# Patient Record
Sex: FEMALE | Race: ASIAN | NOT HISPANIC OR LATINO
[De-identification: names, ages, dates, MRNs, and addresses within clinical notes are randomized per-mention and may not be internally consistent; named-entity substitution may affect disease eponyms.]

---

## 2018-01-16 ENCOUNTER — APPOINTMENT (OUTPATIENT)
Dept: INFECTIOUS DISEASE | Facility: CLINIC | Age: 63
End: 2018-01-16
Payer: SELF-PAY

## 2018-01-16 PROCEDURE — 90471 IMMUNIZATION ADMIN: CPT | Mod: NC

## 2018-01-16 PROCEDURE — 90717 YELLOW FEVER VACCINE SUBQ: CPT

## 2018-01-16 PROCEDURE — 99401 PREV MED CNSL INDIV APPRX 15: CPT | Mod: 25

## 2021-12-11 ENCOUNTER — APPOINTMENT (OUTPATIENT)
Dept: NEUROSURGERY | Facility: HOSPITAL | Age: 66
End: 2021-12-11

## 2021-12-11 ENCOUNTER — INPATIENT (INPATIENT)
Facility: HOSPITAL | Age: 66
LOS: 24 days | Discharge: NURSING, REHAB OR LONG-TERM CARE | DRG: 20 | End: 2022-01-05
Attending: NEUROLOGICAL SURGERY | Admitting: NEUROLOGICAL SURGERY
Payer: MEDICARE

## 2021-12-11 ENCOUNTER — EMERGENCY (EMERGENCY)
Facility: HOSPITAL | Age: 66
LOS: 1 days | Discharge: ANOTHER HOSPITAL | End: 2021-12-11
Attending: EMERGENCY MEDICINE | Admitting: EMERGENCY MEDICINE
Payer: MEDICARE

## 2021-12-11 VITALS
DIASTOLIC BLOOD PRESSURE: 76 MMHG | OXYGEN SATURATION: 96 % | HEART RATE: 73 BPM | HEIGHT: 64 IN | RESPIRATION RATE: 16 BRPM | SYSTOLIC BLOOD PRESSURE: 129 MMHG | WEIGHT: 149.91 LBS

## 2021-12-11 VITALS — SYSTOLIC BLOOD PRESSURE: 128 MMHG | DIASTOLIC BLOOD PRESSURE: 72 MMHG | HEART RATE: 86 BPM

## 2021-12-11 VITALS
HEART RATE: 78 BPM | OXYGEN SATURATION: 100 % | DIASTOLIC BLOOD PRESSURE: 87 MMHG | TEMPERATURE: 98 F | SYSTOLIC BLOOD PRESSURE: 152 MMHG | RESPIRATION RATE: 20 BRPM

## 2021-12-11 DIAGNOSIS — Z86.79 PERSONAL HISTORY OF OTHER DISEASES OF THE CIRCULATORY SYSTEM: ICD-10-CM

## 2021-12-11 DIAGNOSIS — I60.9 NONTRAUMATIC SUBARACHNOID HEMORRHAGE, UNSPECIFIED: ICD-10-CM

## 2021-12-11 LAB
A1C WITH ESTIMATED AVERAGE GLUCOSE RESULT: 5.4 % — SIGNIFICANT CHANGE UP (ref 4–5.6)
ALBUMIN SERPL ELPH-MCNC: 4.3 G/DL — SIGNIFICANT CHANGE UP (ref 3.3–5)
ALBUMIN SERPL ELPH-MCNC: 4.3 G/DL — SIGNIFICANT CHANGE UP (ref 3.3–5)
ALP SERPL-CCNC: 77 U/L — SIGNIFICANT CHANGE UP (ref 40–120)
ALP SERPL-CCNC: 78 U/L — SIGNIFICANT CHANGE UP (ref 40–120)
ALT FLD-CCNC: 13 U/L — SIGNIFICANT CHANGE UP (ref 10–45)
ALT FLD-CCNC: 13 U/L — SIGNIFICANT CHANGE UP (ref 4–33)
ANION GAP SERPL CALC-SCNC: 11 MMOL/L — SIGNIFICANT CHANGE UP (ref 5–17)
ANION GAP SERPL CALC-SCNC: 11 MMOL/L — SIGNIFICANT CHANGE UP (ref 5–17)
ANION GAP SERPL CALC-SCNC: 13 MMOL/L — SIGNIFICANT CHANGE UP (ref 7–14)
APPEARANCE UR: CLEAR — SIGNIFICANT CHANGE UP
APTT BLD: 30.2 SEC — SIGNIFICANT CHANGE UP (ref 27.5–35.5)
APTT BLD: 32.6 SEC — SIGNIFICANT CHANGE UP (ref 27–36.3)
AST SERPL-CCNC: 15 U/L — SIGNIFICANT CHANGE UP (ref 10–40)
AST SERPL-CCNC: 16 U/L — SIGNIFICANT CHANGE UP (ref 4–32)
B PERT DNA SPEC QL NAA+PROBE: SIGNIFICANT CHANGE UP
B PERT+PARAPERT DNA PNL SPEC NAA+PROBE: SIGNIFICANT CHANGE UP
BACTERIA # UR AUTO: NEGATIVE — SIGNIFICANT CHANGE UP
BASOPHILS # BLD AUTO: 0.03 K/UL — SIGNIFICANT CHANGE UP (ref 0–0.2)
BASOPHILS # BLD AUTO: 0.03 K/UL — SIGNIFICANT CHANGE UP (ref 0–0.2)
BASOPHILS NFR BLD AUTO: 0.3 % — SIGNIFICANT CHANGE UP (ref 0–2)
BASOPHILS NFR BLD AUTO: 0.4 % — SIGNIFICANT CHANGE UP (ref 0–2)
BILIRUB SERPL-MCNC: 0.4 MG/DL — SIGNIFICANT CHANGE UP (ref 0.2–1.2)
BILIRUB SERPL-MCNC: 0.4 MG/DL — SIGNIFICANT CHANGE UP (ref 0.2–1.2)
BILIRUB UR-MCNC: NEGATIVE — SIGNIFICANT CHANGE UP
BLD GP AB SCN SERPL QL: NEGATIVE — SIGNIFICANT CHANGE UP
BLD GP AB SCN SERPL QL: NEGATIVE — SIGNIFICANT CHANGE UP
BORDETELLA PARAPERTUSSIS (RAPRVP): SIGNIFICANT CHANGE UP
BUN SERPL-MCNC: 11 MG/DL — SIGNIFICANT CHANGE UP (ref 7–23)
BUN SERPL-MCNC: 6 MG/DL — LOW (ref 7–23)
BUN SERPL-MCNC: 8 MG/DL — SIGNIFICANT CHANGE UP (ref 7–23)
C PNEUM DNA SPEC QL NAA+PROBE: SIGNIFICANT CHANGE UP
CALCIUM SERPL-MCNC: 8.4 MG/DL — SIGNIFICANT CHANGE UP (ref 8.4–10.5)
CALCIUM SERPL-MCNC: 8.7 MG/DL — SIGNIFICANT CHANGE UP (ref 8.4–10.5)
CALCIUM SERPL-MCNC: 9.5 MG/DL — SIGNIFICANT CHANGE UP (ref 8.4–10.5)
CHLORIDE SERPL-SCNC: 100 MMOL/L — SIGNIFICANT CHANGE UP (ref 98–107)
CHLORIDE SERPL-SCNC: 105 MMOL/L — SIGNIFICANT CHANGE UP (ref 96–108)
CHLORIDE SERPL-SCNC: 112 MMOL/L — HIGH (ref 96–108)
CHOLEST SERPL-MCNC: 241 MG/DL — HIGH
CO2 SERPL-SCNC: 21 MMOL/L — LOW (ref 22–31)
CO2 SERPL-SCNC: 22 MMOL/L — SIGNIFICANT CHANGE UP (ref 22–31)
CO2 SERPL-SCNC: 23 MMOL/L — SIGNIFICANT CHANGE UP (ref 22–31)
COLOR SPEC: COLORLESS — SIGNIFICANT CHANGE UP
CREAT SERPL-MCNC: 0.47 MG/DL — LOW (ref 0.5–1.3)
CREAT SERPL-MCNC: 0.53 MG/DL — SIGNIFICANT CHANGE UP (ref 0.5–1.3)
CREAT SERPL-MCNC: 0.55 MG/DL — SIGNIFICANT CHANGE UP (ref 0.5–1.3)
DIFF PNL FLD: ABNORMAL
EOSINOPHIL # BLD AUTO: 0 K/UL — SIGNIFICANT CHANGE UP (ref 0–0.5)
EOSINOPHIL # BLD AUTO: 0.14 K/UL — SIGNIFICANT CHANGE UP (ref 0–0.5)
EOSINOPHIL NFR BLD AUTO: 0 % — SIGNIFICANT CHANGE UP (ref 0–6)
EOSINOPHIL NFR BLD AUTO: 2 % — SIGNIFICANT CHANGE UP (ref 0–6)
EPI CELLS # UR: 0 /HPF — SIGNIFICANT CHANGE UP
ESTIMATED AVERAGE GLUCOSE: 108 MG/DL — SIGNIFICANT CHANGE UP (ref 68–114)
FLUAV SUBTYP SPEC NAA+PROBE: SIGNIFICANT CHANGE UP
FLUBV RNA SPEC QL NAA+PROBE: SIGNIFICANT CHANGE UP
GLUCOSE SERPL-MCNC: 100 MG/DL — HIGH (ref 70–99)
GLUCOSE SERPL-MCNC: 108 MG/DL — HIGH (ref 70–99)
GLUCOSE SERPL-MCNC: 126 MG/DL — HIGH (ref 70–99)
GLUCOSE UR QL: NEGATIVE — SIGNIFICANT CHANGE UP
HADV DNA SPEC QL NAA+PROBE: SIGNIFICANT CHANGE UP
HCOV 229E RNA SPEC QL NAA+PROBE: SIGNIFICANT CHANGE UP
HCOV HKU1 RNA SPEC QL NAA+PROBE: SIGNIFICANT CHANGE UP
HCOV NL63 RNA SPEC QL NAA+PROBE: SIGNIFICANT CHANGE UP
HCOV OC43 RNA SPEC QL NAA+PROBE: SIGNIFICANT CHANGE UP
HCT VFR BLD CALC: 37.7 % — SIGNIFICANT CHANGE UP (ref 34.5–45)
HCT VFR BLD CALC: 43.3 % — SIGNIFICANT CHANGE UP (ref 34.5–45)
HCT VFR BLD CALC: 43.9 % — SIGNIFICANT CHANGE UP (ref 34.5–45)
HDLC SERPL-MCNC: 71 MG/DL — SIGNIFICANT CHANGE UP
HEPARINASE TEG R TIME: 5.4 MIN — SIGNIFICANT CHANGE UP (ref 4.3–8.3)
HGB BLD-MCNC: 12.5 G/DL — SIGNIFICANT CHANGE UP (ref 11.5–15.5)
HGB BLD-MCNC: 14.1 G/DL — SIGNIFICANT CHANGE UP (ref 11.5–15.5)
HGB BLD-MCNC: 14.5 G/DL — SIGNIFICANT CHANGE UP (ref 11.5–15.5)
HMPV RNA SPEC QL NAA+PROBE: SIGNIFICANT CHANGE UP
HPIV1 RNA SPEC QL NAA+PROBE: SIGNIFICANT CHANGE UP
HPIV2 RNA SPEC QL NAA+PROBE: SIGNIFICANT CHANGE UP
HPIV3 RNA SPEC QL NAA+PROBE: SIGNIFICANT CHANGE UP
HPIV4 RNA SPEC QL NAA+PROBE: SIGNIFICANT CHANGE UP
IANC: 6.67 K/UL — SIGNIFICANT CHANGE UP (ref 1.5–8.5)
IMM GRANULOCYTES NFR BLD AUTO: 0.3 % — SIGNIFICANT CHANGE UP (ref 0–1.5)
IMM GRANULOCYTES NFR BLD AUTO: 0.4 % — SIGNIFICANT CHANGE UP (ref 0–1.5)
INR BLD: 0.97 RATIO — SIGNIFICANT CHANGE UP (ref 0.88–1.16)
INR BLD: 0.99 RATIO — SIGNIFICANT CHANGE UP (ref 0.88–1.16)
KETONES UR-MCNC: ABNORMAL
LEUKOCYTE ESTERASE UR-ACNC: NEGATIVE — SIGNIFICANT CHANGE UP
LIPID PNL WITH DIRECT LDL SERPL: 153 MG/DL — HIGH
LYMPHOCYTES # BLD AUTO: 1.69 K/UL — SIGNIFICANT CHANGE UP (ref 1–3.3)
LYMPHOCYTES # BLD AUTO: 1.77 K/UL — SIGNIFICANT CHANGE UP (ref 1–3.3)
LYMPHOCYTES # BLD AUTO: 19.6 % — SIGNIFICANT CHANGE UP (ref 13–44)
LYMPHOCYTES # BLD AUTO: 24 % — SIGNIFICANT CHANGE UP (ref 13–44)
M PNEUMO DNA SPEC QL NAA+PROBE: SIGNIFICANT CHANGE UP
MAGNESIUM SERPL-MCNC: 1.8 MG/DL — SIGNIFICANT CHANGE UP (ref 1.6–2.6)
MAGNESIUM SERPL-MCNC: 2.1 MG/DL — SIGNIFICANT CHANGE UP (ref 1.6–2.6)
MCHC RBC-ENTMCNC: 29.9 PG — SIGNIFICANT CHANGE UP (ref 27–34)
MCHC RBC-ENTMCNC: 30.2 PG — SIGNIFICANT CHANGE UP (ref 27–34)
MCHC RBC-ENTMCNC: 30.6 PG — SIGNIFICANT CHANGE UP (ref 27–34)
MCHC RBC-ENTMCNC: 32.6 GM/DL — SIGNIFICANT CHANGE UP (ref 32–36)
MCHC RBC-ENTMCNC: 33 GM/DL — SIGNIFICANT CHANGE UP (ref 32–36)
MCHC RBC-ENTMCNC: 33.2 GM/DL — SIGNIFICANT CHANGE UP (ref 32–36)
MCV RBC AUTO: 91.5 FL — SIGNIFICANT CHANGE UP (ref 80–100)
MCV RBC AUTO: 91.9 FL — SIGNIFICANT CHANGE UP (ref 80–100)
MCV RBC AUTO: 92.2 FL — SIGNIFICANT CHANGE UP (ref 80–100)
MONOCYTES # BLD AUTO: 0.37 K/UL — SIGNIFICANT CHANGE UP (ref 0–0.9)
MONOCYTES # BLD AUTO: 0.51 K/UL — SIGNIFICANT CHANGE UP (ref 0–0.9)
MONOCYTES NFR BLD AUTO: 5.2 % — SIGNIFICANT CHANGE UP (ref 2–14)
MONOCYTES NFR BLD AUTO: 5.7 % — SIGNIFICANT CHANGE UP (ref 2–14)
NEUTROPHILS # BLD AUTO: 4.79 K/UL — SIGNIFICANT CHANGE UP (ref 1.8–7.4)
NEUTROPHILS # BLD AUTO: 6.67 K/UL — SIGNIFICANT CHANGE UP (ref 1.8–7.4)
NEUTROPHILS NFR BLD AUTO: 68 % — SIGNIFICANT CHANGE UP (ref 43–77)
NEUTROPHILS NFR BLD AUTO: 74.1 % — SIGNIFICANT CHANGE UP (ref 43–77)
NITRITE UR-MCNC: NEGATIVE — SIGNIFICANT CHANGE UP
NON HDL CHOLESTEROL: 170 MG/DL — HIGH
NRBC # BLD: 0 /100 WBCS — SIGNIFICANT CHANGE UP
NRBC # BLD: 0 /100 WBCS — SIGNIFICANT CHANGE UP (ref 0–0)
NRBC # BLD: 0 /100 WBCS — SIGNIFICANT CHANGE UP (ref 0–0)
NRBC # FLD: 0 K/UL — SIGNIFICANT CHANGE UP
PA ADP PRP-ACNC: 195 PRU — SIGNIFICANT CHANGE UP (ref 194–417)
PH UR: 6.5 — SIGNIFICANT CHANGE UP (ref 5–8)
PHOSPHATE SERPL-MCNC: 2.2 MG/DL — LOW (ref 2.5–4.5)
PLATELET # BLD AUTO: 157 K/UL — SIGNIFICANT CHANGE UP (ref 150–400)
PLATELET # BLD AUTO: 185 K/UL — SIGNIFICANT CHANGE UP (ref 150–400)
PLATELET # BLD AUTO: 188 K/UL — SIGNIFICANT CHANGE UP (ref 150–400)
PLATELET RESPONSE ASPIRIN RESULT: 643 ARU — SIGNIFICANT CHANGE UP (ref 350–700)
POTASSIUM SERPL-MCNC: 3.1 MMOL/L — LOW (ref 3.5–5.3)
POTASSIUM SERPL-MCNC: 3.3 MMOL/L — LOW (ref 3.5–5.3)
POTASSIUM SERPL-MCNC: 3.6 MMOL/L — SIGNIFICANT CHANGE UP (ref 3.5–5.3)
POTASSIUM SERPL-SCNC: 3.1 MMOL/L — LOW (ref 3.5–5.3)
POTASSIUM SERPL-SCNC: 3.3 MMOL/L — LOW (ref 3.5–5.3)
POTASSIUM SERPL-SCNC: 3.6 MMOL/L — SIGNIFICANT CHANGE UP (ref 3.5–5.3)
PROT SERPL-MCNC: 6.4 G/DL — SIGNIFICANT CHANGE UP (ref 6–8.3)
PROT SERPL-MCNC: 7 G/DL — SIGNIFICANT CHANGE UP (ref 6–8.3)
PROT UR-MCNC: NEGATIVE — SIGNIFICANT CHANGE UP
PROTHROM AB SERPL-ACNC: 11.3 SEC — SIGNIFICANT CHANGE UP (ref 10.6–13.6)
PROTHROM AB SERPL-ACNC: 11.6 SEC — SIGNIFICANT CHANGE UP (ref 10.6–13.6)
RAPID RVP RESULT: SIGNIFICANT CHANGE UP
RAPIDTEG MAXIMUM AMPLITUDE: 60.8 MM — SIGNIFICANT CHANGE UP (ref 52–70)
RBC # BLD: 4.09 M/UL — SIGNIFICANT CHANGE UP (ref 3.8–5.2)
RBC # BLD: 4.71 M/UL — SIGNIFICANT CHANGE UP (ref 3.8–5.2)
RBC # BLD: 4.8 M/UL — SIGNIFICANT CHANGE UP (ref 3.8–5.2)
RBC # FLD: 12.6 % — SIGNIFICANT CHANGE UP (ref 10.3–14.5)
RBC # FLD: 12.6 % — SIGNIFICANT CHANGE UP (ref 10.3–14.5)
RBC # FLD: 13 % — SIGNIFICANT CHANGE UP (ref 10.3–14.5)
RBC CASTS # UR COMP ASSIST: 21 /HPF — HIGH (ref 0–4)
RH IG SCN BLD-IMP: POSITIVE — SIGNIFICANT CHANGE UP
RH IG SCN BLD-IMP: POSITIVE — SIGNIFICANT CHANGE UP
RSV RNA SPEC QL NAA+PROBE: SIGNIFICANT CHANGE UP
RV+EV RNA SPEC QL NAA+PROBE: SIGNIFICANT CHANGE UP
SARS-COV-2 RNA SPEC QL NAA+PROBE: SIGNIFICANT CHANGE UP
SARS-COV-2 RNA SPEC QL NAA+PROBE: SIGNIFICANT CHANGE UP
SODIUM SERPL-SCNC: 135 MMOL/L — SIGNIFICANT CHANGE UP (ref 135–145)
SODIUM SERPL-SCNC: 139 MMOL/L — SIGNIFICANT CHANGE UP (ref 135–145)
SODIUM SERPL-SCNC: 144 MMOL/L — SIGNIFICANT CHANGE UP (ref 135–145)
SP GR SPEC: 1.03 — HIGH (ref 1.01–1.02)
TEG FUNCTIONAL FIBRINOGEN: 20.9 MM — SIGNIFICANT CHANGE UP (ref 15–32)
TEG MAXIMUM AMPLITUDE: 60.2 MM — SIGNIFICANT CHANGE UP (ref 52–69)
TEG REACTION TIME: 6.2 MIN — SIGNIFICANT CHANGE UP (ref 4.6–9.1)
TRIGL SERPL-MCNC: 87 MG/DL — SIGNIFICANT CHANGE UP
TSH SERPL-MCNC: 0.46 UIU/ML — SIGNIFICANT CHANGE UP (ref 0.27–4.2)
UROBILINOGEN FLD QL: NEGATIVE — SIGNIFICANT CHANGE UP
WBC # BLD: 10.6 K/UL — HIGH (ref 3.8–10.5)
WBC # BLD: 7.05 K/UL — SIGNIFICANT CHANGE UP (ref 3.8–10.5)
WBC # BLD: 9.01 K/UL — SIGNIFICANT CHANGE UP (ref 3.8–10.5)
WBC # FLD AUTO: 10.6 K/UL — HIGH (ref 3.8–10.5)
WBC # FLD AUTO: 7.05 K/UL — SIGNIFICANT CHANGE UP (ref 3.8–10.5)
WBC # FLD AUTO: 9.01 K/UL — SIGNIFICANT CHANGE UP (ref 3.8–10.5)
WBC UR QL: 2 /HPF — SIGNIFICANT CHANGE UP (ref 0–5)

## 2021-12-11 PROCEDURE — 36224 PLACE CATH CAROTD ART: CPT | Mod: 59,RT

## 2021-12-11 PROCEDURE — 99291 CRITICAL CARE FIRST HOUR: CPT

## 2021-12-11 PROCEDURE — 36226 PLACE CATH VERTEBRAL ART: CPT | Mod: 50,59

## 2021-12-11 PROCEDURE — 70496 CT ANGIOGRAPHY HEAD: CPT | Mod: 26

## 2021-12-11 PROCEDURE — 99292 CRITICAL CARE ADDL 30 MIN: CPT

## 2021-12-11 PROCEDURE — 61645 PERQ ART M-THROMBECT &/NFS: CPT | Mod: LT

## 2021-12-11 PROCEDURE — 70450 CT HEAD/BRAIN W/O DYE: CPT | Mod: 26,77

## 2021-12-11 PROCEDURE — 93010 ELECTROCARDIOGRAM REPORT: CPT

## 2021-12-11 PROCEDURE — 75894 X-RAYS TRANSCATH THERAPY: CPT | Mod: 26,59

## 2021-12-11 PROCEDURE — 36227 PLACE CATH XTRNL CAROTID: CPT | Mod: 59,RT

## 2021-12-11 PROCEDURE — 99285 EMERGENCY DEPT VISIT HI MDM: CPT

## 2021-12-11 PROCEDURE — 70450 CT HEAD/BRAIN W/O DYE: CPT | Mod: 26,MA

## 2021-12-11 PROCEDURE — 70498 CT ANGIOGRAPHY NECK: CPT | Mod: 26

## 2021-12-11 PROCEDURE — 61624 TCAT PERM OCCLS/EMBOLJ CNS: CPT

## 2021-12-11 PROCEDURE — 75898 FOLLOW-UP ANGIOGRAPHY: CPT | Mod: 26,59

## 2021-12-11 PROCEDURE — 76377 3D RENDER W/INTRP POSTPROCES: CPT | Mod: 26

## 2021-12-11 RX ORDER — POTASSIUM CHLORIDE 20 MEQ
10 PACKET (EA) ORAL
Refills: 0 | Status: DISCONTINUED | OUTPATIENT
Start: 2021-12-11 | End: 2021-12-11

## 2021-12-11 RX ORDER — TRAMADOL HYDROCHLORIDE 50 MG/1
25 TABLET ORAL EVERY 4 HOURS
Refills: 0 | Status: DISCONTINUED | OUTPATIENT
Start: 2021-12-11 | End: 2021-12-12

## 2021-12-11 RX ORDER — METOCLOPRAMIDE HCL 10 MG
10 TABLET ORAL ONCE
Refills: 0 | Status: COMPLETED | OUTPATIENT
Start: 2021-12-11 | End: 2021-12-11

## 2021-12-11 RX ORDER — SODIUM CHLORIDE 9 MG/ML
1000 INJECTION INTRAMUSCULAR; INTRAVENOUS; SUBCUTANEOUS
Refills: 0 | Status: DISCONTINUED | OUTPATIENT
Start: 2021-12-11 | End: 2021-12-12

## 2021-12-11 RX ORDER — DIPHENHYDRAMINE HCL 50 MG
50 CAPSULE ORAL ONCE
Refills: 0 | Status: COMPLETED | OUTPATIENT
Start: 2021-12-11 | End: 2021-12-11

## 2021-12-11 RX ORDER — NICARDIPINE HYDROCHLORIDE 30 MG/1
5 CAPSULE, EXTENDED RELEASE ORAL
Qty: 40 | Refills: 0 | Status: DISCONTINUED | OUTPATIENT
Start: 2021-12-11 | End: 2021-12-12

## 2021-12-11 RX ORDER — NICARDIPINE HYDROCHLORIDE 30 MG/1
5 CAPSULE, EXTENDED RELEASE ORAL
Qty: 40 | Refills: 0 | Status: DISCONTINUED | OUTPATIENT
Start: 2021-12-11 | End: 2021-12-14

## 2021-12-11 RX ORDER — NIMODIPINE 60 MG/10ML
30 SOLUTION ORAL
Refills: 0 | Status: DISCONTINUED | OUTPATIENT
Start: 2021-12-11 | End: 2021-12-13

## 2021-12-11 RX ORDER — LEVETIRACETAM 250 MG/1
500 TABLET, FILM COATED ORAL EVERY 12 HOURS
Refills: 0 | Status: DISCONTINUED | OUTPATIENT
Start: 2021-12-11 | End: 2021-12-11

## 2021-12-11 RX ORDER — SODIUM CHLORIDE 9 MG/ML
500 INJECTION INTRAMUSCULAR; INTRAVENOUS; SUBCUTANEOUS ONCE
Refills: 0 | Status: COMPLETED | OUTPATIENT
Start: 2021-12-11 | End: 2021-12-11

## 2021-12-11 RX ORDER — ACETAMINOPHEN 500 MG
1000 TABLET ORAL ONCE
Refills: 0 | Status: COMPLETED | OUTPATIENT
Start: 2021-12-11 | End: 2021-12-11

## 2021-12-11 RX ORDER — ACETAMINOPHEN 500 MG
650 TABLET ORAL EVERY 6 HOURS
Refills: 0 | Status: DISCONTINUED | OUTPATIENT
Start: 2021-12-11 | End: 2022-01-05

## 2021-12-11 RX ORDER — CHLORHEXIDINE GLUCONATE 213 G/1000ML
1 SOLUTION TOPICAL
Refills: 0 | Status: DISCONTINUED | OUTPATIENT
Start: 2021-12-11 | End: 2021-12-26

## 2021-12-11 RX ORDER — POTASSIUM CHLORIDE 20 MEQ
40 PACKET (EA) ORAL ONCE
Refills: 0 | Status: COMPLETED | OUTPATIENT
Start: 2021-12-11 | End: 2021-12-11

## 2021-12-11 RX ORDER — POTASSIUM PHOSPHATE, MONOBASIC POTASSIUM PHOSPHATE, DIBASIC 236; 224 MG/ML; MG/ML
15 INJECTION, SOLUTION INTRAVENOUS ONCE
Refills: 0 | Status: COMPLETED | OUTPATIENT
Start: 2021-12-11 | End: 2021-12-11

## 2021-12-11 RX ORDER — LEVETIRACETAM 250 MG/1
1500 TABLET, FILM COATED ORAL ONCE
Refills: 0 | Status: COMPLETED | OUTPATIENT
Start: 2021-12-11 | End: 2021-12-11

## 2021-12-11 RX ORDER — SENNA PLUS 8.6 MG/1
2 TABLET ORAL AT BEDTIME
Refills: 0 | Status: DISCONTINUED | OUTPATIENT
Start: 2021-12-11 | End: 2021-12-13

## 2021-12-11 RX ORDER — SODIUM CHLORIDE 9 MG/ML
1000 INJECTION INTRAMUSCULAR; INTRAVENOUS; SUBCUTANEOUS ONCE
Refills: 0 | Status: COMPLETED | OUTPATIENT
Start: 2021-12-11 | End: 2021-12-11

## 2021-12-11 RX ORDER — NIMODIPINE 60 MG/10ML
60 SOLUTION ORAL ONCE
Refills: 0 | Status: DISCONTINUED | OUTPATIENT
Start: 2021-12-11 | End: 2021-12-14

## 2021-12-11 RX ORDER — POTASSIUM CHLORIDE 20 MEQ
40 PACKET (EA) ORAL EVERY 4 HOURS
Refills: 0 | Status: COMPLETED | OUTPATIENT
Start: 2021-12-11 | End: 2021-12-12

## 2021-12-11 RX ADMIN — SODIUM CHLORIDE 1000 MILLILITER(S): 9 INJECTION INTRAMUSCULAR; INTRAVENOUS; SUBCUTANEOUS at 02:58

## 2021-12-11 RX ADMIN — Medication 50 MILLIGRAM(S): at 02:59

## 2021-12-11 RX ADMIN — NICARDIPINE HYDROCHLORIDE 25 MG/HR: 30 CAPSULE, EXTENDED RELEASE ORAL at 21:26

## 2021-12-11 RX ADMIN — LEVETIRACETAM 400 MILLIGRAM(S): 250 TABLET, FILM COATED ORAL at 18:43

## 2021-12-11 RX ADMIN — POTASSIUM PHOSPHATE, MONOBASIC POTASSIUM PHOSPHATE, DIBASIC 62.5 MILLIMOLE(S): 236; 224 INJECTION, SOLUTION INTRAVENOUS at 23:32

## 2021-12-11 RX ADMIN — Medication 40 MILLIEQUIVALENT(S): at 04:33

## 2021-12-11 RX ADMIN — SODIUM CHLORIDE 75 MILLILITER(S): 9 INJECTION INTRAMUSCULAR; INTRAVENOUS; SUBCUTANEOUS at 21:26

## 2021-12-11 RX ADMIN — Medication 1000 MILLIGRAM(S): at 19:13

## 2021-12-11 RX ADMIN — Medication 10 MILLIGRAM(S): at 02:59

## 2021-12-11 RX ADMIN — CHLORHEXIDINE GLUCONATE 1 APPLICATION(S): 213 SOLUTION TOPICAL at 22:57

## 2021-12-11 RX ADMIN — Medication 400 MILLIGRAM(S): at 18:43

## 2021-12-11 RX ADMIN — NICARDIPINE HYDROCHLORIDE 25 MG/HR: 30 CAPSULE, EXTENDED RELEASE ORAL at 06:18

## 2021-12-11 RX ADMIN — LEVETIRACETAM 400 MILLIGRAM(S): 250 TABLET, FILM COATED ORAL at 06:19

## 2021-12-11 RX ADMIN — Medication 400 MILLIGRAM(S): at 06:18

## 2021-12-11 RX ADMIN — TRAMADOL HYDROCHLORIDE 25 MILLIGRAM(S): 50 TABLET ORAL at 23:29

## 2021-12-11 RX ADMIN — SODIUM CHLORIDE 500 MILLILITER(S): 9 INJECTION INTRAMUSCULAR; INTRAVENOUS; SUBCUTANEOUS at 22:57

## 2021-12-11 NOTE — ED ADULT NURSE NOTE - OBJECTIVE STATEMENT
67 y/o female presents to ED as transfer from [facility] ED for NSGY services, found to have atraumatic SAH overnight on CTs. Pt was reporting headache "all over" last night and  brought her to ED. No known PMH, no daily meds or blood thinners. A&Ox2 to person and place, arousable to voice, breathing unlabored, no edema, abd soft nontender, skin warm dry and intact. 2 patent peripheral IVs in place on arrival. NSGY resident at bedside.

## 2021-12-11 NOTE — ED PROVIDER NOTE - PROGRESS NOTE DETAILS
Mario RODNEY (PGY-2)   Received call from rads, found to have SAH on CT with hydrocephalus. Ordered CTA's, informed NSGY, administered keppra load, cardine gtt started to control sBP<140. Will likely need transfer to NS after imaging

## 2021-12-11 NOTE — CHART NOTE - NSCHARTNOTEFT_GEN_A_CORE
Interventional Neuro- Radiology   Procedure Note PA-C    Procedure: Selective Cerebral Angiography   Pre- Procedure Diagnosis:  Post- Procedure Diagnosis:    : Dr. Vladimir Lucas      RN:    Anesthesia: (MAC)   (general anesthesia)    Sheath:    I/Os:  Estimated blood loss less than 10cc  IV fluids:     cc     Urine output     cc        Contrast Omnipaque 240      cc         Antibiotics:    Vitals: BP         HR      Spo2    %      Spo2    %    Preliminary Report:    Using a 4 Fr short/long sheath to the right groin under MAC sedation via   left vertebral artery,  left common carotid artery, left external carotid artey, right vertebral arter,  right common carotid artery, right external carotid artery  a selective cerebral angiography was performed and  demonstrates ________. ( Official note to follow).  Patient tolerated procedure well, hemodynamically stable, no change in neurological status compared to baseline.  Results discussed with neurosurgery, patient and patient's  family.  Groin sheath was removed,  manual compression held to hemostasis  for  21 minutes, no active bleeding, no hematoma, avitene applied,  quick clot and safeguard balloon dressing applied at _____h.  STAT labs:  CBC BMP  ____h.  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 Vladimir Lucas  Fellow: Dr. Wilmar Rodriguez  Resident: Dr. Reji Packer  NP: Cynthia Eric  RN: Oneil    Anesthesia: Dr. Han (general anesthesia)    Sheath: 8 Yoruba BMX    I/Os:  Estimated blood loss: less than 10cc    IV fluids: 900cc     Urine output: 450     cc        Contrast Omnipaque 240: 97      cc         Antibiotics: ancef 2 grams  Intra arterial heparin: 4000 units  Intra arterial verapamil: 3mg     Vitals: BP  141/78        HR 68     Spo2 100   %      Preliminary Report:  Using a 8 Yoruba bmx sheath to the right groin under general anesthesia via left vertebral artery,  left internal carotid artery, left external carotid artery, right vertebral artery,  right internal carotid artery, right external carotid artery  a selective cerebral angiography was performed and  demonstrates bilobed left pcomm aneurysm.  Proceeded with endovascular embolization of the aneurysm with balloon assisted coiling (3coils). Partial left m2 thombus now s/p thrombectomy with tici 3 reperfusion.  Aneurysm well secured and thrombosed  ( Official note to follow).  Patient tolerated procedure well, hemodynamically stable.   Results discussed with neurosurgery  and patient's  family.  Groin sheath was removed, kelt device deployed, manual compression held to hemostasis  for  21 minutes, no active bleeding, no hematoma,  quick clot and safeguard balloon dressing applied at 1230h.  Patient transferred to NSCU  Keep -140 post procedure

## 2021-12-11 NOTE — ED PROVIDER NOTE - OBJECTIVE STATEMENT
66F smoker no significant PMH presenting for diffuse, stabbing headache that started gradually around 12 hrs ago, associated with nausea and vomiting x3. No fevers/chills, neck stiffness, vision changes, chest pain, SOB, abdominal pain, leg swelling, vision changes. Has never had headache like this before. No FHx of aneurysms of brain bleeds

## 2021-12-11 NOTE — PROGRESS NOTE ADULT - SUBJECTIVE AND OBJECTIVE BOX
HPI:  66F, no sig PMH, txfered from [facility] for HA since 12/10 morning. Had associated nausea/vomiting and posterior neck pain. CT: diffuse SAH, most prominent at Sylvian fissures. Exam: Somnolent, Ox2.5 (said November), Left pupil 6NR; Right pupil 5NR, EOMI, no facial, Right drift, LAWTON 5/5   (11 Dec 2021 11:22)      EVENTS:   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; partial left m2 thombus now s/p thrombectomy with tici 3 reperfusion      ICU Vital Signs Last 24 Hrs  T(C): 36.9 (11 Dec 2021 10:35), Max: 36.9 (11 Dec 2021 08:30)  T(F): 98.4 (11 Dec 2021 08:30), Max: 98.4 (11 Dec 2021 08:30)  HR: 62 (11 Dec 2021 10:35) (62 - 97)  BP: 139/84 (11 Dec 2021 10:35) (128/76 - 139/84)  BP(mean): --  ABP: --  ABP(mean): --  RR: 18 (11 Dec 2021 10:35) (16 - 20)  SpO2: 98% (11 Dec 2021 10:35) (96% - 99%)                            14.1   7.05  )-----------( 185      ( 11 Dec 2021 07:49 )             43.3    12-11    139  |  105  |  8   ----------------------------<  100<H>  3.6   |  23  |  0.53    Ca    8.7      11 Dec 2021 07:49  Mg     2.1     12-11    TPro  7.0  /  Alb  4.3  /  TBili  0.4  /  DBili  x   /  AST  15  /  ALT  13  /  AlkPhos  77  12-11            PHYSICAL EXAM:    General: No Acute Distress     Neurological: Awake, alert oriented to person, place and time, Following Commands, PERRL, EOMI, Face Symmetrical, Speech Fluent, Moving all extremities, Muscle Strength normal in all four extremities, No Drift, Sensation to Light Touch Intact    Pulmonary: Clear to Auscultation, No Rales, No Rhonchi, No Wheezes     Cardiovascular: S1, S2, Regular Rate and Rhythm     Gastrointestinal: Soft, Nontender, Nondistended     Extremities: No calf tenderness     Incision:       MEDICATIONS:  Antibiotics:      Neurological:   acetaminophen     Tablet .. 650 milliGRAM(s) Oral every 6 hours PRN  levETIRAcetam 500 milliGRAM(s) Oral every 12 hours    Cardiac:   niCARdipine Infusion 5 mG/Hr IV Continuous <Continuous>    Pulm:    Heme:     Other:   senna 2 Tablet(s) Oral at bedtime PRN Constipation  sodium chloride 0.9%. 1000 milliLiter(s) IV Continuous <Continuous>       DEVICES: [] Restraints [] MILES/HMV []LD [] ET tube [] Trach [] Chest Tube [] A-line [] Ty [] NGT [] Rectal Tube       A/P:  HPI:  66F, a SAH mFS3, HHS 2 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; partial left m2 thombus now s/p thrombectomy with tici 3 reperfusion    Neuro: neuro checks q 1 hr, CT head tomorrow morning, nimodipine,  d/c  keppra 500 mg BID for seizure prophylaxis  hold sedation   hydrocephalus, EVD at 10 cm water   Respiratory: intubated, acute respiratory failure , will get chest xray  stop propofol  PS trial   attempt extubation   CV: NSR, TTE, -160 mmhg  Endocrine: finger sticks q6hrs, ISS , keep sugar 120-180 mmhg   Heme/Onc: INR <1.5, Plt>100            DVT ppx: SCD, start lovenox tomorrow if am CT head stable   Renal: NS  75 ml/hr  ID: afebrile  GI: NPO, protonix, NG, colace   Social/Family: updated at bedside  Discharge planning: ICU    Code Status: [x] Full Code [] DNR [] DNI [] Goals of Care:   Disposition: [x] ICU [] Stroke Unit [] RCU []PCU []Floor [] Discharge Home     Patient at high risk for neurologic deterioration, aneurysm rerupture, seizures, ICP crisis, critical care time, excluding procedures: 40 minutes  Patient condition is critical, she has very poor exam.                       HPI:  66F, no sig PMH, txfered from [facility] for HA since 12/10 morning. Had associated nausea/vomiting and posterior neck pain. CT: diffuse SAH, most prominent at Sylvian fissures. Exam: Somnolent, Ox2.5 (said November), Left pupil 6NR; Right pupil 5NR, EOMI, no facial, Right drift, LAWTON 5/5   (11 Dec 2021 11:22)      EVENTS:   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; partial left m2 thombus now s/p thrombectomy with tici 3 reperfusion      ICU Vital Signs Last 24 Hrs  T(C): 36.9 (11 Dec 2021 10:35), Max: 36.9 (11 Dec 2021 08:30)  T(F): 98.4 (11 Dec 2021 08:30), Max: 98.4 (11 Dec 2021 08:30)  HR: 62 (11 Dec 2021 10:35) (62 - 97)  BP: 139/84 (11 Dec 2021 10:35) (128/76 - 139/84)  BP(mean): --  ABP: --  ABP(mean): --  RR: 18 (11 Dec 2021 10:35) (16 - 20)  SpO2: 98% (11 Dec 2021 10:35) (96% - 99%)                            14.1   7.05  )-----------( 185      ( 11 Dec 2021 07:49 )             43.3    12-11    139  |  105  |  8   ----------------------------<  100<H>  3.6   |  23  |  0.53    Ca    8.7      11 Dec 2021 07:49  Mg     2.1     12-11    TPro  7.0  /  Alb  4.3  /  TBili  0.4  /  DBili  x   /  AST  15  /  ALT  13  /  AlkPhos  77  12-11            PHYSICAL EXAM:    General: No Acute Distress     Neurological: off propofol- awake Ox0 intubated, ALLAN, not FC pupils R 4 NR L 5NR corneals, dolls, cough gag+, overbreathing, BUE posturing BLE posturing to nox  Pulmonary: Clear to Auscultation, No Rales, No Rhonchi, No Wheezes     Cardiovascular: S1, S2, Regular Rate and Rhythm     Gastrointestinal: Soft, Nontender, Nondistended     Extremities: No calf tenderness     Incision:       MEDICATIONS:  Antibiotics:      Neurological:   acetaminophen     Tablet .. 650 milliGRAM(s) Oral every 6 hours PRN  levETIRAcetam 500 milliGRAM(s) Oral every 12 hours    Cardiac:   niCARdipine Infusion 5 mG/Hr IV Continuous <Continuous>    Pulm:    Heme:     Other:   senna 2 Tablet(s) Oral at bedtime PRN Constipation  sodium chloride 0.9%. 1000 milliLiter(s) IV Continuous <Continuous>       DEVICES: [] Restraints [] MILES/HMV []LD [] ET tube [] Trach [] Chest Tube [] A-line [] Ty [] NGT [] Rectal Tube       A/P:  HPI:  66F, a SAH mFS3, HHS 2 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; partial left m2 thombus now s/p thrombectomy with tici 3 reperfusion    Neuro: neuro checks q 1 hr, CT head tomorrow morning, nimodipine,  d/c  keppra 500 mg BID for seizure prophylaxis  hold sedation   hydrocephalus, EVD at 10 cm water   Respiratory: intubated, acute respiratory failure , will get chest xray  stop propofol  PS trial   attempt extubation   CV: NSR, TTE, -160 mmhg  Endocrine: finger sticks q6hrs, ISS , keep sugar 120-180 mmhg   Heme/Onc: INR <1.5, Plt>100            DVT ppx: SCD, start lovenox tomorrow if am CT head stable   Renal: NS  75 ml/hr  ID: afebrile  GI: NPO, protonix, NG, colace   Social/Family: updated at bedside  Discharge planning: ICU    Code Status: [x] Full Code [] DNR [] DNI [] Goals of Care:   Disposition: [x] ICU [] Stroke Unit [] RCU []PCU []Floor [] Discharge Home     Patient at high risk for neurologic deterioration, aneurysm rerupture, seizures, ICP crisis, critical care time, excluding procedures: 40 minutes  Patient condition is critical, she has very poor exam.                       HPI:  66F, no sig PMH, txfered from [facility] for HA since 12/10 morning. Had associated nausea/vomiting and posterior neck pain. CT: diffuse SAH, most prominent at Sylvian fissures. Exam: Somnolent, Ox2.5 (said November), Left pupil 6NR; Right pupil 5NR, EOMI, no facial, Right drift, LAWTON 5/5   (11 Dec 2021 11:22)      EVENTS:   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; partial left m2 thombus now s/p thrombectomy with tici 3 reperfusion      ICU Vital Signs Last 24 Hrs  T(C): 36.9 (11 Dec 2021 10:35), Max: 36.9 (11 Dec 2021 08:30)  T(F): 98.4 (11 Dec 2021 08:30), Max: 98.4 (11 Dec 2021 08:30)  HR: 62 (11 Dec 2021 10:35) (62 - 97)  BP: 139/84 (11 Dec 2021 10:35) (128/76 - 139/84)  BP(mean): --  ABP: --  ABP(mean): --  RR: 18 (11 Dec 2021 10:35) (16 - 20)  SpO2: 98% (11 Dec 2021 10:35) (96% - 99%)                            14.1   7.05  )-----------( 185      ( 11 Dec 2021 07:49 )             43.3    12-11    139  |  105  |  8   ----------------------------<  100<H>  3.6   |  23  |  0.53    Ca    8.7      11 Dec 2021 07:49  Mg     2.1     12-11    TPro  7.0  /  Alb  4.3  /  TBili  0.4  /  DBili  x   /  AST  15  /  ALT  13  /  AlkPhos  77  12-11            PHYSICAL EXAM:    General: No Acute Distress     Neurological: off propofol- awake Ox0 intubated, ALLAN, not FC pupils R 4 NR L 5NR corneals not reactive, dolls, cough gag+, overbreathing, moving all extremities to painful stimuli   Pulmonary: Clear to Auscultation, No Rales, No Rhonchi, No Wheezes     Cardiovascular: S1, S2, Regular Rate and Rhythm     Gastrointestinal: Soft, Nontender, Nondistended     Extremities: No calf tenderness     Incision:       MEDICATIONS:  Antibiotics:      Neurological:   acetaminophen     Tablet .. 650 milliGRAM(s) Oral every 6 hours PRN  levETIRAcetam 500 milliGRAM(s) Oral every 12 hours    Cardiac:   niCARdipine Infusion 5 mG/Hr IV Continuous <Continuous>    Pulm:    Heme:     Other:   senna 2 Tablet(s) Oral at bedtime PRN Constipation  sodium chloride 0.9%. 1000 milliLiter(s) IV Continuous <Continuous>       DEVICES: [] Restraints [] MILES/HMV []LD [] ET tube [] Trach [] Chest Tube [] A-line [] Ty [] NGT [] Rectal Tube       A/P:  66F, a SAH mFS3, HHS 2 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; partial left m2 thombus now s/p thrombectomy with tici 3 reperfusion    Neuro: neuro checks q 1 hr, CT head tomorrow morning, nimodipine,  d/c  keppra 500 mg BID for seizure prophylaxis  hold sedation   hydrocephalus, EVD at 10 cm water   Respiratory: intubated, acute respiratory failure , will get chest xray  stop propofol  PS trial   attempt extubation   CV: NSR, TTE, -160 mmhg  Endocrine: finger sticks q6hrs, ISS , keep sugar 120-180 mmhg   Heme/Onc: INR <1.5, Plt>100            DVT ppx: SCD, start lovenox tomorrow if am CT head stable   Renal: NS  75 ml/hr  ID: afebrile  GI: NPO, protonix, NG, colace   Social/Family: updated at bedside  Discharge planning: ICU    Code Status: [x] Full Code [] DNR [] DNI [] Goals of Care:   Disposition: [x] ICU [] Stroke Unit [] RCU []PCU []Floor [] Discharge Home     Patient at high risk for neurologic deterioration, aneurysm rerupture, seizures, ICP crisis, critical care time, excluding procedures: 40 minutes  Patient condition is critical, she has very poor exam.

## 2021-12-11 NOTE — PROGRESS NOTE ADULT - SUBJECTIVE AND OBJECTIVE BOX
NSCU Progress Note    Assessment/Hospital Course:        24 Hour Events/Subjective:  -       REVIEW OF SYSTEMS:  - negative except as above    VITALS:   - T(C): 37.2 (12-11-21 @ 17:00), Max: 37.2 (12-11-21 @ 17:00)  T(F): 98.9 (12-11-21 @ 17:00), Max: 98.9 (12-11-21 @ 17:00)  HR: 71 (12-11-21 @ 18:45) (62 - 120)  BP: 139/84 (12-11-21 @ 10:35) (128/76 - 139/84)  ABP: 123/55 (12-11-21 @ 18:45) (115/38 - 173/89)  ABP(mean): 79 (12-11-21 @ 18:45) (70 - 124)  RR: 17 (12-11-21 @ 18:45) (12 - 21)  SpO2: 100% (12-11-21 @ 18:45) (96% - 100%)      IMAGING/DATA:   - Reviewed          PHYSICAL EXAM:    General: calm  CVS: RRR  Pulm: CTAB  GI: Soft, NTND  Extremities: No LE Edema  Neuro: AOx3, PERRL, EOMI, facial symmetrical, fluent speech, motor 5/5 throughout, no PND, sensation in tact   NSCU Progress Note      24 Hour Events/Subjective:  - PAD 0 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; partial left m2 thrombus now s/p thrombectomy with tici 3 reperfusion  - EVD@10  - Extubated to RA  - given  for goal net euvolemia   - TCDs WNL  - started on nimodipine      REVIEW OF SYSTEMS:  - negative except as above    VITALS:   - T(C): 37.2 (12-11-21 @ 17:00), Max: 37.2 (12-11-21 @ 17:00)  T(F): 98.9 (12-11-21 @ 17:00), Max: 98.9 (12-11-21 @ 17:00)  HR: 71 (12-11-21 @ 18:45) (62 - 120)  BP: 139/84 (12-11-21 @ 10:35) (128/76 - 139/84)  ABP: 123/55 (12-11-21 @ 18:45) (115/38 - 173/89)  ABP(mean): 79 (12-11-21 @ 18:45) (70 - 124)  RR: 17 (12-11-21 @ 18:45) (12 - 21)  SpO2: 100% (12-11-21 @ 18:45) (96% - 100%)      IMAGING/DATA:   - Reviewed          PHYSICAL EXAM:    General: calm  CVS: RRR  Pulm: CTAB  GI: Soft, NTND  Extremities: No LE Edema  Neuro: AOx3, L pupil 4mmNR Rtpupil 5mm NR, EOMI, facial symmetrical, motor 5/5 throughout, RUE PND

## 2021-12-11 NOTE — ED PROVIDER NOTE - PHYSICAL EXAMINATION
Physical Exam:  Gen: uncomfortable 2/2 pain, awake alert   HEENT: EOMI, PEERL, normal conjunctiva, oral mucosa moist  Lung: CTAB, no respiratory distress, no wheezes/rhonchi/rales B/L, speaking in full sentences  CV: RRR, no murmurs, rubs or gallops  Abd: soft, NT, ND, no guarding, no rigidity, no rebound tenderness, no CVA tenderness   MSK: no visible deformities, ROM normal in UE/LE  Neuro: CN's 2-12 grossly intact, No focal sensory or motor deficits, negative kernigs/brudzinskis signs  Skin: Warm, well perfused, no rash, no leg swelling  Psych: normal affect, calm  ~Andreas Martin MD (PGY-2)

## 2021-12-11 NOTE — ED ADULT NURSE NOTE - OBJECTIVE STATEMENT
pt a&o x3 c/o headaches since today accompanied by n/v and dizziness. no blurred vision or chest pain. right ac 20g placed. nad noted. endorsed to primary rn

## 2021-12-11 NOTE — ED PROVIDER NOTE - ATTENDING CONTRIBUTION TO CARE
------------ATTENDING NOTE------------ ------------ATTENDING NOTE------------  pt transferred for spontaneous SAH complicated by uncontrolled HTN, mild obtunded by ABCs intact / protecting airway, stable during ED course, no additional complaints, coordination w/ NSGY for continued tx, close reassessments, additional w/u, needing admission for optimize medical mgmt, outpt needs.  - Hank Gilbert MD    ----------------------------------------------

## 2021-12-11 NOTE — ED PROVIDER NOTE - CLINICAL SUMMARY MEDICAL DECISION MAKING FREE TEXT BOX
66F p/w headache, n/v. VSS in ED. No neuro deficits on exam, no meningeal signs  Given gradual nature of symptoms, likely migraine etiology. However, will evaluate for possible SAH although unlikely  Plan: labs, migraine cocktail, ct head, reassess

## 2021-12-11 NOTE — ED PROVIDER NOTE - OBJECTIVE STATEMENT
66 year old female with no pertinent PMH presents with headache since yesterday. Pt reports sudden onset severe bilateral headache since yesterday morning associated with nausea and multiple episodes of nonbloody nonbilious emesis.  reports giving Tylenol with little improvement. Pt seen at [facility] with CT head showing bilateral SAH with hydrocephalus, started on cardene gtt and transferred to [facility]. Denies any recent injury or trauma. Denies any fevers, chest pain, or shortness of breath. Denies any aspirin or AC use. Denies any additional complaints. 66 year old female with no pertinent PMH presents with headache since yesterday. Pt reports sudden onset severe bilateral headache since yesterday morning associated with nausea and multiple episodes of nonbloody nonbilious emesis.  reports giving Tylenol with little improvement. Pt seen at [facility] with CT head showing bilateral SAH with hydrocephalus, started on cardene gtt and transferred to [facility]. Denies any recent injury or trauma. Denies any fevers, chest pain, or shortness of breath. Denies any aspirin or AC use. Denies any additional complaints.    [facility] MRN [id number]

## 2021-12-11 NOTE — ED PROVIDER NOTE - PHYSICAL EXAMINATION
CONSTITUTIONAL: non-toxic, somnolent appearing  SKIN: no rash, no petechiae.  EYES: PERRL, EOMI, pink conjunctiva, anicteric  ENT: tongue and uvular midline, no exudates, moist mucous membranes  NECK: Supple; no meningismus, no JVD  CARD: RRR, no murmurs, equal radial pulses bilaterally 2+  RESP: CTAB, no respiratory distress  ABD: Soft, non-tender, non-distended, no peritoneal signs  EXT: Normal ROM x4. No edema.   NEURO: Alert, oriented. Neuro exam nonfocal, equal strength bilaterally.  PSYCH: Cooperative, appropriate. CONSTITUTIONAL: non-toxic, somnolent appearing  SKIN: no rash, no petechiae.  EYES: PERRL, EOMI, pink conjunctiva, anicteric  ENT: tongue and uvular midline  NECK: Supple; no meningismus, no JVD  CARD: RRR, no murmurs, equal radial pulses bilaterally 2+  RESP: CTAB, no respiratory distress  ABD: Soft, non-tender, non-distended, no peritoneal signs  EXT: Normal ROM x4. No edema.   NEURO: Alert, oriented. Neuro exam nonfocal, equal strength bilaterally.  PSYCH: Cooperative, appropriate.

## 2021-12-11 NOTE — ED PROVIDER NOTE - NS ED ROS FT
CONST: no fevers, no chills  EYES: no vision changes  ENT: no sore throat  CV: no chest pain, no leg swelling  RESP: no shortness of breath, no cough  ABD: no abdominal pain, +nausea, +vomiting, no diarrhea  : no dysuria, no flank pain, no hematuria  MSK: no back pain, no extremity pain  NEURO: +headache  SKIN:  no rash

## 2021-12-11 NOTE — ED PROVIDER NOTE - ATTENDING CONTRIBUTION TO CARE
I performed a face-to-face evaluation of the patient and performed a history and physical examination along with the resident or ACP, and/or medical student above.  I agree with the history and physical examination as documented by the resident or ACP, and/or medical student above.  [place]:  65yo F, denies sig pmh, p/w headache x approx 12hrs associated w/ nausea and nbnb emesis. Low suspicion for brain bleed but given severity of headache will get CTH, meds, basics, reassess.

## 2021-12-11 NOTE — ED ADULT NURSE REASSESSMENT NOTE - NS ED NURSE REASSESS COMMENT FT1
Pt more drowsy than upon arrival, but still arousable to voice. Needs repetitive instruction to follow commands, similar to arrival.

## 2021-12-11 NOTE — ED PROVIDER NOTE - CARE PLAN
1 Principal Discharge DX:	Spontaneous subarachnoid hemorrhage  Secondary Diagnosis:	Uncontrolled hypertension

## 2021-12-11 NOTE — H&P ADULT - ASSESSMENT
66F, no sig PMH, txfered from [facility] for HA x12hr. Had nausea/vomiting. Denies neck stiffness. CT: diffuse SAH, most prominent at Sylvian fissures. Exam: Somnolent, Ox2.5 (said November), Left pupil 6NR; Right pupil 5NR, EOMI, no facial, Right drift, LAWTON 5/5  -Adm NSCU, A line, BP<140  -CTA  -Preop Angio/embolization, external ventricular drain placement today  -Keppra 500 BID  -CBC, coags wnl at [facility]

## 2021-12-11 NOTE — CONSULT NOTE ADULT - SUBJECTIVE AND OBJECTIVE BOX
OZ DAVIS 66y ,Female  HPI:  66F smoker no significant PMH presenting for diffuse, stabbing headache that started gradually around 12 hrs ago, associated with nausea and vomiting x3. No fevers/chills, neck stiffness, vision changes, chest pain, SOB, abdominal pain, leg swelling, vision changes. Has never had headache like this before. No FHx of aneurysms of brain bleeds.  CT head shows There is diffuse bilateral subarachnoid hemorrhage of uncertain etiology most prominent at the sylvian fissures. Also seen at the midline falx and at the basilar cistern. Ruptured aneurysm is suspected. There is mild to moderate hydrocephalus of the third fourth and lateral ventricles and temporal horns. There is a small focus of intraventricular blood in the left lateral ventricle.    PAST MEDICAL & SURGICAL HISTORY:  No pertinent past medical history  No significant past surgical history    Allergies  No Known Allergies    MEDICATIONS  (STANDING):  niCARdipine Infusion 5 mG/Hr (25 mL/Hr) IV Continuous <Continuous>    MEDICATIONS  (PRN):    Vital Signs Last 24 Hrs  T(C): 37.1 (11 Dec 2021 06:03), Max: 37.1 (11 Dec 2021 02:27)  T(F): 98.8 (11 Dec 2021 06:03), Max: 98.8 (11 Dec 2021 02:27)  HR: 66 (11 Dec 2021 06:03) (64 - 78)  BP: 159/84 (11 Dec 2021 06:03) (152/87 - 170/84)  BP(mean): --  RR: 17 (11 Dec 2021 06:03) (17 - 20)  SpO2: 100% (11 Dec 2021 06:03) (99% - 100%)    PE:  AA&0 x 3, CN 2-12 grossly intact, speach clear, follows commands, PERRL  Motor: strength : BUE/BLE 5/5  Sensory: SILT  No drift    LABS:                        14.5   9.01  )-----------( 188      ( 11 Dec 2021 03:28 )             43.9     12-11    135  |  100  |  11  ----------------------------<  126<H>  3.1<L>   |  22  |  0.55    Ca    9.5      11 Dec 2021 03:28    TPro  6.4  /  Alb  4.3  /  TBili  0.4  /  DBili  x   /  AST  16  /  ALT  13  /  AlkPhos  78  12-11

## 2021-12-11 NOTE — CHART NOTE - NSCHARTNOTEFT_GEN_A_CORE
CAPRINI SCORE [CLOT] Score on Admission for     AGE RELATED RISK FACTORS                                                       MOBILITY RELATED FACTORS  [ ] Age 41-60 years                                            (1 Point)                  [ ] Bed rest                                                        (1 Point)  [ x] Age: 61-74 years                                           (2 Points)                 [ ] Plaster cast                                                   (2 Points)  [ ] Age= 75 years                                              (3 Points)                 [ ] Bed bound for more than 72 hours                 (2 Points)    DISEASE RELATED RISK FACTORS                                               GENDER SPECIFIC FACTORS  [ ] Edema in the lower extremities                       (1 Point)                  [ ] Pregnancy                                                     (1 Point)  [ ] Varicose veins                                               (1 Point)                  [ ] Post-partum < 6 weeks                                   (1 Point)             [x ] BMI > 25 Kg/m2                                            (1 Point)                  [ ] Hormonal therapy  or oral contraception          (1 Point)                 [ ] Sepsis (in the previous month)                        (1 Point)                  [ ] History of pregnancy complications                 (1 point)  [ ] Pneumonia or serious lung disease                                               [ ] Unexplained or recurrent                     (1 Point)           (in the previous month)                               (1 Point)  [ ] Abnormal pulmonary function test                     (1 Point)                 SURGERY RELATED RISK FACTORS (include planned surgeries)  [ ] Acute myocardial infarction                              (1 Point)                 [ ]  Section                                             (1 Point)  [ ] Congestive heart failure (in the previous month)  (1 Point)         [ ] Minor surgery                                                  (1 Point)   [ ] Inflammatory bowel disease                             (1 Point)                 [ ] Arthroscopic surgery                                        (2 Points)  [ ] Central venous access                                      (2 Points)                [ ] General surgery lasting more than 45 minutes   (2 Points)       [x ] Stroke (in the previous month)                          (5 Points)               [ ] Elective arthroplasty                                         (5 Points)            [ ] current or past malignancy                              (2 Points)                                                                                                       HEMATOLOGY RELATED FACTORS                                                 TRAUMA RELATED RISK FACTORS  [ ] Prior episodes of VTE                                     (3 Points)                [ ] Fracture of the hip, pelvis, or leg                       (5 Points)  [ ] Positive family history for VTE                         (3 Points)                 [ ] Acute spinal cord injury (in the previous month)  (5 Points)  [ ] Prothrombin 21489 A                                     (3 Points)                 [ ] Paralysis  (less than 1 month)                             (5 Points)  [ ] Factor V Leiden                                             (3 Points)                  [ ] Multiple Trauma within 1 month                        (5 Points)  [ ] Lupus anticoagulants                                     (3 Points)                                                           [ ] Anticardiolipin antibodies                               (3 Points)                                                       [ ] High homocysteine in the blood                      (3 Points)                                             [ ] Other congenital or acquired thrombophilia      (3 Points)                                                [ ] Heparin induced thrombocytopenia                  (3 Points)                                          Total Score [     8     ]    Risk:  Very low 0   Low 1 to 2   Moderate 3 to 4   High =5       VTE Prophylasix Recommednations:  [ x] mechanical pneumatic compression devices                                      [ ] contraindicated: _____________________  [ ] chemo prophylasix                                                                                   [ ] contraindicated _____________________    **** HIGH LIKELIHOOD DVT PRESENT ON ADMISSION  [ ] (please order LE dopplers within 24 hours of admission)

## 2021-12-11 NOTE — ED PROVIDER NOTE - PROGRESS NOTE DETAILS
Homer-PGY3: spoke to neurosurgery re: consult, requesting CTA, pending recs. Homer-PGY3: pt seen by NSG, rpt imaging pending, admitted to NSCU.

## 2021-12-11 NOTE — ED PROVIDER NOTE - NS ED ROS FT
Review of Systems    Constitutional: (-) fever, (-) chills, (-) fatigue  Cardiovascular: (-) chest pain, (-) syncope  Respiratory: (-) cough, (-) shortness of breath  Gastrointestinal: (-) vomiting, (-) diarrhea, (-) abdominal pain  Musculoskeletal: (-) neck pain, (-) back pain, (-) joint pain  Integumentary: (-) rash, (-) edema, (-) wound  Neurological: (+) headache, (-) altered mental status    Except as documented in the HPI, all other systems are negative. Review of Systems    Constitutional: (-) fever, (-) chills, (-) fatigue  Cardiovascular: (-) chest pain, (-) syncope  Respiratory: (-) cough, (-) shortness of breath  Gastrointestinal: (+) vomiting, (-) diarrhea, (-) abdominal pain  Musculoskeletal: (-) neck pain, (-) back pain, (-) joint pain  Integumentary: (-) rash, (-) edema, (-) wound  Neurological: (+) headache, (-) altered mental status    Except as documented in the HPI, all other systems are negative. Review of Systems    Constitutional: (-) fever, (-) chills  Cardiovascular: (-) chest pain, (-) syncope  Respiratory: (-) cough, (-) shortness of breath  Gastrointestinal: (+) vomiting, (-) diarrhea, (-) abdominal pain  Musculoskeletal: (-) neck pain, (-) back pain, (-) joint pain  Integumentary: (-) rash, (-) edema, (-) wound  Neurological: (+) headache, (-) altered mental status    Except as documented in the HPI, all other systems are negative.

## 2021-12-11 NOTE — H&P ADULT - HISTORY OF PRESENT ILLNESS
66F, no sig PMH, txfered from [facility] for HA since 12/10 morning. Had associated nausea/vomiting and posterior neck pain. CT: diffuse SAH, most prominent at Sylvian fissures. Exam: Somnolent, Ox2.5 (said November), Left pupil 6NR; Right pupil 5NR, EOMI, no facial, Right drift, LAWTON 5/5

## 2021-12-11 NOTE — H&P ADULT - NSHPROSALLOTHERNEGRD_GEN_ALL_CORE
inflammation (redness, irritation), bleeding, ulcers, or tumors. REASONS FOR UPPER ENDOSCOPY  The most common reasons for upper endoscopy include:  Unexplained discomfort in the upper abdomen   GERD or gastroesophageal reflux disease, (often called heartburn)   Persistent nausea and vomiting   Upper GI bleeding (vomiting blood or blood found in the stool that originated from the upper part of the gastrointestinal tract). Bleeding can be treated during the endoscopy. Difficulty swallowing; food/liquids getting stuck in the esophagus during swallowing. This may be caused by a narrowing (stricture) or tumor. The stricture may be dilated with special balloons or dilation tubes during the endoscopy. Abnormal or unclear findings on an upper GI x-ray, CT scan or MRI. Removal of a foreign body (a swallowed object). To check healing or progress on previously found polyps (growths), tumors, or ulcers. ENDOSCOPY PREPARATION  You will be given specific instructions regarding how to prepare for the examination before the procedure. These instructions are designed to maximize your safety during and after the examination and to minimize possible complications. It is important to read the instructions ahead of time and follow them carefully. Do not hesitate to call the physician's office or the endoscopy unit if there are questions. You may be asked not to eat or drink anything for up to eight hours before the test. It is important for your stomach to be empty to allow the endoscopist to visualize the entire area and to decrease the possibility of food or fluid being vomited into the lungs while under sedation (called aspiration). You may be asked to adjust the dose of your medications or to stop specific medications (such as aspirin-like drugs) temporarily before the examination. You should discuss your medications with your physician before your appointment for the endoscopy.   You should arrange for a friend or family monitor. Most people have no difficulty swallowing the flexible gastroscope as a result of the sedating medications. Many people sleep during the test; others are very relaxed and generally not aware of the examination. An alternative procedure called transnasal endoscopy may be available in some facilities. This involves passing a very thin scope (about the size of a drinking straw) through the nose. You are not sedated but a medication is applied to the nose to prevent discomfort. A full examination can be performed with this instrument. The endoscopist may take tissue samples called biopsies (not painful), or perform specific treatments (such as dilation, removal of polyps, treatment of bleeding), depending upon what is found during the examination. Air is introduced through the scope to open the esophagus, stomach, and intestine, allowing the scope to be passed through these structures and improving the endoscopist's ability to see all of the structures. You may experience a mild discomfort as air is pushed into the intestinal tract. This is not harmful and belching may relieve the sensation. The endoscope does not interfere with breathing. Taking slow, deep breaths during the procedure may help you to relax. ENDOSCOPY RECOVERY  After the endoscopy, you will be observed for one to two hours while the sedative medication wears off. The medicines cause most people to temporarily feel tired or have difficulty concentrating and you should not drive or return to work after the procedure. The most common discomfort after the examination is a feeling of bloating as a result of the air introduced during the examination. This usually resolves quickly. Some patients also have a mild sore throat. Most patients are able to eat shortly after the examination. ENDOSCOPY COMPLICATIONS  Upper endoscopy is a safe procedure and complications are uncommon.  The following is a list of possible complications:  Aspiration Medicine  (www.nlm.nih.gov/medlineplus/healthtopics. html)  The American Society of Gastrointestinal Endoscopy:  (www.askasge. org)  [employer] of Diabetes and Digestive and Kidney Diseases  (http://digestive. niddk.nih.gov/ddiseases/pubs/upperendoscopy/index. htm) All other review of systems negative, except as noted in HPI

## 2021-12-11 NOTE — CONSULT NOTE ADULT - PROBLEM SELECTOR RECOMMENDATION 9
1. CTA  2. continue cardene, keep SBP < 140  3. Tx to [facility] under Dr. Bundy  4. Neurochecks Q1H  5. load with 1g Keppra  6. Nimodipine 60mg PO Q6h

## 2021-12-11 NOTE — CHART NOTE - NSCHARTNOTEFT_GEN_A_CORE
Interventional Neuro Radiology  Pre-Procedure Note PA-C    HPI:  This is a 66yr old female presents to [facility] with WHOL. CT head significant for SAH. CTA shows possible left pcomm aneurysm. Transferred to neuro IR for cerebral angiogram and embolization.     Allergies: No Known Allergies      PAST MEDICAL & SURGICAL HISTORY:  No pertinent past medical history    No significant past surgical history        Social History: Unknown    FAMILY HISTORY: Unknown      Current Medications: niCARdipine Infusion 5 mG/Hr IV Continuous <Continuous>      Labs:                         14.1   7.05  )-----------( 185                   43.3       12-11    139  |  105  |  8   ----------------------------<  100<H>  3.6   |  23  |  0.53      Assessment/Plan:   This is a 66y  year old female with subarachnoid hemorrhage possible left pcomm aneurysm.  Patient presents to neuro-IR for selective cerebral angiography and embolization of ruptured aneurysm.   Procedure, goals, risks, benefits and alternatives  were discussed with patient and patient's family.  All questions were answered.  Risks include but are not limited to stroke, vessel injury, hemorrhage, and or right  groin hematoma.  Patient demonstrates understanding  of all risks involved with this procedure and wishes to continue.   Appropriate  consent was obtained from patient and consent is in the patient's chart. Interventional Neuro Radiology  Pre-Procedure Note PA-C    HPI:  This is a 66yr old female presents to [facility] with WHOL. CT head significant for SAH. CTA shows possible left pcomm aneurysm. Transferred to neuro IR for cerebral angiogram and embolization. Pre op neuro exam patient letheragic a01-2 moves all extremities. EVD placed in IR open at 10.     Allergies: No Known Allergies      PAST MEDICAL & SURGICAL HISTORY:  No pertinent past medical history    No significant past surgical history        Social History: Unknown    FAMILY HISTORY: Unknown      Current Medications: niCARdipine Infusion 5 mG/Hr IV Continuous <Continuous>      Labs:                         14.1   7.05  )-----------( 185                   43.3       12-11    139  |  105  |  8   ----------------------------<  100<H>  3.6   |  23  |  0.53      Assessment/Plan:   This is a 66y  year old female with subarachnoid hemorrhage possible left pcomm aneurysm.  Patient presents to neuro-IR for selective cerebral angiography and embolization of ruptured aneurysm.   Procedure, goals, risks, benefits and alternatives  were discussed with patient and patient's family.  All questions were answered.  Risks include but are not limited to stroke, vessel injury, hemorrhage, and or right  groin hematoma.  Patient;s  demonstrates understanding  of all risks involved with this procedure and wishes to continue.   Appropriate  consent was obtained from patient;s  and consent is in the patient's chart.

## 2021-12-11 NOTE — PROGRESS NOTE ADULT - ASSESSMENT
ASSESSMENT/PLAN:    66F, a SAH mFS3, HHS 2 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; partial left m2 thrombus now s/p thrombectomy with tici 3 reperfusion    NEURO:  - neuro checks q1h  - repeat CTH AM  - HCP: EVD@10, goal ICP<22  - DCI management: Daily TCds, normonatremia, euvolemia  - [id number]  - DC keppra, aneurysm secured  - Activity: PT/OT as tolerated    CVS:  - SBP goal 100-140 (aneurysm secured, post MT for LM2 partial occlusion)    PULM:  - RA  - IS As tolerated  - Aspiration precautions    RENAL:  - Fluids: 75cc/h NS until toleratring full diet  - daily IOs    GI:  - Diet: ADAT  - Bowel regimen: miralax, senna    ENDO:   - FS goal 120-180    HEME/ONC:  - SCDs  - Chemoppx: hold pending scan in am    ID:  - monitor for fevers      Patient is at high risk of neurologic deterioration/death due to:  SAH, vasospasm watch      Time spent: 35 critical care minutes

## 2021-12-11 NOTE — H&P ADULT - NSHPPHYSICALEXAM_GEN_ALL_CORE
Somnolent, Ox2.5 (said November), Left pupil 6NR; Right pupil 5NR, EOMI, no facial, Right drift, LAWTON 5/5

## 2021-12-11 NOTE — ED ADULT TRIAGE NOTE - CHIEF COMPLAINT QUOTE
Headache and generalized weakness since Yesterday morning. Vomited twice at home. Denies CP or SOB or fever. No PMH.

## 2021-12-12 LAB
ANION GAP SERPL CALC-SCNC: 11 MMOL/L — SIGNIFICANT CHANGE UP (ref 5–17)
BUN SERPL-MCNC: 8 MG/DL — SIGNIFICANT CHANGE UP (ref 7–23)
CALCIUM SERPL-MCNC: 8.9 MG/DL — SIGNIFICANT CHANGE UP (ref 8.4–10.5)
CHLORIDE SERPL-SCNC: 105 MMOL/L — SIGNIFICANT CHANGE UP (ref 96–108)
CO2 SERPL-SCNC: 21 MMOL/L — LOW (ref 22–31)
CREAT SERPL-MCNC: 0.47 MG/DL — LOW (ref 0.5–1.3)
GLUCOSE SERPL-MCNC: 105 MG/DL — HIGH (ref 70–99)
HCT VFR BLD CALC: 37.3 % — SIGNIFICANT CHANGE UP (ref 34.5–45)
HCV AB S/CO SERPL IA: 0.14 S/CO — SIGNIFICANT CHANGE UP (ref 0–0.99)
HCV AB SERPL-IMP: SIGNIFICANT CHANGE UP
HGB BLD-MCNC: 12.6 G/DL — SIGNIFICANT CHANGE UP (ref 11.5–15.5)
MAGNESIUM SERPL-MCNC: 2 MG/DL — SIGNIFICANT CHANGE UP (ref 1.6–2.6)
MCHC RBC-ENTMCNC: 30.8 PG — SIGNIFICANT CHANGE UP (ref 27–34)
MCHC RBC-ENTMCNC: 33.8 GM/DL — SIGNIFICANT CHANGE UP (ref 32–36)
MCV RBC AUTO: 91.2 FL — SIGNIFICANT CHANGE UP (ref 80–100)
NRBC # BLD: 0 /100 WBCS — SIGNIFICANT CHANGE UP (ref 0–0)
PHOSPHATE SERPL-MCNC: 2.7 MG/DL — SIGNIFICANT CHANGE UP (ref 2.5–4.5)
PLATELET # BLD AUTO: 153 K/UL — SIGNIFICANT CHANGE UP (ref 150–400)
POTASSIUM SERPL-MCNC: 3.6 MMOL/L — SIGNIFICANT CHANGE UP (ref 3.5–5.3)
POTASSIUM SERPL-SCNC: 3.6 MMOL/L — SIGNIFICANT CHANGE UP (ref 3.5–5.3)
RBC # BLD: 4.09 M/UL — SIGNIFICANT CHANGE UP (ref 3.8–5.2)
RBC # FLD: 12.4 % — SIGNIFICANT CHANGE UP (ref 10.3–14.5)
SODIUM SERPL-SCNC: 137 MMOL/L — SIGNIFICANT CHANGE UP (ref 135–145)
WBC # BLD: 8.09 K/UL — SIGNIFICANT CHANGE UP (ref 3.8–10.5)
WBC # FLD AUTO: 8.09 K/UL — SIGNIFICANT CHANGE UP (ref 3.8–10.5)

## 2021-12-12 PROCEDURE — 93306 TTE W/DOPPLER COMPLETE: CPT | Mod: 26

## 2021-12-12 PROCEDURE — 99291 CRITICAL CARE FIRST HOUR: CPT

## 2021-12-12 PROCEDURE — 70450 CT HEAD/BRAIN W/O DYE: CPT | Mod: 26

## 2021-12-12 RX ORDER — OXYCODONE HYDROCHLORIDE 5 MG/1
5 TABLET ORAL EVERY 4 HOURS
Refills: 0 | Status: DISCONTINUED | OUTPATIENT
Start: 2021-12-12 | End: 2021-12-15

## 2021-12-12 RX ORDER — OXYCODONE HYDROCHLORIDE 5 MG/1
5 TABLET ORAL ONCE
Refills: 0 | Status: DISCONTINUED | OUTPATIENT
Start: 2021-12-12 | End: 2021-12-12

## 2021-12-12 RX ORDER — SODIUM CHLORIDE 9 MG/ML
500 INJECTION INTRAMUSCULAR; INTRAVENOUS; SUBCUTANEOUS ONCE
Refills: 0 | Status: COMPLETED | OUTPATIENT
Start: 2021-12-12 | End: 2021-12-12

## 2021-12-12 RX ORDER — OXYCODONE HYDROCHLORIDE 5 MG/1
10 TABLET ORAL EVERY 4 HOURS
Refills: 0 | Status: DISCONTINUED | OUTPATIENT
Start: 2021-12-12 | End: 2021-12-15

## 2021-12-12 RX ORDER — POTASSIUM CHLORIDE 20 MEQ
40 PACKET (EA) ORAL ONCE
Refills: 0 | Status: COMPLETED | OUTPATIENT
Start: 2021-12-12 | End: 2021-12-12

## 2021-12-12 RX ORDER — POTASSIUM CHLORIDE 20 MEQ
40 PACKET (EA) ORAL EVERY 4 HOURS
Refills: 0 | Status: COMPLETED | OUTPATIENT
Start: 2021-12-12 | End: 2021-12-13

## 2021-12-12 RX ADMIN — NIMODIPINE 30 MILLIGRAM(S): 60 SOLUTION ORAL at 20:00

## 2021-12-12 RX ADMIN — CHLORHEXIDINE GLUCONATE 1 APPLICATION(S): 213 SOLUTION TOPICAL at 21:15

## 2021-12-12 RX ADMIN — TRAMADOL HYDROCHLORIDE 25 MILLIGRAM(S): 50 TABLET ORAL at 17:30

## 2021-12-12 RX ADMIN — NIMODIPINE 30 MILLIGRAM(S): 60 SOLUTION ORAL at 22:00

## 2021-12-12 RX ADMIN — OXYCODONE HYDROCHLORIDE 5 MILLIGRAM(S): 5 TABLET ORAL at 19:47

## 2021-12-12 RX ADMIN — TRAMADOL HYDROCHLORIDE 25 MILLIGRAM(S): 50 TABLET ORAL at 11:05

## 2021-12-12 RX ADMIN — Medication 650 MILLIGRAM(S): at 09:15

## 2021-12-12 RX ADMIN — TRAMADOL HYDROCHLORIDE 25 MILLIGRAM(S): 50 TABLET ORAL at 16:31

## 2021-12-12 RX ADMIN — Medication 650 MILLIGRAM(S): at 10:00

## 2021-12-12 RX ADMIN — OXYCODONE HYDROCHLORIDE 5 MILLIGRAM(S): 5 TABLET ORAL at 18:47

## 2021-12-12 RX ADMIN — TRAMADOL HYDROCHLORIDE 25 MILLIGRAM(S): 50 TABLET ORAL at 11:55

## 2021-12-12 RX ADMIN — NIMODIPINE 30 MILLIGRAM(S): 60 SOLUTION ORAL at 10:12

## 2021-12-12 RX ADMIN — TRAMADOL HYDROCHLORIDE 25 MILLIGRAM(S): 50 TABLET ORAL at 00:00

## 2021-12-12 RX ADMIN — Medication 40 MILLIEQUIVALENT(S): at 02:07

## 2021-12-12 RX ADMIN — NIMODIPINE 30 MILLIGRAM(S): 60 SOLUTION ORAL at 00:09

## 2021-12-12 RX ADMIN — SODIUM CHLORIDE 500 MILLILITER(S): 9 INJECTION INTRAMUSCULAR; INTRAVENOUS; SUBCUTANEOUS at 22:00

## 2021-12-12 NOTE — PROGRESS NOTE ADULT - SUBJECTIVE AND OBJECTIVE BOX
NSCU Progress Note      24 Hour Events/Subjective:  - PAD 1 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; partial left m2 thrombus now s/p thrombectomy with tici 3 reperfusion  - EVD@10, no ICP issues  - given  for goal net euvolemia overnight      REVIEW OF SYSTEMS:  - negative except as above    VITALS:   - Reviewed      IMAGING/DATA:   - Reviewed          PHYSICAL EXAM:    General: calm, on RA  CVS: RRR  Pulm: CTAB  GI: Soft, NTND  Extremities: No LE Edema  Neuro: AOx3, L pupil 4mmNR Rtpupil 5mm NR, EOMI, facial symmetrical, motor 5/5 throughout, RUE PND   NSCU Progress Note      24 Hour Events/Subjective:  - PAD 1 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; partial left m2 thrombus now s/p thrombectomy with tici 3 reperfusion  - EVD@10, no ICP issues  - given  for goal net euvolemia overnight      T(C): 36.3 (12-12-21 @ 07:00), Max: 37.2 (12-11-21 @ 17:00)  HR: 62 (12-12-21 @ 09:00) (55 - 120)  BP: 139/84 (12-11-21 @ 10:35) (139/84 - 139/84)  RR: 14 (12-12-21 @ 09:00) (12 - 23)  SpO2: 99% (12-12-21 @ 09:00) (94% - 100%)  12-11-21 @ 07:01  -  12-12-21 @ 07:00  --------------------------------------------------------  IN: 2542.5 mL / OUT: 2824 mL / NET: -281.5 mL    12-12-21 @ 07:01  -  12-12-21 @ 09:50  --------------------------------------------------------  IN: 150 mL / OUT: 13 mL / NET: 137 mL    acetaminophen     Tablet .. 650 milliGRAM(s) Oral every 6 hours PRN  chlorhexidine 4% Liquid 1 Application(s) Topical <User Schedule>  niCARdipine Infusion 5 mG/Hr IV Continuous <Continuous>  niMODipine 30 milliGRAM(s) Oral every 2 hours  senna 2 Tablet(s) Oral at bedtime PRN  sodium chloride 0.9%. 1000 milliLiter(s) IV Continuous <Continuous>  traMADol 25 milliGRAM(s) Oral every 4 hours PRN        IMAGING/DATA:   - Reviewed          PHYSICAL EXAM:    General: calm, on RA  CVS: RRR  Pulm: CTAB  GI: Soft, NTND  Extremities: No LE Edema  Neuro: AOx3, L pupil 4mmNR Rtpupil 5mm NR, EOMI, facial symmetrical, motor 5/5 throughout, RUE PND       LABS:  Na: 144 (12-11 @ 20:51), 139 (12-11 @ 07:49)  K: 3.3 (12-11 @ 20:51), 3.6 (12-11 @ 07:49)  Cl: 112 (12-11 @ 20:51), 105 (12-11 @ 07:49)  CO2: 21 (12-11 @ 20:51), 23 (12-11 @ 07:49)  BUN: 6 (12-11 @ 20:51), 8 (12-11 @ 07:49)  Cr: 0.47 (12-11 @ 20:51), 0.53 (12-11 @ 07:49)  Glu: 108(12-11 @ 20:51), 100(12-11 @ 07:49)    Hgb: 12.5 (12-11 @ 20:51), 14.1 (12-11 @ 07:49)  Hct: 37.7 (12-11 @ 20:51), 43.3 (12-11 @ 07:49)  WBC: 10.60 (12-11 @ 20:51), 7.05 (12-11 @ 07:49)  Plt: 157 (12-11 @ 20:51), 185 (12-11 @ 07:49)    INR: 0.97 12-11-21 @ 07:50  PTT: 30.2 12-11-21 @ 07:50

## 2021-12-12 NOTE — PROGRESS NOTE ADULT - ASSESSMENT
66F, a SAH mFS3, HHS 2 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  - possible vanquish vs rage

## 2021-12-12 NOTE — PHYSICAL THERAPY INITIAL EVALUATION ADULT - DISCHARGE DISPOSITION, PT EVAL
Anticipate home with PT pending further ambulation/stair negotiation. Per , he is available to assist at all times./home w/ home PT

## 2021-12-12 NOTE — PHYSICAL THERAPY INITIAL EVALUATION ADULT - PATIENT/FAMILY AGREES WITH PLAN
Anticipate home with PT pending further ambulation/stair negotiation. Per , he is available to assist at all times./yes

## 2021-12-12 NOTE — PHYSICAL THERAPY INITIAL EVALUATION ADULT - ADDITIONAL COMMENTS
Pt. lives in apt. with , 3 steps to get in. ~10 steps inside.   Patient ambulated without AD independent.

## 2021-12-12 NOTE — PROGRESS NOTE ADULT - SUBJECTIVE AND OBJECTIVE BOX
EVD dropped to 5cmH2O. EVD tract heme    Awake, alert, fully oriented, pupils reactive, moves all limbs strongly, though slightly brisker on left

## 2021-12-12 NOTE — PHYSICAL THERAPY INITIAL EVALUATION ADULT - PERTINENT HX OF CURRENT PROBLEM, REHAB EVAL
66F, no sig PMH, txfered from [facility] for HA since 12/10 morning. Had associated nausea/vomiting and posterior neck pain. CT: diffuse SAH, most prominent at Sylvian fissures.

## 2021-12-12 NOTE — PROGRESS NOTE ADULT - SUBJECTIVE AND OBJECTIVE BOX
Patient seen and examined at bedside.    --Anticoagulation--    T(C): 36.6 (12-12-21 @ 03:00), Max: 37.2 (12-11-21 @ 17:00)  HR: 58 (12-12-21 @ 05:00) (55 - 120)  BP: 139/84 (12-11-21 @ 10:35) (128/76 - 139/84)  RR: 16 (12-12-21 @ 05:00) (12 - 23)  SpO2: 99% (12-12-21 @ 05:00) (94% - 100%)  Wt(kg): --    Exam: AOx3, L pupil 4mmNR Rtpupil 5mm NR, EOMI, facial symmetrical, motor 5/5 throughout

## 2021-12-12 NOTE — PROGRESS NOTE ADULT - ASSESSMENT
HH2 mF3 subarachnoid hemorrhage, post-bleed day 1    TCDs, euvolemia  Monitor external ventricular drain tract heme  hydrocephalus: external ventricular drain in place  -200mmHg, BP autoregulation  Pain control    At risk of of death/neuro decline due to: delayed cerebral ischemia

## 2021-12-12 NOTE — PROGRESS NOTE ADULT - ASSESSMENT
ASSESSMENT/PLAN:    66F, a SAH mFS3, HHS 2 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; partial left m2 thrombus now s/p thrombectomy with tici 3 reperfusion    NEURO:  - neuro checks q1h  - repeat CTH today  - HCP: EVD@10, goal ICP<22  - DCI management: Daily TCds, normonatremia, euvolemia  - [id number]  - Activity: PT/OT as tolerated    CVS:  - SBP goal 100-140 (aneurysm secured though post MT for LM2 partial occlusion); liberalize per NSG    PULM:  - RA  - IS As tolerated  - Aspiration precautions    RENAL:  - Fluids: 75cc/h NS until tolerating full diet  - daily IOs    GI:  - Diet: ADAT  - Bowel regimen: miralax, senna    ENDO:   - FS goal 120-180    HEME/ONC:  - SCDs  - Chemoppx: hold pending scan in am    ID:  - monitor for fevers      Patient is at high risk of neurologic deterioration/death due to:  SAH, vasospasm watch      Time spent: 35 critical care minutes ASSESSMENT/PLAN:    66F, a SAH mFS3, HHS 2 , PBD 2, 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; partial left m2 thrombus now s/p thrombectomy with tici 3 reperfusion    NEURO:  - neuro checks q1h  - CTH today improved hydrocephalus   - HCP: EVD@10, output 124 ml / 24 hr , goal ICP<22  - DCI management: Daily TCds , nimodipine with holding parameter,  euvolemia, nimodipine is being held for bradycardia   - Activity: PT/OT as tolerated    CVS:  - SBP goal 100-200   -d/c nicardipine  TTE pending     PULM:  - RA  - IS As tolerated  - Aspiration precautions    RENAL:  - Fluids: IVL   - daily IOs    GI:  - Diet: regular diet   - Bowel regimen: miralax, senna    ENDO:   - FS goal 120-180     HEME/ONC:  - SCDs  - Chemoppx: start lovenox 40 mg sc qhs tomorrow     ID:  afebrile       Patient is at high risk of neurologic deterioration/death due to:  SAH, vasospasm watch      Time spent: 35 critical care minutes ASSESSMENT/PLAN:    66F, a SAH mFS3, HHS 2 , PBD 2, 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; partial left m2 thrombus now s/p thrombectomy with tici 3 reperfusion    NEURO:  - neuro checks q1h  - CTH today improved hydrocephalus   - HCP: EVD@10, output 124 ml / 24 hr , goal ICP<22  - DCI management: Daily TCds , nimodipine with holding parameter,  euvolemia, nimodipine is being held for bradycardia   - Activity: PT/OT as tolerated  patient is alert and can self stimulate will approach family today for [facility] trial     CVS:  - SBP goal 100-200   -d/c nicardipine  TTE pending     PULM:  - RA  - IS As tolerated  - Aspiration precautions    RENAL:  - Fluids: IVL   - daily IOs    GI:  - Diet: regular diet   - Bowel regimen: miralax, senna    ENDO:   - FS goal 120-180     HEME/ONC:  - SCDs  - Chemoppx: start lovenox 40 mg sc qhs tomorrow     ID:  afebrile       Patient is at high risk of neurologic deterioration/death due to:  SAH, vasospasm watch      Time spent: 35 critical care minutes

## 2021-12-12 NOTE — PHYSICAL THERAPY INITIAL EVALUATION ADULT - DID THE PATIENT HAVE SURGERY?
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; partial left m2 thrombus now s/p thrombectomy with tici 3 reperfusion/yes

## 2021-12-13 ENCOUNTER — TRANSCRIPTION ENCOUNTER (OUTPATIENT)
Age: 66
End: 2021-12-13

## 2021-12-13 LAB
ANION GAP SERPL CALC-SCNC: 13 MMOL/L — SIGNIFICANT CHANGE UP (ref 5–17)
BUN SERPL-MCNC: 9 MG/DL — SIGNIFICANT CHANGE UP (ref 7–23)
CALCIUM SERPL-MCNC: 9.1 MG/DL — SIGNIFICANT CHANGE UP (ref 8.4–10.5)
CHLORIDE SERPL-SCNC: 101 MMOL/L — SIGNIFICANT CHANGE UP (ref 96–108)
CO2 SERPL-SCNC: 21 MMOL/L — LOW (ref 22–31)
CREAT SERPL-MCNC: 0.49 MG/DL — LOW (ref 0.5–1.3)
GLUCOSE SERPL-MCNC: 105 MG/DL — HIGH (ref 70–99)
HCT VFR BLD CALC: 41.2 % — SIGNIFICANT CHANGE UP (ref 34.5–45)
HGB BLD-MCNC: 14.1 G/DL — SIGNIFICANT CHANGE UP (ref 11.5–15.5)
MAGNESIUM SERPL-MCNC: 2 MG/DL — SIGNIFICANT CHANGE UP (ref 1.6–2.6)
MCHC RBC-ENTMCNC: 30.9 PG — SIGNIFICANT CHANGE UP (ref 27–34)
MCHC RBC-ENTMCNC: 34.2 GM/DL — SIGNIFICANT CHANGE UP (ref 32–36)
MCV RBC AUTO: 90.4 FL — SIGNIFICANT CHANGE UP (ref 80–100)
NRBC # BLD: 0 /100 WBCS — SIGNIFICANT CHANGE UP (ref 0–0)
PHOSPHATE SERPL-MCNC: 2.7 MG/DL — SIGNIFICANT CHANGE UP (ref 2.5–4.5)
PLATELET # BLD AUTO: 179 K/UL — SIGNIFICANT CHANGE UP (ref 150–400)
POTASSIUM SERPL-MCNC: 3.7 MMOL/L — SIGNIFICANT CHANGE UP (ref 3.5–5.3)
POTASSIUM SERPL-SCNC: 3.7 MMOL/L — SIGNIFICANT CHANGE UP (ref 3.5–5.3)
RBC # BLD: 4.56 M/UL — SIGNIFICANT CHANGE UP (ref 3.8–5.2)
RBC # FLD: 12.2 % — SIGNIFICANT CHANGE UP (ref 10.3–14.5)
SODIUM SERPL-SCNC: 135 MMOL/L — SIGNIFICANT CHANGE UP (ref 135–145)
WBC # BLD: 8.63 K/UL — SIGNIFICANT CHANGE UP (ref 3.8–10.5)
WBC # FLD AUTO: 8.63 K/UL — SIGNIFICANT CHANGE UP (ref 3.8–10.5)

## 2021-12-13 PROCEDURE — 99231 SBSQ HOSP IP/OBS SF/LOW 25: CPT | Mod: GC

## 2021-12-13 PROCEDURE — 99291 CRITICAL CARE FIRST HOUR: CPT

## 2021-12-13 PROCEDURE — 93886 INTRACRANIAL COMPLETE STUDY: CPT | Mod: 26

## 2021-12-13 PROCEDURE — 70450 CT HEAD/BRAIN W/O DYE: CPT | Mod: 26

## 2021-12-13 PROCEDURE — 70450 CT HEAD/BRAIN W/O DYE: CPT | Mod: 26,77

## 2021-12-13 RX ORDER — PROTAMINE SULFATE 10 MG/ML
40 AMPUL (ML) INTRAVENOUS ONCE
Refills: 0 | Status: COMPLETED | OUTPATIENT
Start: 2021-12-13 | End: 2021-12-13

## 2021-12-13 RX ORDER — SODIUM CHLORIDE 9 MG/ML
1000 INJECTION INTRAMUSCULAR; INTRAVENOUS; SUBCUTANEOUS ONCE
Refills: 0 | Status: COMPLETED | OUTPATIENT
Start: 2021-12-13 | End: 2021-12-13

## 2021-12-13 RX ORDER — SENNA PLUS 8.6 MG/1
2 TABLET ORAL AT BEDTIME
Refills: 0 | Status: DISCONTINUED | OUTPATIENT
Start: 2021-12-13 | End: 2021-12-21

## 2021-12-13 RX ORDER — ENOXAPARIN SODIUM 100 MG/ML
40 INJECTION SUBCUTANEOUS
Refills: 0 | Status: DISCONTINUED | OUTPATIENT
Start: 2021-12-13 | End: 2021-12-14

## 2021-12-13 RX ORDER — POTASSIUM PHOSPHATE, MONOBASIC POTASSIUM PHOSPHATE, DIBASIC 236; 224 MG/ML; MG/ML
15 INJECTION, SOLUTION INTRAVENOUS ONCE
Refills: 0 | Status: COMPLETED | OUTPATIENT
Start: 2021-12-13 | End: 2021-12-13

## 2021-12-13 RX ORDER — POLYETHYLENE GLYCOL 3350 17 G/17G
17 POWDER, FOR SOLUTION ORAL
Refills: 0 | Status: DISCONTINUED | OUTPATIENT
Start: 2021-12-13 | End: 2021-12-21

## 2021-12-13 RX ADMIN — OXYCODONE HYDROCHLORIDE 5 MILLIGRAM(S): 5 TABLET ORAL at 10:37

## 2021-12-13 RX ADMIN — NIMODIPINE 30 MILLIGRAM(S): 60 SOLUTION ORAL at 00:00

## 2021-12-13 RX ADMIN — POLYETHYLENE GLYCOL 3350 17 GRAM(S): 17 POWDER, FOR SOLUTION ORAL at 17:24

## 2021-12-13 RX ADMIN — OXYCODONE HYDROCHLORIDE 5 MILLIGRAM(S): 5 TABLET ORAL at 21:25

## 2021-12-13 RX ADMIN — Medication 650 MILLIGRAM(S): at 22:20

## 2021-12-13 RX ADMIN — Medication 324 MILLIGRAM(S): at 23:57

## 2021-12-13 RX ADMIN — OXYCODONE HYDROCHLORIDE 5 MILLIGRAM(S): 5 TABLET ORAL at 10:07

## 2021-12-13 RX ADMIN — Medication 650 MILLIGRAM(S): at 23:20

## 2021-12-13 RX ADMIN — Medication 40 MILLIEQUIVALENT(S): at 05:06

## 2021-12-13 RX ADMIN — OXYCODONE HYDROCHLORIDE 10 MILLIGRAM(S): 5 TABLET ORAL at 13:57

## 2021-12-13 RX ADMIN — OXYCODONE HYDROCHLORIDE 5 MILLIGRAM(S): 5 TABLET ORAL at 06:06

## 2021-12-13 RX ADMIN — POTASSIUM PHOSPHATE, MONOBASIC POTASSIUM PHOSPHATE, DIBASIC 62.5 MILLIMOLE(S): 236; 224 INJECTION, SOLUTION INTRAVENOUS at 22:34

## 2021-12-13 RX ADMIN — Medication 650 MILLIGRAM(S): at 01:00

## 2021-12-13 RX ADMIN — Medication 650 MILLIGRAM(S): at 02:00

## 2021-12-13 RX ADMIN — CHLORHEXIDINE GLUCONATE 1 APPLICATION(S): 213 SOLUTION TOPICAL at 21:27

## 2021-12-13 RX ADMIN — OXYCODONE HYDROCHLORIDE 5 MILLIGRAM(S): 5 TABLET ORAL at 22:25

## 2021-12-13 RX ADMIN — OXYCODONE HYDROCHLORIDE 10 MILLIGRAM(S): 5 TABLET ORAL at 13:27

## 2021-12-13 RX ADMIN — OXYCODONE HYDROCHLORIDE 10 MILLIGRAM(S): 5 TABLET ORAL at 17:46

## 2021-12-13 RX ADMIN — OXYCODONE HYDROCHLORIDE 5 MILLIGRAM(S): 5 TABLET ORAL at 05:06

## 2021-12-13 RX ADMIN — ENOXAPARIN SODIUM 40 MILLIGRAM(S): 100 INJECTION SUBCUTANEOUS at 17:24

## 2021-12-13 RX ADMIN — OXYCODONE HYDROCHLORIDE 10 MILLIGRAM(S): 5 TABLET ORAL at 18:16

## 2021-12-13 RX ADMIN — Medication 40 MILLIEQUIVALENT(S): at 01:03

## 2021-12-13 RX ADMIN — SODIUM CHLORIDE 1000 MILLILITER(S): 9 INJECTION INTRAMUSCULAR; INTRAVENOUS; SUBCUTANEOUS at 10:07

## 2021-12-13 NOTE — OCCUPATIONAL THERAPY INITIAL EVALUATION ADULT - NS ASR FOLLOW COMMAND OT EVAL
pt scored 0/28 on short blessed cognitive screening exam indicating normal cognition/100% of the time

## 2021-12-13 NOTE — PROGRESS NOTE ADULT - ASSESSMENT
HH2 mF3 subarachnoid hemorrhage, post-bleed day 2    TCDs, euvolemia  Monitor external ventricular drain tract heme  hydrocephalus: external ventricular drain in place  -200mmHg, BP autoregulation  Pain control  Bowel regimen  Monitor for cerebral salt wasting, goal eunatremia     At risk of of death/neuro decline due to: delayed cerebral ischemia  HH2 mF3 subarachnoid hemorrhage, post-bleed day 2    TCDs, euvolemia  Monitor external ventricular drain tract heme  hydrocephalus: NSGY to replace external ventricular drain   -200mmHg, BP autoregulation  Pain control  Bowel regimen  Monitor for cerebral salt wasting, goal eunatremia     At risk of of death/neuro decline due to: delayed cerebral ischemia

## 2021-12-13 NOTE — PROGRESS NOTE ADULT - SUBJECTIVE AND OBJECTIVE BOX
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     24 HOUR EVENTS:   TCDs wnl. EVD stopped working. Flushed by PA and RN. Patient somewhat sleepier and external ventricular drain still not working, so CT head obtained. CT revealed larger donald-EVD hemorrhage with IVH.     EXAMINATION:   Sleepy, but opens eyes to voice, fully oriented, pupils reactive, moves all limbs strongly, though slightly brisker on left

## 2021-12-13 NOTE — DISCHARGE NOTE NURSING/CASE MANAGEMENT/SOCIAL WORK - NSDCPEFALRISK_GEN_ALL_CORE
For information on Fall & Injury Prevention, visit: [email or web address] OR  [email or web address] OR  https://www.cdc.gov/steadi/patient.html

## 2021-12-13 NOTE — CHART NOTE - NSCHARTNOTEFT_GEN_A_CORE
Transcranial doppler exam #1 12/13/21  12pm  mean velocity  cm/sec                              Left         Right  MIRELLA                    x             x   MCA                   x            47      PCA                    x             x  VERT                  35           36   BA                            45  Technically difficult study.  Poor temporal windows.  Official report to follow.  Rito

## 2021-12-13 NOTE — PROGRESS NOTE ADULT - SUBJECTIVE AND OBJECTIVE BOX
HPI:  66F, no sig PMH, txfered from [facility] for HA since 12/10 morning. Had associated nausea/vomiting and posterior neck pain. CT: diffuse SAH, most prominent at Sylvian fissures. Exam: Somnolent, Ox2.5 (said November), Left pupil 6NR; Right pupil 5NR, EOMI, no facial, Right drift, LAWTON 5/5   (11 Dec 2021 11:22)    OVERNIGHT EVENTS:   No acute events overnight.    VITALS:  T(C): , Max: 37.3 (12-12-21 @ 15:00)  HR:  (54 - 71)  BP: --  ABP:  (125/67 - 167/76)  RR:  (12 - 21)  SpO2:  (94% - 99%)  Wt(kg): --      12-12-21 @ 07:01  -  12-13-21 @ 07:00  --------------------------------------------------------  IN: 1150 mL / OUT: 2473 mL / NET: -1323 mL      LABS:  Na: 137 (12-12 @ 22:24), 144 (12-11 @ 20:51), 139 (12-11 @ 07:49)  K: 3.6 (12-12 @ 22:24), 3.3 (12-11 @ 20:51), 3.6 (12-11 @ 07:49)  Cl: 105 (12-12 @ 22:24), 112 (12-11 @ 20:51), 105 (12-11 @ 07:49)  CO2: 21 (12-12 @ 22:24), 21 (12-11 @ 20:51), 23 (12-11 @ 07:49)  BUN: 8 (12-12 @ 22:24), 6 (12-11 @ 20:51), 8 (12-11 @ 07:49)  Cr: 0.47 (12-12 @ 22:24), 0.47 (12-11 @ 20:51), 0.53 (12-11 @ 07:49)  Glu: 105(12-12 @ 22:24), 108(12-11 @ 20:51), 100(12-11 @ 07:49)    Hgb: 12.6 (12-12 @ 22:24), 12.5 (12-11 @ 20:51), 14.1 (12-11 @ 07:49)  Hct: 37.3 (12-12 @ 22:24), 37.7 (12-11 @ 20:51), 43.3 (12-11 @ 07:49)  WBC: 8.09 (12-12 @ 22:24), 10.60 (12-11 @ 20:51), 7.05 (12-11 @ 07:49)  Plt: 153 (12-12 @ 22:24), 157 (12-11 @ 20:51), 185 (12-11 @ 07:49)    INR: 0.97 12-11-21 @ 07:50  PTT: 30.2 12-11-21 @ 07:50    IMAGING:   Recent imaging studies were reviewed.    MEDICATIONS:  acetaminophen     Tablet .. 650 milliGRAM(s) Oral every 6 hours PRN  chlorhexidine 4% Liquid 1 Application(s) Topical <User Schedule>  niMODipine 30 milliGRAM(s) Oral every 2 hours  oxyCODONE    IR 5 milliGRAM(s) Oral every 4 hours PRN  oxyCODONE    IR 10 milliGRAM(s) Oral every 4 hours PRN  senna 2 Tablet(s) Oral at bedtime PRN    EXAMINATION:  General:  calm  HEENT:  MMM  Neuro:  awake, alert, oriented x 3, follows commands, moves all extremities  Cards:  RRR  Respiratory:  no respiratory distress  Adomen:  soft  Extremities:  no edema  Skin:  warm/dry HPI:  66F, no sig PMH, txfered from [facility] for HA since 12/10 morning. Had associated nausea/vomiting and posterior neck pain. CT: diffuse SAH, most prominent at Sylvian fissures. Exam: Somnolent, Ox2.5 (said November), Left pupil 6NR; Right pupil 5NR, EOMI, no facial, Right drift, LAWTON 5/5   (11 Dec 2021 11:22)    OVERNIGHT EVENTS:   nimodipine on hold for nimodipine     T(C): 36.9 (12-13-21 @ 07:00), Max: 37.3 (12-12-21 @ 15:00)  HR: 57 (12-13-21 @ 09:00) (54 - 71)  BP: --  RR: 12 (12-13-21 @ 09:00) (12 - 21)  SpO2: 99% (12-13-21 @ 09:00) (94% - 99%)  12-12-21 @ 07:01  -  12-13-21 @ 07:00  --------------------------------------------------------  IN: 1150 mL / OUT: 2473 mL / NET: -1323 mL    12-13-21 @ 07:01  -  12-13-21 @ 09:37  --------------------------------------------------------  IN: 0 mL / OUT: 20 mL / NET: -20 mL    acetaminophen     Tablet .. 650 milliGRAM(s) Oral every 6 hours PRN  chlorhexidine 4% Liquid 1 Application(s) Topical <User Schedule>  niMODipine 30 milliGRAM(s) Oral every 2 hours  oxyCODONE    IR 5 milliGRAM(s) Oral every 4 hours PRN  oxyCODONE    IR 10 milliGRAM(s) Oral every 4 hours PRN  senna 2 Tablet(s) Oral at bedtime PRN      LABS:  Na: 137 (12-12 @ 22:24), 144 (12-11 @ 20:51), 139 (12-11 @ 07:49)  K: 3.6 (12-12 @ 22:24), 3.3 (12-11 @ 20:51), 3.6 (12-11 @ 07:49)  Cl: 105 (12-12 @ 22:24), 112 (12-11 @ 20:51), 105 (12-11 @ 07:49)  CO2: 21 (12-12 @ 22:24), 21 (12-11 @ 20:51), 23 (12-11 @ 07:49)  BUN: 8 (12-12 @ 22:24), 6 (12-11 @ 20:51), 8 (12-11 @ 07:49)  Cr: 0.47 (12-12 @ 22:24), 0.47 (12-11 @ 20:51), 0.53 (12-11 @ 07:49)  Glu: 105(12-12 @ 22:24), 108(12-11 @ 20:51), 100(12-11 @ 07:49)    Hgb: 12.6 (12-12 @ 22:24), 12.5 (12-11 @ 20:51), 14.1 (12-11 @ 07:49)  Hct: 37.3 (12-12 @ 22:24), 37.7 (12-11 @ 20:51), 43.3 (12-11 @ 07:49)  WBC: 8.09 (12-12 @ 22:24), 10.60 (12-11 @ 20:51), 7.05 (12-11 @ 07:49)  Plt: 153 (12-12 @ 22:24), 157 (12-11 @ 20:51), 185 (12-11 @ 07:49)    INR: 0.97 12-11-21 @ 07:50  PTT: 30.2 12-11-21 @ 07:50          EXAMINATION:  General:  calm  HEENT:  MMM  Neuro:  awake, alert, oriented x 3, follows commands, moves all extremities  Cards:  RRR  Respiratory:  no respiratory distress  Adomen:  soft  Extremities:  no edema  Skin:  warm/dry

## 2021-12-13 NOTE — PROGRESS NOTE ADULT - ASSESSMENT
ASSESSMENT/PLAN:    66F, a SAH mFS3, HHS 2 , PBD 2, 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; partial left m2 thrombus now s/p thrombectomy with tici 3 reperfusion    NEURO:  - neuro checks q1h  - CTH today improved hydrocephalus   - HCP: EVD@10, output 124 ml / 24 hr , goal ICP<22  - DCI management: Daily TCds , nimodipine with holding parameter,  euvolemia, nimodipine is being held for bradycardia   - Activity: PT/OT as tolerated  patient is alert and can self stimulate will approach family today for [facility] trial     CVS:  - SBP goal 100-200   -d/c nicardipine  TTE pending     PULM:  - RA  - IS As tolerated  - Aspiration precautions    RENAL:  - Fluids: IVL   - daily IOs    GI:  - Diet: regular diet   - Bowel regimen: miralax, senna    ENDO:   - FS goal 120-180     HEME/ONC:  - SCDs  - Chemoppx: start lovenox 40 mg sc qhs tomorrow     ID:  afebrile       Patient is at high risk of neurologic deterioration/death due to:  SAH, vasospasm watch       ASSESSMENT/PLAN:    66F, a SAH mFS3, HHS 2 , PBD 3, 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; partial left m2 thrombus now s/p thrombectomy with tici 3 reperfusion    NEURO:  - neuro checks q1h  - CTH is stable today    - HCP: EVD dropped at 5 cm water, output 248 ml / 24 hr , goal ICP<22 mmhg , CPP >65 mmhg   - DCI management: Daily TCds ,  euvolemia 1 L    nimodipine on hold for bradycardia   - Activity: PT/OT as tolerated    CVS:  - SBP goal 100-200  mmhg   TTE EF 65 %     PULM:  - RA    RENAL:  - Fluids: IVL   -keep euvolemia    GI:  - Diet: regular diet   - Bowel regimen: miralax, senna    ENDO:   - FS goal 120-180     HEME/ONC:  - SCDs  - Chemoppx: lovenox on hold per NS for tract hemorrhage     ID:  afebrile       Patient is at high risk of neurologic deterioration/death due to:  SAH, vasospasm watch

## 2021-12-13 NOTE — OCCUPATIONAL THERAPY INITIAL EVALUATION ADULT - PERTINENT HX OF CURRENT PROBLEM, REHAB EVAL
66F, no sig PMH, txfered from [facility] for HA since 12/10 morning. Had associated nausea/vomiting and posterior neck pain. CT: diffuse SAH, most prominent at Sylvian fissures.

## 2021-12-13 NOTE — OCCUPATIONAL THERAPY INITIAL EVALUATION ADULT - ADDITIONAL COMMENTS
Pt. lives in apt. with , 3 steps to get in. ~10 steps inside.   Patient ambulated without AD independent. +tub in bathroom

## 2021-12-13 NOTE — PROGRESS NOTE ADULT - ASSESSMENT
66F pw HH2, MF 3 SAH now s/p EVD; S/P Angio--coil/embo if Lt Pcomm lobular aneurysm and distal M2 branch aspiration of partially occlusive thrombus, neurostable postop now POD2  - fu TCDs   - CT in AM   - no dex, possible vanquish candidate  - EVD at 5, fu output  - Last na 137  - cont q1h neuro checks  - SAH protocol, vasospasm watch

## 2021-12-13 NOTE — DISCHARGE NOTE NURSING/CASE MANAGEMENT/SOCIAL WORK - PATIENT PORTAL LINK FT
You can access the FollowMyHealth Patient Portal offered by [facility] by registering at the following website: [email or web address]. By joining [employer]’s FollowMyHealth portal, you will also be able to view your health information using other applications (apps) compatible with our system.

## 2021-12-13 NOTE — PROGRESS NOTE ADULT - SUBJECTIVE AND OBJECTIVE BOX
Patient seen and examined at bedside.    --Anticoagulation--    T(C): 36.9 (12-13-21 @ 03:00), Max: 37.3 (12-12-21 @ 15:00)  HR: 54 (12-13-21 @ 04:00) (54 - 71)  BP: --  RR: 15 (12-13-21 @ 04:00) (12 - 21)  SpO2: 95% (12-13-21 @ 04:00) (94% - 99%)  Wt(kg): --    Exam:  AOX3 pupil Rt 4mm, Lt 5 mm NR,  LAWTON 5/5 LT>RT    Labs:                        12.6   8.09  )-----------( 153      ( 12 Dec 2021 22:24 )             37.3     12-12    137  |  105  |  8   ----------------------------<  105<H>  3.6   |  21<L>  |  0.47<L>    Ca    8.9      12 Dec 2021 22:24  Phos  2.7     12-12  Mg     2.0     12-12    TPro  7.0  /  Alb  4.3  /  TBili  0.4  /  DBili  x   /  AST  15  /  ALT  13  /  AlkPhos  77  12-11

## 2021-12-13 NOTE — OCCUPATIONAL THERAPY INITIAL EVALUATION ADULT - PATIENT PROFILE REVIEW, REHAB EVAL
EYES: no pain, no visual disturbances  ENT: no epistaxis, no rhinorrhea, no hearing changes  CV: no chest pain, no palpitations, no orthopnea, + extremity pain or swelling  RESP: no shortness of breath, no cough, no sputum, no pleurisy, no wheezing  ABD: no abdominal pain, no nausea, no vomiting, no diarrhea, no black or bloody stool  : no dysuria, no hematuria, no frequency, no urgency  MSK: no back pain, no neck pain, + extremity pain  NEURO: no headache, no focal weakness  HEME: no easy bleeding or bruising  SKIN: no diaphoresis, + rash yes

## 2021-12-13 NOTE — CHART NOTE - NSCHARTNOTEFT_GEN_A_CORE
EVD stopped working at 11pm, distally flushed without improvement, attempt to proximally flush with significant resistance and evidence of proximal clot so went for CTH.     CTH showed rapidly enlarged tract hemorrhage and new R IVH. Plan to Place LEFT frontal EVD after protamine reversal and repeat coags.     Discussed with DR. GONZALES and DR. ROBERT. Both physicians herein documenting 2PC consent for procedure given unable to contact NOK () after 7 attempts, and left an urgent voicemail.

## 2021-12-14 LAB
APTT BLD: 37.7 SEC — HIGH (ref 27.5–35.5)
FIBRINOGEN PPP-MCNC: 575 MG/DL — HIGH (ref 290–520)
INR BLD: 0.93 RATIO — SIGNIFICANT CHANGE UP (ref 0.88–1.16)
INR BLD: 0.97 RATIO — SIGNIFICANT CHANGE UP (ref 0.88–1.16)
PA ADP PRP-ACNC: 195 PRU — SIGNIFICANT CHANGE UP (ref 194–417)
PROTHROM AB SERPL-ACNC: 11.2 SEC — SIGNIFICANT CHANGE UP (ref 10.6–13.6)
PROTHROM AB SERPL-ACNC: 11.7 SEC — SIGNIFICANT CHANGE UP (ref 10.6–13.6)
THROMBIN TIME: 20.8 SEC — SIGNIFICANT CHANGE UP (ref 16–25)

## 2021-12-14 PROCEDURE — 93970 EXTREMITY STUDY: CPT | Mod: 26

## 2021-12-14 PROCEDURE — 70450 CT HEAD/BRAIN W/O DYE: CPT | Mod: 26,77

## 2021-12-14 PROCEDURE — 93888 INTRACRANIAL LIMITED STUDY: CPT | Mod: 26

## 2021-12-14 PROCEDURE — 99291 CRITICAL CARE FIRST HOUR: CPT

## 2021-12-14 PROCEDURE — 70450 CT HEAD/BRAIN W/O DYE: CPT | Mod: 26

## 2021-12-14 RX ORDER — FENTANYL CITRATE 50 UG/ML
100 INJECTION INTRAVENOUS ONCE
Refills: 0 | Status: DISCONTINUED | OUTPATIENT
Start: 2021-12-14 | End: 2021-12-14

## 2021-12-14 RX ORDER — CEFAZOLIN SODIUM 1 G
2000 VIAL (EA) INJECTION ONCE
Refills: 0 | Status: COMPLETED | OUTPATIENT
Start: 2021-12-14 | End: 2021-12-14

## 2021-12-14 RX ORDER — SODIUM CHLORIDE 5 G/100ML
1000 INJECTION, SOLUTION INTRAVENOUS
Refills: 0 | Status: DISCONTINUED | OUTPATIENT
Start: 2021-12-14 | End: 2021-12-15

## 2021-12-14 RX ORDER — SODIUM CHLORIDE 9 MG/ML
500 INJECTION INTRAMUSCULAR; INTRAVENOUS; SUBCUTANEOUS ONCE
Refills: 0 | Status: COMPLETED | OUTPATIENT
Start: 2021-12-14 | End: 2021-12-14

## 2021-12-14 RX ORDER — MIDAZOLAM HYDROCHLORIDE 1 MG/ML
4 INJECTION, SOLUTION INTRAMUSCULAR; INTRAVENOUS ONCE
Refills: 0 | Status: DISCONTINUED | OUTPATIENT
Start: 2021-12-14 | End: 2021-12-14

## 2021-12-14 RX ORDER — MIDAZOLAM HYDROCHLORIDE 1 MG/ML
2 INJECTION, SOLUTION INTRAMUSCULAR; INTRAVENOUS ONCE
Refills: 0 | Status: DISCONTINUED | OUTPATIENT
Start: 2021-12-14 | End: 2021-12-14

## 2021-12-14 RX ORDER — ACETAMINOPHEN 500 MG
1000 TABLET ORAL ONCE
Refills: 0 | Status: COMPLETED | OUTPATIENT
Start: 2021-12-14 | End: 2021-12-14

## 2021-12-14 RX ADMIN — POLYETHYLENE GLYCOL 3350 17 GRAM(S): 17 POWDER, FOR SOLUTION ORAL at 18:49

## 2021-12-14 RX ADMIN — FENTANYL CITRATE 100 MICROGRAM(S): 50 INJECTION INTRAVENOUS at 01:45

## 2021-12-14 RX ADMIN — POLYETHYLENE GLYCOL 3350 17 GRAM(S): 17 POWDER, FOR SOLUTION ORAL at 05:02

## 2021-12-14 RX ADMIN — SENNA PLUS 2 TABLET(S): 8.6 TABLET ORAL at 21:08

## 2021-12-14 RX ADMIN — SODIUM CHLORIDE 500 MILLILITER(S): 9 INJECTION INTRAMUSCULAR; INTRAVENOUS; SUBCUTANEOUS at 11:17

## 2021-12-14 RX ADMIN — MIDAZOLAM HYDROCHLORIDE 2 MILLIGRAM(S): 1 INJECTION, SOLUTION INTRAMUSCULAR; INTRAVENOUS at 01:45

## 2021-12-14 RX ADMIN — SODIUM CHLORIDE 50 MILLILITER(S): 5 INJECTION, SOLUTION INTRAVENOUS at 19:00

## 2021-12-14 RX ADMIN — Medication 1000 MILLIGRAM(S): at 04:24

## 2021-12-14 RX ADMIN — Medication 100 MILLIGRAM(S): at 01:21

## 2021-12-14 RX ADMIN — CHLORHEXIDINE GLUCONATE 1 APPLICATION(S): 213 SOLUTION TOPICAL at 22:21

## 2021-12-14 RX ADMIN — Medication 400 MILLIGRAM(S): at 03:54

## 2021-12-14 RX ADMIN — Medication 5 MILLIGRAM(S): at 21:09

## 2021-12-14 NOTE — PROGRESS NOTE ADULT - SUBJECTIVE AND OBJECTIVE BOX
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     24 HOUR EVENTS:   TCDs wnl. EVD stopped working. Flushed by PA and RN. Patient somewhat sleepier and external ventricular drain still not working, so CT head obtained. CT revealed larger donald-EVD hemorrhage with IVH.     VITALS:  T(C): , Max: 37.4 (12-13-21 @ 15:00)  HR:  (55 - 79)  BP:  (135/85 - 178/81)  ABP:  (109/97 - 118/101)  RR:  (9 - 22)  SpO2:  (92% - 100%)  Wt(kg): --      12-13-21 @ 07:01  -  12-14-21 @ 07:00  --------------------------------------------------------  IN: 1800 mL / OUT: 2500 mL / NET: -700 mL      LABS:  Na: 135 (12-13 @ 21:25), 137 (12-12 @ 22:24), 144 (12-11 @ 20:51), 139 (12-11 @ 07:49)  K: 3.7 (12-13 @ 21:25), 3.6 (12-12 @ 22:24), 3.3 (12-11 @ 20:51), 3.6 (12-11 @ 07:49)  Cl: 101 (12-13 @ 21:25), 105 (12-12 @ 22:24), 112 (12-11 @ 20:51), 105 (12-11 @ 07:49)  CO2: 21 (12-13 @ 21:25), 21 (12-12 @ 22:24), 21 (12-11 @ 20:51), 23 (12-11 @ 07:49)  BUN: 9 (12-13 @ 21:25), 8 (12-12 @ 22:24), 6 (12-11 @ 20:51), 8 (12-11 @ 07:49)  Cr: 0.49 (12-13 @ 21:25), 0.47 (12-12 @ 22:24), 0.47 (12-11 @ 20:51), 0.53 (12-11 @ 07:49)  Glu: 105(12-13 @ 21:25), 105(12-12 @ 22:24), 108(12-11 @ 20:51), 100(12-11 @ 07:49)    Hgb: 14.1 (12-13 @ 21:25), 12.6 (12-12 @ 22:24), 12.5 (12-11 @ 20:51), 14.1 (12-11 @ 07:49)  Hct: 41.2 (12-13 @ 21:25), 37.3 (12-12 @ 22:24), 37.7 (12-11 @ 20:51), 43.3 (12-11 @ 07:49)  WBC: 8.63 (12-13 @ 21:25), 8.09 (12-12 @ 22:24), 10.60 (12-11 @ 20:51), 7.05 (12-11 @ 07:49)  Plt: 179 (12-13 @ 21:25), 153 (12-12 @ 22:24), 157 (12-11 @ 20:51), 185 (12-11 @ 07:49)    INR: 0.97 12-13-21 @ 23:54, 0.97 12-11-21 @ 07:50  PTT: 37.7 12-13-21 @ 23:54, 30.2 12-11-21 @ 07:50    IMAGING:   Recent imaging studies were reviewed.    MEDICATIONS:  acetaminophen     Tablet .. 650 milliGRAM(s) Oral every 6 hours PRN  chlorhexidine 4% Liquid 1 Application(s) Topical <User Schedule>  oxyCODONE    IR 5 milliGRAM(s) Oral every 4 hours PRN  oxyCODONE    IR 10 milliGRAM(s) Oral every 4 hours PRN  polyethylene glycol 3350 17 Gram(s) Oral two times a day  senna 2 Tablet(s) Oral at bedtime    EXAMINATION:  General:  calm  HEENT:  MMM  Neuro:  Sleepy, but opens eyes to voice, fully oriented, pupils reactive, moves all limbs strongly, though slightly brisker on left  Cards:  RRR  Respiratory:  no respiratory distress  Adomen:  soft  Extremities:  no edema  Skin:  warm/dry SUMMARY:   66 year-old woman admitted 12/11/21 for HH2 Mf3 subarachnoid hemorrhage status post 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     24 HOUR EVENTS:   TCDs wnl. EVD stopped working. Flushed by PA and RN. Patient somewhat sleepier and external ventricular drain still not working, so CT head obtained. CT revealed larger donald-EVD hemorrhage with IVH.     VITALS:  T(C): , Max: 37.4 (12-13-21 @ 15:00)  HR:  (55 - 79)  BP:  (135/85 - 178/81)  ABP:  (109/97 - 118/101)  RR:  (9 - 22)  SpO2:  (92% - 100%)  Wt(kg): --      12-13-21 @ 07:01  -  12-14-21 @ 07:00  --------------------------------------------------------  IN: 1800 mL / OUT: 2500 mL / NET: -700 mL      LABS:  Na: 135 (12-13 @ 21:25), 137 (12-12 @ 22:24), 144 (12-11 @ 20:51), 139 (12-11 @ 07:49)  K: 3.7 (12-13 @ 21:25), 3.6 (12-12 @ 22:24), 3.3 (12-11 @ 20:51), 3.6 (12-11 @ 07:49)  Cl: 101 (12-13 @ 21:25), 105 (12-12 @ 22:24), 112 (12-11 @ 20:51), 105 (12-11 @ 07:49)  CO2: 21 (12-13 @ 21:25), 21 (12-12 @ 22:24), 21 (12-11 @ 20:51), 23 (12-11 @ 07:49)  BUN: 9 (12-13 @ 21:25), 8 (12-12 @ 22:24), 6 (12-11 @ 20:51), 8 (12-11 @ 07:49)  Cr: 0.49 (12-13 @ 21:25), 0.47 (12-12 @ 22:24), 0.47 (12-11 @ 20:51), 0.53 (12-11 @ 07:49)  Glu: 105(12-13 @ 21:25), 105(12-12 @ 22:24), 108(12-11 @ 20:51), 100(12-11 @ 07:49)    Hgb: 14.1 (12-13 @ 21:25), 12.6 (12-12 @ 22:24), 12.5 (12-11 @ 20:51), 14.1 (12-11 @ 07:49)  Hct: 41.2 (12-13 @ 21:25), 37.3 (12-12 @ 22:24), 37.7 (12-11 @ 20:51), 43.3 (12-11 @ 07:49)  WBC: 8.63 (12-13 @ 21:25), 8.09 (12-12 @ 22:24), 10.60 (12-11 @ 20:51), 7.05 (12-11 @ 07:49)  Plt: 179 (12-13 @ 21:25), 153 (12-12 @ 22:24), 157 (12-11 @ 20:51), 185 (12-11 @ 07:49)    INR: 0.97 12-13-21 @ 23:54, 0.97 12-11-21 @ 07:50  PTT: 37.7 12-13-21 @ 23:54, 30.2 12-11-21 @ 07:50    IMAGING:   Recent imaging studies were reviewed.    MEDICATIONS:  acetaminophen     Tablet .. 650 milliGRAM(s) Oral every 6 hours PRN  chlorhexidine 4% Liquid 1 Application(s) Topical <User Schedule>  oxyCODONE    IR 5 milliGRAM(s) Oral every 4 hours PRN  oxyCODONE    IR 10 milliGRAM(s) Oral every 4 hours PRN  polyethylene glycol 3350 17 Gram(s) Oral two times a day  senna 2 Tablet(s) Oral at bedtime    EXAMINATION:  General:  calm  HEENT:  MMM  Neuro:  Sleepy, but opens eyes to voice, fully oriented, pupils reactive, moves all limbs strongly symmetrically   Cards:  RRR  Respiratory:  no respiratory distress  Adomen:  soft  Extremities:  no edema  Skin:  warm/dry

## 2021-12-14 NOTE — PROGRESS NOTE ADULT - ASSESSMENT
ASSESSMENT/PLAN:    66F, a SAH mFS3, HHS 2 , PBD 3, 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; partial left m2 thrombus now s/p thrombectomy with tici 3 reperfusion    NEURO:  - neuro checks q1h  - CTH is stable today    - HCP: EVD dropped at 5 cm water, output 248 ml / 24 hr , goal ICP<22 mmhg , CPP >65 mmhg   - DCI management: Daily TCds ,  euvolemia 1 L    nimodipine on hold for bradycardia   - Activity: PT/OT as tolerated    CVS:  - SBP goal 100-200  mmhg   TTE EF 65 %     PULM:  - RA    RENAL:  - Fluids: IVL   -keep euvolemia    GI:  - Diet: regular diet   - Bowel regimen: miralax, senna    ENDO:   - FS goal 120-180     HEME/ONC:  - SCDs  - Chemoppx: lovenox on hold per NS for tract hemorrhage     ID:  afebrile       Patient is at high risk of neurologic deterioration/death due to:  SAH, vasospasm watch   ASSESSMENT/PLAN:    66F, a SAH mFS3, HHS 2 , PBD 3, 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; partial left m2 thrombus now s/p thrombectomy with tici 3 reperfusion    NEURO:  - neuro checks q1h  - CTH showed significant hematoma at the tract   - HCP: EVD dropped at 5 cm water, output ml / 24 hr , goal ICP<22 mmhg , CPP >65 mmhg   - DCI management: Daily TCds   Negative 700   - Activity: PT/OT as tolerated    CVS:  - SBP goal 100-200  mmhg   TTE EF 65 %     PULM:  - RA    RENAL:  - Fluids: IVL   -Bolus 500 ml NS  - 2% SALINE 50 ML/HR  -BMP at 2:00 pm     GI:  - Diet: regular diet   - Bowel regimen: miralax, senna    ENDO:   - FS goal 120-180     HEME/ONC:  - SCDs  - Chemoppx: lovenox on hold per NS for tract hemorrhage     ID:  afebrile       Patient is at high risk of neurologic deterioration/death due to:  SAH, vasospasm watch   ASSESSMENT/PLAN:    66F, a SAH mFS3, HHS 2 , PBD 4, 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; partial left m2 thrombus now s/p thrombectomy with tici 3 reperfusion    NEURO:  - neuro checks q1h  - CTH showed significant hematoma at the tract   - HCP: EVD dropped at 5 cm water, output ml 200/ 24 hr , goal ICP<22 mmhg , CPP >65 mmhg   - DCI management: Daily TCds   Negative 700   - Activity: PT/OT as tolerated    CVS:  - SBP goal 100-200  mmhg   TTE EF 65 %     PULM:  - RA    RENAL:  - Fluids: IVL   -Bolus 500 ml NS  - 2% SALINE 50 ML/HR  -BMP at 2:00 pm     GI:  - Diet: regular diet   - Bowel regimen: miralax, senna    ENDO:   - FS goal 120-180     HEME/ONC:  - SCDs  - Chemoppx: lovenox on hold per NS for tract hemorrhage     ID:  afebrile       Patient is at high risk of neurologic deterioration/death due to:  SAH, vasospasm watch

## 2021-12-14 NOTE — CHART NOTE - NSCHARTNOTEFT_GEN_A_CORE
66F, a SAH mFS3, HHS 2 , PBD 3, 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; partial left m2 thrombus now s/p thrombectomy with tici 3 reperfusion. Patient with intermittent bleeding post op day 2. Hematology called given concern for bleeding. Labs reviewed, normal PT/INR, mildly elevated PTT (suspect effect of LMWH), normal CBC including platelets. Bleeding likely in setting of recent surgery, ongoing healing and incomplete hemostasis. No hematology recs indicated. Management as per primary team/neurosurgery.

## 2021-12-14 NOTE — PROGRESS NOTE ADULT - SUBJECTIVE AND OBJECTIVE BOX
EVENTS:   No acute events overnight.    VITALS:  T(C): , Max: 37.1 (12-14-21 @ 15:00)  HR:  (55 - 80)  BP:  (118/97 - 169/82)  ABP: --  RR:  (9 - 21)  SpO2:  (92% - 100%)  Wt(kg): --      12-13-21 @ 07:01  -  12-14-21 @ 07:00  --------------------------------------------------------  IN: 1800 mL / OUT: 2500 mL / NET: -700 mL    12-14-21 @ 07:01  -  12-14-21 @ 18:31  --------------------------------------------------------  IN: 750 mL / OUT: 382 mL / NET: 368 mL      LABS:  Na: 135 (12-13 @ 21:25), 137 (12-12 @ 22:24), 144 (12-11 @ 20:51)  K: 3.7 (12-13 @ 21:25), 3.6 (12-12 @ 22:24), 3.3 (12-11 @ 20:51)  Cl: 101 (12-13 @ 21:25), 105 (12-12 @ 22:24), 112 (12-11 @ 20:51)  CO2: 21 (12-13 @ 21:25), 21 (12-12 @ 22:24), 21 (12-11 @ 20:51)  BUN: 9 (12-13 @ 21:25), 8 (12-12 @ 22:24), 6 (12-11 @ 20:51)  Cr: 0.49 (12-13 @ 21:25), 0.47 (12-12 @ 22:24), 0.47 (12-11 @ 20:51)  Glu: 105(12-13 @ 21:25), 105(12-12 @ 22:24), 108(12-11 @ 20:51)    Hgb: 14.1 (12-13 @ 21:25), 12.6 (12-12 @ 22:24), 12.5 (12-11 @ 20:51)  Hct: 41.2 (12-13 @ 21:25), 37.3 (12-12 @ 22:24), 37.7 (12-11 @ 20:51)  WBC: 8.63 (12-13 @ 21:25), 8.09 (12-12 @ 22:24), 10.60 (12-11 @ 20:51)  Plt: 179 (12-13 @ 21:25), 153 (12-12 @ 22:24), 157 (12-11 @ 20:51)    INR: 0.93 12-14-21 @ 10:38, 0.97 12-13-21 @ 23:54  PTT: 37.7 12-13-21 @ 23:54    MEDICATIONS:  acetaminophen     Tablet .. 650 milliGRAM(s) Oral every 6 hours PRN  bisacodyl 5 milliGRAM(s) Oral at bedtime  chlorhexidine 4% Liquid 1 Application(s) Topical <User Schedule>  oxyCODONE    IR 5 milliGRAM(s) Oral every 4 hours PRN  oxyCODONE    IR 10 milliGRAM(s) Oral every 4 hours PRN  polyethylene glycol 3350 17 Gram(s) Oral two times a day  senna 2 Tablet(s) Oral at bedtime  sodium chloride 2% . 1000 milliLiter(s) IV Continuous <Continuous>    EXAMINATION:  General:  calm  HEENT:  MMM  Neuro:  awake, alert, oriented x 3, follows commands, moves all extremities  Cards:  RRR  Respiratory:  no respiratory distress  Abdomen:  soft  Extremities:  no edema    Assessment/Plan:    EVENTS:   TCDs with poor windows, but no clear vasospasm.   Afebrile. Net + 600cc for 24 hrs.     VITALS:  T(C): , Max: 37.1 (12-14-21 @ 15:00)  HR:  (55 - 80)  BP:  (118/97 - 169/82)  ABP: --  RR:  (9 - 21)  SpO2:  (92% - 100%)  Wt(kg): --      12-13-21 @ 07:01  -  12-14-21 @ 07:00  --------------------------------------------------------  IN: 1800 mL / OUT: 2500 mL / NET: -700 mL    12-14-21 @ 07:01  -  12-14-21 @ 18:31  --------------------------------------------------------  IN: 750 mL / OUT: 382 mL / NET: 368 mL      LABS:  Na: 135 (12-13 @ 21:25), 137 (12-12 @ 22:24), 144 (12-11 @ 20:51)  K: 3.7 (12-13 @ 21:25), 3.6 (12-12 @ 22:24), 3.3 (12-11 @ 20:51)  Cl: 101 (12-13 @ 21:25), 105 (12-12 @ 22:24), 112 (12-11 @ 20:51)  CO2: 21 (12-13 @ 21:25), 21 (12-12 @ 22:24), 21 (12-11 @ 20:51)  BUN: 9 (12-13 @ 21:25), 8 (12-12 @ 22:24), 6 (12-11 @ 20:51)  Cr: 0.49 (12-13 @ 21:25), 0.47 (12-12 @ 22:24), 0.47 (12-11 @ 20:51)  Glu: 105(12-13 @ 21:25), 105(12-12 @ 22:24), 108(12-11 @ 20:51)    Hgb: 14.1 (12-13 @ 21:25), 12.6 (12-12 @ 22:24), 12.5 (12-11 @ 20:51)  Hct: 41.2 (12-13 @ 21:25), 37.3 (12-12 @ 22:24), 37.7 (12-11 @ 20:51)  WBC: 8.63 (12-13 @ 21:25), 8.09 (12-12 @ 22:24), 10.60 (12-11 @ 20:51)  Plt: 179 (12-13 @ 21:25), 153 (12-12 @ 22:24), 157 (12-11 @ 20:51)    INR: 0.93 12-14-21 @ 10:38, 0.97 12-13-21 @ 23:54  PTT: 37.7 12-13-21 @ 23:54    MEDICATIONS:  acetaminophen     Tablet .. 650 milliGRAM(s) Oral every 6 hours PRN  bisacodyl 5 milliGRAM(s) Oral at bedtime  chlorhexidine 4% Liquid 1 Application(s) Topical <User Schedule>  oxyCODONE    IR 5 milliGRAM(s) Oral every 4 hours PRN  oxyCODONE    IR 10 milliGRAM(s) Oral every 4 hours PRN  polyethylene glycol 3350 17 Gram(s) Oral two times a day  senna 2 Tablet(s) Oral at bedtime  sodium chloride 2% . 1000 milliLiter(s) IV Continuous <Continuous>    EXAMINATION:  General:  calm  HEENT:  MMM  Neuro:  awake, alert, oriented x 3, follows commands, moves all extremities  Cards:  RRR  Respiratory:  no respiratory distress  Abdomen:  soft  Extremities:  no edema    Assessment/Plan:   67 yo woman admitted 12/11/21 with HA/vomiting since 12/10 for HH2mF3 SAH c/b hydro s/p coiling of L Pcomm aneurysm, found to have a partial left m2 thrombus now s/p thrombectomy with tici 3 reperfusion. S/p R EVD c/b sizable tract hemorrhage with IVH now with L EVD. PBD 4.     repeat CT head in AM  EVD at 5cm H2O  daily TCDs  SBP goal 100-200  goal euvolemia as patient is entering the vasospasm period   on 2% HTS at 50cc/hr   regualr diet  holding Lov for tract hemorrhage (LE doppler with no DVTs)     EVENTS:   TCDs with poor windows, but no clear vasospasm.   Afebrile. Net + 600cc for 24 hrs.   EVD output 10-15cc/hr     VITALS:  T(C): , Max: 37.1 (12-14-21 @ 15:00)  HR:  (55 - 80)  BP:  (118/97 - 169/82)  ABP: --  RR:  (9 - 21)  SpO2:  (92% - 100%)  Wt(kg): --      12-13-21 @ 07:01  -  12-14-21 @ 07:00  --------------------------------------------------------  IN: 1800 mL / OUT: 2500 mL / NET: -700 mL    12-14-21 @ 07:01  -  12-14-21 @ 18:31  --------------------------------------------------------  IN: 750 mL / OUT: 382 mL / NET: 368 mL      LABS:  Na: 135 (12-13 @ 21:25), 137 (12-12 @ 22:24), 144 (12-11 @ 20:51)  K: 3.7 (12-13 @ 21:25), 3.6 (12-12 @ 22:24), 3.3 (12-11 @ 20:51)  Cl: 101 (12-13 @ 21:25), 105 (12-12 @ 22:24), 112 (12-11 @ 20:51)  CO2: 21 (12-13 @ 21:25), 21 (12-12 @ 22:24), 21 (12-11 @ 20:51)  BUN: 9 (12-13 @ 21:25), 8 (12-12 @ 22:24), 6 (12-11 @ 20:51)  Cr: 0.49 (12-13 @ 21:25), 0.47 (12-12 @ 22:24), 0.47 (12-11 @ 20:51)  Glu: 105(12-13 @ 21:25), 105(12-12 @ 22:24), 108(12-11 @ 20:51)    Hgb: 14.1 (12-13 @ 21:25), 12.6 (12-12 @ 22:24), 12.5 (12-11 @ 20:51)  Hct: 41.2 (12-13 @ 21:25), 37.3 (12-12 @ 22:24), 37.7 (12-11 @ 20:51)  WBC: 8.63 (12-13 @ 21:25), 8.09 (12-12 @ 22:24), 10.60 (12-11 @ 20:51)  Plt: 179 (12-13 @ 21:25), 153 (12-12 @ 22:24), 157 (12-11 @ 20:51)    INR: 0.93 12-14-21 @ 10:38, 0.97 12-13-21 @ 23:54  PTT: 37.7 12-13-21 @ 23:54    MEDICATIONS:  acetaminophen     Tablet .. 650 milliGRAM(s) Oral every 6 hours PRN  bisacodyl 5 milliGRAM(s) Oral at bedtime  chlorhexidine 4% Liquid 1 Application(s) Topical <User Schedule>  oxyCODONE    IR 5 milliGRAM(s) Oral every 4 hours PRN  oxyCODONE    IR 10 milliGRAM(s) Oral every 4 hours PRN  polyethylene glycol 3350 17 Gram(s) Oral two times a day  senna 2 Tablet(s) Oral at bedtime  sodium chloride 2% . 1000 milliLiter(s) IV Continuous <Continuous>    EXAMINATION:  General:  calm  HEENT:  MMM  Neuro:  awake, alert, oriented x 3, follows commands, moves all extremities  Cards:  RRR  Respiratory:  no respiratory distress  Abdomen:  soft  Extremities:  no edema    Assessment/Plan:   65 yo woman admitted 12/11/21 with HA/vomiting since 12/10 for HH2mF3 SAH c/b hydro s/p coiling of L Pcomm aneurysm, found to have a partial left m2 thrombus now s/p thrombectomy with tici 3 reperfusion. S/p R EVD c/b sizable tract hemorrhage with IVH now with L EVD. PBD 4.     repeat CT head in AM  EVD at 5cm H2O  daily TCDs  SBP goal 100-200  goal euvolemia as patient is entering the vasospasm period   on 2% HTS at 50cc/hr   regualr diet  holding Lov for tract hemorrhage (LE doppler with no DVTs)  regular diet      EVENTS:   TCDs with poor windows, but no clear vasospasm.   Afebrile. Net + 600cc for 24 hrs.   EVD output 10-15cc/hr     VITALS:  T(C): , Max: 37.1 (12-14-21 @ 15:00)  HR:  (55 - 80)  BP:  (118/97 - 169/82)  ABP: --  RR:  (9 - 21)  SpO2:  (92% - 100%)  Wt(kg): --      12-13-21 @ 07:01  -  12-14-21 @ 07:00  --------------------------------------------------------  IN: 1800 mL / OUT: 2500 mL / NET: -700 mL    12-14-21 @ 07:01  -  12-14-21 @ 18:31  --------------------------------------------------------  IN: 750 mL / OUT: 382 mL / NET: 368 mL      LABS:  Na: 135 (12-13 @ 21:25), 137 (12-12 @ 22:24), 144 (12-11 @ 20:51)  K: 3.7 (12-13 @ 21:25), 3.6 (12-12 @ 22:24), 3.3 (12-11 @ 20:51)  Cl: 101 (12-13 @ 21:25), 105 (12-12 @ 22:24), 112 (12-11 @ 20:51)  CO2: 21 (12-13 @ 21:25), 21 (12-12 @ 22:24), 21 (12-11 @ 20:51)  BUN: 9 (12-13 @ 21:25), 8 (12-12 @ 22:24), 6 (12-11 @ 20:51)  Cr: 0.49 (12-13 @ 21:25), 0.47 (12-12 @ 22:24), 0.47 (12-11 @ 20:51)  Glu: 105(12-13 @ 21:25), 105(12-12 @ 22:24), 108(12-11 @ 20:51)    Hgb: 14.1 (12-13 @ 21:25), 12.6 (12-12 @ 22:24), 12.5 (12-11 @ 20:51)  Hct: 41.2 (12-13 @ 21:25), 37.3 (12-12 @ 22:24), 37.7 (12-11 @ 20:51)  WBC: 8.63 (12-13 @ 21:25), 8.09 (12-12 @ 22:24), 10.60 (12-11 @ 20:51)  Plt: 179 (12-13 @ 21:25), 153 (12-12 @ 22:24), 157 (12-11 @ 20:51)    INR: 0.93 12-14-21 @ 10:38, 0.97 12-13-21 @ 23:54  PTT: 37.7 12-13-21 @ 23:54    MEDICATIONS:  acetaminophen     Tablet .. 650 milliGRAM(s) Oral every 6 hours PRN  bisacodyl 5 milliGRAM(s) Oral at bedtime  chlorhexidine 4% Liquid 1 Application(s) Topical <User Schedule>  oxyCODONE    IR 5 milliGRAM(s) Oral every 4 hours PRN  oxyCODONE    IR 10 milliGRAM(s) Oral every 4 hours PRN  polyethylene glycol 3350 17 Gram(s) Oral two times a day  senna 2 Tablet(s) Oral at bedtime  sodium chloride 2% . 1000 milliLiter(s) IV Continuous <Continuous>    EXAMINATION:  General:  calm  HEENT:  MMM  Neuro:  awake, alert, oriented to hospital, 2021, follows commands, VFF, no drift, 5.5 in b/l biceps and triceps and dorsiflexion.   Cards:  RRR  Respiratory:  no respiratory distress  Abdomen:  soft  Extremities:  no edema    Assessment/Plan:   67 yo woman admitted 12/11/21 with HA/vomiting since 12/10 for HH2mF3 SAH c/b hydro s/p coiling of L Pcomm aneurysm, found to have a partial left m2 thrombus now s/p thrombectomy with tici 3 reperfusion. S/p R EVD c/b sizable tract hemorrhage with IVH now with L EVD. PBD 4.     repeat CT head in AM  EVD at 5cm H2O  daily TCDs  SBP goal 100-200  goal euvolemia as patient is entering the vasospasm period   on 2% HTS at 50cc/hr   regualr diet  holding Lov for tract hemorrhage (LE doppler with no DVTs)  regular diet      EVENTS:   TCDs with poor windows, but no clear vasospasm.   Afebrile. Net + 600cc for 24 hrs.   EVD output 10-15cc/hr.    VITALS:  T(C): , Max: 37.1 (12-14-21 @ 15:00)  HR:  (55 - 80)  BP:  (118/97 - 169/82)  ABP: --  RR:  (9 - 21)  SpO2:  (92% - 100%)  Wt(kg): --      12-13-21 @ 07:01  -  12-14-21 @ 07:00  --------------------------------------------------------  IN: 1800 mL / OUT: 2500 mL / NET: -700 mL    12-14-21 @ 07:01  -  12-14-21 @ 18:31  --------------------------------------------------------  IN: 750 mL / OUT: 382 mL / NET: 368 mL      LABS:  Na: 135 (12-13 @ 21:25), 137 (12-12 @ 22:24), 144 (12-11 @ 20:51)  K: 3.7 (12-13 @ 21:25), 3.6 (12-12 @ 22:24), 3.3 (12-11 @ 20:51)  Cl: 101 (12-13 @ 21:25), 105 (12-12 @ 22:24), 112 (12-11 @ 20:51)  CO2: 21 (12-13 @ 21:25), 21 (12-12 @ 22:24), 21 (12-11 @ 20:51)  BUN: 9 (12-13 @ 21:25), 8 (12-12 @ 22:24), 6 (12-11 @ 20:51)  Cr: 0.49 (12-13 @ 21:25), 0.47 (12-12 @ 22:24), 0.47 (12-11 @ 20:51)  Glu: 105(12-13 @ 21:25), 105(12-12 @ 22:24), 108(12-11 @ 20:51)    Hgb: 14.1 (12-13 @ 21:25), 12.6 (12-12 @ 22:24), 12.5 (12-11 @ 20:51)  Hct: 41.2 (12-13 @ 21:25), 37.3 (12-12 @ 22:24), 37.7 (12-11 @ 20:51)  WBC: 8.63 (12-13 @ 21:25), 8.09 (12-12 @ 22:24), 10.60 (12-11 @ 20:51)  Plt: 179 (12-13 @ 21:25), 153 (12-12 @ 22:24), 157 (12-11 @ 20:51)    INR: 0.93 12-14-21 @ 10:38, 0.97 12-13-21 @ 23:54  PTT: 37.7 12-13-21 @ 23:54    MEDICATIONS:  acetaminophen     Tablet .. 650 milliGRAM(s) Oral every 6 hours PRN  bisacodyl 5 milliGRAM(s) Oral at bedtime  chlorhexidine 4% Liquid 1 Application(s) Topical <User Schedule>  oxyCODONE    IR 5 milliGRAM(s) Oral every 4 hours PRN  oxyCODONE    IR 10 milliGRAM(s) Oral every 4 hours PRN  polyethylene glycol 3350 17 Gram(s) Oral two times a day  senna 2 Tablet(s) Oral at bedtime  sodium chloride 2% . 1000 milliLiter(s) IV Continuous <Continuous>    EXAMINATION:  General:  calm  HEENT:  MMM  Neuro:  awake, alert, oriented to hospital, 2021, follows commands, VFF, no drift, 5/5 in b/l biceps, triceps and dorsiflexion.   Cards:  RRR  Respiratory:  no respiratory distress  Abdomen:  soft  Extremities:  no edema    Assessment/Plan:   67 yo woman admitted 12/11/21 with HA/vomiting since 12/10 found to have HH2mF3 SAH c/b hydro s/p coiling of L Pcomm aneurysm, found to have a partial left m2 thrombus now s/p thrombectomy with TICI 3 reperfusion. S/p R EVD for hydro c/b sizable tract hemorrhage with IVH now with L EVD. PBD 4.     repeat CT head in AM for stability of tract hemorrhage   c/w EVD at 5cm H2O  daily TCDs  SBP goal 100-200  goal euvolemia as patient is entering the vasospasm period   on 2% HTS at 50cc/hr   regular diet  holding Lov for tract hemorrhage (LE doppler with no DVTs)    Patient seen and examined by attending on 12/14/2021.    Patient is critically ill due to SAH and at high risk for neurological deterioration or death due to: hydrocephalus requiring an EVD for CSF diversion, at risk for delayed cerebral ischemia

## 2021-12-14 NOTE — CHART NOTE - NSCHARTNOTEFT_GEN_A_CORE
Transcranial doppler exam #1 12/13/21  12pm  mean velocity  cm/sec                              Left         Right  MIRELLA                    x             x   MCA                   x            47      PCA                    x             x  VERT                  35           36   BA                            45  Technically difficult study.  Poor temporal windows.  Official report to follow.  Rito    Transcranial doppler exam #1 12/14/21  1045am  mean velocity  cm/sec                              Left         Right  MIRELLA                    x             x   MCA                   x            56     PCA                    x             x  VERT                  32           37   BA                            46  Technically difficult study.  Poor temporal windows.  Official report to follow.  Rito

## 2021-12-15 LAB
ANION GAP SERPL CALC-SCNC: 12 MMOL/L — SIGNIFICANT CHANGE UP (ref 5–17)
ANION GAP SERPL CALC-SCNC: 14 MMOL/L — SIGNIFICANT CHANGE UP (ref 5–17)
BUN SERPL-MCNC: 10 MG/DL — SIGNIFICANT CHANGE UP (ref 7–23)
BUN SERPL-MCNC: 9 MG/DL — SIGNIFICANT CHANGE UP (ref 7–23)
CALCIUM SERPL-MCNC: 8.6 MG/DL — SIGNIFICANT CHANGE UP (ref 8.4–10.5)
CALCIUM SERPL-MCNC: 8.9 MG/DL — SIGNIFICANT CHANGE UP (ref 8.4–10.5)
CHLORIDE SERPL-SCNC: 102 MMOL/L — SIGNIFICANT CHANGE UP (ref 96–108)
CHLORIDE SERPL-SCNC: 105 MMOL/L — SIGNIFICANT CHANGE UP (ref 96–108)
CO2 SERPL-SCNC: 22 MMOL/L — SIGNIFICANT CHANGE UP (ref 22–31)
CO2 SERPL-SCNC: 22 MMOL/L — SIGNIFICANT CHANGE UP (ref 22–31)
CREAT SERPL-MCNC: 0.43 MG/DL — LOW (ref 0.5–1.3)
CREAT SERPL-MCNC: 0.43 MG/DL — LOW (ref 0.5–1.3)
GLUCOSE SERPL-MCNC: 126 MG/DL — HIGH (ref 70–99)
GLUCOSE SERPL-MCNC: 267 MG/DL — HIGH (ref 70–99)
HCT VFR BLD CALC: 38.1 % — SIGNIFICANT CHANGE UP (ref 34.5–45)
HGB BLD-MCNC: 12.9 G/DL — SIGNIFICANT CHANGE UP (ref 11.5–15.5)
MAGNESIUM SERPL-MCNC: 2 MG/DL — SIGNIFICANT CHANGE UP (ref 1.6–2.6)
MAGNESIUM SERPL-MCNC: 2.1 MG/DL — SIGNIFICANT CHANGE UP (ref 1.6–2.6)
MCHC RBC-ENTMCNC: 30.9 PG — SIGNIFICANT CHANGE UP (ref 27–34)
MCHC RBC-ENTMCNC: 33.9 GM/DL — SIGNIFICANT CHANGE UP (ref 32–36)
MCV RBC AUTO: 91.4 FL — SIGNIFICANT CHANGE UP (ref 80–100)
NRBC # BLD: 0 /100 WBCS — SIGNIFICANT CHANGE UP (ref 0–0)
PHOSPHATE SERPL-MCNC: 2.2 MG/DL — LOW (ref 2.5–4.5)
PHOSPHATE SERPL-MCNC: 2.5 MG/DL — SIGNIFICANT CHANGE UP (ref 2.5–4.5)
PLATELET # BLD AUTO: 183 K/UL — SIGNIFICANT CHANGE UP (ref 150–400)
POTASSIUM SERPL-MCNC: 3.3 MMOL/L — LOW (ref 3.5–5.3)
POTASSIUM SERPL-MCNC: 3.5 MMOL/L — SIGNIFICANT CHANGE UP (ref 3.5–5.3)
POTASSIUM SERPL-SCNC: 3.3 MMOL/L — LOW (ref 3.5–5.3)
POTASSIUM SERPL-SCNC: 3.5 MMOL/L — SIGNIFICANT CHANGE UP (ref 3.5–5.3)
RBC # BLD: 4.17 M/UL — SIGNIFICANT CHANGE UP (ref 3.8–5.2)
RBC # FLD: 12.1 % — SIGNIFICANT CHANGE UP (ref 10.3–14.5)
SODIUM SERPL-SCNC: 138 MMOL/L — SIGNIFICANT CHANGE UP (ref 135–145)
SODIUM SERPL-SCNC: 139 MMOL/L — SIGNIFICANT CHANGE UP (ref 135–145)
WBC # BLD: 7.58 K/UL — SIGNIFICANT CHANGE UP (ref 3.8–10.5)
WBC # FLD AUTO: 7.58 K/UL — SIGNIFICANT CHANGE UP (ref 3.8–10.5)

## 2021-12-15 PROCEDURE — 99291 CRITICAL CARE FIRST HOUR: CPT

## 2021-12-15 PROCEDURE — 70450 CT HEAD/BRAIN W/O DYE: CPT | Mod: 26

## 2021-12-15 RX ORDER — SODIUM CHLORIDE 5 G/100ML
1000 INJECTION, SOLUTION INTRAVENOUS
Refills: 0 | Status: DISCONTINUED | OUTPATIENT
Start: 2021-12-15 | End: 2021-12-15

## 2021-12-15 RX ORDER — POTASSIUM PHOSPHATE, MONOBASIC POTASSIUM PHOSPHATE, DIBASIC 236; 224 MG/ML; MG/ML
30 INJECTION, SOLUTION INTRAVENOUS ONCE
Refills: 0 | Status: COMPLETED | OUTPATIENT
Start: 2021-12-15 | End: 2021-12-15

## 2021-12-15 RX ORDER — OXYCODONE HYDROCHLORIDE 5 MG/1
10 TABLET ORAL EVERY 4 HOURS
Refills: 0 | Status: DISCONTINUED | OUTPATIENT
Start: 2021-12-15 | End: 2021-12-16

## 2021-12-15 RX ORDER — SODIUM CHLORIDE 9 MG/ML
2 INJECTION INTRAMUSCULAR; INTRAVENOUS; SUBCUTANEOUS EVERY 6 HOURS
Refills: 0 | Status: DISCONTINUED | OUTPATIENT
Start: 2021-12-15 | End: 2021-12-18

## 2021-12-15 RX ORDER — OXYCODONE HYDROCHLORIDE 5 MG/1
5 TABLET ORAL EVERY 4 HOURS
Refills: 0 | Status: DISCONTINUED | OUTPATIENT
Start: 2021-12-15 | End: 2021-12-16

## 2021-12-15 RX ORDER — POTASSIUM CHLORIDE 20 MEQ
40 PACKET (EA) ORAL ONCE
Refills: 0 | Status: COMPLETED | OUTPATIENT
Start: 2021-12-15 | End: 2021-12-15

## 2021-12-15 RX ADMIN — OXYCODONE HYDROCHLORIDE 5 MILLIGRAM(S): 5 TABLET ORAL at 05:45

## 2021-12-15 RX ADMIN — Medication 5 MILLIGRAM(S): at 22:14

## 2021-12-15 RX ADMIN — POLYETHYLENE GLYCOL 3350 17 GRAM(S): 17 POWDER, FOR SOLUTION ORAL at 18:33

## 2021-12-15 RX ADMIN — Medication 650 MILLIGRAM(S): at 21:48

## 2021-12-15 RX ADMIN — POTASSIUM PHOSPHATE, MONOBASIC POTASSIUM PHOSPHATE, DIBASIC 83.33 MILLIMOLE(S): 236; 224 INJECTION, SOLUTION INTRAVENOUS at 03:43

## 2021-12-15 RX ADMIN — POLYETHYLENE GLYCOL 3350 17 GRAM(S): 17 POWDER, FOR SOLUTION ORAL at 05:05

## 2021-12-15 RX ADMIN — Medication 40 MILLIEQUIVALENT(S): at 15:56

## 2021-12-15 RX ADMIN — POTASSIUM PHOSPHATE, MONOBASIC POTASSIUM PHOSPHATE, DIBASIC 83.33 MILLIMOLE(S): 236; 224 INJECTION, SOLUTION INTRAVENOUS at 16:00

## 2021-12-15 RX ADMIN — SENNA PLUS 2 TABLET(S): 8.6 TABLET ORAL at 22:13

## 2021-12-15 RX ADMIN — CHLORHEXIDINE GLUCONATE 1 APPLICATION(S): 213 SOLUTION TOPICAL at 22:13

## 2021-12-15 RX ADMIN — OXYCODONE HYDROCHLORIDE 5 MILLIGRAM(S): 5 TABLET ORAL at 06:45

## 2021-12-15 RX ADMIN — SODIUM CHLORIDE 2 GRAM(S): 9 INJECTION INTRAMUSCULAR; INTRAVENOUS; SUBCUTANEOUS at 15:56

## 2021-12-15 RX ADMIN — Medication 650 MILLIGRAM(S): at 20:48

## 2021-12-15 RX ADMIN — SODIUM CHLORIDE 2 GRAM(S): 9 INJECTION INTRAMUSCULAR; INTRAVENOUS; SUBCUTANEOUS at 22:13

## 2021-12-15 NOTE — PROGRESS NOTE ADULT - ASSESSMENT
ASSESSMENT/PLAN:    66F, a SAH mFS3, HHS 2 , PBD 4, 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; partial left m2 thrombus now s/p thrombectomy with tici 3 reperfusion    NEURO:  - neuro checks q1h  - CTH showed significant hematoma at the tract   - HCP: EVD dropped at 5 cm water, output ml 200/ 24 hr , goal ICP<22 mmhg , CPP >65 mmhg   - DCI management: Daily TCds   Negative 700   - Activity: PT/OT as tolerated    CVS:  - SBP goal 100-200  mmhg   TTE EF 65 %     PULM:  - RA    RENAL:  - Fluids: IVL   -Bolus 500 ml NS  - 2% SALINE 50 ML/HR  -BMP at 2:00 pm     GI:  - Diet: regular diet   - Bowel regimen: miralax, senna    ENDO:   - FS goal 120-180     HEME/ONC:  - SCDs  - Chemoppx: lovenox on hold per NS for tract hemorrhage     ID:  afebrile       Patient is at high risk of neurologic deterioration/death due to:  SAH, vasospasm watch   ASSESSMENT/PLAN:    66F, a SAH mFS3, HHS 2 , PBD 4, 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; partial left m2 thrombus now s/p thrombectomy with tici 3 reperfusion    NEURO:  - neuro checks q1h  - CTH today is stable  - HCP: EVD  at 5 cm water, output ml 256/ 24 hr , goal ICP<22 mmhg , CPP >65 mmhg   - DCI management: Daily TCds   - Balance 660  - Activity: PT/OT as tolerated    CVS:  - SBP goal 100-200  mmhg   TTE EF 65 %     PULM:  - RA    RENAL:  - Fluids: IVL   - 2% SALINE 50 ML/HR  -BMP     GI:  - Diet: regular diet   - Bowel regimen: miralax, senna    ENDO:   - FS goal 120-180     HEME/ONC:  - SCDs  - Chemoppx: lovenox on hold per NS for tract hemorrhage     ID:  afebrile       Patient is at high risk of neurologic deterioration/death due to:  SAH, vasospasm, expansion of the hematoma

## 2021-12-15 NOTE — PROGRESS NOTE ADULT - SUBJECTIVE AND OBJECTIVE BOX
HPI:  65 yo woman admitted 12/11/21 with HA/vomiting since 12/10 for HH2mF3 SAH c/b hydro s/p coiling of L Pcomm aneurysm, found to have a partial left m2 thrombus now s/p thrombectomy with tici 3 reperfusion. S/p R EVD c/b sizable tract hemorrhage with IVH now with L EVD. PBD 5.     OVERNIGHT EVENTS:   No acute events overnight.    VITALS:  T(C): , Max: 37.5 (12-14-21 @ 19:00)  HR:  (58 - 101)  BP:  (118/97 - 178/69)  ABP: --  RR:  (12 - 23)  SpO2:  (92% - 100%)  Wt(kg): --      12-14-21 @ 07:01  -  12-15-21 @ 07:00  --------------------------------------------------------  IN: 1816.5 mL / OUT: 1156 mL / NET: 660.5 mL    12-15-21 @ 07:01  -  12-15-21 @ 15:29  --------------------------------------------------------  IN: 300 mL / OUT: 76 mL / NET: 224 mL      LABS:  Na: 138 (12-15 @ 13:16), 139 (12-15 @ 01:12), 135 (12-13 @ 21:25), 137 (12-12 @ 22:24)  K: 3.3 (12-15 @ 13:16), 3.5 (12-15 @ 01:12), 3.7 (12-13 @ 21:25), 3.6 (12-12 @ 22:24)  Cl: 102 (12-15 @ 13:16), 105 (12-15 @ 01:12), 101 (12-13 @ 21:25), 105 (12-12 @ 22:24)  CO2: 22 (12-15 @ 13:16), 22 (12-15 @ 01:12), 21 (12-13 @ 21:25), 21 (12-12 @ 22:24)  BUN: 9 (12-15 @ 13:16), 10 (12-15 @ 01:12), 9 (12-13 @ 21:25), 8 (12-12 @ 22:24)  Cr: 0.43 (12-15 @ 13:16), 0.43 (12-15 @ 01:12), 0.49 (12-13 @ 21:25), 0.47 (12-12 @ 22:24)  Glu: 267(12-15 @ 13:16), 126(12-15 @ 01:12), 105(12-13 @ 21:25), 105(12-12 @ 22:24)    Hgb: 12.9 (12-15 @ 01:12), 14.1 (12-13 @ 21:25), 12.6 (12-12 @ 22:24)  Hct: 38.1 (12-15 @ 01:12), 41.2 (12-13 @ 21:25), 37.3 (12-12 @ 22:24)  WBC: 7.58 (12-15 @ 01:12), 8.63 (12-13 @ 21:25), 8.09 (12-12 @ 22:24)  Plt: 183 (12-15 @ 01:12), 179 (12-13 @ 21:25), 153 (12-12 @ 22:24)    INR: 0.93 12-14-21 @ 10:38, 0.97 12-13-21 @ 23:54  PTT: 37.7 12-13-21 @ 23:54    IMAGING:   Recent imaging studies were reviewed.    MEDICATIONS:  acetaminophen     Tablet .. 650 milliGRAM(s) Oral every 6 hours PRN  bisacodyl 5 milliGRAM(s) Oral at bedtime  chlorhexidine 4% Liquid 1 Application(s) Topical <User Schedule>  oxyCODONE    IR 5 milliGRAM(s) Oral every 4 hours PRN  oxyCODONE    IR 10 milliGRAM(s) Oral every 4 hours PRN  polyethylene glycol 3350 17 Gram(s) Oral two times a day  potassium chloride    Tablet ER 40 milliEquivalent(s) Oral once  potassium phosphate IVPB 30 milliMole(s) IV Intermittent once  senna 2 Tablet(s) Oral at bedtime  sodium chloride 2 Gram(s) Oral every 6 hours  sodium chloride 2% . 1000 milliLiter(s) IV Continuous <Continuous>    EXAMINATION:  General:  calm  HEENT:  MMM  Neuro:  awake, alert, oriented x 3, follows commands, right gaze preference , no drift, 5.5 in b/l biceps and triceps and dorsiflexion.   Cards:  RRR  Respiratory:  no respiratory distress  Abdomen:  soft  Extremities:  no edema

## 2021-12-15 NOTE — PATIENT PROFILE ADULT - DOES PATIENT HAVE ADVANCE DIRECTIVE
How Severe Are Your Spot(S)?: moderate Have Your Spot(S) Been Treated In The Past?: has not been treated Hpi Title: Evaluation of Skin Lesions Additional History: \\n\\n\\nPresents today for a CSE Hpi Title: Evaluation of a Skin Lesion No

## 2021-12-15 NOTE — PATIENT PROFILE ADULT - FALL HARM RISK - RISK INTERVENTIONS
Monitor for mental status changes/Reinforce activity limits and safety measures with patient and family/Reorient to person, place and time as needed/Use of alarms - bed, chair and/or voice tab/Bed in lowest position, wheels locked, appropriate side rails in place/Call bell, personal items and telephone in reach/Purposeful Proactive Rounding

## 2021-12-15 NOTE — PROGRESS NOTE ADULT - SUBJECTIVE AND OBJECTIVE BOX
EVENTS:   [facility] this am with stable tract hemorrhage.   No TCDs performed today.   Afebrile. Net + 400cc for 24 hrs.   EVD output 141 cc since 7am.     VITALS:  T(C): , Max: 36.9 (12-14-21 @ 23:00)  HR:  (58 - 101)  BP:  (117/74 - 178/69)  ABP: --  RR:  (12 - 23)  SpO2:  (92% - 100%)  Wt(kg): --      12-14-21 @ 07:01  -  12-15-21 @ 07:00  --------------------------------------------------------  IN: 1816.5 mL / OUT: 1156 mL / NET: 660.5 mL    12-15-21 @ 07:01  -  12-15-21 @ 19:21  --------------------------------------------------------  IN: 933.2 mL / OUT: 541 mL / NET: 392.2 mL      LABS:  Na: 138 (12-15 @ 13:16), 139 (12-15 @ 01:12), 135 (12-13 @ 21:25), 137 (12-12 @ 22:24)  K: 3.3 (12-15 @ 13:16), 3.5 (12-15 @ 01:12), 3.7 (12-13 @ 21:25), 3.6 (12-12 @ 22:24)  Cl: 102 (12-15 @ 13:16), 105 (12-15 @ 01:12), 101 (12-13 @ 21:25), 105 (12-12 @ 22:24)  CO2: 22 (12-15 @ 13:16), 22 (12-15 @ 01:12), 21 (12-13 @ 21:25), 21 (12-12 @ 22:24)  BUN: 9 (12-15 @ 13:16), 10 (12-15 @ 01:12), 9 (12-13 @ 21:25), 8 (12-12 @ 22:24)  Cr: 0.43 (12-15 @ 13:16), 0.43 (12-15 @ 01:12), 0.49 (12-13 @ 21:25), 0.47 (12-12 @ 22:24)  Glu: 267(12-15 @ 13:16), 126(12-15 @ 01:12), 105(12-13 @ 21:25), 105(12-12 @ 22:24)    Hgb: 12.9 (12-15 @ 01:12), 14.1 (12-13 @ 21:25), 12.6 (12-12 @ 22:24)  Hct: 38.1 (12-15 @ 01:12), 41.2 (12-13 @ 21:25), 37.3 (12-12 @ 22:24)  WBC: 7.58 (12-15 @ 01:12), 8.63 (12-13 @ 21:25), 8.09 (12-12 @ 22:24)  Plt: 183 (12-15 @ 01:12), 179 (12-13 @ 21:25), 153 (12-12 @ 22:24)    INR: 0.93 12-14-21 @ 10:38, 0.97 12-13-21 @ 23:54  PTT: 37.7 12-13-21 @ 23:54    MEDICATIONS:  acetaminophen     Tablet .. 650 milliGRAM(s) Oral every 6 hours PRN  bisacodyl 5 milliGRAM(s) Oral at bedtime  chlorhexidine 4% Liquid 1 Application(s) Topical <User Schedule>  oxyCODONE    IR 5 milliGRAM(s) Oral every 4 hours PRN  oxyCODONE    IR 10 milliGRAM(s) Oral every 4 hours PRN  polyethylene glycol 3350 17 Gram(s) Oral two times a day  senna 2 Tablet(s) Oral at bedtime  sodium chloride 2 Gram(s) Oral every 6 hours  sodium chloride 2% . 1000 milliLiter(s) IV Continuous <Continuous>    EXAMINATION:  General:  calm  HEENT:  MMM  Neuro:  awake, alert, oriented to hospital, 2021, follows commands, VFF, no drift, 5/5 in b/l biceps, triceps and dorsiflexion.   Cards:  RRR  Respiratory:  no respiratory distress  Abdomen:  soft  Extremities:  no edema    Assessment/Plan:   65 yo woman admitted 12/11/21 with HA/vomiting since 12/10 found to have HH2mF3 SAH c/b hydro s/p coiling of L Pcomm aneurysm, found to have a partial left m2 thrombus now s/p thrombectomy with TICI 3 reperfusion. S/p R EVD for hydro c/b sizable tract hemorrhage with IVH now with L EVD. PBD 5.     repeat CT head in AM for stability of tract hemorrhage   c/w EVD at 5cm H2O  daily TCDs  SBP goal 100-200  goal euvolemia as patient is entering the vasospasm period   on 2% HTS at 50cc/hr   regular diet  holding Lov for tract hemorrhage (LE doppler with no DVTs)    Patient seen and examined by attending on 12/15/2021.    Patient is critically ill due to SAH and at high risk for neurological deterioration or death due to: hydrocephalus requiring an EVD for CSF diversion, at risk for delayed cerebral ischemia     EVENTS:   [facility] this am with stable tract hemorrhage.   No TCDs performed today.   Afebrile. Net + 400cc for 24 hrs.   EVD output 141 cc since 7am.   Intermittently disoriented. With headache responding to Tylenol.     VITALS:  T(C): , Max: 36.9 (12-14-21 @ 23:00)  HR:  (58 - 101)  BP:  (117/74 - 178/69)  ABP: --  RR:  (12 - 23)  SpO2:  (92% - 100%)  Wt(kg): --      12-14-21 @ 07:01  -  12-15-21 @ 07:00  --------------------------------------------------------  IN: 1816.5 mL / OUT: 1156 mL / NET: 660.5 mL    12-15-21 @ 07:01  -  12-15-21 @ 19:21  --------------------------------------------------------  IN: 933.2 mL / OUT: 541 mL / NET: 392.2 mL      LABS:  Na: 138 (12-15 @ 13:16), 139 (12-15 @ 01:12), 135 (12-13 @ 21:25), 137 (12-12 @ 22:24)  K: 3.3 (12-15 @ 13:16), 3.5 (12-15 @ 01:12), 3.7 (12-13 @ 21:25), 3.6 (12-12 @ 22:24)  Cl: 102 (12-15 @ 13:16), 105 (12-15 @ 01:12), 101 (12-13 @ 21:25), 105 (12-12 @ 22:24)  CO2: 22 (12-15 @ 13:16), 22 (12-15 @ 01:12), 21 (12-13 @ 21:25), 21 (12-12 @ 22:24)  BUN: 9 (12-15 @ 13:16), 10 (12-15 @ 01:12), 9 (12-13 @ 21:25), 8 (12-12 @ 22:24)  Cr: 0.43 (12-15 @ 13:16), 0.43 (12-15 @ 01:12), 0.49 (12-13 @ 21:25), 0.47 (12-12 @ 22:24)  Glu: 267(12-15 @ 13:16), 126(12-15 @ 01:12), 105(12-13 @ 21:25), 105(12-12 @ 22:24)    Hgb: 12.9 (12-15 @ 01:12), 14.1 (12-13 @ 21:25), 12.6 (12-12 @ 22:24)  Hct: 38.1 (12-15 @ 01:12), 41.2 (12-13 @ 21:25), 37.3 (12-12 @ 22:24)  WBC: 7.58 (12-15 @ 01:12), 8.63 (12-13 @ 21:25), 8.09 (12-12 @ 22:24)  Plt: 183 (12-15 @ 01:12), 179 (12-13 @ 21:25), 153 (12-12 @ 22:24)    INR: 0.93 12-14-21 @ 10:38, 0.97 12-13-21 @ 23:54  PTT: 37.7 12-13-21 @ 23:54    MEDICATIONS:  acetaminophen     Tablet .. 650 milliGRAM(s) Oral every 6 hours PRN  bisacodyl 5 milliGRAM(s) Oral at bedtime  chlorhexidine 4% Liquid 1 Application(s) Topical <User Schedule>  oxyCODONE    IR 5 milliGRAM(s) Oral every 4 hours PRN  oxyCODONE    IR 10 milliGRAM(s) Oral every 4 hours PRN  polyethylene glycol 3350 17 Gram(s) Oral two times a day  senna 2 Tablet(s) Oral at bedtime  sodium chloride 2 Gram(s) Oral every 6 hours  sodium chloride 2% . 1000 milliLiter(s) IV Continuous <Continuous>    EXAMINATION:  General:  calm  HEENT:  MMM  Neuro:  more lethargic this evening, oriented to self, not place, oriented to Dec 2021, follows commands, no drift in UEs, able to raise b/l LEs antigravity   Cards:  RRR  Respiratory:  no respiratory distress  Abdomen:  soft  Extremities:  no edema    Assessment/Plan:   67 yo woman admitted 12/11/21 with HA/vomiting since 12/10 found to have HH2mF3 SAH c/b hydro s/p coiling of L Pcomm aneurysm, found to have a partial left m2 thrombus now s/p thrombectomy with TICI 3 reperfusion. S/p R EVD for hydro c/b sizable tract hemorrhage with IVH now with L EVD. PBD 5. More lethargic this evening but fluctuates.     c/w EVD open at 5cm H2O  daily TCDs  SBP goal 100-200  goal euvolemia as patient is entering the vasospasm period   stop 2% HTS, c/w NaCl tabs 2g q6h  regular diet  Tylenol for pain  holding Lov for tract hemorrhage (LE doppler with no DVTs)  Place A line     Patient seen and examined by attending on 12/15/2021.    Patient is critically ill due to SAH and at high risk for neurological deterioration or death due to: hydrocephalus requiring an EVD for CSF diversion, at risk for delayed cerebral ischemia     EVENTS:   [facility] this am with stable tract hemorrhage.   No TCDs performed today.   Afebrile. Net + 400cc for 24 hrs.   EVD output 141 cc since 7am.   Intermittently disoriented. With headache responding to Tylenol.     VITALS:  T(C): , Max: 36.9 (12-14-21 @ 23:00)  HR:  (58 - 101)  BP:  (117/74 - 178/69)  ABP: --  RR:  (12 - 23)  SpO2:  (92% - 100%)  Wt(kg): --      12-14-21 @ 07:01  -  12-15-21 @ 07:00  --------------------------------------------------------  IN: 1816.5 mL / OUT: 1156 mL / NET: 660.5 mL    12-15-21 @ 07:01  -  12-15-21 @ 19:21  --------------------------------------------------------  IN: 933.2 mL / OUT: 541 mL / NET: 392.2 mL      LABS:  Na: 138 (12-15 @ 13:16), 139 (12-15 @ 01:12), 135 (12-13 @ 21:25), 137 (12-12 @ 22:24)  K: 3.3 (12-15 @ 13:16), 3.5 (12-15 @ 01:12), 3.7 (12-13 @ 21:25), 3.6 (12-12 @ 22:24)  Cl: 102 (12-15 @ 13:16), 105 (12-15 @ 01:12), 101 (12-13 @ 21:25), 105 (12-12 @ 22:24)  CO2: 22 (12-15 @ 13:16), 22 (12-15 @ 01:12), 21 (12-13 @ 21:25), 21 (12-12 @ 22:24)  BUN: 9 (12-15 @ 13:16), 10 (12-15 @ 01:12), 9 (12-13 @ 21:25), 8 (12-12 @ 22:24)  Cr: 0.43 (12-15 @ 13:16), 0.43 (12-15 @ 01:12), 0.49 (12-13 @ 21:25), 0.47 (12-12 @ 22:24)  Glu: 267(12-15 @ 13:16), 126(12-15 @ 01:12), 105(12-13 @ 21:25), 105(12-12 @ 22:24)    Hgb: 12.9 (12-15 @ 01:12), 14.1 (12-13 @ 21:25), 12.6 (12-12 @ 22:24)  Hct: 38.1 (12-15 @ 01:12), 41.2 (12-13 @ 21:25), 37.3 (12-12 @ 22:24)  WBC: 7.58 (12-15 @ 01:12), 8.63 (12-13 @ 21:25), 8.09 (12-12 @ 22:24)  Plt: 183 (12-15 @ 01:12), 179 (12-13 @ 21:25), 153 (12-12 @ 22:24)    INR: 0.93 12-14-21 @ 10:38, 0.97 12-13-21 @ 23:54  PTT: 37.7 12-13-21 @ 23:54    MEDICATIONS:  acetaminophen     Tablet .. 650 milliGRAM(s) Oral every 6 hours PRN  bisacodyl 5 milliGRAM(s) Oral at bedtime  chlorhexidine 4% Liquid 1 Application(s) Topical <User Schedule>  oxyCODONE    IR 5 milliGRAM(s) Oral every 4 hours PRN  oxyCODONE    IR 10 milliGRAM(s) Oral every 4 hours PRN  polyethylene glycol 3350 17 Gram(s) Oral two times a day  senna 2 Tablet(s) Oral at bedtime  sodium chloride 2 Gram(s) Oral every 6 hours  sodium chloride 2% . 1000 milliLiter(s) IV Continuous <Continuous>    EXAMINATION:  General:  calm  HEENT:  MMM  Neuro:  more lethargic this evening, oriented to self, not place, oriented to Dec 2021, follows commands, no drift in UEs, able to raise b/l LEs antigravity   Cards:  RRR  Respiratory:  no respiratory distress  Abdomen:  soft  Extremities:  no edema    Assessment/Plan:   65 yo woman admitted 12/11/21 with HA/vomiting since 12/10 found to have HH2mF3 SAH c/b hydro s/p coiling of L Pcomm aneurysm, found to have a partial left m2 thrombus now s/p thrombectomy with TICI 3 reperfusion. S/p R EVD for hydro c/b sizable tract hemorrhage with IVH now with L EVD. PBD 5. More lethargic this evening but fluctuates.     CT head  c/w EVD open at 5cm H2O  daily TCDs  SBP goal 100-200  goal euvolemia as patient is entering the vasospasm period   stop 2% HTS, c/w NaCl tabs 2g q6h  regular diet  Tylenol for pain  holding Lov for tract hemorrhage (LE doppler with no DVTs)  Place A line to see whether patient fluctuated with changes in BP    Patient seen and examined by attending on 12/15/2021.    Patient is critically ill due to SAH and at high risk for neurological deterioration or death due to: hydrocephalus requiring an EVD for CSF diversion, at risk for delayed cerebral ischemia

## 2021-12-16 LAB
ANION GAP SERPL CALC-SCNC: 12 MMOL/L — SIGNIFICANT CHANGE UP (ref 5–17)
ANION GAP SERPL CALC-SCNC: 13 MMOL/L — SIGNIFICANT CHANGE UP (ref 5–17)
APPEARANCE CSF: ABNORMAL
APPEARANCE UR: CLEAR — SIGNIFICANT CHANGE UP
APTT BLD: 24.9 SEC — LOW (ref 27.5–35.5)
BACTERIA # UR AUTO: ABNORMAL
BASOPHILS # BLD AUTO: 0.06 K/UL — SIGNIFICANT CHANGE UP (ref 0–0.2)
BASOPHILS NFR BLD AUTO: 0.8 % — SIGNIFICANT CHANGE UP (ref 0–2)
BILIRUB UR-MCNC: NEGATIVE — SIGNIFICANT CHANGE UP
BLD GP AB SCN SERPL QL: NEGATIVE — SIGNIFICANT CHANGE UP
BUN SERPL-MCNC: 7 MG/DL — SIGNIFICANT CHANGE UP (ref 7–23)
BUN SERPL-MCNC: 8 MG/DL — SIGNIFICANT CHANGE UP (ref 7–23)
CALCIUM SERPL-MCNC: 9.3 MG/DL — SIGNIFICANT CHANGE UP (ref 8.4–10.5)
CALCIUM SERPL-MCNC: 9.7 MG/DL — SIGNIFICANT CHANGE UP (ref 8.4–10.5)
CHLORIDE SERPL-SCNC: 101 MMOL/L — SIGNIFICANT CHANGE UP (ref 96–108)
CHLORIDE SERPL-SCNC: 104 MMOL/L — SIGNIFICANT CHANGE UP (ref 96–108)
CO2 SERPL-SCNC: 22 MMOL/L — SIGNIFICANT CHANGE UP (ref 22–31)
CO2 SERPL-SCNC: 23 MMOL/L — SIGNIFICANT CHANGE UP (ref 22–31)
COLOR CSF: ABNORMAL
COLOR SPEC: COLORLESS — SIGNIFICANT CHANGE UP
CREAT SERPL-MCNC: 0.4 MG/DL — LOW (ref 0.5–1.3)
CREAT SERPL-MCNC: 0.5 MG/DL — SIGNIFICANT CHANGE UP (ref 0.5–1.3)
D DIMER BLD IA.RAPID-MCNC: 256 NG/ML DDU — HIGH
DIFF PNL FLD: NEGATIVE — SIGNIFICANT CHANGE UP
DRVVT SCREEN TO CONFIRM RATIO: SIGNIFICANT CHANGE UP
EOSINOPHIL # BLD AUTO: 0.06 K/UL — SIGNIFICANT CHANGE UP (ref 0–0.5)
EOSINOPHIL NFR BLD AUTO: 0.8 % — SIGNIFICANT CHANGE UP (ref 0–6)
EPI CELLS # UR: 0 /HPF — SIGNIFICANT CHANGE UP
FACT IX PPP CHRO-ACNC: 135 % — SIGNIFICANT CHANGE UP (ref 80–165)
FACT VIII ACT/NOR PPP: 251 % — HIGH (ref 60–125)
FACT XII ACT/NOR PPP: 137 % — SIGNIFICANT CHANGE UP (ref 70–145)
FACT XIII ACT/NOR PPP CHRO: 109 % — SIGNIFICANT CHANGE UP (ref 51–163)
FACT XIII ACT/NOR PPP CHRO: 114 % — SIGNIFICANT CHANGE UP (ref 51–163)
FIBRINOGEN PPP-MCNC: 605 MG/DL — HIGH (ref 290–520)
GLUCOSE CSF-MCNC: 59 MG/DL — SIGNIFICANT CHANGE UP (ref 40–70)
GLUCOSE SERPL-MCNC: 117 MG/DL — HIGH (ref 70–99)
GLUCOSE SERPL-MCNC: 120 MG/DL — HIGH (ref 70–99)
GLUCOSE UR QL: NEGATIVE — SIGNIFICANT CHANGE UP
GRAM STN FLD: SIGNIFICANT CHANGE UP
HCT VFR BLD CALC: 37.1 % — SIGNIFICANT CHANGE UP (ref 34.5–45)
HEPARINASE TEG R TIME: 3.1 MIN (ref 4.3–8.3)
HGB BLD-MCNC: 12.8 G/DL — SIGNIFICANT CHANGE UP (ref 11.5–15.5)
HYALINE CASTS # UR AUTO: 1 /LPF — SIGNIFICANT CHANGE UP (ref 0–2)
IMM GRANULOCYTES NFR BLD AUTO: 0.5 % — SIGNIFICANT CHANGE UP (ref 0–1.5)
INR BLD: 0.96 RATIO — SIGNIFICANT CHANGE UP (ref 0.88–1.16)
KETONES UR-MCNC: NEGATIVE — SIGNIFICANT CHANGE UP
LA NT DPL PPP QL: 29.6 SEC — SIGNIFICANT CHANGE UP
LACTATE CSF-MCNC: 3.5 MMOL/L — HIGH (ref 1.1–2.4)
LEUKOCYTE ESTERASE UR-ACNC: ABNORMAL
LYMPHOCYTES # BLD AUTO: 1.72 K/UL — SIGNIFICANT CHANGE UP (ref 1–3.3)
LYMPHOCYTES # BLD AUTO: 22.3 % — SIGNIFICANT CHANGE UP (ref 13–44)
LYMPHOCYTES # CSF: 2 % — LOW (ref 40–80)
MAGNESIUM SERPL-MCNC: 1.9 MG/DL — SIGNIFICANT CHANGE UP (ref 1.6–2.6)
MAGNESIUM SERPL-MCNC: 2.5 MG/DL — SIGNIFICANT CHANGE UP (ref 1.6–2.6)
MCHC RBC-ENTMCNC: 30.8 PG — SIGNIFICANT CHANGE UP (ref 27–34)
MCHC RBC-ENTMCNC: 34.5 GM/DL — SIGNIFICANT CHANGE UP (ref 32–36)
MCV RBC AUTO: 89.2 FL — SIGNIFICANT CHANGE UP (ref 80–100)
MONOCYTES # BLD AUTO: 0.46 K/UL — SIGNIFICANT CHANGE UP (ref 0–0.9)
MONOCYTES NFR BLD AUTO: 6 % — SIGNIFICANT CHANGE UP (ref 2–14)
MONOS+MACROS NFR CSF: 3 % — LOW (ref 15–45)
NEUTROPHILS # BLD AUTO: 5.36 K/UL — SIGNIFICANT CHANGE UP (ref 1.8–7.4)
NEUTROPHILS # CSF: 95 % — HIGH (ref 0–6)
NEUTROPHILS NFR BLD AUTO: 69.6 % — SIGNIFICANT CHANGE UP (ref 43–77)
NITRITE UR-MCNC: POSITIVE
NORMALIZED SCT PPP-RTO: 0.84 RATIO — SIGNIFICANT CHANGE UP (ref 0–1.16)
NORMALIZED SCT PPP-RTO: SIGNIFICANT CHANGE UP
NRBC # BLD: 0 /100 WBCS — SIGNIFICANT CHANGE UP (ref 0–0)
NRBC NFR CSF: 185 /UL — HIGH (ref 0–5)
PA ADP PRP-ACNC: 188 PRU — LOW (ref 194–417)
PH UR: 7.5 — SIGNIFICANT CHANGE UP (ref 5–8)
PHOSPHATE SERPL-MCNC: 3 MG/DL — SIGNIFICANT CHANGE UP (ref 2.5–4.5)
PHOSPHATE SERPL-MCNC: 3.6 MG/DL — SIGNIFICANT CHANGE UP (ref 2.5–4.5)
PLATELET # BLD AUTO: 198 K/UL — SIGNIFICANT CHANGE UP (ref 150–400)
PLATELET MAPPING ACTF MAX AMPLITUDE: 18 MM — SIGNIFICANT CHANGE UP (ref 2–19)
PLATELET MAPPING ADP MAXIMUM AMPLITUDE: 58.4 MM — SIGNIFICANT CHANGE UP (ref 45–69)
PLATELET MAPPING ADP PERCENT INHIBITION: 14.2 % — SIGNIFICANT CHANGE UP (ref 0–17)
PLATELET MAPPING HKH MAXIMUM AMPLITUDE: 65.1 MM — SIGNIFICANT CHANGE UP (ref 53–68)
POTASSIUM SERPL-MCNC: 3.5 MMOL/L — SIGNIFICANT CHANGE UP (ref 3.5–5.3)
POTASSIUM SERPL-MCNC: 3.8 MMOL/L — SIGNIFICANT CHANGE UP (ref 3.5–5.3)
POTASSIUM SERPL-SCNC: 3.5 MMOL/L — SIGNIFICANT CHANGE UP (ref 3.5–5.3)
POTASSIUM SERPL-SCNC: 3.8 MMOL/L — SIGNIFICANT CHANGE UP (ref 3.5–5.3)
PROT CSF-MCNC: 47 MG/DL — HIGH (ref 15–45)
PROT UR-MCNC: NEGATIVE — SIGNIFICANT CHANGE UP
PROTHROM AB SERPL-ACNC: 11.5 SEC — SIGNIFICANT CHANGE UP (ref 10.6–13.6)
RAPIDTEG MAXIMUM AMPLITUDE: 65.4 MM — SIGNIFICANT CHANGE UP (ref 52–70)
RBC # BLD: 4.16 M/UL — SIGNIFICANT CHANGE UP (ref 3.8–5.2)
RBC # CSF: HIGH /UL (ref 0–0)
RBC # FLD: 12.1 % — SIGNIFICANT CHANGE UP (ref 10.3–14.5)
RBC CASTS # UR COMP ASSIST: 2 /HPF — SIGNIFICANT CHANGE UP (ref 0–4)
RH IG SCN BLD-IMP: POSITIVE — SIGNIFICANT CHANGE UP
SARS-COV-2 RNA SPEC QL NAA+PROBE: SIGNIFICANT CHANGE UP
SODIUM SERPL-SCNC: 136 MMOL/L — SIGNIFICANT CHANGE UP (ref 135–145)
SODIUM SERPL-SCNC: 139 MMOL/L — SIGNIFICANT CHANGE UP (ref 135–145)
SP GR SPEC: 1.01 — SIGNIFICANT CHANGE UP (ref 1.01–1.02)
SPECIMEN SOURCE: SIGNIFICANT CHANGE UP
TEG FUNCTIONAL FIBRINOGEN: 28.2 MM — SIGNIFICANT CHANGE UP (ref 15–32)
TEG MAXIMUM AMPLITUDE: 65.4 MM — SIGNIFICANT CHANGE UP (ref 52–69)
TEG REACTION TIME: 3.7 MIN (ref 4.6–9.1)
TUBE TYPE: SIGNIFICANT CHANGE UP
UROBILINOGEN FLD QL: NEGATIVE — SIGNIFICANT CHANGE UP
VWF AG ACT/NOR PPP IA: 275 % — HIGH (ref 63–170)
VWF:RCO ACT/NOR PPP PL AGG: 277 % — HIGH (ref 45–133)
WBC # BLD: 7.7 K/UL — SIGNIFICANT CHANGE UP (ref 3.8–10.5)
WBC # FLD AUTO: 7.7 K/UL — SIGNIFICANT CHANGE UP (ref 3.8–10.5)
WBC UR QL: 21 /HPF — HIGH (ref 0–5)

## 2021-12-16 PROCEDURE — 99291 CRITICAL CARE FIRST HOUR: CPT

## 2021-12-16 PROCEDURE — 95720 EEG PHY/QHP EA INCR W/VEEG: CPT

## 2021-12-16 PROCEDURE — 70450 CT HEAD/BRAIN W/O DYE: CPT | Mod: 26

## 2021-12-16 PROCEDURE — 71045 X-RAY EXAM CHEST 1 VIEW: CPT | Mod: 26

## 2021-12-16 RX ORDER — CEFTRIAXONE 500 MG/1
1000 INJECTION, POWDER, FOR SOLUTION INTRAMUSCULAR; INTRAVENOUS ONCE
Refills: 0 | Status: COMPLETED | OUTPATIENT
Start: 2021-12-16 | End: 2021-12-16

## 2021-12-16 RX ORDER — POTASSIUM CHLORIDE 20 MEQ
10 PACKET (EA) ORAL
Refills: 0 | Status: DISCONTINUED | OUTPATIENT
Start: 2021-12-16 | End: 2021-12-16

## 2021-12-16 RX ORDER — SODIUM CHLORIDE 9 MG/ML
1000 INJECTION INTRAMUSCULAR; INTRAVENOUS; SUBCUTANEOUS ONCE
Refills: 0 | Status: COMPLETED | OUTPATIENT
Start: 2021-12-16 | End: 2021-12-16

## 2021-12-16 RX ORDER — LEVETIRACETAM 250 MG/1
500 TABLET, FILM COATED ORAL EVERY 12 HOURS
Refills: 0 | Status: DISCONTINUED | OUTPATIENT
Start: 2021-12-16 | End: 2021-12-19

## 2021-12-16 RX ORDER — POTASSIUM CHLORIDE 20 MEQ
40 PACKET (EA) ORAL ONCE
Refills: 0 | Status: COMPLETED | OUTPATIENT
Start: 2021-12-16 | End: 2021-12-16

## 2021-12-16 RX ORDER — CEFTRIAXONE 500 MG/1
1000 INJECTION, POWDER, FOR SOLUTION INTRAMUSCULAR; INTRAVENOUS EVERY 24 HOURS
Refills: 0 | Status: DISCONTINUED | OUTPATIENT
Start: 2021-12-17 | End: 2021-12-19

## 2021-12-16 RX ORDER — SODIUM CHLORIDE 5 G/100ML
1000 INJECTION, SOLUTION INTRAVENOUS
Refills: 0 | Status: DISCONTINUED | OUTPATIENT
Start: 2021-12-16 | End: 2021-12-18

## 2021-12-16 RX ORDER — SODIUM CHLORIDE 9 MG/ML
1000 INJECTION INTRAMUSCULAR; INTRAVENOUS; SUBCUTANEOUS
Refills: 0 | Status: DISCONTINUED | OUTPATIENT
Start: 2021-12-16 | End: 2021-12-16

## 2021-12-16 RX ORDER — ACETAMINOPHEN 500 MG
1000 TABLET ORAL ONCE
Refills: 0 | Status: COMPLETED | OUTPATIENT
Start: 2021-12-16 | End: 2021-12-16

## 2021-12-16 RX ORDER — MAGNESIUM SULFATE 500 MG/ML
1 VIAL (ML) INJECTION ONCE
Refills: 0 | Status: COMPLETED | OUTPATIENT
Start: 2021-12-16 | End: 2021-12-16

## 2021-12-16 RX ORDER — LEVETIRACETAM 250 MG/1
1000 TABLET, FILM COATED ORAL EVERY 12 HOURS
Refills: 0 | Status: DISCONTINUED | OUTPATIENT
Start: 2021-12-16 | End: 2021-12-16

## 2021-12-16 RX ORDER — POTASSIUM CHLORIDE 20 MEQ
20 PACKET (EA) ORAL
Refills: 0 | Status: COMPLETED | OUTPATIENT
Start: 2021-12-16 | End: 2021-12-16

## 2021-12-16 RX ORDER — SODIUM CHLORIDE 5 G/100ML
1000 INJECTION, SOLUTION INTRAVENOUS
Refills: 0 | Status: DISCONTINUED | OUTPATIENT
Start: 2021-12-16 | End: 2021-12-16

## 2021-12-16 RX ORDER — CEFTRIAXONE 500 MG/1
INJECTION, POWDER, FOR SOLUTION INTRAMUSCULAR; INTRAVENOUS
Refills: 0 | Status: DISCONTINUED | OUTPATIENT
Start: 2021-12-16 | End: 2021-12-19

## 2021-12-16 RX ADMIN — Medication 1000 MILLIGRAM(S): at 06:00

## 2021-12-16 RX ADMIN — SODIUM CHLORIDE 2 GRAM(S): 9 INJECTION INTRAMUSCULAR; INTRAVENOUS; SUBCUTANEOUS at 22:03

## 2021-12-16 RX ADMIN — Medication 650 MILLIGRAM(S): at 19:11

## 2021-12-16 RX ADMIN — SODIUM CHLORIDE 60 MILLILITER(S): 5 INJECTION, SOLUTION INTRAVENOUS at 22:04

## 2021-12-16 RX ADMIN — SENNA PLUS 2 TABLET(S): 8.6 TABLET ORAL at 22:03

## 2021-12-16 RX ADMIN — CEFTRIAXONE 100 MILLIGRAM(S): 500 INJECTION, POWDER, FOR SOLUTION INTRAMUSCULAR; INTRAVENOUS at 20:25

## 2021-12-16 RX ADMIN — CHLORHEXIDINE GLUCONATE 1 APPLICATION(S): 213 SOLUTION TOPICAL at 22:03

## 2021-12-16 RX ADMIN — Medication 20 MILLIEQUIVALENT(S): at 18:13

## 2021-12-16 RX ADMIN — LEVETIRACETAM 400 MILLIGRAM(S): 250 TABLET, FILM COATED ORAL at 18:13

## 2021-12-16 RX ADMIN — SODIUM CHLORIDE 2 GRAM(S): 9 INJECTION INTRAMUSCULAR; INTRAVENOUS; SUBCUTANEOUS at 04:14

## 2021-12-16 RX ADMIN — LEVETIRACETAM 400 MILLIGRAM(S): 250 TABLET, FILM COATED ORAL at 06:17

## 2021-12-16 RX ADMIN — Medication 650 MILLIGRAM(S): at 19:45

## 2021-12-16 RX ADMIN — Medication 100 GRAM(S): at 12:43

## 2021-12-16 RX ADMIN — Medication 100 MILLIEQUIVALENT(S): at 14:35

## 2021-12-16 RX ADMIN — SODIUM CHLORIDE 1000 MILLILITER(S): 9 INJECTION INTRAMUSCULAR; INTRAVENOUS; SUBCUTANEOUS at 09:54

## 2021-12-16 RX ADMIN — Medication 20 MILLIEQUIVALENT(S): at 22:04

## 2021-12-16 RX ADMIN — SODIUM CHLORIDE 1000 MILLILITER(S): 9 INJECTION INTRAMUSCULAR; INTRAVENOUS; SUBCUTANEOUS at 23:00

## 2021-12-16 RX ADMIN — POLYETHYLENE GLYCOL 3350 17 GRAM(S): 17 POWDER, FOR SOLUTION ORAL at 18:14

## 2021-12-16 RX ADMIN — SODIUM CHLORIDE 60 MILLILITER(S): 5 INJECTION, SOLUTION INTRAVENOUS at 12:43

## 2021-12-16 RX ADMIN — Medication 400 MILLIGRAM(S): at 05:30

## 2021-12-16 RX ADMIN — Medication 20 MILLIEQUIVALENT(S): at 20:25

## 2021-12-16 RX ADMIN — POLYETHYLENE GLYCOL 3350 17 GRAM(S): 17 POWDER, FOR SOLUTION ORAL at 06:17

## 2021-12-16 NOTE — PROGRESS NOTE ADULT - ASSESSMENT
66F, a SAH mFS3, HHS 2 , PBD 4, 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; partial left m2 thrombus now s/p thrombectomy with tici 3 reperfusion    NEURO:  - neuro checks q1h  - CTH today is stable  - HCP: EVD  at 5 cm water, output ml 256/ 24 hr , goal ICP<22 mmhg , CPP >65 mmhg   - DCI management: Daily TCds   - Balance 660  - Activity: PT/OT as tolerated    CVS:  - SBP goal 100-200  mmhg   TTE EF 65 %     PULM:  - RA    RENAL:  - Fluids: IVL   - 2% SALINE 50 ML/HR  -BMP     GI:  - Diet: regular diet   - Bowel regimen: miralax, senna    ENDO:   - FS goal 120-180     HEME/ONC:  - SCDs  - Chemoppx: lovenox on hold per NS for tract hemorrhage     ID:  afebrile       Patient is at high risk of neurologic deterioration/death due to:  SAH, vasospasm, expansion of the hematoma    66F, a SAH mFS3, HHS 2 , PBD 4, 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; partial left m2 thrombus now s/p thrombectomy with tici 3 reperfusion    NEURO:  - neuro checks q1h  - CTH today is stable  - HCP: EVD  at 5 cm water, output ml 256/ 24 hr , goal ICP<22 mmhg , CPP >65 mmhg   - DCI management: Daily TCds   - Balance 660  - Activity: PT/OT as tolerated    CVS:  - SBP goal 100-180  mmhg   TTE EF 65 %     PULM:  - RA    RENAL:  - Fluids: 2% at 60 ml/hr  -BMP     GI:  - NPO   - Bowel regimen: miralax, senna    ENDO:   - FS goal 120-180     HEME/ONC:  - SCDs  - Chemoppx: lovenox on hold per NS for tract hemorrhage     ID:  afebrile       Patient is at high risk of neurologic deterioration/death due to:  SAH, vasospasm, expansion of the hematoma    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; partial left m2 thrombus now s/p thrombectomy with tici 3 reperfusion    NEURO:  - neuro checks q1h  - CT head follow up  - HCP: EVD  at 0 cm water, output ml 330/ 24 hr , goal ICP<22 mmhg , CPP >65 mmhg   - DCI management: Daily TCds   - Balance -900  - Activity: PT/OT as tolerated    CVS:  - SBP goal 140-180  mmhg   TTE EF 65 %     PULM:  - RA    RENAL:  - Fluids: 2% at 60 ml/hr  -BMP     GI:  - NPO   - Bowel regimen: miralax, senna    ENDO:   - FS goal 120-180     HEME/ONC:  - SCDs  - Chemoppx: lovenox on hold per NS for tract hemorrhage     ID:  afebrile       Patient is at high risk of neurologic deterioration/death due to:  SAH, vasospasm, expansion of the hematoma

## 2021-12-16 NOTE — CHART NOTE - NSCHARTNOTEFT_GEN_A_CORE
Patient febrile with tmax 38.3.  External Ventricular device was accessed under sterile conditions for collection of CSF sample.  EVD was clamped distally and approximately 3cc of CSF was sterilely withdrawn and discarded.  Another 3cc of CSF was withdrawn and sent to lab in 2 separate vials for culture and analysis.  Access port was closed with disinfecting cap then EVD was unclamped.  ICPs monitored and patient tolerated procedure well.

## 2021-12-16 NOTE — DIETITIAN INITIAL EVALUATION ADULT. - PHYSCIAL ASSESSMENT
%IBW: 125% %IBW: 125% (?) - based on dosing wt 150 lbs.   Wt discrepancy from what spouse expressed UBW (105 lbs). Will continue to monitor.

## 2021-12-16 NOTE — DIETITIAN INITIAL EVALUATION ADULT. - ENTERAL
If EN feeds warranted, consider Jevity 1.2 at 50ml/hr x 24 hrs. To provide (based on IBW 54.5kg): 1200ml, 1440kcal (26.4kcal/kg) and 67g protein (1.2g protein/kg).

## 2021-12-16 NOTE — DIETITIAN INITIAL EVALUATION ADULT. - ORAL INTAKE PTA/DIET HISTORY
Met with pt with spouse at bedside. Pt lethargic, unable to participate in nutrition evaluation at this time. Per spouse, pt with good appetite PTA. Consumes on average two meals daily; enjoys most foods, specifically vegetables, beef, fish and pork. Denies intolerance to chewing/swallowing, denies food allergies. Denies N/V/C/diarrhea. Took vitamin C PTA.

## 2021-12-16 NOTE — DIETITIAN INITIAL EVALUATION ADULT. - ADD RECOMMEND
1. If PO diet advanced, consider no therapeutic restrictions and defer consistency to medical team, SLP +/- oral supplementation. 2. Recommend multivitamin (if no medication contraindications) to aid in prevention of micronutrient deficiencies. 3. Monitor wt trends/labs/skin/hydration status/bowel regularity.

## 2021-12-16 NOTE — EEG REPORT - NS EEG TEXT BOX
[facility]   COMPREHENSIVE EPILEPSY CENTER   REPORT OF LONG-TERM VIDEO EEG     [facility]: [address] , [address], [place] [address], Ph#: [phone]  [facility]: [address] [place] [address], Ph#: [phone]  [facility]: [address] [place] [address], Ph#: [phone]    Patient Name: OZ DAVIS  Age and : 66y ([birth year])  MRN #: [id number]  Location: [place] [address]  Referring Physician: Vladimir Lucas    Start Time/Date: 06:51 on 21  End Time/Date: 08:00 on 21  Duration: 1 hour 9 min    _____________________________________________________________  STUDY INFORMATION    EEG Recording Technique:  The patient underwent continuous Video-EEG monitoring, using Telemetry System hardware on the [employer] Digital System. EEG and video data were stored on a computer hard drive with important events saved in digital archive files. The material was reviewed by a physician (electroencephalographer / epileptologist) on a daily basis. Andrew and seizure detection algorithms were utilized and reviewed. An EEG Technician attended to the patient, and was available throughout daytime work hours.  The epilepsy center neurologist was available in person or on call 24-hours per day.    EEG Placement and Labeling of Electrodes:  The EEG was performed utilizing 20 channel referential EEG connections (coronal over temporal over parasagittal montage) using all standard 10-20 electrode placements with EKG, with additional electrodes placed in the inferior temporal region using the modified 10-10 montage electrode placements for elective admissions, or if deemed necessary. Recording was at a sampling rate of 256 samples per second per channel. Time synchronized digital video recording was done simultaneously with EEG recording. A low light infrared camera was used for low light recording.     _____________________________________________________________  HISTORY    Patient is a 66y old  Female who presents with a chief complaint of Subarachnoid hemorrhage (16 Dec 2021 08:07)      PERTINENT MEDICATION:  MEDICATIONS  (STANDING):  bisacodyl 5 milliGRAM(s) Oral at bedtime  chlorhexidine 4% Liquid 1 Application(s) Topical <User Schedule>  levETIRAcetam  IVPB 1000 milliGRAM(s) IV Intermittent every 12 hours  polyethylene glycol 3350 17 Gram(s) Oral two times a day  senna 2 Tablet(s) Oral at bedtime  sodium chloride 2 Gram(s) Oral every 6 hours  sodium chloride 0.9% Bolus 1000 milliLiter(s) IV Bolus once    _____________________________________________________________  STUDY INTERPRETATION    Findings: The background was continuous, spontaneously variable and reactive. During wakefulness, the posterior dominant rhythm consisted of 6-7 Hz activity.    Background Slowing:  Diffuse theta and polymorphic delta slowing.    Focal Slowing:   None were present.  Continuous theta/delta slowing in the right frontotemporal region.     Sleep Background:  Drowsiness and stage II sleep transients were not recorded.    Other Non-Epileptiform Findings:  None were present.    Interictal Epileptiform Activity:   None were present.    Events:  Clinical events: None recorded.  Seizures: None recorded.    Activation Procedures:   Hyperventilation was not performed.    Photic stimulation was not performed.     Artifacts:  Intermittent myogenic and movement artifacts were noted.    ECG:  The heart rate on single channel ECG was predominantly between 60-70 BPM.    _____________________________________________________________  EEG SUMMARY/CLASSIFICATION    Abnormal EEG  - Moderate generalized slowing  - Right frontotemporal slowing    _____________________________________________________________  EEG IMPRESSION/CLINICAL CORRELATE    This EEG is consistent with moderate diffuse cerebral dysfunction with superimposed focal right frontotemporal cerebral dysfunction.  No clear epileptiform pattern or seizure seen.  _____________________________________________________________    Malaika Rick DO  Attending Physician, [facility] [place]

## 2021-12-16 NOTE — DIETITIAN INITIAL EVALUATION ADULT. - ETIOLOGY
increased demand for nutrient in the setting of brain injury, neurosurgical intervention complex clinical course deferring ability of pt to receive/consume optimal energy/protein

## 2021-12-16 NOTE — PROGRESS NOTE ADULT - SUBJECTIVE AND OBJECTIVE BOX
HPI:  67 yo woman admitted 12/11/21 with HA/vomiting since 12/10 for HH2mF3 SAH c/b hydro s/p coiling of L Pcomm aneurysm, found to have a partial left m2 thrombus now s/p thrombectomy with tici 3 reperfusion. S/p R EVD c/b sizable tract hemorrhage with IVH now with L EVD. PBD 6.     OVERNIGHT EVENTS:   No acute events overnight.    VITALS:  T(C): , Max: 36.9 (12-15-21 @ 15:00)  HR:  (59 - 101)  BP:  (117/74 - 178/69)  ABP: --  RR:  (13 - 20)  SpO2:  (95% - 100%)  Wt(kg): --      12-15-21 @ 07:01  -  12-16-21 @ 07:00  --------------------------------------------------------  IN: 1249.8 mL / OUT: 1573 mL / NET: -323.2 mL      LABS:  Na: 136 (12-16 @ 06:24), 138 (12-15 @ 13:16), 139 (12-15 @ 01:12), 135 (12-13 @ 21:25)  K: 3.5 (12-16 @ 06:24), 3.3 (12-15 @ 13:16), 3.5 (12-15 @ 01:12), 3.7 (12-13 @ 21:25)  Cl: 101 (12-16 @ 06:24), 102 (12-15 @ 13:16), 105 (12-15 @ 01:12), 101 (12-13 @ 21:25)  CO2: 22 (12-16 @ 06:24), 22 (12-15 @ 13:16), 22 (12-15 @ 01:12), 21 (12-13 @ 21:25)  BUN: 7 (12-16 @ 06:24), 9 (12-15 @ 13:16), 10 (12-15 @ 01:12), 9 (12-13 @ 21:25)  Cr: 0.40 (12-16 @ 06:24), 0.43 (12-15 @ 13:16), 0.43 (12-15 @ 01:12), 0.49 (12-13 @ 21:25)  Glu: 120(12-16 @ 06:24), 267(12-15 @ 13:16), 126(12-15 @ 01:12), 105(12-13 @ 21:25)    Hgb: 12.8 (12-16 @ 06:21), 12.9 (12-15 @ 01:12), 14.1 (12-13 @ 21:25)  Hct: 37.1 (12-16 @ 06:21), 38.1 (12-15 @ 01:12), 41.2 (12-13 @ 21:25)  WBC: 7.70 (12-16 @ 06:21), 7.58 (12-15 @ 01:12), 8.63 (12-13 @ 21:25)  Plt: 198 (12-16 @ 06:21), 183 (12-15 @ 01:12), 179 (12-13 @ 21:25)    INR: 0.93 12-14-21 @ 10:38, 0.97 12-13-21 @ 23:54  PTT: 37.7 12-13-21 @ 23:54    IMAGING:   Recent imaging studies were reviewed.    MEDICATIONS:  acetaminophen     Tablet .. 650 milliGRAM(s) Oral every 6 hours PRN  acetaminophen   IVPB .. 1000 milliGRAM(s) IV Intermittent once  bisacodyl 5 milliGRAM(s) Oral at bedtime  chlorhexidine 4% Liquid 1 Application(s) Topical <User Schedule>  levETIRAcetam  IVPB 1000 milliGRAM(s) IV Intermittent every 12 hours  oxyCODONE    IR 5 milliGRAM(s) Oral every 4 hours PRN  oxyCODONE    IR 10 milliGRAM(s) Oral every 4 hours PRN  polyethylene glycol 3350 17 Gram(s) Oral two times a day  senna 2 Tablet(s) Oral at bedtime  sodium chloride 2 Gram(s) Oral every 6 hours    EXAMINATION:  General:  calm  HEENT:  MMM  Neuro:  awake, alert, oriented x 3, follows commands, right gaze preference , no drift, 5.5 in b/l biceps and triceps and dorsiflexion.   Cards:  RRR  Respiratory:  no respiratory distress  Abdomen:  soft  Extremities:  no edema   HPI:  67 yo woman admitted 12/11/21 with HA/vomiting since 12/10 for HH2mF3 SAH c/b hydro s/p coiling of L Pcomm aneurysm, found to have a partial left m2 thrombus now s/p thrombectomy with tici 3 reperfusion. S/p R EVD c/b sizable tract hemorrhage with IVH now with L EVD. PBD 6.     OVERNIGHT EVENTS:   No acute events overnight.    VITALS:  T(C): , Max: 36.9 (12-15-21 @ 15:00)  HR:  (59 - 101)  BP:  (117/74 - 178/69)  ABP: --  RR:  (13 - 20)  SpO2:  (95% - 100%)  Wt(kg): --      12-15-21 @ 07:01  -  12-16-21 @ 07:00  --------------------------------------------------------  IN: 1249.8 mL / OUT: 1573 mL / NET: -323.2 mL      LABS:  Na: 136 (12-16 @ 06:24), 138 (12-15 @ 13:16), 139 (12-15 @ 01:12), 135 (12-13 @ 21:25)  K: 3.5 (12-16 @ 06:24), 3.3 (12-15 @ 13:16), 3.5 (12-15 @ 01:12), 3.7 (12-13 @ 21:25)  Cl: 101 (12-16 @ 06:24), 102 (12-15 @ 13:16), 105 (12-15 @ 01:12), 101 (12-13 @ 21:25)  CO2: 22 (12-16 @ 06:24), 22 (12-15 @ 13:16), 22 (12-15 @ 01:12), 21 (12-13 @ 21:25)  BUN: 7 (12-16 @ 06:24), 9 (12-15 @ 13:16), 10 (12-15 @ 01:12), 9 (12-13 @ 21:25)  Cr: 0.40 (12-16 @ 06:24), 0.43 (12-15 @ 13:16), 0.43 (12-15 @ 01:12), 0.49 (12-13 @ 21:25)  Glu: 120(12-16 @ 06:24), 267(12-15 @ 13:16), 126(12-15 @ 01:12), 105(12-13 @ 21:25)    Hgb: 12.8 (12-16 @ 06:21), 12.9 (12-15 @ 01:12), 14.1 (12-13 @ 21:25)  Hct: 37.1 (12-16 @ 06:21), 38.1 (12-15 @ 01:12), 41.2 (12-13 @ 21:25)  WBC: 7.70 (12-16 @ 06:21), 7.58 (12-15 @ 01:12), 8.63 (12-13 @ 21:25)  Plt: 198 (12-16 @ 06:21), 183 (12-15 @ 01:12), 179 (12-13 @ 21:25)    INR: 0.93 12-14-21 @ 10:38, 0.97 12-13-21 @ 23:54  PTT: 37.7 12-13-21 @ 23:54    IMAGING:   Recent imaging studies were reviewed.    MEDICATIONS:  acetaminophen     Tablet .. 650 milliGRAM(s) Oral every 6 hours PRN  acetaminophen   IVPB .. 1000 milliGRAM(s) IV Intermittent once  bisacodyl 5 milliGRAM(s) Oral at bedtime  chlorhexidine 4% Liquid 1 Application(s) Topical <User Schedule>  levETIRAcetam  IVPB 1000 milliGRAM(s) IV Intermittent every 12 hours  oxyCODONE    IR 5 milliGRAM(s) Oral every 4 hours PRN  oxyCODONE    IR 10 milliGRAM(s) Oral every 4 hours PRN  polyethylene glycol 3350 17 Gram(s) Oral two times a day  senna 2 Tablet(s) Oral at bedtime  sodium chloride 2 Gram(s) Oral every 6 hours    EXAMINATION:  General:  calm  HEENT:  MMM  Neuro:  awake, alert, oriented x 3, follows commands, right gaze preference , no drift, 5.5 in b/l biceps and triceps and dorsiflexion.     right more than left      Cards:  RRR  Respiratory:  no respiratory distress  Abdomen:  soft  Extremities:  no edema   HPI:  65 yo woman admitted 12/11/21 with HA/vomiting since 12/10 for HH2mF3 SAH c/b hydro s/p coiling of L Pcomm aneurysm, found to have a partial left m2 thrombus now s/p thrombectomy with tici 3 reperfusion. S/p R EVD c/b sizable tract hemorrhage with IVH now with L EVD. PBD 6.     OVERNIGHT EVENTS:   The patient became more sleepy overnight, the EVD was dropped to 0    VITALS:  T(C): , Max: 36.9 (12-15-21 @ 15:00)  HR:  (59 - 101)  BP:  (117/74 - 178/69)  ABP: --  RR:  (13 - 20)  SpO2:  (95% - 100%)  Wt(kg): --      12-15-21 @ 07:01  -  12-16-21 @ 07:00  --------------------------------------------------------  IN: 1249.8 mL / OUT: 1573 mL / NET: -323.2 mL      LABS:  Na: 136 (12-16 @ 06:24), 138 (12-15 @ 13:16), 139 (12-15 @ 01:12), 135 (12-13 @ 21:25)  K: 3.5 (12-16 @ 06:24), 3.3 (12-15 @ 13:16), 3.5 (12-15 @ 01:12), 3.7 (12-13 @ 21:25)  Cl: 101 (12-16 @ 06:24), 102 (12-15 @ 13:16), 105 (12-15 @ 01:12), 101 (12-13 @ 21:25)  CO2: 22 (12-16 @ 06:24), 22 (12-15 @ 13:16), 22 (12-15 @ 01:12), 21 (12-13 @ 21:25)  BUN: 7 (12-16 @ 06:24), 9 (12-15 @ 13:16), 10 (12-15 @ 01:12), 9 (12-13 @ 21:25)  Cr: 0.40 (12-16 @ 06:24), 0.43 (12-15 @ 13:16), 0.43 (12-15 @ 01:12), 0.49 (12-13 @ 21:25)  Glu: 120(12-16 @ 06:24), 267(12-15 @ 13:16), 126(12-15 @ 01:12), 105(12-13 @ 21:25)    Hgb: 12.8 (12-16 @ 06:21), 12.9 (12-15 @ 01:12), 14.1 (12-13 @ 21:25)  Hct: 37.1 (12-16 @ 06:21), 38.1 (12-15 @ 01:12), 41.2 (12-13 @ 21:25)  WBC: 7.70 (12-16 @ 06:21), 7.58 (12-15 @ 01:12), 8.63 (12-13 @ 21:25)  Plt: 198 (12-16 @ 06:21), 183 (12-15 @ 01:12), 179 (12-13 @ 21:25)    INR: 0.93 12-14-21 @ 10:38, 0.97 12-13-21 @ 23:54  PTT: 37.7 12-13-21 @ 23:54    IMAGING:   Recent imaging studies were reviewed.    MEDICATIONS:  acetaminophen     Tablet .. 650 milliGRAM(s) Oral every 6 hours PRN  acetaminophen   IVPB .. 1000 milliGRAM(s) IV Intermittent once  bisacodyl 5 milliGRAM(s) Oral at bedtime  chlorhexidine 4% Liquid 1 Application(s) Topical <User Schedule>  levETIRAcetam  IVPB 1000 milliGRAM(s) IV Intermittent every 12 hours  oxyCODONE    IR 5 milliGRAM(s) Oral every 4 hours PRN  oxyCODONE    IR 10 milliGRAM(s) Oral every 4 hours PRN  polyethylene glycol 3350 17 Gram(s) Oral two times a day  senna 2 Tablet(s) Oral at bedtime  sodium chloride 2 Gram(s) Oral every 6 hours    EXAMINATION:  General:  calm  HEENT:  MMM  Neuro:  Sleepy, oriented x1, perseverates , follows commands ,pupils 5 mm non reactive bilateral, midline gaze, left drift, Moving all limbs 4/5  Cards:  RRR  Respiratory:  no respiratory distress  Abdomen:  soft  Extremities:  no edema

## 2021-12-16 NOTE — DIETITIAN INITIAL EVALUATION ADULT. - CHIEF COMPLAINT
The patient is a 67 yo F with admitted 12/11/21 with HA/vomiting since 12/10. Found to have a SAH (HH2, mF3) c/b hydrocephalus s/p coiling of L PCOMM aneurysm. Found to have a parietal left m2 thrombus now s/p thrombectomy with TICI 3 reperfusion. S/P R EVD c/b sizable tract hemorrhage with IVH. Now with L EVD.

## 2021-12-16 NOTE — DIETITIAN INITIAL EVALUATION ADULT. - OTHER INFO
Dosing w t (12/11): 149.6 lbs  UBW:    Pt currently prescribed a regular diet.   Continues to receive NaCl 2% and NaCl tabs with goal Na/SBP parameters determined by medical team.     Skin: surgical incision right groin   Edema: none noted; GI: last BM (12/15): x 1. On bowel regimen (senna, Miralax) Dosing wt: (12/11): 149.6 lbs  UBW: 105 lbs. Recommend re-weight to determine accuracy.     Pt had received breakfast tray prior to discontinuation of diet, however per spouse, pt too sleepy to participate in diet. Continues to receive NaCl 2% and NaCl tabs with goal Na/SBP parameters determined by medical team.     Skin: surgical incision right groin   Edema: none noted; GI: last BM (12/15): x 1. On bowel regimen (senna, Miralax)

## 2021-12-16 NOTE — DIETITIAN INITIAL EVALUATION ADULT. - SIGNS/SYMPTOMS
pt s/p SAH s/p coiling pt lethargic with change in exam, now NPO; poor appetite in-house prior to diet discontinuation

## 2021-12-16 NOTE — PROGRESS NOTE ADULT - SUBJECTIVE AND OBJECTIVE BOX
O: febrile   T(C): 37.3 (12-16-21 @ 15:00), Max: 38.3 (12-16-21 @ 10:00)  HR: 64 (12-16-21 @ 18:00) (59 - 98)  BP: 171/91 (12-16-21 @ 18:00) (135/81 - 179/88)  RR: 14 (12-16-21 @ 18:00) (13 - 22)  SpO2: 96% (12-16-21 @ 18:00) (95% - 100%)  12-15-21 @ 07:01  -  12-16-21 @ 07:00  --------------------------------------------------------  IN: 1449.8 mL / OUT: 2431 mL / NET: -981.2 mL    12-16-21 @ 07:01  -  12-16-21 @ 18:39  --------------------------------------------------------  IN: 1500 mL / OUT: 1963 mL / NET: -463 mL    acetaminophen     Tablet .. 650 milliGRAM(s) Oral every 6 hours PRN  bisacodyl 5 milliGRAM(s) Oral at bedtime  cefTRIAXone   IVPB      cefTRIAXone   IVPB 1000 milliGRAM(s) IV Intermittent once  chlorhexidine 4% Liquid 1 Application(s) Topical <User Schedule>  levETIRAcetam  IVPB 1000 milliGRAM(s) IV Intermittent every 12 hours  polyethylene glycol 3350 17 Gram(s) Oral two times a day  potassium chloride   Solution 20 milliEquivalent(s) Enteral Tube every 2 hours  senna 2 Tablet(s) Oral at bedtime  sodium chloride 2 Gram(s) Oral every 6 hours  sodium chloride 2% . 1000 milliLiter(s) IV Continuous <Continuous>    EXAMINATION:  General:  calm  HEENT:  MMM  Neuro:  Sleepy, oriented x1, perseverates , follows commands ,pupils 5 mm non reactive bilateral, midline gaze, left drift, Moving all limbs 4/5  Cards:  RRR  Respiratory:  no respiratory distress  Abdomen:  soft  Extremities:  no edema      LABS:  Na: 136 (12-16 @ 06:24), 138 (12-15 @ 13:16), 139 (12-15 @ 01:12), 135 (12-13 @ 21:25)  K: 3.5 (12-16 @ 06:24), 3.3 (12-15 @ 13:16), 3.5 (12-15 @ 01:12), 3.7 (12-13 @ 21:25)  Cl: 101 (12-16 @ 06:24), 102 (12-15 @ 13:16), 105 (12-15 @ 01:12), 101 (12-13 @ 21:25)  CO2: 22 (12-16 @ 06:24), 22 (12-15 @ 13:16), 22 (12-15 @ 01:12), 21 (12-13 @ 21:25)  BUN: 7 (12-16 @ 06:24), 9 (12-15 @ 13:16), 10 (12-15 @ 01:12), 9 (12-13 @ 21:25)  Cr: 0.40 (12-16 @ 06:24), 0.43 (12-15 @ 13:16), 0.43 (12-15 @ 01:12), 0.49 (12-13 @ 21:25)  Glu: 120(12-16 @ 06:24), 267(12-15 @ 13:16), 126(12-15 @ 01:12), 105(12-13 @ 21:25)    Hgb: 12.8 (12-16 @ 06:21), 12.9 (12-15 @ 01:12), 14.1 (12-13 @ 21:25)  Hct: 37.1 (12-16 @ 06:21), 38.1 (12-15 @ 01:12), 41.2 (12-13 @ 21:25)  WBC: 7.70 (12-16 @ 06:21), 7.58 (12-15 @ 01:12), 8.63 (12-13 @ 21:25)  Plt: 198 (12-16 @ 06:21), 183 (12-15 @ 01:12), 179 (12-13 @ 21:25)    INR: 0.96 12-16-21 @ 06:21, 0.93 12-14-21 @ 10:38, 0.97 12-13-21 @ 23:54  PTT: 24.9 12-16-21 @ 06:21, 37.7 12-13-21 @ 23:54            a/p 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; partial left m2 thrombus now s/p thrombectomy with tici 3 reperfusion  PBD 6  complicated by ICH , tract hemorrhage   CT head today  stable ICH   DCI prophylaxis: euvolemia   IN: 1500 mL / OUT: 1963 mL / NET: -463 mL  video EEG no seizures   no TCD as she has poor window  keppra 1 g q 12  hr for seizure prrophylaxis would consider d/c as no seizures on EEG   nimodipine    Hydrocephalus: EVD at     , keep ICP< 22 mmhg. CPP> 65-70   Brain edema, hypertonic saline                 , keep sodium in                  , BMP q 6 hr   CV : SBP goal   100-160 mmhg   Pulm: acute respiratory failure, intubated, ABG and adjust vent settings accordingly     GI: on TF, at goal   Renal: see neuro   ID fever, UTI, ceftriaxone, send urine cx   chest xray clear  blood cx pending   Endo: DM, target sugar 120-180   Hem: SCD,   hold chemoprophylaxis as POD     35 critical care time as patient is at risk for brain henrniation, ICP crisis, seizures, ICH  O: febrile   T(C): 37.3 (12-16-21 @ 15:00), Max: 38.3 (12-16-21 @ 10:00)  HR: 64 (12-16-21 @ 18:00) (59 - 98)  BP: 171/91 (12-16-21 @ 18:00) (135/81 - 179/88)  RR: 14 (12-16-21 @ 18:00) (13 - 22)  SpO2: 96% (12-16-21 @ 18:00) (95% - 100%)  12-15-21 @ 07:01  -  12-16-21 @ 07:00  --------------------------------------------------------  IN: 1449.8 mL / OUT: 2431 mL / NET: -981.2 mL    12-16-21 @ 07:01  -  12-16-21 @ 18:39  --------------------------------------------------------  IN: 1500 mL / OUT: 1963 mL / NET: -463 mL    acetaminophen     Tablet .. 650 milliGRAM(s) Oral every 6 hours PRN  bisacodyl 5 milliGRAM(s) Oral at bedtime  cefTRIAXone   IVPB      cefTRIAXone   IVPB 1000 milliGRAM(s) IV Intermittent once  chlorhexidine 4% Liquid 1 Application(s) Topical <User Schedule>  levETIRAcetam  IVPB 1000 milliGRAM(s) IV Intermittent every 12 hours  polyethylene glycol 3350 17 Gram(s) Oral two times a day  potassium chloride   Solution 20 milliEquivalent(s) Enteral Tube every 2 hours  senna 2 Tablet(s) Oral at bedtime  sodium chloride 2 Gram(s) Oral every 6 hours  sodium chloride 2% . 1000 milliLiter(s) IV Continuous <Continuous>    EXAMINATION:  General:  calm  HEENT:  MMM  Neuro:  Sleepy, oriented x1, perseverates , follows commands ,pupils 5 mm non reactive bilateral, midline gaze, left drift, Moving all limbs 4/5  Cards:  RRR  Respiratory:  no respiratory distress  Abdomen:  soft  Extremities:  no edema      LABS:  Na: 136 (12-16 @ 06:24), 138 (12-15 @ 13:16), 139 (12-15 @ 01:12), 135 (12-13 @ 21:25)  K: 3.5 (12-16 @ 06:24), 3.3 (12-15 @ 13:16), 3.5 (12-15 @ 01:12), 3.7 (12-13 @ 21:25)  Cl: 101 (12-16 @ 06:24), 102 (12-15 @ 13:16), 105 (12-15 @ 01:12), 101 (12-13 @ 21:25)  CO2: 22 (12-16 @ 06:24), 22 (12-15 @ 13:16), 22 (12-15 @ 01:12), 21 (12-13 @ 21:25)  BUN: 7 (12-16 @ 06:24), 9 (12-15 @ 13:16), 10 (12-15 @ 01:12), 9 (12-13 @ 21:25)  Cr: 0.40 (12-16 @ 06:24), 0.43 (12-15 @ 13:16), 0.43 (12-15 @ 01:12), 0.49 (12-13 @ 21:25)  Glu: 120(12-16 @ 06:24), 267(12-15 @ 13:16), 126(12-15 @ 01:12), 105(12-13 @ 21:25)    Hgb: 12.8 (12-16 @ 06:21), 12.9 (12-15 @ 01:12), 14.1 (12-13 @ 21:25)  Hct: 37.1 (12-16 @ 06:21), 38.1 (12-15 @ 01:12), 41.2 (12-13 @ 21:25)  WBC: 7.70 (12-16 @ 06:21), 7.58 (12-15 @ 01:12), 8.63 (12-13 @ 21:25)  Plt: 198 (12-16 @ 06:21), 183 (12-15 @ 01:12), 179 (12-13 @ 21:25)    INR: 0.96 12-16-21 @ 06:21, 0.93 12-14-21 @ 10:38, 0.97 12-13-21 @ 23:54  PTT: 24.9 12-16-21 @ 06:21, 37.7 12-13-21 @ 23:54            a/p 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; partial left m2 thrombus now s/p thrombectomy with tici 3 reperfusion  PBD 6  complicated by ICH , tract hemorrhage   CT head today  stable ICH   DCI prophylaxis: euvolemia   IN: 1500 mL / OUT: 1963 mL / NET: -463 mL  possible angio tomorrow   video EEG no seizures   no TCD as she has poor window  keppra 1 g q 12  hr for seizure prrophylaxis would consider d/c as no seizures on EEG   Hydrocephalus: EVD at   5 cm water  , keep ICP< 22 mmhg. CPP> 65-70   Brain edema, hypertonic saline                 , keep sodium in  140-150  , BMP q 6 hr   CV : SBP goal   100-160 mmhg   Pulm: RA   GI: on TF, at goal   NPO after midnight for possible angio tomorrow   Renal: see neuro   ID fever, UTI, ceftriaxone, send urine cx   chest xray clear  blood cx pending   Endo:  target sugar 120-180   Hem: SCD,   hold chemoprophylaxis new ICH    35 critical care time as patient is at risk for brain henrniation, ICP crisis, seizures, ICH  O: febrile   T(C): 37.3 (12-16-21 @ 15:00), Max: 38.3 (12-16-21 @ 10:00)  HR: 64 (12-16-21 @ 18:00) (59 - 98)  BP: 171/91 (12-16-21 @ 18:00) (135/81 - 179/88)  RR: 14 (12-16-21 @ 18:00) (13 - 22)  SpO2: 96% (12-16-21 @ 18:00) (95% - 100%)  12-15-21 @ 07:01  -  12-16-21 @ 07:00  --------------------------------------------------------  IN: 1449.8 mL / OUT: 2431 mL / NET: -981.2 mL    12-16-21 @ 07:01  -  12-16-21 @ 18:39  --------------------------------------------------------  IN: 1500 mL / OUT: 1963 mL / NET: -463 mL    acetaminophen     Tablet .. 650 milliGRAM(s) Oral every 6 hours PRN  bisacodyl 5 milliGRAM(s) Oral at bedtime  cefTRIAXone   IVPB      cefTRIAXone   IVPB 1000 milliGRAM(s) IV Intermittent once  chlorhexidine 4% Liquid 1 Application(s) Topical <User Schedule>  levETIRAcetam  IVPB 1000 milliGRAM(s) IV Intermittent every 12 hours  polyethylene glycol 3350 17 Gram(s) Oral two times a day  potassium chloride   Solution 20 milliEquivalent(s) Enteral Tube every 2 hours  senna 2 Tablet(s) Oral at bedtime  sodium chloride 2 Gram(s) Oral every 6 hours  sodium chloride 2% . 1000 milliLiter(s) IV Continuous <Continuous>    EXAMINATION:  General:  calm  HEENT:  MMM  Neuro:  Sleepy, oriented x1, perseverates , follows commands ,pupils 5 mm non reactive bilateral, midline gaze, left drift,  right side 5/5, left side 4/5 with drift   Cards:  RRR  Respiratory:  no respiratory distress  Abdomen:  soft  Extremities:  no edema      LABS:  Na: 136 (12-16 @ 06:24), 138 (12-15 @ 13:16), 139 (12-15 @ 01:12), 135 (12-13 @ 21:25)  K: 3.5 (12-16 @ 06:24), 3.3 (12-15 @ 13:16), 3.5 (12-15 @ 01:12), 3.7 (12-13 @ 21:25)  Cl: 101 (12-16 @ 06:24), 102 (12-15 @ 13:16), 105 (12-15 @ 01:12), 101 (12-13 @ 21:25)  CO2: 22 (12-16 @ 06:24), 22 (12-15 @ 13:16), 22 (12-15 @ 01:12), 21 (12-13 @ 21:25)  BUN: 7 (12-16 @ 06:24), 9 (12-15 @ 13:16), 10 (12-15 @ 01:12), 9 (12-13 @ 21:25)  Cr: 0.40 (12-16 @ 06:24), 0.43 (12-15 @ 13:16), 0.43 (12-15 @ 01:12), 0.49 (12-13 @ 21:25)  Glu: 120(12-16 @ 06:24), 267(12-15 @ 13:16), 126(12-15 @ 01:12), 105(12-13 @ 21:25)    Hgb: 12.8 (12-16 @ 06:21), 12.9 (12-15 @ 01:12), 14.1 (12-13 @ 21:25)  Hct: 37.1 (12-16 @ 06:21), 38.1 (12-15 @ 01:12), 41.2 (12-13 @ 21:25)  WBC: 7.70 (12-16 @ 06:21), 7.58 (12-15 @ 01:12), 8.63 (12-13 @ 21:25)  Plt: 198 (12-16 @ 06:21), 183 (12-15 @ 01:12), 179 (12-13 @ 21:25)    INR: 0.96 12-16-21 @ 06:21, 0.93 12-14-21 @ 10:38, 0.97 12-13-21 @ 23:54  PTT: 24.9 12-16-21 @ 06:21, 37.7 12-13-21 @ 23:54            a/p 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 CPP> 65-70 , output  243  ml in 24 hr   Brain edema, hypertonic saline   2% at 60 ml/hr   , keep sodium in  140-145  , BMP q 6 hr   CV : SBP goal   100-160 mmhg   Pulm: RA   GI: on TF, at goal   NPO after midnight for possible angio tomorrow   Renal: see neuro   ID fever, UTI, ceftriaxone, send urine cx   chest xray clear  blood cx pending   csf cx negative   Endo:  target sugar 120-180   Hem: SCD,   hold chemoprophylaxis new ICH    35 critical care time as patient is at risk for brain henrniation, ICP crisis, seizures, ICH  O: febrile   T(C): 37.3 (12-16-21 @ 15:00), Max: 38.3 (12-16-21 @ 10:00)  HR: 64 (12-16-21 @ 18:00) (59 - 98)  BP: 171/91 (12-16-21 @ 18:00) (135/81 - 179/88)  RR: 14 (12-16-21 @ 18:00) (13 - 22)  SpO2: 96% (12-16-21 @ 18:00) (95% - 100%)  12-15-21 @ 07:01  -  12-16-21 @ 07:00  --------------------------------------------------------  IN: 1449.8 mL / OUT: 2431 mL / NET: -981.2 mL    12-16-21 @ 07:01  -  12-16-21 @ 18:39  --------------------------------------------------------  IN: 1500 mL / OUT: 1963 mL / NET: -463 mL    acetaminophen     Tablet .. 650 milliGRAM(s) Oral every 6 hours PRN  bisacodyl 5 milliGRAM(s) Oral at bedtime  cefTRIAXone   IVPB      cefTRIAXone   IVPB 1000 milliGRAM(s) IV Intermittent once  chlorhexidine 4% Liquid 1 Application(s) Topical <User Schedule>  levETIRAcetam  IVPB 1000 milliGRAM(s) IV Intermittent every 12 hours  polyethylene glycol 3350 17 Gram(s) Oral two times a day  potassium chloride   Solution 20 milliEquivalent(s) Enteral Tube every 2 hours  senna 2 Tablet(s) Oral at bedtime  sodium chloride 2 Gram(s) Oral every 6 hours  sodium chloride 2% . 1000 milliLiter(s) IV Continuous <Continuous>    EXAMINATION:  General:  calm  HEENT:  MMM  Neuro:  Sleepy, oriented x1, perseverates , follows commands ,pupils 5 mm non reactive bilateral, midline gaze, left drift,  right side 5/5, left side 4/5 with drift   Cards:  RRR  Respiratory:  no respiratory distress  Abdomen:  soft  Extremities:  no edema      LABS:  Na: 136 (12-16 @ 06:24), 138 (12-15 @ 13:16), 139 (12-15 @ 01:12), 135 (12-13 @ 21:25)  K: 3.5 (12-16 @ 06:24), 3.3 (12-15 @ 13:16), 3.5 (12-15 @ 01:12), 3.7 (12-13 @ 21:25)  Cl: 101 (12-16 @ 06:24), 102 (12-15 @ 13:16), 105 (12-15 @ 01:12), 101 (12-13 @ 21:25)  CO2: 22 (12-16 @ 06:24), 22 (12-15 @ 13:16), 22 (12-15 @ 01:12), 21 (12-13 @ 21:25)  BUN: 7 (12-16 @ 06:24), 9 (12-15 @ 13:16), 10 (12-15 @ 01:12), 9 (12-13 @ 21:25)  Cr: 0.40 (12-16 @ 06:24), 0.43 (12-15 @ 13:16), 0.43 (12-15 @ 01:12), 0.49 (12-13 @ 21:25)  Glu: 120(12-16 @ 06:24), 267(12-15 @ 13:16), 126(12-15 @ 01:12), 105(12-13 @ 21:25)    Hgb: 12.8 (12-16 @ 06:21), 12.9 (12-15 @ 01:12), 14.1 (12-13 @ 21:25)  Hct: 37.1 (12-16 @ 06:21), 38.1 (12-15 @ 01:12), 41.2 (12-13 @ 21:25)  WBC: 7.70 (12-16 @ 06:21), 7.58 (12-15 @ 01:12), 8.63 (12-13 @ 21:25)  Plt: 198 (12-16 @ 06:21), 183 (12-15 @ 01:12), 179 (12-13 @ 21:25)    INR: 0.96 12-16-21 @ 06:21, 0.93 12-14-21 @ 10:38, 0.97 12-13-21 @ 23:54  PTT: 24.9 12-16-21 @ 06:21, 37.7 12-13-21 @ 23:54            a/p 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 CPP> 65-70 , output  243  ml in 24 hr   Brain edema, hypertonic saline   2% at 60 ml/hr   , keep sodium in  140-145  , BMP q 6 hr   CV : SBP goal   100-160 mmhg   Pulm: RA   GI: on TF, at goal   NPO after midnight for possible angio tomorrow   Renal: see neuro   ID fever, UTI, ceftriaxone, send urine cx   chest xray clear  blood cx pending   csf cx negative   Endo:  target sugar 120-180   Hem: SCD,   hold chemoprophylaxis new ICH    35 critical care time as patient is at risk for brain henrniation, ICP crisis, seizures, ICH

## 2021-12-17 LAB
ANA TITR SER: NEGATIVE — SIGNIFICANT CHANGE UP
ANION GAP SERPL CALC-SCNC: 10 MMOL/L — SIGNIFICANT CHANGE UP (ref 5–17)
ANION GAP SERPL CALC-SCNC: 13 MMOL/L — SIGNIFICANT CHANGE UP (ref 5–17)
ANION GAP SERPL CALC-SCNC: 13 MMOL/L — SIGNIFICANT CHANGE UP (ref 5–17)
BUN SERPL-MCNC: 10 MG/DL — SIGNIFICANT CHANGE UP (ref 7–23)
BUN SERPL-MCNC: 11 MG/DL — SIGNIFICANT CHANGE UP (ref 7–23)
BUN SERPL-MCNC: 11 MG/DL — SIGNIFICANT CHANGE UP (ref 7–23)
CALCIUM SERPL-MCNC: 8.5 MG/DL — SIGNIFICANT CHANGE UP (ref 8.4–10.5)
CALCIUM SERPL-MCNC: 8.9 MG/DL — SIGNIFICANT CHANGE UP (ref 8.4–10.5)
CALCIUM SERPL-MCNC: 9 MG/DL — SIGNIFICANT CHANGE UP (ref 8.4–10.5)
CHLORIDE SERPL-SCNC: 110 MMOL/L — HIGH (ref 96–108)
CHLORIDE SERPL-SCNC: 110 MMOL/L — HIGH (ref 96–108)
CHLORIDE SERPL-SCNC: 111 MMOL/L — HIGH (ref 96–108)
CO2 SERPL-SCNC: 21 MMOL/L — LOW (ref 22–31)
CO2 SERPL-SCNC: 21 MMOL/L — LOW (ref 22–31)
CO2 SERPL-SCNC: 22 MMOL/L — SIGNIFICANT CHANGE UP (ref 22–31)
CREAT SERPL-MCNC: 0.41 MG/DL — LOW (ref 0.5–1.3)
CREAT SERPL-MCNC: 0.44 MG/DL — LOW (ref 0.5–1.3)
CREAT SERPL-MCNC: 0.44 MG/DL — LOW (ref 0.5–1.3)
GLUCOSE SERPL-MCNC: 108 MG/DL — HIGH (ref 70–99)
GLUCOSE SERPL-MCNC: 121 MG/DL — HIGH (ref 70–99)
GLUCOSE SERPL-MCNC: 124 MG/DL — HIGH (ref 70–99)
HCT VFR BLD CALC: 37.1 % — SIGNIFICANT CHANGE UP (ref 34.5–45)
HGB BLD-MCNC: 12.4 G/DL — SIGNIFICANT CHANGE UP (ref 11.5–15.5)
MAGNESIUM SERPL-MCNC: 2.1 MG/DL — SIGNIFICANT CHANGE UP (ref 1.6–2.6)
MAGNESIUM SERPL-MCNC: 2.3 MG/DL — SIGNIFICANT CHANGE UP (ref 1.6–2.6)
MCHC RBC-ENTMCNC: 30.8 PG — SIGNIFICANT CHANGE UP (ref 27–34)
MCHC RBC-ENTMCNC: 33.4 GM/DL — SIGNIFICANT CHANGE UP (ref 32–36)
MCV RBC AUTO: 92.1 FL — SIGNIFICANT CHANGE UP (ref 80–100)
NRBC # BLD: 0 /100 WBCS — SIGNIFICANT CHANGE UP (ref 0–0)
PHOSPHATE SERPL-MCNC: 3 MG/DL — SIGNIFICANT CHANGE UP (ref 2.5–4.5)
PHOSPHATE SERPL-MCNC: 3.4 MG/DL — SIGNIFICANT CHANGE UP (ref 2.5–4.5)
PLATELET # BLD AUTO: 223 K/UL — SIGNIFICANT CHANGE UP (ref 150–400)
POTASSIUM SERPL-MCNC: 3.2 MMOL/L — LOW (ref 3.5–5.3)
POTASSIUM SERPL-MCNC: 3.4 MMOL/L — LOW (ref 3.5–5.3)
POTASSIUM SERPL-MCNC: 3.7 MMOL/L — SIGNIFICANT CHANGE UP (ref 3.5–5.3)
POTASSIUM SERPL-SCNC: 3.2 MMOL/L — LOW (ref 3.5–5.3)
POTASSIUM SERPL-SCNC: 3.4 MMOL/L — LOW (ref 3.5–5.3)
POTASSIUM SERPL-SCNC: 3.7 MMOL/L — SIGNIFICANT CHANGE UP (ref 3.5–5.3)
RBC # BLD: 4.03 M/UL — SIGNIFICANT CHANGE UP (ref 3.8–5.2)
RBC # FLD: 12.4 % — SIGNIFICANT CHANGE UP (ref 10.3–14.5)
SODIUM SERPL-SCNC: 143 MMOL/L — SIGNIFICANT CHANGE UP (ref 135–145)
SODIUM SERPL-SCNC: 144 MMOL/L — SIGNIFICANT CHANGE UP (ref 135–145)
SODIUM SERPL-SCNC: 144 MMOL/L — SIGNIFICANT CHANGE UP (ref 135–145)
WBC # BLD: 6.81 K/UL — SIGNIFICANT CHANGE UP (ref 3.8–10.5)
WBC # FLD AUTO: 6.81 K/UL — SIGNIFICANT CHANGE UP (ref 3.8–10.5)

## 2021-12-17 PROCEDURE — 99291 CRITICAL CARE FIRST HOUR: CPT

## 2021-12-17 PROCEDURE — 36410 VNPNXR 3YR/> PHY/QHP DX/THER: CPT

## 2021-12-17 PROCEDURE — 71045 X-RAY EXAM CHEST 1 VIEW: CPT | Mod: 26,76

## 2021-12-17 PROCEDURE — 95812 EEG 41-60 MINUTES: CPT | Mod: 26

## 2021-12-17 PROCEDURE — 36620 INSERTION CATHETER ARTERY: CPT

## 2021-12-17 PROCEDURE — 36226 PLACE CATH VERTEBRAL ART: CPT | Mod: LT

## 2021-12-17 PROCEDURE — 36224 PLACE CATH CAROTD ART: CPT | Mod: 50

## 2021-12-17 RX ORDER — POTASSIUM CHLORIDE 20 MEQ
40 PACKET (EA) ORAL ONCE
Refills: 0 | Status: COMPLETED | OUTPATIENT
Start: 2021-12-17 | End: 2021-12-17

## 2021-12-17 RX ORDER — HYDRALAZINE HCL 50 MG
5 TABLET ORAL ONCE
Refills: 0 | Status: COMPLETED | OUTPATIENT
Start: 2021-12-17 | End: 2021-12-17

## 2021-12-17 RX ORDER — SODIUM CHLORIDE 9 MG/ML
10 INJECTION INTRAMUSCULAR; INTRAVENOUS; SUBCUTANEOUS
Refills: 0 | Status: DISCONTINUED | OUTPATIENT
Start: 2021-12-17 | End: 2022-01-05

## 2021-12-17 RX ORDER — POTASSIUM CHLORIDE 20 MEQ
40 PACKET (EA) ORAL ONCE
Refills: 0 | Status: DISCONTINUED | OUTPATIENT
Start: 2021-12-17 | End: 2021-12-17

## 2021-12-17 RX ORDER — SODIUM CHLORIDE 9 MG/ML
500 INJECTION INTRAMUSCULAR; INTRAVENOUS; SUBCUTANEOUS ONCE
Refills: 0 | Status: COMPLETED | OUTPATIENT
Start: 2021-12-17 | End: 2021-12-17

## 2021-12-17 RX ORDER — ACETAMINOPHEN 500 MG
1000 TABLET ORAL ONCE
Refills: 0 | Status: COMPLETED | OUTPATIENT
Start: 2021-12-17 | End: 2021-12-17

## 2021-12-17 RX ORDER — POTASSIUM CHLORIDE 20 MEQ
20 PACKET (EA) ORAL ONCE
Refills: 0 | Status: COMPLETED | OUTPATIENT
Start: 2021-12-17 | End: 2021-12-17

## 2021-12-17 RX ADMIN — CHLORHEXIDINE GLUCONATE 1 APPLICATION(S): 213 SOLUTION TOPICAL at 21:18

## 2021-12-17 RX ADMIN — SODIUM CHLORIDE 2 GRAM(S): 9 INJECTION INTRAMUSCULAR; INTRAVENOUS; SUBCUTANEOUS at 05:11

## 2021-12-17 RX ADMIN — LEVETIRACETAM 400 MILLIGRAM(S): 250 TABLET, FILM COATED ORAL at 17:32

## 2021-12-17 RX ADMIN — Medication 1000 MILLIGRAM(S): at 03:50

## 2021-12-17 RX ADMIN — SODIUM CHLORIDE 2 GRAM(S): 9 INJECTION INTRAMUSCULAR; INTRAVENOUS; SUBCUTANEOUS at 17:42

## 2021-12-17 RX ADMIN — CEFTRIAXONE 100 MILLIGRAM(S): 500 INJECTION, POWDER, FOR SOLUTION INTRAMUSCULAR; INTRAVENOUS at 13:06

## 2021-12-17 RX ADMIN — LEVETIRACETAM 400 MILLIGRAM(S): 250 TABLET, FILM COATED ORAL at 05:13

## 2021-12-17 RX ADMIN — SODIUM CHLORIDE 2 GRAM(S): 9 INJECTION INTRAMUSCULAR; INTRAVENOUS; SUBCUTANEOUS at 21:17

## 2021-12-17 RX ADMIN — SODIUM CHLORIDE 500 MILLILITER(S): 9 INJECTION INTRAMUSCULAR; INTRAVENOUS; SUBCUTANEOUS at 05:14

## 2021-12-17 RX ADMIN — Medication 5 MILLIGRAM(S): at 11:50

## 2021-12-17 RX ADMIN — SENNA PLUS 2 TABLET(S): 8.6 TABLET ORAL at 21:17

## 2021-12-17 RX ADMIN — POLYETHYLENE GLYCOL 3350 17 GRAM(S): 17 POWDER, FOR SOLUTION ORAL at 17:32

## 2021-12-17 RX ADMIN — Medication 40 MILLIEQUIVALENT(S): at 12:58

## 2021-12-17 RX ADMIN — POLYETHYLENE GLYCOL 3350 17 GRAM(S): 17 POWDER, FOR SOLUTION ORAL at 05:13

## 2021-12-17 RX ADMIN — Medication 400 MILLIGRAM(S): at 03:21

## 2021-12-17 RX ADMIN — Medication 50 MILLIEQUIVALENT(S): at 19:56

## 2021-12-17 RX ADMIN — SODIUM CHLORIDE 2 GRAM(S): 9 INJECTION INTRAMUSCULAR; INTRAVENOUS; SUBCUTANEOUS at 12:58

## 2021-12-17 RX ADMIN — Medication 20 MILLIEQUIVALENT(S): at 19:56

## 2021-12-17 NOTE — PROCEDURE NOTE - NSPROCNAME_GEN_A_CORE
External Ventricular Drain Insertion
Arterial Puncture/Cannulation
Peripheral Line Insertion
Arterial Puncture/Cannulation

## 2021-12-17 NOTE — EEG REPORT - NS EEG TEXT BOX
[facility]   COMPREHENSIVE EPILEPSY CENTER   REPORT OF LONG-TERM VIDEO EEG     [facility]: [address], [place] [address], Ph#: [phone]  [facility]: [address], [place] [address], Ph#: [phone]  [facility]: [address] [place] [address], Ph#: [phone]    Patient Name: OZ DAVIS  Age and : 66y (57)  MRN #: [id number]  Location: [place] [address]  Referring Physician: Vladimir Lucas    Start Time/Date: 08:00 on 21  End Time/Date: 08:00 on 21  Duration: 24 hr    _____________________________________________________________  STUDY INFORMATION    EEG Recording Technique:  The patient underwent continuous Video-EEG monitoring, using Telemetry System hardware on the XLTek Digital System. EEG and video data were stored on a computer hard drive with important events saved in digital archive files. The material was reviewed by a physician (electroencephalographer / epileptologist) on a daily basis. Andrew and seizure detection algorithms were utilized and reviewed. An EEG Technician attended to the patient, and was available throughout daytime work hours.  The epilepsy center neurologist was available in person or on call 24-hours per day.    EEG Placement and Labeling of Electrodes:  The EEG was performed utilizing 20 channel referential EEG connections (coronal over temporal over parasagittal montage) using all standard 10-20 electrode placements with EKG, with additional electrodes placed in the inferior temporal region using the modified 10-10 montage electrode placements for elective admissions, or if deemed necessary. Recording was at a sampling rate of 256 samples per second per channel. Time synchronized digital video recording was done simultaneously with EEG recording. A low light infrared camera was used for low light recording.     _____________________________________________________________  HISTORY    Patient is a 66y old  Female who presents with a chief complaint of Subarachnoid hemorrhage (16 Dec 2021 08:07)      PERTINENT MEDICATION:  MEDICATIONS  (STANDING):  bisacodyl 5 milliGRAM(s) Oral at bedtime  chlorhexidine 4% Liquid 1 Application(s) Topical <User Schedule>  levETIRAcetam  IVPB 1000 milliGRAM(s) IV Intermittent every 12 hours  polyethylene glycol 3350 17 Gram(s) Oral two times a day  senna 2 Tablet(s) Oral at bedtime  sodium chloride 2 Gram(s) Oral every 6 hours  sodium chloride 0.9% Bolus 1000 milliLiter(s) IV Bolus once    _____________________________________________________________  STUDY INTERPRETATION    Findings: The background was continuous, spontaneously variable and reactive. During wakefulness, the posterior dominant rhythm consisted of 6-7 Hz activity over the left. No clear posterior dominant rhythm seen over the right.    Background Slowing:  Diffuse theta and polymorphic delta slowing.    Focal Slowing:   None were present.  Continuous theta/delta slowing in the right hemisphere, right frontotemporal maximal.     Sleep Background:  Drowsiness and stage II sleep transients were not recorded.    Other Non-Epileptiform Findings:  None were present.    Interictal Epileptiform Activity:   None were present.    Events:  Clinical events: None recorded.  Seizures: None recorded.    Activation Procedures:   Hyperventilation was not performed.    Photic stimulation was not performed.     Artifacts:  Intermittent myogenic and movement artifacts were noted.    ECG:  The heart rate on single channel ECG was predominantly between 60-70 BPM.    _____________________________________________________________  EEG SUMMARY/CLASSIFICATION    Abnormal EEG  - Mild to moderate generalized slowing  - Right frontotemporal slowing    _____________________________________________________________  EEG IMPRESSION/CLINICAL CORRELATE    This EEG is consistent with mild to moderate diffuse cerebral dysfunction with superimposed focal right frontotemporal cerebral dysfunction.  No clear epileptiform pattern or seizure seen.  _____________________________________________________________    Malaika Rick DO  Attending Physician, [facility]

## 2021-12-17 NOTE — PROGRESS NOTE ADULT - ASSESSMENT
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; partial left m2 thrombus now s/p thrombectomy with tici 3 reperfusion    NEURO:  - neuro checks q1h  - CT head follow up  - HCP: EVD  at 0 cm water, output ml 330/ 24 hr , goal ICP<22 mmhg , CPP >65 mmhg   - DCI management: Daily TCds   - Balance -900  - Activity: PT/OT as tolerated    CVS:  - SBP goal 140-180  mmhg   TTE EF 65 %     PULM:  - RA    RENAL:  - Fluids: 2% at 60 ml/hr  -BMP     GI:  - NPO   - Bowel regimen: miralax, senna    ENDO:   - FS goal 120-180     HEME/ONC:  - SCDs  - Chemoppx: lovenox on hold per NS for tract hemorrhage     ID:  afebrile       Patient is at high risk of neurologic deterioration/death due to:  SAH, vasospasm, expansion of the hematoma    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; partial left m2 thrombus now s/p thrombectomy with tici 3 reperfusion    NEURO:  - neuro checks q1h  DC EEG  - CT head follow up  - HCP: EVD  at 0 cm water, goal ICP<22 mmhg , CPP >65 mmhg   - Angio today no spasm   - Balance -900  - Activity: PT/OT as tolerated    CVS:  - SBP goal 100-180  mmhg   TTE EF 65 %     PULM:  - RA    RENAL:  - Fluids: 2% at 60 ml/hr  -BMP     GI:  - NPO   - Bowel regimen: miralax, senna    ENDO:   - FS goal 120-180     HEME/ONC:  - SCDs  - Chemoppx: lovenox on hold per NS for tract hemorrhage     ID:  Ceftriaxone for UTI   CSF gram stain no organisms       Patient is at high risk of neurologic deterioration/death due to:  SAH, vasospasm, expansion of the hematoma    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; partial left m2 thrombus now s/p thrombectomy with tici 3 reperfusion    NEURO:  - neuro checks q1h  DC EEG  - Cerebral angiography  - HCP: EVD  at 0 cm water 395/24hr, goal ICP<22 mmhg , CPP >65 mmhg   - Angio today no spasm   - Activity: bed rest post angio    CVS:  - SBP goal 100-180  mmhg   TTE EF 65 %     PULM:  - RA    RENAL:  - Fluids: 2% at 60 ml/hr  -BMP     GI:  - NPO for angio, resume dit after the procedure    - Bowel regimen: miralax, senna    ENDO:   - FS goal 120-180     HEME/ONC:  - SCDs  - Lovenox on hold    ID:  Ceftriaxone for UTI   CSF gram stain no organisms       Patient is at high risk of neurologic deterioration/death due to:  SAH, vasospasm, expansion of the hematoma

## 2021-12-17 NOTE — CHART NOTE - NSCHARTNOTEFT_GEN_A_CORE
Interventional Neuro- Radiology   Procedure Note PA-C    Procedure: Selective Cerebral Angiography   Pre- Procedure Diagnosis:  Post- Procedure Diagnosis:    : Dr Vladimir Lucas   Fellow:     Dr Bong Hardy   Physician Assistant: Charline Sun PA-C  Resident:                 Dr Blu Packer     Nurse:  Radiologic Tech:  Anesthesiologist:  Sheath:      I/Os:EBL less than 10cc  IV fluids:     cc     Urine output     cc    Contrast Omnipaque 240      cc             Vitals: BP         HR      Spo2     %          Preliminary Report:  Using a 5 Paraguayan long sheath to the right groin under MAC sedation via left vertebral artery,  left internal carotid artery, left external carotid artery, right vertebral artery, right internal carotid artery, right external carotid artery a selective cerebral angiography was performed and demonstrated                       Official note to follow.  Patient tolerated procedure well, hemodynamically stable, no change in neurological status compared to baseline. Results discussed with neuro ICU team and patient's family. Right groin sheath was removed, manual compression held to hemostasis for 20 minutes, no active bleeding, no hematoma, quick clot and safeguard balloon dressing applied at Interventional Neuro- Radiology   Procedure Note PA-C    Procedure: Catheter Cerebral Angiogram and vasospasm treatment   Pre- Procedure Diagnosis: vasospasm   Post- Procedure Diagnosis:    : Dr Vladimir Lucas   Fellow:     Dr Bong Hardy   Physician Assistant: Charline Sun PA-C  Resident:                 Dr Blu Packer     Nurse:                      Olegario Shelley RN  Radiologic Tech:      Moises Wilder LRt  Anesthesiologist:      Dr Luis Zaldivar CRNA Ginamarivincent   Sheath:                     5       I/Os:EBL less than 10cc  IV fluids:     cc  Urine output     cc    Contrast Omnipaque 240             Vitals: BP         HR      Spo2 100%          Preliminary Report:  Using a 5 Upper sorbian long sheath to the right groin under MAC sedation via left vertebral artery, left internal carotid artery, left external carotid artery, right vertebral artery, right internal carotid artery, right external carotid artery a selective cerebral angiography was performed and demonstrated                       Official note to follow.  Patient tolerated procedure well, hemodynamically stable, no change in neurological status compared to baseline. Results discussed with Neuro ICU team and patient's family. Right groin sheath was removed, a Vascade device and manual compression held to hemostasis for 20 minutes, no active bleeding, no hematoma, quick clot and safeguard balloon dressing applied at Interventional Neuro- Radiology   Procedure Note PA-C    Procedure: Catheter Cerebral Angiogram and vasospasm treatment   Pre- Procedure Diagnosis: vasospasm   Post- Procedure Diagnosis:    : Dr Vladimir Lucas   Fellow:     Dr Bong Hardy   Physician Assistant: Charline Sun PA-C  Resident:                 Dr Blu Packer     Nurse:                      Olegario Shelley RN  Radiologic Tech:      Moises Wilder LRt  Anesthesiologist:      Dr Luis Zaldivar CRNA Ginamarivincent   Sheath:                     5 Togolese short sheath        I/Os:EBL less than 10cc  IV fluids:     cc  Urine output     cc Contrast Omnipaque 240             Vitals: BP         HR      Spo2 100%          Preliminary Report:  Using a 5 Togolese short sheath to the right groin under MAC sedation via left vertebral artery, left internal carotid artery, left external carotid artery, right vertebral artery, right internal carotid artery, right external carotid artery a selective cerebral angiography was performed and demonstrated                       Official note to follow.  Patient tolerated procedure well, hemodynamically stable, no change in neurological status compared to baseline. Results discussed with Neuro ICU team and patient's family. Right groin sheath was removed, a Vascade device and manual compression held to hemostasis for 20 minutes, no active bleeding, no hematoma, quick clot and safeguard balloon dressing applied at Interventional Neuro- Radiology   Procedure Note PA-C    Procedure: Catheter Cerebral Angiogram and vasospasm treatment   Pre- Procedure Diagnosis: vasospasm   Post- Procedure Diagnosis:    : Dr Vladimir Lucas   Fellow:     Dr Bong Hardy   Physician Assistant: Charline Sun PA-C  Resident:                 Dr Blu Packer     Nurse:                      Olegario Shelley RN  Radiologic Tech:      Moises Wilder LRt  Anesthesiologist:      Dr Luis Zaldivar CRNA Ginamarivincent   Sheath:                     5 Indonesian short sheath    ACT:                          154       I/Os:EBL less than 10cc  IV fluids:     cc  Urine output     cc Contrast Omnipaque 240   EVD           Vitals: BP         HR      Spo2 100%          Preliminary Report:  Using a 5 Indonesian short sheath to the right groin under MAC sedation via left vertebral artery, left internal carotid artery, left external carotid artery, right vertebral artery, right internal carotid artery, right external carotid artery a selective cerebral angiography was performed and demonstrated                       Official note to follow.  Patient tolerated procedure well, hemodynamically stable, no change in neurological status compared to baseline. Results discussed with Neuro ICU team and patient's family. Right groin sheath was removed, a Vascade device and manual compression held to hemostasis for 20 minutes, no active bleeding, no hematoma, quick clot and safeguard balloon dressing applied at Interventional Neuro- Radiology   Procedure Note PA-C    Procedure: Catheter Cerebral Angiogram and vasospasm treatment   Pre- Procedure Diagnosis: vasospasm   Post- Procedure Diagnosis: No vasospasm     : Dr Vladimir Lucas   Fellow:     Dr Bong Hardy   Physician Assistant: Charline Sun PA-C  Resident:                 Dr Blu Packer     Nurse:                      Olegario Shelley RN  Radiologic Tech:      Moises Wilder LRt  Anesthesiologist:      Dr Luis Huanamronan   Sheath:                     5 Somali short sheath    ACT:                         154       I/Os:EBL less than 10cc  IV fluids: 150cc  Urine due to void Contrast 240 36cc   EVD 25cc            Vitals: /66        HR 62      Spo2 100%          Preliminary Report:  Using a 5 Somali short sheath to the right groin under MAC sedation via left vertebral artery, left internal carotid artery, left external carotid artery, right vertebral artery, right internal carotid artery, right external carotid artery a selective cerebral angiography was performed and demonstrated no vasospasm. Official note to follow.  Patient tolerated procedure well, hemodynamically stable, no change in neurological status compared to baseline. Results discussed with Neuro ICU team and patient's family. Right groin sheath was removed, a Vascade device and manual compression held to hemostasis for 20 minutes, by resident. No active bleeding, no hematoma, quick clot and safeguard balloon dressing applied at 10:15am. Interventional Neuro- Radiology   Procedure Note PA-C    Procedure: Catheter Cerebral Angiogram and vasospasm treatment   Pre- Procedure Diagnosis: vasospasm   Post- Procedure Diagnosis: No vasospasm     : Dr Vladimir Lucas   Fellow:     Dr Bong Hardy   Physician Assistant: Charline Sun PA-C  Resident:                 Dr Blu Packer     Nurse:                      Olegario Shelley RN  Radiologic Tech:      Moises Wilder LRt  Anesthesiologist:      Dr Luis Negro   Sheath:                     5 Cuban short sheath    ACT:                         154       I/Os:EBL less than 10cc  IV fluids: 150cc  Urine due to void Contrast 240 36cc   EVD 25cc            Vitals: /66        HR 62      Spo2 100%          Preliminary Report:  Using a 5 Cuban short sheath to the right groin under MAC sedation via left vertebral artery, left internal carotid artery, left external carotid artery, right vertebral artery, right internal carotid artery, right external carotid artery a selective cerebral angiography was performed and demonstrated no vasospasm. Official note to follow.  Patient tolerated procedure well, hemodynamically stable, no change in neurological status compared to baseline. Results discussed with Neuro ICU team and patient's family. Right groin sheath was removed, a Vascade device and manual compression held to hemostasis for 20 minutes, by resident. No active bleeding, no hematoma, quick clot and safeguard balloon dressing applied at 10:15am. Interventional Neuro- Radiology   Procedure Note PA-C    Procedure: Catheter Cerebral Angiogram and vasospasm treatment   Pre- Procedure Diagnosis: vasospasm   Post- Procedure Diagnosis: No vasospasm     : Dr Vladimir Lucas   Fellow:     Dr Bong Hardy   Physician Assistant: Charline Sun PA-C  Resident:                 Dr Blu Packer     Nurse:                      Olegario Shelley RN  Radiologic Tech:      Moises Wilder LRt  Anesthesiologist:      Dr Luis Negro   Sheath:                     5 Belizean short sheath    ACT:                         154       I/Os:EBL less than 10cc  IV fluids: 150cc  Urine due to void Contrast 240 36cc   EVD 25cc            Vitals: /66        HR 62      Spo2 100%          Preliminary Report:  Using a 5 Belizean short sheath to the right groin under MAC sedation via left vertebral artery, left internal carotid artery, right internal carotid artery a selective cerebral angiography was performed and demonstrated no vasospasm. Official note to follow.  Patient tolerated procedure well, hemodynamically stable, no change in neurological status compared to baseline. Results discussed with Neuro ICU team and patient's family. Right groin sheath was removed, a Vascade device and manual compression held to hemostasis for 20 minutes, by resident. No active bleeding, no hematoma, quick clot and safeguard balloon dressing applied at 10:15am.

## 2021-12-17 NOTE — PROCEDURE NOTE - NSPOSTCAREGUIDE_GEN_A_CORE
Care for catheter as per unit/ICU protocols
Verbal/written post procedure instructions were given to patient/caregiver/Instructed patient/caregiver to follow-up with primary care physician/Instructed patient/caregiver regarding signs and symptoms of infection/Keep the cast/splint/dressing clean and dry/Care for catheter as per unit/ICU protocols

## 2021-12-17 NOTE — CHART NOTE - NSCHARTNOTEFT_GEN_A_CORE
Interventional Neuro Radiology  Pre-Procedure Note PA-C    This is a 66 year old female previously healthy a transfer from [facility] with complaints of HA since 12/10 morning. Had associated nausea/vomiting and posterior neck pain. CT: diffuse SAH, most prominent at Sylvian fissures.      Upon Exam: Somnolent, Ox2.5 (said November), Left pupil 6NR; Right pupil 5NR, EOMI, no facial, Right drift, LAWTON 5/5      Allergies: No Known Allergies  PMHX:  PSHX:  Social History:   FAMILY HISTORY:      Current Medications: acetaminophen     Tablet   cefTRIAXone   IVPB      cefTRIAXone   IVPB 1000 milliGRAM(s) IV Intermittent every 24 hours  chlorhexidine 4% Liquid 1 Application(s) Topical   levETIRAcetam  IVPB 500 milliGRAM(s) IV Intermittent every 12 hours  polyethylene glycol 3350 17 Gram(s) Oral two times a day  potassium chloride   Solution 40 milliEquivalent(s) Oral once  senna 2 Tablet(s) Oral at bedtime  sodium chloride 2 Gram(s) Oral every 6 hours  sodium chloride 2% . 1000 milliLiter(s) IV Continuous       Labs:                         12.4   6.81  )-----------( 223      ( 17 Dec 2021 01:46 )             37.1       12-17    144  |  110<H>  |  10  ----------------------------<  121<H>  3.7   |  21<L>  |  0.44<L>    Ca    9.0      17 Dec 2021 01:46  Phos  3.4     12-17  Mg     2.3     12-17      Blood Bank: AB positive         Assessment/Plan:   This is a 66 year old right hand dominant female    Patient presents to neuro-IR for selective cerebral angiography.   Procedure, goals, risks, benefits and alternatives were discussed with patient and patient's family. All questions were answered. Risks include but are not limited to stroke, vessel injury, hemorrhage, and or right groin hematoma.  Patient demonstrates understanding  of all risks involved with this procedure and wishes to continue.   Appropriate  content was obtained from patient and consent is in the patient's chart. Interventional Neuro Radiology  Pre-Procedure Note PA-C    This is a 66 year old female previously healthy a transfer from [facility] with complaints of HA since 12/10 morning. Had associated nausea/vomiting and posterior neck pain. CT: revealed diffuse SAH, most prominent at Sylvian fissures. Patient is transported to Neuro IR for a catheter cerebral angiogram and vasospasm.     Allergies: No Known Allergies  PMHX:   PSHX:  Social History:    FAMILY HISTORY: non-contributory     Current Medications: acetaminophen   Tablet   cefTRIAXone   IVPB      cefTRIAXone   IVPB 1000 milliGRAM(s) IV Intermittent every 24 hours  chlorhexidine 4% Liquid 1 Application(s) Topical   levETIRAcetam  IVPB 500 milliGRAM(s) IV Intermittent every 12 hours  polyethylene glycol 3350 17 Gram(s) Oral two times a day  potassium chloride   Solution 40 milliEquivalent(s) Oral once  senna 2 Tablet(s) Oral at bedtime  sodium chloride 2 Gram(s) Oral every 6 hours  sodium chloride 2% . 1000 milliLiter(s) IV Continuous       Labs:                         12.4   6.81  )-----------( 223      ( 17 Dec 2021 01:46 )             37.1       12-17    144  |  110<H>  |  10  ----------------------------<  121<H>  3.7   |  21<L>  |  0.44<L>    Ca    9.0      17 Dec 2021 01:46  Phos  3.4     12-17  Mg     2.3     12-17      Blood Bank: AB positive available       Assessment/Plan:   This is a 66 year old right hand dominant female status post craniotomy for microsurgical clipping of PCOM artery aneurysm. Patient is transported to Neuro-IR for catheter cerebral angiogram and vasospasm treatment. Procedure, goals, risks, benefits and alternatives were discussed with patient's . All questions were answered. Risks include but are not limited to stroke, vessel injury, hemorrhage, and or right groin hematoma. Patient's  demonstrates understanding of all risks involved with this procedure and wishes to continue.   Appropriate consent was obtained from patient's  and consent is in the patient's chart.

## 2021-12-17 NOTE — PROCEDURE NOTE - NSPROCDETAILS_GEN_ALL_CORE
location identified, draped/prepped, sterile technique used, needle inserted/introduced/positive blood return obtained via catheter/connected to a pressurized flush line/sutured in place/hemostasis with direct pressure, dressing applied/Seldinger technique/all materials/supplies accounted for at end of procedure
location identified, draped/prepped, sterile technique used/blood seen on insertion/dressing applied/flushes easily/secured in place/sterile technique, catheter placed

## 2021-12-17 NOTE — PROGRESS NOTE ADULT - SUBJECTIVE AND OBJECTIVE BOX
· Subjective and Objective:   O: taken for angio which showed no CVS   low grade fever    T(C): 36.8 (12-17-21 @ 11:00), Max: 38.5 (12-16-21 @ 19:00)  HR: 90 (12-17-21 @ 16:00) (55 - 111)  BP: 164/94 (12-17-21 @ 00:00) (146/91 - 175/94)  RR: 15 (12-17-21 @ 16:00) (13 - 24)  SpO2: 97% (12-17-21 @ 16:00) (93% - 100%)  12-16-21 @ 07:01  -  12-17-21 @ 07:00  --------------------------------------------------------  IN: 4090 mL / OUT: 3795 mL / NET: 295 mL    12-17-21 @ 07:01  -  12-17-21 @ 17:10  --------------------------------------------------------  IN: 420 mL / OUT: 184 mL / NET: 236 mL    acetaminophen     Tablet .. 650 milliGRAM(s) Oral every 6 hours PRN  bisacodyl 5 milliGRAM(s) Oral at bedtime  cefTRIAXone   IVPB      cefTRIAXone   IVPB 1000 milliGRAM(s) IV Intermittent every 24 hours  chlorhexidine 4% Liquid 1 Application(s) Topical <User Schedule>  levETIRAcetam  IVPB 500 milliGRAM(s) IV Intermittent every 12 hours  polyethylene glycol 3350 17 Gram(s) Oral two times a day  senna 2 Tablet(s) Oral at bedtime  sodium chloride 2 Gram(s) Oral every 6 hours  sodium chloride 0.9% lock flush 10 milliLiter(s) IV Push every 1 hour PRN  sodium chloride 2% . 1000 milliLiter(s) IV Continuous <Continuous>    Culture - CSF with Gram Stain (collected 12-16-21 @ 18:17)  Source: .CSF CSF  Gram Stain (12-16-21 @ 18:54):    polymorphonuclear leukocytes seen    No organisms seen    by cytocentrifuge  Preliminary Report (12-17-21 @ 16:50):    No growth        EXAMINATION:  General:  calm  HEENT:  MMM  Neuro:  Sleepy, oriented x1, perseverates , follows commands ,pupils 5 mm non reactive bilateral, midline gaze, left drift,  right side 5/5, left side 4/5 with drift   Cards:  RRR  Respiratory:  no respiratory distress  Abdomen:  soft  Extremities:  no edema, no groin hematoma, nl pulses         LABS:  Na: 143 (12-17 @ 08:04), 144 (12-17 @ 01:46), 139 (12-16 @ 19:11), 136 (12-16 @ 06:24), 138 (12-15 @ 13:16), 139 (12-15 @ 01:12)  K: 3.2 (12-17 @ 08:04), 3.7 (12-17 @ 01:46), 3.8 (12-16 @ 19:11), 3.5 (12-16 @ 06:24), 3.3 (12-15 @ 13:16), 3.5 (12-15 @ 01:12)  Cl: 111 (12-17 @ 08:04), 110 (12-17 @ 01:46), 104 (12-16 @ 19:11), 101 (12-16 @ 06:24), 102 (12-15 @ 13:16), 105 (12-15 @ 01:12)  CO2: 22 (12-17 @ 08:04), 21 (12-17 @ 01:46), 23 (12-16 @ 19:11), 22 (12-16 @ 06:24), 22 (12-15 @ 13:16), 22 (12-15 @ 01:12)  BUN: 11 (12-17 @ 08:04), 10 (12-17 @ 01:46), 8 (12-16 @ 19:11), 7 (12-16 @ 06:24), 9 (12-15 @ 13:16), 10 (12-15 @ 01:12)  Cr: 0.41 (12-17 @ 08:04), 0.44 (12-17 @ 01:46), 0.50 (12-16 @ 19:11), 0.40 (12-16 @ 06:24), 0.43 (12-15 @ 13:16), 0.43 (12-15 @ 01:12)  Glu: 108(12-17 @ 08:04), 121(12-17 @ 01:46), 117(12-16 @ 19:11), 120(12-16 @ 06:24), 267(12-15 @ 13:16), 126(12-15 @ 01:12)    Hgb: 12.4 (12-17 @ 01:46), 12.8 (12-16 @ 06:21), 12.9 (12-15 @ 01:12)  Hct: 37.1 (12-17 @ 01:46), 37.1 (12-16 @ 06:21), 38.1 (12-15 @ 01:12)  WBC: 6.81 (12-17 @ 01:46), 7.70 (12-16 @ 06:21), 7.58 (12-15 @ 01:12)  Plt: 223 (12-17 @ 01:46), 198 (12-16 @ 06:21), 183 (12-15 @ 01:12)    INR: 0.96 12-16-21 @ 06:21  PTT: 24.9 12-16-21 @ 06:21        a/p 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 CPP> 65-70 , output  243  ml in 24 hr   Brain edema, hypertonic saline   2% at 60 ml/hr   , keep sodium in  140-145  , BMP q 6 hr   CV : SBP goal   100-160 mmhg   groin check, pulse check   Pulm: RA   GI: on TF, at goal   Renal: see neuro   ID fever, UTI, ceftriaxone, send urine cx   blood cxn egative so far   csf cx negative   Endo:  target sugar 120-180   Hem: SCD,   hold chemoprophylaxis new ICH    35 critical care time as patient is at risk for brain henrniation, ICP crisis, seizures, ICH      · Subjective and Objective:   O: taken for angio which showed no CVS   low grade fever    T(C): 36.8 (12-17-21 @ 11:00), Max: 38.5 (12-16-21 @ 19:00)  HR: 90 (12-17-21 @ 16:00) (55 - 111)  BP: 164/94 (12-17-21 @ 00:00) (146/91 - 175/94)  RR: 15 (12-17-21 @ 16:00) (13 - 24)  SpO2: 97% (12-17-21 @ 16:00) (93% - 100%)  12-16-21 @ 07:01  -  12-17-21 @ 07:00  --------------------------------------------------------  IN: 4090 mL / OUT: 3795 mL / NET: 295 mL    12-17-21 @ 07:01  -  12-17-21 @ 17:10  --------------------------------------------------------  IN: 420 mL / OUT: 184 mL / NET: 236 mL    acetaminophen     Tablet .. 650 milliGRAM(s) Oral every 6 hours PRN  bisacodyl 5 milliGRAM(s) Oral at bedtime  cefTRIAXone   IVPB      cefTRIAXone   IVPB 1000 milliGRAM(s) IV Intermittent every 24 hours  chlorhexidine 4% Liquid 1 Application(s) Topical <User Schedule>  levETIRAcetam  IVPB 500 milliGRAM(s) IV Intermittent every 12 hours  polyethylene glycol 3350 17 Gram(s) Oral two times a day  senna 2 Tablet(s) Oral at bedtime  sodium chloride 2 Gram(s) Oral every 6 hours  sodium chloride 0.9% lock flush 10 milliLiter(s) IV Push every 1 hour PRN  sodium chloride 2% . 1000 milliLiter(s) IV Continuous <Continuous>    Culture - CSF with Gram Stain (collected 12-16-21 @ 18:17)  Source: .CSF CSF  Gram Stain (12-16-21 @ 18:54):    polymorphonuclear leukocytes seen    No organisms seen    by cytocentrifuge  Preliminary Report (12-17-21 @ 16:50):    No growth        EXAMINATION:  General:  calm  HEENT:  MMM  Neuro:  Sleepy, oriented x1, perseverates , follows commands ( shows two fingers) ,pupils 5 mm non reactive bilateral, midline gaze, left drift,  right side 5/5, left side 4/5 with drift   Cards:  RRR  Respiratory:  no respiratory distress  Abdomen:  soft  Extremities:  no edema, no groin hematoma, nl pulses         LABS:  Na: 143 (12-17 @ 08:04), 144 (12-17 @ 01:46), 139 (12-16 @ 19:11), 136 (12-16 @ 06:24), 138 (12-15 @ 13:16), 139 (12-15 @ 01:12)  K: 3.2 (12-17 @ 08:04), 3.7 (12-17 @ 01:46), 3.8 (12-16 @ 19:11), 3.5 (12-16 @ 06:24), 3.3 (12-15 @ 13:16), 3.5 (12-15 @ 01:12)  Cl: 111 (12-17 @ 08:04), 110 (12-17 @ 01:46), 104 (12-16 @ 19:11), 101 (12-16 @ 06:24), 102 (12-15 @ 13:16), 105 (12-15 @ 01:12)  CO2: 22 (12-17 @ 08:04), 21 (12-17 @ 01:46), 23 (12-16 @ 19:11), 22 (12-16 @ 06:24), 22 (12-15 @ 13:16), 22 (12-15 @ 01:12)  BUN: 11 (12-17 @ 08:04), 10 (12-17 @ 01:46), 8 (12-16 @ 19:11), 7 (12-16 @ 06:24), 9 (12-15 @ 13:16), 10 (12-15 @ 01:12)  Cr: 0.41 (12-17 @ 08:04), 0.44 (12-17 @ 01:46), 0.50 (12-16 @ 19:11), 0.40 (12-16 @ 06:24), 0.43 (12-15 @ 13:16), 0.43 (12-15 @ 01:12)  Glu: 108(12-17 @ 08:04), 121(12-17 @ 01:46), 117(12-16 @ 19:11), 120(12-16 @ 06:24), 267(12-15 @ 13:16), 126(12-15 @ 01:12)    Hgb: 12.4 (12-17 @ 01:46), 12.8 (12-16 @ 06:21), 12.9 (12-15 @ 01:12)  Hct: 37.1 (12-17 @ 01:46), 37.1 (12-16 @ 06:21), 38.1 (12-15 @ 01:12)  WBC: 6.81 (12-17 @ 01:46), 7.70 (12-16 @ 06:21), 7.58 (12-15 @ 01:12)  Plt: 223 (12-17 @ 01:46), 198 (12-16 @ 06:21), 183 (12-15 @ 01:12)    INR: 0.96 12-16-21 @ 06:21  PTT: 24.9 12-16-21 @ 06:21        a/p 66F, a SAH mFS3, HHS 2 , PBD 7, 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 CPP> 65-70 , output 273  ml in 24 hr   Brain edema, hypertonic saline   2% at 60 ml/hr   , keep sodium in  140-145  , BMP q 8 hr   CV : SBP goal   100-160 mmhg   groin check, pulse check   Pulm: RA   GI: on TF, at goal   Renal: see neuro   ID: UTI, ceftriaxone, urine cx in lab  blood cxn egative so far   csf cx negative   Endo:  target sugar 120-180   Hem: SCD,   hold chemoprophylaxis new ICH    35 critical care time as patient is at risk for brain henrniation, ICP crisis, seizures, ICH

## 2021-12-17 NOTE — EEG REPORT - NS EEG TEXT BOX
[facility]   COMPREHENSIVE EPILEPSY CENTER   REPORT OF LONG-TERM VIDEO EEG     [facility]: [address] , [address], [place] [address], Ph#: [phone]  [facility]: [id number] [place] [address], Ph#: [phone]  [facility]: [address] [place] [address], Ph#: [phone]    Patient Name: OZ DAVIS  Age and : 66y (57)  MRN #: [id number]  Location: [place] [address]  Referring Physician: Vladimir Lucas    Start Time/Date: 08:00 on 21  End Time/Date: 09:00 on 21  Duration: ~1 hr    _____________________________________________________________  STUDY INFORMATION    EEG Recording Technique:  The patient underwent continuous Video-EEG monitoring, using Telemetry System hardware on the XLTek Digital System. EEG and video data were stored on a computer hard drive with important events saved in digital archive files. The material was reviewed by a physician (electroencephalographer / epileptologist) on a daily basis. Andrew and seizure detection algorithms were utilized and reviewed. An EEG Technician attended to the patient, and was available throughout daytime work hours.  The epilepsy center neurologist was available in person or on call 24-hours per day.    EEG Placement and Labeling of Electrodes:  The EEG was performed utilizing 20 channel referential EEG connections (coronal over temporal over parasagittal montage) using all standard 10-20 electrode placements with EKG, with additional electrodes placed in the inferior temporal region using the modified 10-10 montage electrode placements for elective admissions, or if deemed necessary. Recording was at a sampling rate of 256 samples per second per channel. Time synchronized digital video recording was done simultaneously with EEG recording. A low light infrared camera was used for low light recording.     _____________________________________________________________  HISTORY    Patient is a 66y old  Female who presents with a chief complaint of Subarachnoid hemorrhage (16 Dec 2021 08:07)      PERTINENT MEDICATION:  MEDICATIONS  (STANDING):  bisacodyl 5 milliGRAM(s) Oral at bedtime  chlorhexidine 4% Liquid 1 Application(s) Topical <User Schedule>  levETIRAcetam  IVPB 1000 milliGRAM(s) IV Intermittent every 12 hours  polyethylene glycol 3350 17 Gram(s) Oral two times a day  senna 2 Tablet(s) Oral at bedtime  sodium chloride 2 Gram(s) Oral every 6 hours  sodium chloride 0.9% Bolus 1000 milliLiter(s) IV Bolus once    _____________________________________________________________  STUDY INTERPRETATION    Findings: The background was continuous, spontaneously variable and reactive. During wakefulness, the posterior dominant rhythm consisted of 6-7 Hz activity over the left. No clear posterior dominant rhythm seen over the right.    Background Slowing:  Diffuse theta and polymorphic delta slowing.    Focal Slowing:   None were present.  Continuous theta/delta slowing in the right hemisphere, right frontotemporal maximal.     Sleep Background:  Drowsiness and stage II sleep transients were not recorded.    Other Non-Epileptiform Findings:  None were present.    Interictal Epileptiform Activity:   None were present.    Events:  Clinical events: None recorded.  Seizures: None recorded.    Activation Procedures:   Hyperventilation was not performed.    Photic stimulation was not performed.     Artifacts:  Intermittent myogenic and movement artifacts were noted.    ECG:  The heart rate on single channel ECG was predominantly between 60-70 BPM.    _____________________________________________________________  EEG SUMMARY/CLASSIFICATION    Abnormal EEG  - Mild to moderate generalized slowing  - Right frontotemporal slowing    _____________________________________________________________  EEG IMPRESSION/CLINICAL CORRELATE    This EEG is consistent with mild to moderate diffuse cerebral dysfunction with superimposed focal right frontotemporal cerebral dysfunction.  No clear epileptiform pattern or seizure seen.  _____________________________________________________________    Preliminary Fellow Report, final report pending attending review    Amando Bridges MD  Epilepsy Fellow    Reading Room: [phone]  On Call Service After Hours: [phone]      [facility]   COMPREHENSIVE EPILEPSY CENTER   REPORT OF LONG-TERM VIDEO EEG     [facility]: [address] , [address] [place] [address], Ph#: [phone]  [facility]: [id number] [place] [address], Ph#: [phone]  [facility]: [address] [place] [address], Ph#: [phone]    Patient Name: OZ DAVIS  Age and : 66y (57)  MRN #: [id number]  Location: [place] [address]  Referring Physician: Vladimir Lucas    Start Time/Date: 08:00 on 21  End Time/Date: 09:00 on 21  Duration: ~1 hr    _____________________________________________________________  STUDY INFORMATION    EEG Recording Technique:  The patient underwent continuous Video-EEG monitoring, using Telemetry System hardware on the XLTek Digital System. EEG and video data were stored on a computer hard drive with important events saved in digital archive files. The material was reviewed by a physician (electroencephalographer / epileptologist) on a daily basis. Andrew and seizure detection algorithms were utilized and reviewed. An EEG Technician attended to the patient, and was available throughout daytime work hours.  The epilepsy center neurologist was available in person or on call 24-hours per day.    EEG Placement and Labeling of Electrodes:  The EEG was performed utilizing 20 channel referential EEG connections (coronal over temporal over parasagittal montage) using all standard 10-20 electrode placements with EKG, with additional electrodes placed in the inferior temporal region using the modified 10-10 montage electrode placements for elective admissions, or if deemed necessary. Recording was at a sampling rate of 256 samples per second per channel. Time synchronized digital video recording was done simultaneously with EEG recording. A low light infrared camera was used for low light recording.     _____________________________________________________________  HISTORY    Patient is a 66y old  Female who presents with a chief complaint of Subarachnoid hemorrhage (16 Dec 2021 08:07)      PERTINENT MEDICATION:  MEDICATIONS  (STANDING):  bisacodyl 5 milliGRAM(s) Oral at bedtime  chlorhexidine 4% Liquid 1 Application(s) Topical <User Schedule>  levETIRAcetam  IVPB 1000 milliGRAM(s) IV Intermittent every 12 hours  polyethylene glycol 3350 17 Gram(s) Oral two times a day  senna 2 Tablet(s) Oral at bedtime  sodium chloride 2 Gram(s) Oral every 6 hours  sodium chloride 0.9% Bolus 1000 milliLiter(s) IV Bolus once    _____________________________________________________________  STUDY INTERPRETATION    Findings: The background was continuous, spontaneously variable and reactive. During wakefulness, the posterior dominant rhythm consisted of 6-7 Hz activity over the left. No clear posterior dominant rhythm seen over the right.    Background Slowing:  Diffuse theta and polymorphic delta slowing.    Focal Slowing:   None were present.  Continuous theta/delta slowing in the right hemisphere, right frontotemporal maximal.     Sleep Background:  Drowsiness and stage II sleep transients were not recorded.    Other Non-Epileptiform Findings:  None were present.    Interictal Epileptiform Activity:   None were present.    Events:  Clinical events: None recorded.  Seizures: None recorded.    Activation Procedures:   Hyperventilation was not performed.    Photic stimulation was not performed.     Artifacts:  Intermittent myogenic and movement artifacts were noted.    ECG:  The heart rate on single channel ECG was predominantly between 60-70 BPM.    _____________________________________________________________  EEG SUMMARY/CLASSIFICATION    Abnormal EEG  - Mild to moderate generalized slowing  - Right frontotemporal slowing    _____________________________________________________________  EEG IMPRESSION/CLINICAL CORRELATE    This EEG is consistent with mild to moderate diffuse cerebral dysfunction with superimposed focal right frontotemporal cerebral dysfunction.  No clear epileptiform pattern or seizure seen.  _____________________________________________________________      Amando Bridges MD  Epilepsy Fellow    Reading Room: [phone]  On Call Service After Hours: [phone]

## 2021-12-17 NOTE — PROCEDURE NOTE - NSINDICATIONS_GEN_A_CORE
critical patient
antibiotic therapy/emergency venous access/fluid administration
monitoring purposes

## 2021-12-17 NOTE — PROGRESS NOTE ADULT - SUBJECTIVE AND OBJECTIVE BOX
HPI:  67 yo woman admitted 12/11/21 with HA/vomiting since 12/10 for HH2mF3 SAH c/b hydro s/p coiling of L Pcomm aneurysm, found to have a partial left m2 thrombus now s/p thrombectomy with tici 3 reperfusion. S/p R EVD c/b sizable tract hemorrhage with IVH now with L EVD. PBD 7    OVERNIGHT EVENTS:   No acute events overnight.    VITALS:  T(C): , Max: 38.5 (12-16-21 @ 19:00)  HR:  (58 - 91)  BP:  (135/81 - 179/88)  ABP:  (147/74 - 180/86)  RR:  (13 - 22)  SpO2:  (93% - 99%)  Wt(kg): --      12-16-21 @ 07:01  -  12-17-21 @ 07:00  --------------------------------------------------------  IN: 3970 mL / OUT: 3362 mL / NET: 608 mL      LABS:  Na: 144 (12-17 @ 01:46), 139 (12-16 @ 19:11), 136 (12-16 @ 06:24), 138 (12-15 @ 13:16), 139 (12-15 @ 01:12)  K: 3.7 (12-17 @ 01:46), 3.8 (12-16 @ 19:11), 3.5 (12-16 @ 06:24), 3.3 (12-15 @ 13:16), 3.5 (12-15 @ 01:12)  Cl: 110 (12-17 @ 01:46), 104 (12-16 @ 19:11), 101 (12-16 @ 06:24), 102 (12-15 @ 13:16), 105 (12-15 @ 01:12)  CO2: 21 (12-17 @ 01:46), 23 (12-16 @ 19:11), 22 (12-16 @ 06:24), 22 (12-15 @ 13:16), 22 (12-15 @ 01:12)  BUN: 10 (12-17 @ 01:46), 8 (12-16 @ 19:11), 7 (12-16 @ 06:24), 9 (12-15 @ 13:16), 10 (12-15 @ 01:12)  Cr: 0.44 (12-17 @ 01:46), 0.50 (12-16 @ 19:11), 0.40 (12-16 @ 06:24), 0.43 (12-15 @ 13:16), 0.43 (12-15 @ 01:12)  Glu: 121(12-17 @ 01:46), 117(12-16 @ 19:11), 120(12-16 @ 06:24), 267(12-15 @ 13:16), 126(12-15 @ 01:12)    Hgb: 12.4 (12-17 @ 01:46), 12.8 (12-16 @ 06:21), 12.9 (12-15 @ 01:12)  Hct: 37.1 (12-17 @ 01:46), 37.1 (12-16 @ 06:21), 38.1 (12-15 @ 01:12)  WBC: 6.81 (12-17 @ 01:46), 7.70 (12-16 @ 06:21), 7.58 (12-15 @ 01:12)  Plt: 223 (12-17 @ 01:46), 198 (12-16 @ 06:21), 183 (12-15 @ 01:12)    INR: 0.96 12-16-21 @ 06:21, 0.93 12-14-21 @ 10:38  PTT: 24.9 12-16-21 @ 06:21    IMAGING:   Recent imaging studies were reviewed.    MEDICATIONS:  acetaminophen     Tablet .. 650 milliGRAM(s) Oral every 6 hours PRN  bisacodyl 5 milliGRAM(s) Oral at bedtime  cefTRIAXone   IVPB      cefTRIAXone   IVPB 1000 milliGRAM(s) IV Intermittent every 24 hours  chlorhexidine 4% Liquid 1 Application(s) Topical <User Schedule>  levETIRAcetam  IVPB 500 milliGRAM(s) IV Intermittent every 12 hours  polyethylene glycol 3350 17 Gram(s) Oral two times a day  potassium chloride   Solution 40 milliEquivalent(s) Oral once  senna 2 Tablet(s) Oral at bedtime  sodium chloride 2 Gram(s) Oral every 6 hours  sodium chloride 2% . 1000 milliLiter(s) IV Continuous <Continuous>    EXAMINATION:  General:  calm  HEENT:  MMM  Neuro:  Sleepy, oriented x1, perseverates , follows commands ,pupils 5 mm non reactive bilateral, midline gaze, left drift, Moving all limbs 4/5  Cards:  RRR  Respiratory:  no respiratory distress  Abdomen:  soft  Extremities:  no edema   HPI:  67 yo woman admitted 12/11/21 with HA/vomiting since 12/10 for HH2mF3 SAH c/b hydro s/p coiling of L Pcomm aneurysm, found to have a partial left m2 thrombus now s/p thrombectomy with tici 3 reperfusion. S/p R EVD c/b sizable tract hemorrhage with IVH now with L EVD. PBD 7    OVERNIGHT EVENTS:   Febrile     VITALS:  T(C): , Max: 38.5 (12-16-21 @ 19:00)  HR:  (58 - 91)  BP:  (135/81 - 179/88)  ABP:  (147/74 - 180/86)  RR:  (13 - 22)  SpO2:  (93% - 99%)  Wt(kg): --      12-16-21 @ 07:01  -  12-17-21 @ 07:00  --------------------------------------------------------  IN: 3970 mL / OUT: 3362 mL / NET: 608 mL      LABS:  Na: 144 (12-17 @ 01:46), 139 (12-16 @ 19:11), 136 (12-16 @ 06:24), 138 (12-15 @ 13:16), 139 (12-15 @ 01:12)  K: 3.7 (12-17 @ 01:46), 3.8 (12-16 @ 19:11), 3.5 (12-16 @ 06:24), 3.3 (12-15 @ 13:16), 3.5 (12-15 @ 01:12)  Cl: 110 (12-17 @ 01:46), 104 (12-16 @ 19:11), 101 (12-16 @ 06:24), 102 (12-15 @ 13:16), 105 (12-15 @ 01:12)  CO2: 21 (12-17 @ 01:46), 23 (12-16 @ 19:11), 22 (12-16 @ 06:24), 22 (12-15 @ 13:16), 22 (12-15 @ 01:12)  BUN: 10 (12-17 @ 01:46), 8 (12-16 @ 19:11), 7 (12-16 @ 06:24), 9 (12-15 @ 13:16), 10 (12-15 @ 01:12)  Cr: 0.44 (12-17 @ 01:46), 0.50 (12-16 @ 19:11), 0.40 (12-16 @ 06:24), 0.43 (12-15 @ 13:16), 0.43 (12-15 @ 01:12)  Glu: 121(12-17 @ 01:46), 117(12-16 @ 19:11), 120(12-16 @ 06:24), 267(12-15 @ 13:16), 126(12-15 @ 01:12)    Hgb: 12.4 (12-17 @ 01:46), 12.8 (12-16 @ 06:21), 12.9 (12-15 @ 01:12)  Hct: 37.1 (12-17 @ 01:46), 37.1 (12-16 @ 06:21), 38.1 (12-15 @ 01:12)  WBC: 6.81 (12-17 @ 01:46), 7.70 (12-16 @ 06:21), 7.58 (12-15 @ 01:12)  Plt: 223 (12-17 @ 01:46), 198 (12-16 @ 06:21), 183 (12-15 @ 01:12)    INR: 0.96 12-16-21 @ 06:21, 0.93 12-14-21 @ 10:38  PTT: 24.9 12-16-21 @ 06:21    IMAGING:   Recent imaging studies were reviewed.    MEDICATIONS:  acetaminophen     Tablet .. 650 milliGRAM(s) Oral every 6 hours PRN  bisacodyl 5 milliGRAM(s) Oral at bedtime  cefTRIAXone   IVPB      cefTRIAXone   IVPB 1000 milliGRAM(s) IV Intermittent every 24 hours  chlorhexidine 4% Liquid 1 Application(s) Topical <User Schedule>  levETIRAcetam  IVPB 500 milliGRAM(s) IV Intermittent every 12 hours  polyethylene glycol 3350 17 Gram(s) Oral two times a day  potassium chloride   Solution 40 milliEquivalent(s) Oral once  senna 2 Tablet(s) Oral at bedtime  sodium chloride 2 Gram(s) Oral every 6 hours  sodium chloride 2% . 1000 milliLiter(s) IV Continuous <Continuous>    EXAMINATION:  General:  calm  HEENT:  MMM  Neuro:  Sleepy, oriented x1, perseverates , follows commands ,pupils 5 mm non reactive bilateral, midline gaze, left drift, Moving all limbs 4/5  Cards:  RRR  Respiratory:  no respiratory distress  Abdomen:  soft  Extremities:  no edema

## 2021-12-18 LAB
ANION GAP SERPL CALC-SCNC: 11 MMOL/L — SIGNIFICANT CHANGE UP (ref 5–17)
ANION GAP SERPL CALC-SCNC: 11 MMOL/L — SIGNIFICANT CHANGE UP (ref 5–17)
ANION GAP SERPL CALC-SCNC: 12 MMOL/L — SIGNIFICANT CHANGE UP (ref 5–17)
BUN SERPL-MCNC: 12 MG/DL — SIGNIFICANT CHANGE UP (ref 7–23)
BUN SERPL-MCNC: 15 MG/DL — SIGNIFICANT CHANGE UP (ref 7–23)
BUN SERPL-MCNC: 18 MG/DL — SIGNIFICANT CHANGE UP (ref 7–23)
CALCIUM SERPL-MCNC: 9.4 MG/DL — SIGNIFICANT CHANGE UP (ref 8.4–10.5)
CALCIUM SERPL-MCNC: 9.4 MG/DL — SIGNIFICANT CHANGE UP (ref 8.4–10.5)
CALCIUM SERPL-MCNC: 9.5 MG/DL — SIGNIFICANT CHANGE UP (ref 8.4–10.5)
CHLORIDE SERPL-SCNC: 112 MMOL/L — HIGH (ref 96–108)
CHLORIDE SERPL-SCNC: 113 MMOL/L — HIGH (ref 96–108)
CHLORIDE SERPL-SCNC: 115 MMOL/L — HIGH (ref 96–108)
CO2 SERPL-SCNC: 21 MMOL/L — LOW (ref 22–31)
CREAT SERPL-MCNC: 0.45 MG/DL — LOW (ref 0.5–1.3)
CREAT SERPL-MCNC: 0.45 MG/DL — LOW (ref 0.5–1.3)
CREAT SERPL-MCNC: 0.47 MG/DL — LOW (ref 0.5–1.3)
GLUCOSE SERPL-MCNC: 118 MG/DL — HIGH (ref 70–99)
GLUCOSE SERPL-MCNC: 161 MG/DL — HIGH (ref 70–99)
GLUCOSE SERPL-MCNC: 192 MG/DL — HIGH (ref 70–99)
HCT VFR BLD CALC: 33.9 % — LOW (ref 34.5–45)
HGB BLD-MCNC: 11.2 G/DL — LOW (ref 11.5–15.5)
MAGNESIUM SERPL-MCNC: 2.1 MG/DL — SIGNIFICANT CHANGE UP (ref 1.6–2.6)
MAGNESIUM SERPL-MCNC: 2.2 MG/DL — SIGNIFICANT CHANGE UP (ref 1.6–2.6)
MCHC RBC-ENTMCNC: 30.8 PG — SIGNIFICANT CHANGE UP (ref 27–34)
MCHC RBC-ENTMCNC: 33 GM/DL — SIGNIFICANT CHANGE UP (ref 32–36)
MCV RBC AUTO: 93.1 FL — SIGNIFICANT CHANGE UP (ref 80–100)
NRBC # BLD: 0 /100 WBCS — SIGNIFICANT CHANGE UP (ref 0–0)
PHOSPHATE SERPL-MCNC: 3.3 MG/DL — SIGNIFICANT CHANGE UP (ref 2.5–4.5)
PHOSPHATE SERPL-MCNC: 3.4 MG/DL — SIGNIFICANT CHANGE UP (ref 2.5–4.5)
PLATELET # BLD AUTO: 201 K/UL — SIGNIFICANT CHANGE UP (ref 150–400)
POTASSIUM SERPL-MCNC: 3.3 MMOL/L — LOW (ref 3.5–5.3)
POTASSIUM SERPL-MCNC: 3.5 MMOL/L — SIGNIFICANT CHANGE UP (ref 3.5–5.3)
POTASSIUM SERPL-MCNC: 3.8 MMOL/L — SIGNIFICANT CHANGE UP (ref 3.5–5.3)
POTASSIUM SERPL-SCNC: 3.3 MMOL/L — LOW (ref 3.5–5.3)
POTASSIUM SERPL-SCNC: 3.5 MMOL/L — SIGNIFICANT CHANGE UP (ref 3.5–5.3)
POTASSIUM SERPL-SCNC: 3.8 MMOL/L — SIGNIFICANT CHANGE UP (ref 3.5–5.3)
RBC # BLD: 3.64 M/UL — LOW (ref 3.8–5.2)
RBC # FLD: 12.6 % — SIGNIFICANT CHANGE UP (ref 10.3–14.5)
SODIUM SERPL-SCNC: 145 MMOL/L — SIGNIFICANT CHANGE UP (ref 135–145)
SODIUM SERPL-SCNC: 145 MMOL/L — SIGNIFICANT CHANGE UP (ref 135–145)
SODIUM SERPL-SCNC: 147 MMOL/L — HIGH (ref 135–145)
WBC # BLD: 7.73 K/UL — SIGNIFICANT CHANGE UP (ref 3.8–10.5)
WBC # FLD AUTO: 7.73 K/UL — SIGNIFICANT CHANGE UP (ref 3.8–10.5)

## 2021-12-18 PROCEDURE — 70450 CT HEAD/BRAIN W/O DYE: CPT | Mod: 26,77

## 2021-12-18 PROCEDURE — 93010 ELECTROCARDIOGRAM REPORT: CPT

## 2021-12-18 PROCEDURE — 71045 X-RAY EXAM CHEST 1 VIEW: CPT | Mod: 26

## 2021-12-18 PROCEDURE — 99291 CRITICAL CARE FIRST HOUR: CPT

## 2021-12-18 PROCEDURE — 70450 CT HEAD/BRAIN W/O DYE: CPT | Mod: 26

## 2021-12-18 RX ORDER — NIMODIPINE 60 MG/10ML
30 SOLUTION ORAL
Refills: 0 | Status: DISCONTINUED | OUTPATIENT
Start: 2021-12-18 | End: 2021-12-22

## 2021-12-18 RX ORDER — POTASSIUM CHLORIDE 20 MEQ
40 PACKET (EA) ORAL ONCE
Refills: 0 | Status: COMPLETED | OUTPATIENT
Start: 2021-12-18 | End: 2021-12-18

## 2021-12-18 RX ORDER — HEPARIN SODIUM 5000 [USP'U]/ML
5000 INJECTION INTRAVENOUS; SUBCUTANEOUS EVERY 12 HOURS
Refills: 0 | Status: DISCONTINUED | OUTPATIENT
Start: 2021-12-18 | End: 2021-12-20

## 2021-12-18 RX ORDER — POTASSIUM CHLORIDE 20 MEQ
40 PACKET (EA) ORAL EVERY 4 HOURS
Refills: 0 | Status: COMPLETED | OUTPATIENT
Start: 2021-12-18 | End: 2021-12-18

## 2021-12-18 RX ORDER — POTASSIUM CHLORIDE 20 MEQ
20 PACKET (EA) ORAL ONCE
Refills: 0 | Status: COMPLETED | OUTPATIENT
Start: 2021-12-18 | End: 2021-12-18

## 2021-12-18 RX ADMIN — Medication 40 MILLIEQUIVALENT(S): at 13:11

## 2021-12-18 RX ADMIN — NIMODIPINE 30 MILLIGRAM(S): 60 SOLUTION ORAL at 18:12

## 2021-12-18 RX ADMIN — Medication 40 MILLIEQUIVALENT(S): at 02:30

## 2021-12-18 RX ADMIN — SODIUM CHLORIDE 2 GRAM(S): 9 INJECTION INTRAMUSCULAR; INTRAVENOUS; SUBCUTANEOUS at 09:59

## 2021-12-18 RX ADMIN — Medication 20 MILLIEQUIVALENT(S): at 17:44

## 2021-12-18 RX ADMIN — NIMODIPINE 30 MILLIGRAM(S): 60 SOLUTION ORAL at 22:24

## 2021-12-18 RX ADMIN — SENNA PLUS 2 TABLET(S): 8.6 TABLET ORAL at 22:24

## 2021-12-18 RX ADMIN — CEFTRIAXONE 100 MILLIGRAM(S): 500 INJECTION, POWDER, FOR SOLUTION INTRAMUSCULAR; INTRAVENOUS at 13:11

## 2021-12-18 RX ADMIN — LEVETIRACETAM 400 MILLIGRAM(S): 250 TABLET, FILM COATED ORAL at 17:43

## 2021-12-18 RX ADMIN — CHLORHEXIDINE GLUCONATE 1 APPLICATION(S): 213 SOLUTION TOPICAL at 22:24

## 2021-12-18 RX ADMIN — NIMODIPINE 30 MILLIGRAM(S): 60 SOLUTION ORAL at 20:01

## 2021-12-18 RX ADMIN — HEPARIN SODIUM 5000 UNIT(S): 5000 INJECTION INTRAVENOUS; SUBCUTANEOUS at 17:43

## 2021-12-18 RX ADMIN — LEVETIRACETAM 400 MILLIGRAM(S): 250 TABLET, FILM COATED ORAL at 05:09

## 2021-12-18 RX ADMIN — Medication 40 MILLIEQUIVALENT(S): at 09:59

## 2021-12-18 RX ADMIN — SODIUM CHLORIDE 2 GRAM(S): 9 INJECTION INTRAMUSCULAR; INTRAVENOUS; SUBCUTANEOUS at 17:19

## 2021-12-18 RX ADMIN — SODIUM CHLORIDE 2 GRAM(S): 9 INJECTION INTRAMUSCULAR; INTRAVENOUS; SUBCUTANEOUS at 04:09

## 2021-12-18 NOTE — PROVIDER CONTACT NOTE (CHANGE IN STATUS NOTIFICATION) - ACTION/TREATMENT ORDERED:
provide made aware. high net negative noticed, provider ordered 1L NS bolus. Will continue to monitor neuro status.
Gene Piper and ICU team assessed pt at bedside, CT scan, no further interventions ordered, will continue to monitor
Provider at bedside, no intervention at this time. Will continue to assess neuro status.

## 2021-12-18 NOTE — PROGRESS NOTE ADULT - SUBJECTIVE AND OBJECTIVE BOX
DAY EVENTS:   No day events  Angiogram yesterday with no vasospasm.   EVD at 0. No ICP issues  VEEG with mild-moderate generalized slowing.   Afebrile. on CTX for UTI    VITALS:  - Reviewed      LABS:  - Reviewed        IMAGING:   Recent imaging studies were reviewed.    MEDICATIONS:  - Reviewed    EXAMINATION:  General:  calm  HEENT:  MMM  Neuro:  lethargic, oriented to hospital, month and year, intermittently follows simple commands, pupils R 5 mm, L 7 mm non reactive b/l, midline gaze, face symmetric, able to raise b/l arms antigravity with drift, both LL at least 3/5  Cards:  RRR  Respiratory:  no respiratory distress  Abdomen:  soft  Extremities:  no edema DAY EVENTS:   No day events  Angiogram yesterday with no vasospasm.   EVD at 0. No ICP issues  VEEG with mild-moderate generalized slowing.   Afebrile. on CTX for UTI    VITALS:  - Reviewed      LABS:  - Reviewed        IMAGING:   Recent imaging studies were reviewed.    MEDICATIONS:  - Reviewed    EXAMINATION:  General:  calm  HEENT:  MMM  Neuro:  lethargic, AOx0, intermittently follows simple commands, pupils R 5 mm, L 7 mm non reactive b/l, midline gaze, face symmetric, able to raise b/l arms antigravity with drift, both LL at least 3/5  Cards:  RRR  Respiratory:  no respiratory distress  Abdomen:  soft  Extremities:  no edema

## 2021-12-18 NOTE — PROGRESS NOTE ADULT - ASSESSMENT
66F HH2MF3 SAH s/p L pcom aneurysm embo and R evd c/b tract ICH likely iso coagulopathy now s/p new L EVD, further cb fevers and inc lethargy.   - q1h neuro checks  - evd at 0, ICPs 1-10, output 324/24h  - ctx for uti  - keppra  - tcds poor windows  - angio neg for spasm 12/17  - hold sql  - fu heme consult?

## 2021-12-18 NOTE — PROGRESS NOTE ADULT - SUBJECTIVE AND OBJECTIVE BOX
HPI:  67 yo woman admitted 12/11/21 with HA/vomiting since 12/10 for HH2mF3 SAH c/b hydro s/p coiling of L Pcomm aneurysm, found to have a partial left m2 thrombus now s/p thrombectomy with tici 3 reperfusion. S/p R EVD c/b sizable tract hemorrhage with IVH now with L EVD. PBD 8    OVERNIGHT EVENTS:   No acute events overnight.    VITALS:  T(C): , Max: 37.6 (12-17-21 @ 19:00)  HR:  (57 - 111)  BP: --  ABP:  (147/82 - 186/85)  RR:  (13 - 24)  SpO2:  (93% - 100%)  Wt(kg): --      12-17-21 @ 07:01  -  12-18-21 @ 07:00  --------------------------------------------------------  IN: 1890 mL / OUT: 1124 mL / NET: 766 mL      LABS:  Na: 145 (12-18 @ 00:19), 144 (12-17 @ 17:46), 143 (12-17 @ 08:04), 144 (12-17 @ 01:46), 139 (12-16 @ 19:11), 136 (12-16 @ 06:24), 138 (12-15 @ 13:16)  K: 3.5 (12-18 @ 00:19), 3.4 (12-17 @ 17:46), 3.2 (12-17 @ 08:04), 3.7 (12-17 @ 01:46), 3.8 (12-16 @ 19:11), 3.5 (12-16 @ 06:24), 3.3 (12-15 @ 13:16)  Cl: 112 (12-18 @ 00:19), 110 (12-17 @ 17:46), 111 (12-17 @ 08:04), 110 (12-17 @ 01:46), 104 (12-16 @ 19:11), 101 (12-16 @ 06:24), 102 (12-15 @ 13:16)  CO2: 21 (12-18 @ 00:19), 21 (12-17 @ 17:46), 22 (12-17 @ 08:04), 21 (12-17 @ 01:46), 23 (12-16 @ 19:11), 22 (12-16 @ 06:24), 22 (12-15 @ 13:16)  BUN: 12 (12-18 @ 00:19), 11 (12-17 @ 17:46), 11 (12-17 @ 08:04), 10 (12-17 @ 01:46), 8 (12-16 @ 19:11), 7 (12-16 @ 06:24), 9 (12-15 @ 13:16)  Cr: 0.45 (12-18 @ 00:19), 0.44 (12-17 @ 17:46), 0.41 (12-17 @ 08:04), 0.44 (12-17 @ 01:46), 0.50 (12-16 @ 19:11), 0.40 (12-16 @ 06:24), 0.43 (12-15 @ 13:16)  Glu: 118(12-18 @ 00:19), 124(12-17 @ 17:46), 108(12-17 @ 08:04), 121(12-17 @ 01:46), 117(12-16 @ 19:11), 120(12-16 @ 06:24), 267(12-15 @ 13:16)    Hgb: 11.2 (12-18 @ 00:19), 12.4 (12-17 @ 01:46), 12.8 (12-16 @ 06:21)  Hct: 33.9 (12-18 @ 00:19), 37.1 (12-17 @ 01:46), 37.1 (12-16 @ 06:21)  WBC: 7.73 (12-18 @ 00:19), 6.81 (12-17 @ 01:46), 7.70 (12-16 @ 06:21)  Plt: 201 (12-18 @ 00:19), 223 (12-17 @ 01:46), 198 (12-16 @ 06:21)    INR: 0.96 12-16-21 @ 06:21  PTT: 24.9 12-16-21 @ 06:21    IMAGING:   Recent imaging studies were reviewed.    MEDICATIONS:  acetaminophen     Tablet .. 650 milliGRAM(s) Oral every 6 hours PRN  bisacodyl 5 milliGRAM(s) Oral at bedtime  cefTRIAXone   IVPB      cefTRIAXone   IVPB 1000 milliGRAM(s) IV Intermittent every 24 hours  chlorhexidine 4% Liquid 1 Application(s) Topical <User Schedule>  levETIRAcetam  IVPB 500 milliGRAM(s) IV Intermittent every 12 hours  polyethylene glycol 3350 17 Gram(s) Oral two times a day  senna 2 Tablet(s) Oral at bedtime  sodium chloride 2 Gram(s) Oral every 6 hours  sodium chloride 0.9% lock flush 10 milliLiter(s) IV Push every 1 hour PRN  sodium chloride 2% . 1000 milliLiter(s) IV Continuous <Continuous>    EXAMINATION:  General:  calm  HEENT:  MMM  Neuro:  Sleepy, oriented x1, perseverates , follows commands ,pupils 5 mm non reactive bilateral, midline gaze, left drift, Moving all limbs 4/5  Cards:  RRR  Respiratory:  no respiratory distress  Abdomen:  soft  Extremities:  no edema EVENTS:   No acute events overnight.  Angiogram yesterday with no vasospasm.   EVD at 0. Output 424.   VEEG with mild-moderate generalized slowing.   Afebrile. Net + 750cc.     VITALS:  T(C): , Max: 37.6 (12-17-21 @ 19:00)  HR:  (57 - 111)  BP: --  ABP:  (147/82 - 186/85)  RR:  (13 - 24)  SpO2:  (93% - 100%)  Wt(kg): --      12-17-21 @ 07:01  -  12-18-21 @ 07:00  --------------------------------------------------------  IN: 1890 mL / OUT: 1124 mL / NET: 766 mL      LABS:  Na: 145 (12-18 @ 00:19), 144 (12-17 @ 17:46), 143 (12-17 @ 08:04), 144 (12-17 @ 01:46), 139 (12-16 @ 19:11), 136 (12-16 @ 06:24), 138 (12-15 @ 13:16)  K: 3.5 (12-18 @ 00:19), 3.4 (12-17 @ 17:46), 3.2 (12-17 @ 08:04), 3.7 (12-17 @ 01:46), 3.8 (12-16 @ 19:11), 3.5 (12-16 @ 06:24), 3.3 (12-15 @ 13:16)  Cl: 112 (12-18 @ 00:19), 110 (12-17 @ 17:46), 111 (12-17 @ 08:04), 110 (12-17 @ 01:46), 104 (12-16 @ 19:11), 101 (12-16 @ 06:24), 102 (12-15 @ 13:16)  CO2: 21 (12-18 @ 00:19), 21 (12-17 @ 17:46), 22 (12-17 @ 08:04), 21 (12-17 @ 01:46), 23 (12-16 @ 19:11), 22 (12-16 @ 06:24), 22 (12-15 @ 13:16)  BUN: 12 (12-18 @ 00:19), 11 (12-17 @ 17:46), 11 (12-17 @ 08:04), 10 (12-17 @ 01:46), 8 (12-16 @ 19:11), 7 (12-16 @ 06:24), 9 (12-15 @ 13:16)  Cr: 0.45 (12-18 @ 00:19), 0.44 (12-17 @ 17:46), 0.41 (12-17 @ 08:04), 0.44 (12-17 @ 01:46), 0.50 (12-16 @ 19:11), 0.40 (12-16 @ 06:24), 0.43 (12-15 @ 13:16)  Glu: 118(12-18 @ 00:19), 124(12-17 @ 17:46), 108(12-17 @ 08:04), 121(12-17 @ 01:46), 117(12-16 @ 19:11), 120(12-16 @ 06:24), 267(12-15 @ 13:16)    Hgb: 11.2 (12-18 @ 00:19), 12.4 (12-17 @ 01:46), 12.8 (12-16 @ 06:21)  Hct: 33.9 (12-18 @ 00:19), 37.1 (12-17 @ 01:46), 37.1 (12-16 @ 06:21)  WBC: 7.73 (12-18 @ 00:19), 6.81 (12-17 @ 01:46), 7.70 (12-16 @ 06:21)  Plt: 201 (12-18 @ 00:19), 223 (12-17 @ 01:46), 198 (12-16 @ 06:21)    INR: 0.96 12-16-21 @ 06:21  PTT: 24.9 12-16-21 @ 06:21    IMAGING:   Recent imaging studies were reviewed.    MEDICATIONS:  acetaminophen     Tablet .. 650 milliGRAM(s) Oral every 6 hours PRN  bisacodyl 5 milliGRAM(s) Oral at bedtime  cefTRIAXone   IVPB      cefTRIAXone   IVPB 1000 milliGRAM(s) IV Intermittent every 24 hours  chlorhexidine 4% Liquid 1 Application(s) Topical <User Schedule>  levETIRAcetam  IVPB 500 milliGRAM(s) IV Intermittent every 12 hours  polyethylene glycol 3350 17 Gram(s) Oral two times a day  senna 2 Tablet(s) Oral at bedtime  sodium chloride 2 Gram(s) Oral every 6 hours  sodium chloride 0.9% lock flush 10 milliLiter(s) IV Push every 1 hour PRN  sodium chloride 2% . 1000 milliLiter(s) IV Continuous <Continuous>    EXAMINATION:  General:  calm  HEENT:  MMM  Neuro:  Sleepy, oriented x1, perseverates , follows commands ,pupils 5 mm non reactive bilateral, midline gaze, left drift, Moving all limbs 4/5  Cards:  RRR  Respiratory:  no respiratory distress  Abdomen:  soft  Extremities:  no edema EVENTS:   No acute events overnight.  Angiogram yesterday with no vasospasm.   EVD at 0. Output 424.   VEEG with mild-moderate generalized slowing.   Afebrile. Net + 750cc.     VITALS:  T(C): , Max: 37.6 (12-17-21 @ 19:00)  HR:  (57 - 111)  BP: --  ABP:  (147/82 - 186/85)  RR:  (13 - 24)  SpO2:  (93% - 100%)  Wt(kg): --      12-17-21 @ 07:01  -  12-18-21 @ 07:00  --------------------------------------------------------  IN: 1890 mL / OUT: 1124 mL / NET: 766 mL      LABS:  Na: 145 (12-18 @ 00:19), 144 (12-17 @ 17:46), 143 (12-17 @ 08:04), 144 (12-17 @ 01:46), 139 (12-16 @ 19:11), 136 (12-16 @ 06:24), 138 (12-15 @ 13:16)  K: 3.5 (12-18 @ 00:19), 3.4 (12-17 @ 17:46), 3.2 (12-17 @ 08:04), 3.7 (12-17 @ 01:46), 3.8 (12-16 @ 19:11), 3.5 (12-16 @ 06:24), 3.3 (12-15 @ 13:16)  Cl: 112 (12-18 @ 00:19), 110 (12-17 @ 17:46), 111 (12-17 @ 08:04), 110 (12-17 @ 01:46), 104 (12-16 @ 19:11), 101 (12-16 @ 06:24), 102 (12-15 @ 13:16)  CO2: 21 (12-18 @ 00:19), 21 (12-17 @ 17:46), 22 (12-17 @ 08:04), 21 (12-17 @ 01:46), 23 (12-16 @ 19:11), 22 (12-16 @ 06:24), 22 (12-15 @ 13:16)  BUN: 12 (12-18 @ 00:19), 11 (12-17 @ 17:46), 11 (12-17 @ 08:04), 10 (12-17 @ 01:46), 8 (12-16 @ 19:11), 7 (12-16 @ 06:24), 9 (12-15 @ 13:16)  Cr: 0.45 (12-18 @ 00:19), 0.44 (12-17 @ 17:46), 0.41 (12-17 @ 08:04), 0.44 (12-17 @ 01:46), 0.50 (12-16 @ 19:11), 0.40 (12-16 @ 06:24), 0.43 (12-15 @ 13:16)  Glu: 118(12-18 @ 00:19), 124(12-17 @ 17:46), 108(12-17 @ 08:04), 121(12-17 @ 01:46), 117(12-16 @ 19:11), 120(12-16 @ 06:24), 267(12-15 @ 13:16)    Hgb: 11.2 (12-18 @ 00:19), 12.4 (12-17 @ 01:46), 12.8 (12-16 @ 06:21)  Hct: 33.9 (12-18 @ 00:19), 37.1 (12-17 @ 01:46), 37.1 (12-16 @ 06:21)  WBC: 7.73 (12-18 @ 00:19), 6.81 (12-17 @ 01:46), 7.70 (12-16 @ 06:21)  Plt: 201 (12-18 @ 00:19), 223 (12-17 @ 01:46), 198 (12-16 @ 06:21)    INR: 0.96 12-16-21 @ 06:21  PTT: 24.9 12-16-21 @ 06:21    IMAGING:   Recent imaging studies were reviewed.    MEDICATIONS:  acetaminophen     Tablet .. 650 milliGRAM(s) Oral every 6 hours PRN  bisacodyl 5 milliGRAM(s) Oral at bedtime  cefTRIAXone   IVPB      cefTRIAXone   IVPB 1000 milliGRAM(s) IV Intermittent every 24 hours  chlorhexidine 4% Liquid 1 Application(s) Topical <User Schedule>  levETIRAcetam  IVPB 500 milliGRAM(s) IV Intermittent every 12 hours  polyethylene glycol 3350 17 Gram(s) Oral two times a day  senna 2 Tablet(s) Oral at bedtime  sodium chloride 2 Gram(s) Oral every 6 hours  sodium chloride 0.9% lock flush 10 milliLiter(s) IV Push every 1 hour PRN  sodium chloride 2% . 1000 milliLiter(s) IV Continuous <Continuous>    EXAMINATION:  General:  calm  HEENT:  MMM  Neuro:  Sleepy, oriented x1, follows simple commands ,pupils right 5 mm, left 7 mm non reactive bilateral, midline gaze, left drift, UL 4/5, both LL at least 3/5  Cards:  RRR  Respiratory:  no respiratory distress  Abdomen:  soft  Extremities:  no edema EVENTS:   No acute events overnight.  Angiogram yesterday with no vasospasm.   EVD at 0. ICP 5-12. Output 424. CT head this AM with decrease in vent size.   VEEG with mild-moderate generalized slowing.   Afebrile. Net + 750cc.     VITALS:  T(C): , Max: 37.6 (12-17-21 @ 19:00)  HR:  (57 - 111)  BP: --  ABP:  (147/82 - 186/85)  RR:  (13 - 24)  SpO2:  (93% - 100%)  Wt(kg): --      12-17-21 @ 07:01  -  12-18-21 @ 07:00  --------------------------------------------------------  IN: 1890 mL / OUT: 1124 mL / NET: 766 mL      LABS:  Na: 145 (12-18 @ 00:19), 144 (12-17 @ 17:46), 143 (12-17 @ 08:04), 144 (12-17 @ 01:46), 139 (12-16 @ 19:11), 136 (12-16 @ 06:24), 138 (12-15 @ 13:16)  K: 3.5 (12-18 @ 00:19), 3.4 (12-17 @ 17:46), 3.2 (12-17 @ 08:04), 3.7 (12-17 @ 01:46), 3.8 (12-16 @ 19:11), 3.5 (12-16 @ 06:24), 3.3 (12-15 @ 13:16)  Cl: 112 (12-18 @ 00:19), 110 (12-17 @ 17:46), 111 (12-17 @ 08:04), 110 (12-17 @ 01:46), 104 (12-16 @ 19:11), 101 (12-16 @ 06:24), 102 (12-15 @ 13:16)  CO2: 21 (12-18 @ 00:19), 21 (12-17 @ 17:46), 22 (12-17 @ 08:04), 21 (12-17 @ 01:46), 23 (12-16 @ 19:11), 22 (12-16 @ 06:24), 22 (12-15 @ 13:16)  BUN: 12 (12-18 @ 00:19), 11 (12-17 @ 17:46), 11 (12-17 @ 08:04), 10 (12-17 @ 01:46), 8 (12-16 @ 19:11), 7 (12-16 @ 06:24), 9 (12-15 @ 13:16)  Cr: 0.45 (12-18 @ 00:19), 0.44 (12-17 @ 17:46), 0.41 (12-17 @ 08:04), 0.44 (12-17 @ 01:46), 0.50 (12-16 @ 19:11), 0.40 (12-16 @ 06:24), 0.43 (12-15 @ 13:16)  Glu: 118(12-18 @ 00:19), 124(12-17 @ 17:46), 108(12-17 @ 08:04), 121(12-17 @ 01:46), 117(12-16 @ 19:11), 120(12-16 @ 06:24), 267(12-15 @ 13:16)    Hgb: 11.2 (12-18 @ 00:19), 12.4 (12-17 @ 01:46), 12.8 (12-16 @ 06:21)  Hct: 33.9 (12-18 @ 00:19), 37.1 (12-17 @ 01:46), 37.1 (12-16 @ 06:21)  WBC: 7.73 (12-18 @ 00:19), 6.81 (12-17 @ 01:46), 7.70 (12-16 @ 06:21)  Plt: 201 (12-18 @ 00:19), 223 (12-17 @ 01:46), 198 (12-16 @ 06:21)    INR: 0.96 12-16-21 @ 06:21  PTT: 24.9 12-16-21 @ 06:21    IMAGING:   Recent imaging studies were reviewed.    MEDICATIONS:  acetaminophen     Tablet .. 650 milliGRAM(s) Oral every 6 hours PRN  bisacodyl 5 milliGRAM(s) Oral at bedtime  cefTRIAXone   IVPB      cefTRIAXone   IVPB 1000 milliGRAM(s) IV Intermittent every 24 hours  chlorhexidine 4% Liquid 1 Application(s) Topical <User Schedule>  levETIRAcetam  IVPB 500 milliGRAM(s) IV Intermittent every 12 hours  polyethylene glycol 3350 17 Gram(s) Oral two times a day  senna 2 Tablet(s) Oral at bedtime  sodium chloride 2 Gram(s) Oral every 6 hours  sodium chloride 0.9% lock flush 10 milliLiter(s) IV Push every 1 hour PRN  sodium chloride 2% . 1000 milliLiter(s) IV Continuous <Continuous>    EXAMINATION:  General:  calm  HEENT:  MMM  Neuro:  lethargic, oriented to hospital, month and year, intermittently follows simple commands, pupils R 5 mm, L 7 mm non reactive b/l, midline gaze, face symmetric, able to raise b/l arms antigravity with drift, both LL at least 3/5  Cards:  RRR  Respiratory:  no respiratory distress  Abdomen:  soft  Extremities:  no edema

## 2021-12-18 NOTE — PROVIDER CONTACT NOTE (CHANGE IN STATUS NOTIFICATION) - BACKGROUND
65 y/o female with SAH
SAH s/p embolization and coils, s/p right EVD, L EVD with tract heme
Pt admitted with SAH

## 2021-12-18 NOTE — PROGRESS NOTE ADULT - ASSESSMENT
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; partial left m2 thrombus now s/p thrombectomy with tici 3 reperfusion    NEURO:  - neuro checks q1h  DC EEG  - Cerebral angiography  - HCP: EVD  at 0 cm water [id number], goal ICP<22 mmhg , CPP >65 mmhg   - Angio today no spasm   - Activity: bed rest post angio    CVS:  - SBP goal 100-180  mmhg   TTE EF 65 %     PULM:  - RA    RENAL:  - Fluids: 2% at 60 ml/hr  -BMP     GI:  - NPO for angio, resume dit after the procedure    - Bowel regimen: miralax, senna    ENDO:   - FS goal 120-180     HEME/ONC:  - SCDs  - Lovenox on hold    ID:  Ceftriaxone for UTI   CSF gram stain no organisms       Patient is at high risk of neurologic deterioration/death due to:  SAH, vasospasm, expansion of the hematoma    67 yo woman admitted 12/11/21 with HA/vomiting since 12/10 found to have HH2mF3 SAH c/b hydro s/p coiling of L Pcomm aneurysm, found to have a partial left m2 thrombus now s/p thrombectomy with TICI 3 reperfusion. S/p R EVD for hydro c/b sizable tract hemorrhage with IVH now with L EVD. PBD 8. More lethargic this evening but fluctuates.     NEURO:  NC q1h  CT head this AM  DC EEG  - Cerebral angiography  - HCP: EVD  at 0 cm water [id number], goal ICP<22 mmhg , CPP >65 mmhg   - Angio today no spasm   Keppra 500mg BID for seizure ppx  - Activity: bed rest post angio    CVS:  - SBP goal 100-180  mmhg   TTE EF 65 %     PULM:  - RA    RENAL:  - Fluids: 2% at 60 ml/hr  -BMP     GI:  - NPO for angio, resume dit after the procedure    - Bowel regimen: miralax, senna    ENDO:   - FS goal 120-180     HEME/ONC:  - SCDs  - Lovenox on hold    ID:  Ceftriaxone for UTI   CSF gram stain no organisms       Lines: R PICC   65 yo woman admitted 12/11/21 with HA/vomiting since 12/10 found to have HH2mF3 SAH c/b hydro s/p coiling of L Pcomm aneurysm, found to have a partial left m2 thrombus now s/p thrombectomy with TICI 3 reperfusion. S/p R EVD for hydro c/b sizable tract hemorrhage with IVH now with L EVD. PBD 8. More lethargic this evening but fluctuates.     NEURO:  NC q1h  - HCP: EVD  at 0 cm water 424 hr, goal ICP<22 mmhg , CPP >65 mmhg, Consider elevation of EVD after NS approval   Keppra 500mg BID for seizure ppx  - Activity: bed rest post angio    CVS:  - SBP goal 100-180  mmhg   TTE EF 65 %     PULM:  - RA    RENAL:  +700 balance  Na 140-150  Hold 2% saline   -BMP     GI:  - NDGT feeding  - Bowel regimen: miralax, senna    ENDO:   - FS goal 120-180     HEME/ONC:  - SCDs  - Lovenox on hold. to consider resuming DVT prophylaxis (heparin)after NS approval     ID:  Ceftriaxone for UTI   Gram negative rods in rods      Lines: R PICC, left Axillary A line    65 yo woman admitted 12/11/21 with HA/vomiting since 12/10 found to have HH2mF3 SAH c/b hydro s/p coiling of L Pcomm aneurysm, found to have a partial left m2 thrombus now s/p thrombectomy with TICI 3 reperfusion. S/p R EVD for hydro c/b sizable tract hemorrhage with IVH now with L EVD. PBD 8. More lethargic this evening but fluctuates.     NEURO:  NC q1h  HCP: EVD at 0 cmH2O, goal ICP<22 mmhg , CPP >65 mmhg, discuss with neurosurg re raising EVD since with resolved hydrocephalus and high output.   Keppra 500mg BID for seizure ppx  re-trial nimodipine     CVS: TTE EF 65 %   SBP goal 100-180 mmhg     PULM:  RA    RENAL:  Strict IS and Os, goal net even since in the window for vasospasm   Na 140-150  Hold 2% saline   q8h BMP    GI:  - NDGT feeding  - Bowel regimen: miralax, senna    ENDO:   - FS goal 120-180     HEME/ONC:  - SCDs  - Lovenox on hold for b/l tract hemorrhages. Consider resuming DVT prophylaxis (heparin) after discussion with neurosurg.     ID: [id number] 2/16 gram negative rods  Ceftriaxone for UTI       Lines: R PICC, left Axillary A line

## 2021-12-18 NOTE — PROGRESS NOTE ADULT - SUBJECTIVE AND OBJECTIVE BOX
Patient seen and examined at bedside.    --Anticoagulation--    T(C): 37.2 (12-17-21 @ 23:00), Max: 37.6 (12-17-21 @ 19:00)  HR: 87 (12-18-21 @ 03:00) (55 - 111)  BP: --  RR: 17 (12-18-21 @ 03:00) (13 - 24)  SpO2: 96% (12-18-21 @ 03:00) (93% - 100%)  Wt(kg): --    Exam:Lethargic, Ox2(name/hospital/year) EO spont, nonverbal, pupil Rt 5mm NR, Lt 6 mm NR, Lt facial, LUE drift, follow simple commands, RUE 5/5, LUE 4/5,HG 4/5, BLE spont at least 2/5    Labs:                        11.2   7.73  )-----------( 201      ( 18 Dec 2021 00:19 )             33.9   12-18    145  |  112<H>  |  12  ----------------------------<  118<H>  3.5   |  21<L>  |  0.45<L>    Ca    9.4      18 Dec 2021 00:19  Phos  3.4     12-18  Mg     2.1     12-18

## 2021-12-18 NOTE — PROVIDER CONTACT NOTE (CHANGE IN STATUS NOTIFICATION) - ASSESSMENT
----- Message from Lorri Cantor sent at 9/25/2020 11:03 AM CDT -----  Patient called in regards to her IUD , please call back at [phone].        Thanks,  Lorri Cantor    
1900 increased lethargy, pt only oriented to self, drift on all extremities, 2200 increased lethargy, expressive aphasia, some effort on all extremities. EVD remains unclamped @0cm H2O.
Pt lethargic unable to answer questions appropriately. motor strength unchanged. VS stable.
Pt lethargic increased confusion. motor strength unchanged. VS stable.

## 2021-12-18 NOTE — PROVIDER CONTACT NOTE (CHANGE IN STATUS NOTIFICATION) - SITUATION
Change in neuro status
Pt with change in neuro exam
Increased lethargy and disorientation 1900, expressive aphasia 2200

## 2021-12-18 NOTE — PROGRESS NOTE ADULT - ASSESSMENT
65 yo woman admitted 12/11/21 with HA/vomiting since 12/10 found to have HH2mF3 SAH c/b hydro s/p coiling of L Pcomm aneurysm, found to have a partial left m2 thrombus now s/p thrombectomy with TICI 3 reperfusion. S/p R EVD for hydro c/b sizable tract hemorrhage with IVH now with L EVD. PBD 8. More lethargic this evening but fluctuates.     NEURO:  NC q1h  HCP: EVD at 0 cmH2O, goal ICP<22 mmhg , CPP >65 mmhg, discuss with neurosurg re raising EVD since with resolved hydrocephalus and high output.   Keppra 500mg BID for seizure ppx  re-trial nimodipine   TCDs    CVS: TTE EF 65 %   SBP goal 100-180 mmhg     PULM:  RA    RENAL:  Strict IS and Os, goal net even since in the window for vasospasm   Na 140-150  Hold 2% saline pending repeat BMP  q8h BMP    GI:  - NGT feeding  - Bowel regimen: miralax, senna    ENDO:   - FS goal 120-180     HEME/ONC:  - SCDs  - SQH  - daily aptt while on SQH    ID: [id number] 2/16 gram negative rods  Ceftriaxone for UTI       Lines: R PICC, left Axillary A line

## 2021-12-19 LAB
-  AMIKACIN: SIGNIFICANT CHANGE UP
-  AMOXICILLIN/CLAVULANIC ACID: SIGNIFICANT CHANGE UP
-  AMPICILLIN/SULBACTAM: SIGNIFICANT CHANGE UP
-  AMPICILLIN: SIGNIFICANT CHANGE UP
-  AZTREONAM: SIGNIFICANT CHANGE UP
-  CEFAZOLIN: SIGNIFICANT CHANGE UP
-  CEFEPIME: SIGNIFICANT CHANGE UP
-  CEFOXITIN: SIGNIFICANT CHANGE UP
-  CEFTRIAXONE: SIGNIFICANT CHANGE UP
-  CIPROFLOXACIN: SIGNIFICANT CHANGE UP
-  ERTAPENEM: SIGNIFICANT CHANGE UP
-  GENTAMICIN: SIGNIFICANT CHANGE UP
-  IMIPENEM: SIGNIFICANT CHANGE UP
-  LEVOFLOXACIN: SIGNIFICANT CHANGE UP
-  MEROPENEM: SIGNIFICANT CHANGE UP
-  NITROFURANTOIN: SIGNIFICANT CHANGE UP
-  PIPERACILLIN/TAZOBACTAM: SIGNIFICANT CHANGE UP
-  TIGECYCLINE: SIGNIFICANT CHANGE UP
-  TOBRAMYCIN: SIGNIFICANT CHANGE UP
-  TRIMETHOPRIM/SULFAMETHOXAZOLE: SIGNIFICANT CHANGE UP
ANION GAP SERPL CALC-SCNC: 13 MMOL/L — SIGNIFICANT CHANGE UP (ref 5–17)
ANION GAP SERPL CALC-SCNC: 15 MMOL/L — SIGNIFICANT CHANGE UP (ref 5–17)
APTT BLD: 29.4 SEC — SIGNIFICANT CHANGE UP (ref 27.5–35.5)
BUN SERPL-MCNC: 20 MG/DL — SIGNIFICANT CHANGE UP (ref 7–23)
BUN SERPL-MCNC: 31 MG/DL — HIGH (ref 7–23)
CALCIUM SERPL-MCNC: 9.1 MG/DL — SIGNIFICANT CHANGE UP (ref 8.4–10.5)
CALCIUM SERPL-MCNC: 9.7 MG/DL — SIGNIFICANT CHANGE UP (ref 8.4–10.5)
CHLORIDE SERPL-SCNC: 111 MMOL/L — HIGH (ref 96–108)
CHLORIDE SERPL-SCNC: 114 MMOL/L — HIGH (ref 96–108)
CO2 SERPL-SCNC: 22 MMOL/L — SIGNIFICANT CHANGE UP (ref 22–31)
CO2 SERPL-SCNC: 22 MMOL/L — SIGNIFICANT CHANGE UP (ref 22–31)
CREAT SERPL-MCNC: 0.39 MG/DL — LOW (ref 0.5–1.3)
CREAT SERPL-MCNC: 0.5 MG/DL — SIGNIFICANT CHANGE UP (ref 0.5–1.3)
GLUCOSE SERPL-MCNC: 162 MG/DL — HIGH (ref 70–99)
GLUCOSE SERPL-MCNC: 180 MG/DL — HIGH (ref 70–99)
HCT VFR BLD CALC: 35.2 % — SIGNIFICANT CHANGE UP (ref 34.5–45)
HGB BLD-MCNC: 11.7 G/DL — SIGNIFICANT CHANGE UP (ref 11.5–15.5)
INR BLD: 1.01 RATIO — SIGNIFICANT CHANGE UP (ref 0.88–1.16)
MAGNESIUM SERPL-MCNC: 2.4 MG/DL — SIGNIFICANT CHANGE UP (ref 1.6–2.6)
MCHC RBC-ENTMCNC: 30.6 PG — SIGNIFICANT CHANGE UP (ref 27–34)
MCHC RBC-ENTMCNC: 33.2 GM/DL — SIGNIFICANT CHANGE UP (ref 32–36)
MCV RBC AUTO: 92.1 FL — SIGNIFICANT CHANGE UP (ref 80–100)
METHOD TYPE: SIGNIFICANT CHANGE UP
NRBC # BLD: 0 /100 WBCS — SIGNIFICANT CHANGE UP (ref 0–0)
PHOSPHATE SERPL-MCNC: 2.9 MG/DL — SIGNIFICANT CHANGE UP (ref 2.5–4.5)
PLATELET # BLD AUTO: 186 K/UL — SIGNIFICANT CHANGE UP (ref 150–400)
POTASSIUM SERPL-MCNC: 3.1 MMOL/L — LOW (ref 3.5–5.3)
POTASSIUM SERPL-MCNC: 3.3 MMOL/L — LOW (ref 3.5–5.3)
POTASSIUM SERPL-SCNC: 3.1 MMOL/L — LOW (ref 3.5–5.3)
POTASSIUM SERPL-SCNC: 3.3 MMOL/L — LOW (ref 3.5–5.3)
PROTHROM AB SERPL-ACNC: 12.1 SEC — SIGNIFICANT CHANGE UP (ref 10.6–13.6)
RBC # BLD: 3.82 M/UL — SIGNIFICANT CHANGE UP (ref 3.8–5.2)
RBC # FLD: 12.9 % — SIGNIFICANT CHANGE UP (ref 10.3–14.5)
SODIUM SERPL-SCNC: 148 MMOL/L — HIGH (ref 135–145)
SODIUM SERPL-SCNC: 149 MMOL/L — HIGH (ref 135–145)
TROPONIN T, HIGH SENSITIVITY RESULT: <6 NG/L — SIGNIFICANT CHANGE UP (ref 0–51)
WBC # BLD: 8.7 K/UL — SIGNIFICANT CHANGE UP (ref 3.8–10.5)
WBC # FLD AUTO: 8.7 K/UL — SIGNIFICANT CHANGE UP (ref 3.8–10.5)

## 2021-12-19 PROCEDURE — 99291 CRITICAL CARE FIRST HOUR: CPT

## 2021-12-19 RX ORDER — LEVETIRACETAM 250 MG/1
500 TABLET, FILM COATED ORAL
Refills: 0 | Status: DISCONTINUED | OUTPATIENT
Start: 2021-12-19 | End: 2021-12-20

## 2021-12-19 RX ORDER — PIPERACILLIN AND TAZOBACTAM 4; .5 G/20ML; G/20ML
3.38 INJECTION, POWDER, LYOPHILIZED, FOR SOLUTION INTRAVENOUS EVERY 8 HOURS
Refills: 0 | Status: DISCONTINUED | OUTPATIENT
Start: 2021-12-19 | End: 2021-12-19

## 2021-12-19 RX ORDER — POTASSIUM CHLORIDE 20 MEQ
40 PACKET (EA) ORAL EVERY 4 HOURS
Refills: 0 | Status: COMPLETED | OUTPATIENT
Start: 2021-12-19 | End: 2021-12-19

## 2021-12-19 RX ORDER — CIPROFLOXACIN LACTATE 400MG/40ML
500 VIAL (ML) INTRAVENOUS EVERY 12 HOURS
Refills: 0 | Status: DISCONTINUED | OUTPATIENT
Start: 2021-12-19 | End: 2021-12-19

## 2021-12-19 RX ORDER — POTASSIUM CHLORIDE 20 MEQ
40 PACKET (EA) ORAL
Refills: 0 | Status: COMPLETED | OUTPATIENT
Start: 2021-12-19 | End: 2021-12-19

## 2021-12-19 RX ORDER — CIPROFLOXACIN LACTATE 400MG/40ML
500 VIAL (ML) INTRAVENOUS EVERY 12 HOURS
Refills: 0 | Status: COMPLETED | OUTPATIENT
Start: 2021-12-19 | End: 2021-12-29

## 2021-12-19 RX ORDER — PIPERACILLIN AND TAZOBACTAM 4; .5 G/20ML; G/20ML
3.38 INJECTION, POWDER, LYOPHILIZED, FOR SOLUTION INTRAVENOUS ONCE
Refills: 0 | Status: COMPLETED | OUTPATIENT
Start: 2021-12-19 | End: 2021-12-19

## 2021-12-19 RX ORDER — POTASSIUM CHLORIDE 20 MEQ
40 PACKET (EA) ORAL DAILY
Refills: 0 | Status: DISCONTINUED | OUTPATIENT
Start: 2021-12-20 | End: 2021-12-23

## 2021-12-19 RX ORDER — ACETAMINOPHEN 500 MG
1000 TABLET ORAL ONCE
Refills: 0 | Status: COMPLETED | OUTPATIENT
Start: 2021-12-19 | End: 2021-12-19

## 2021-12-19 RX ADMIN — Medication 650 MILLIGRAM(S): at 13:25

## 2021-12-19 RX ADMIN — NIMODIPINE 30 MILLIGRAM(S): 60 SOLUTION ORAL at 18:05

## 2021-12-19 RX ADMIN — NIMODIPINE 30 MILLIGRAM(S): 60 SOLUTION ORAL at 02:11

## 2021-12-19 RX ADMIN — HEPARIN SODIUM 5000 UNIT(S): 5000 INJECTION INTRAVENOUS; SUBCUTANEOUS at 17:28

## 2021-12-19 RX ADMIN — Medication 40 MILLIEQUIVALENT(S): at 02:16

## 2021-12-19 RX ADMIN — Medication 500 MILLIGRAM(S): at 22:03

## 2021-12-19 RX ADMIN — POLYETHYLENE GLYCOL 3350 17 GRAM(S): 17 POWDER, FOR SOLUTION ORAL at 17:27

## 2021-12-19 RX ADMIN — PIPERACILLIN AND TAZOBACTAM 25 GRAM(S): 4; .5 INJECTION, POWDER, LYOPHILIZED, FOR SOLUTION INTRAVENOUS at 15:30

## 2021-12-19 RX ADMIN — HEPARIN SODIUM 5000 UNIT(S): 5000 INJECTION INTRAVENOUS; SUBCUTANEOUS at 06:06

## 2021-12-19 RX ADMIN — NIMODIPINE 30 MILLIGRAM(S): 60 SOLUTION ORAL at 04:00

## 2021-12-19 RX ADMIN — LEVETIRACETAM 400 MILLIGRAM(S): 250 TABLET, FILM COATED ORAL at 06:05

## 2021-12-19 RX ADMIN — NIMODIPINE 30 MILLIGRAM(S): 60 SOLUTION ORAL at 08:17

## 2021-12-19 RX ADMIN — Medication 40 MILLIEQUIVALENT(S): at 17:27

## 2021-12-19 RX ADMIN — NIMODIPINE 30 MILLIGRAM(S): 60 SOLUTION ORAL at 20:00

## 2021-12-19 RX ADMIN — Medication 400 MILLIGRAM(S): at 18:30

## 2021-12-19 RX ADMIN — NIMODIPINE 30 MILLIGRAM(S): 60 SOLUTION ORAL at 22:01

## 2021-12-19 RX ADMIN — Medication 1000 MILLIGRAM(S): at 19:06

## 2021-12-19 RX ADMIN — SENNA PLUS 2 TABLET(S): 8.6 TABLET ORAL at 22:03

## 2021-12-19 RX ADMIN — Medication 40 MILLIEQUIVALENT(S): at 22:02

## 2021-12-19 RX ADMIN — Medication 40 MILLIEQUIVALENT(S): at 03:59

## 2021-12-19 RX ADMIN — NIMODIPINE 30 MILLIGRAM(S): 60 SOLUTION ORAL at 14:20

## 2021-12-19 RX ADMIN — NIMODIPINE 30 MILLIGRAM(S): 60 SOLUTION ORAL at 16:13

## 2021-12-19 RX ADMIN — POLYETHYLENE GLYCOL 3350 17 GRAM(S): 17 POWDER, FOR SOLUTION ORAL at 06:06

## 2021-12-19 RX ADMIN — NIMODIPINE 30 MILLIGRAM(S): 60 SOLUTION ORAL at 00:00

## 2021-12-19 RX ADMIN — CHLORHEXIDINE GLUCONATE 1 APPLICATION(S): 213 SOLUTION TOPICAL at 22:02

## 2021-12-19 RX ADMIN — NIMODIPINE 30 MILLIGRAM(S): 60 SOLUTION ORAL at 10:12

## 2021-12-19 RX ADMIN — LEVETIRACETAM 500 MILLIGRAM(S): 250 TABLET, FILM COATED ORAL at 17:28

## 2021-12-19 RX ADMIN — NIMODIPINE 30 MILLIGRAM(S): 60 SOLUTION ORAL at 12:10

## 2021-12-19 RX ADMIN — Medication 650 MILLIGRAM(S): at 14:30

## 2021-12-19 RX ADMIN — PIPERACILLIN AND TAZOBACTAM 200 GRAM(S): 4; .5 INJECTION, POWDER, LYOPHILIZED, FOR SOLUTION INTRAVENOUS at 11:21

## 2021-12-19 RX ADMIN — NIMODIPINE 30 MILLIGRAM(S): 60 SOLUTION ORAL at 06:05

## 2021-12-19 NOTE — PROGRESS NOTE ADULT - SUBJECTIVE AND OBJECTIVE BOX
Chief complaint:   Patient is a 66y old  Female who presents with a chief complaint of Subarachnoid hemorrhage (19 Dec 2021 10:18)    HPI:  66F, no sig PMH, txfered from [facility] for HA since 12/10 morning. Had associated nausea/vomiting and posterior neck pain. CT: diffuse SAH, most prominent at Sylvian fissures. Exam: Somnolent, Ox2.5 (said November), Left pupil 6NR; Right pupil 5NR, EOMI, no facial, Right drift, LAWTON 5/5   (11 Dec 2021 11:22)        24hr EVENTS:      ROS: [ ]  Unable to assess due to mental status   All other systems negative    -----------------------------------------------------------------------------------------------------------------------------------------------------------------------------------  ICU Vital Signs Last 24 Hrs  T(C): 37 (19 Dec 2021 19:00), Max: 37.6 (19 Dec 2021 15:00)  T(F): 98.6 (19 Dec 2021 19:00), Max: 99.7 (19 Dec 2021 15:00)  HR: 71 (19 Dec 2021 19:00) (71 - 106)  BP: --  BP(mean): --  ABP: 131/69 (19 Dec 2021 19:00) (128/70 - 172/85)  ABP(mean): 95 (19 Dec 2021 19:00) (92 - 122)  RR: 19 (19 Dec 2021 19:00) (15 - 24)  SpO2: 94% (19 Dec 2021 19:00) (93% - 99%)      I&O's Summary    18 Dec 2021 07:01  -  19 Dec 2021 07:00  --------------------------------------------------------  IN: 1700 mL / OUT: 1429 mL / NET: 271 mL    19 Dec 2021 07:01  -  19 Dec 2021 19:56  --------------------------------------------------------  IN: 1140 mL / OUT: 155 mL / NET: 985 mL        MEDICATIONS  (STANDING):  chlorhexidine 4% Liquid 1 Application(s) Topical <User Schedule>  ciprofloxacin     Tablet 500 milliGRAM(s) Oral every 12 hours  heparin   Injectable 5000 Unit(s) SubCutaneous every 12 hours  levETIRAcetam  Solution 500 milliGRAM(s) Oral two times a day  niMODipine Oral Solution 30 milliGRAM(s) Enteral Tube every 2 hours  polyethylene glycol 3350 17 Gram(s) Oral two times a day  potassium chloride   Solution 40 milliEquivalent(s) Enteral Tube every 4 hours  senna 2 Tablet(s) Oral at bedtime      RESPIRATORY:        IMAGING:   Recent imaging studies were reviewed.    LAB RESULTS:                          11.7   8.70  )-----------( 186      ( 19 Dec 2021 00:24 )             35.2       PT/INR - ( 19 Dec 2021 00:23 )   PT: 12.1 sec;   INR: 1.01 ratio         PTT - ( 19 Dec 2021 00:23 )  PTT:29.4 sec    12-19    149<H>  |  114<H>  |  31<H>  ----------------------------<  180<H>  3.3<L>   |  22  |  0.50    Ca    9.7      19 Dec 2021 15:29  Phos  2.9     12-19  Mg     2.4     12-19    -----------------------------------------------------------------------------------------------------------------------------------------------------------------------------------    EXAMINATION: examined with family at bedside   General:  calm  HEENT:  MMM  Neuro:  Alert, oriented to self, hospital, month and year, intermittently follows simple commands, pupils R 5 mm, L 7 mm non reactive b/l, midline gaze, face symmetric, able to raise b/l arms antigravity without drift, able to raise b/l legs antigravity without drift   Cards:  RRR  Respiratory:  no respiratory distress  Abdomen:  soft  Extremities:  no edema     Chief complaint:   Patient is a 66y old  Female who presents with a chief complaint of Subarachnoid hemorrhage (19 Dec 2021 10:18)    HPI:  66F, no sig PMH, txfered from [facility] for HA since 12/10 morning. Had associated nausea/vomiting and posterior neck pain. CT: diffuse SAH, most prominent at Sylvian fissures. Exam: Somnolent, Ox2.5 (said November), Left pupil 6NR; Right pupil 5NR, EOMI, no facial, Right drift, LAWTON 5/5   (11 Dec 2021 11:22)        24hr EVENTS:  no acute issues    ROS: no pain  All other systems negative    -----------------------------------------------------------------------------------------------------------------------------------------------------------------------------------  ICU Vital Signs Last 24 Hrs  T(C): 37 (19 Dec 2021 19:00), Max: 37.6 (19 Dec 2021 15:00)  T(F): 98.6 (19 Dec 2021 19:00), Max: 99.7 (19 Dec 2021 15:00)  HR: 71 (19 Dec 2021 19:00) (71 - 106)  BP: --  BP(mean): --  ABP: 131/69 (19 Dec 2021 19:00) (128/70 - 172/85)  ABP(mean): 95 (19 Dec 2021 19:00) (92 - 122)  RR: 19 (19 Dec 2021 19:00) (15 - 24)  SpO2: 94% (19 Dec 2021 19:00) (93% - 99%)      I&O's Summary    18 Dec 2021 07:01  -  19 Dec 2021 07:00  --------------------------------------------------------  IN: 1700 mL / OUT: 1429 mL / NET: 271 mL    19 Dec 2021 07:01  -  19 Dec 2021 19:56  --------------------------------------------------------  IN: 1140 mL / OUT: 155 mL / NET: 985 mL        MEDICATIONS  (STANDING):  chlorhexidine 4% Liquid 1 Application(s) Topical <User Schedule>  ciprofloxacin     Tablet 500 milliGRAM(s) Oral every 12 hours  heparin   Injectable 5000 Unit(s) SubCutaneous every 12 hours  levETIRAcetam  Solution 500 milliGRAM(s) Oral two times a day  niMODipine Oral Solution 30 milliGRAM(s) Enteral Tube every 2 hours  polyethylene glycol 3350 17 Gram(s) Oral two times a day  potassium chloride   Solution 40 milliEquivalent(s) Enteral Tube every 4 hours  senna 2 Tablet(s) Oral at bedtime    IMAGING:   Recent imaging studies were reviewed.    LAB RESULTS:                          11.7   8.70  )-----------( 186      ( 19 Dec 2021 00:24 )             35.2       PT/INR - ( 19 Dec 2021 00:23 )   PT: 12.1 sec;   INR: 1.01 ratio         PTT - ( 19 Dec 2021 00:23 )  PTT:29.4 sec    12-19    149<H>  |  114<H>  |  31<H>  ----------------------------<  180<H>  3.3<L>   |  22  |  0.50    Ca    9.7      19 Dec 2021 15:29  Phos  2.9     12-19  Mg     2.4     12-19    -----------------------------------------------------------------------------------------------------------------------------------------------------------------------------------    EXAMINATION:    General:  calm  HEENT:  MMM  Neuro:  Alert, oriented to self, hospital, intermittently follows simple commands  pupils R 5 mm, L 7 mm non reactive b/l, midline gaze, face symmetric  able to raise b/l arms antigravity without drift, able to raise b/l legs antigravity without drift   Cards:  RRR  Respiratory:  no respiratory distress  Abdomen:  soft  Extremities:  no edema

## 2021-12-19 NOTE — PROGRESS NOTE ADULT - ASSESSMENT
Assessment/Plan:   67 yo woman admitted 12/11/21 with HA/vomiting since 12/10 found to have HH2mF3 SAH c/b hydro s/p coiling of L Pcomm aneurysm, found to have a partial left m2 thrombus now s/p thrombectomy with TICI 3 reperfusion. S/p R EVD for hydro c/b sizable tract hemorrhage with IVH now with L EVD. PBD 9. With fluctuating mental status.     NC q1h  Hydrocephalus: c/w EVD at 0 cmH2O as per nrsg, goal ICP<22 mmhg  Keppra 500mg BID for seizure ppx  nimodipine 30mg q2h  monitor for vasospasm  SBP goal 100-180 mmhg   RA  q12h BMP, off hypertonics and salt tabs   tube feeds at goal  FS goal 120-180   SCDs  on heparin 500mg BID for DVT ppx    ID: UCX 2/16 Kelbsiella   Broaden to Zosyn for UTI, f/u sensitivities       Lines: R PICC, left Axillary A line     Patient seen and examined by attending on 12/19/2021.    Patient is critically ill due to SAH and at high risk for neurological deterioration or death due to: hydrocephalus requiring an EVD for CSF diversion, at risk for vasospasm     Assessment/Plan:   65 yo woman admitted 12/11/21 with HA/vomiting since 12/10 found to have HH2mF3 SAH c/b hydro s/p coiling of L Pcomm aneurysm, found to have a partial left m2 thrombus now s/p thrombectomy with TICI 3 reperfusion. S/p R EVD for hydro c/b sizable tract hemorrhage with IVH now with L EVD. PBD 9. With fluctuating mental status.     NC q1h  Hydrocephalus: c/w EVD at 0 cmH2O as per nrsg, goal ICP<22 mmhg  Keppra 500mg BID for seizure ppx  nimodipine 30mg q2h  monitor for vasospasm  SBP goal 100-180 mmhg   RA  q12h BMP, off hypertonics and salt tabs   tube feeds at goal  FS goal 120-180   SCDs  on heparin 500mg BID for DVT ppx  UCX 2/16 Kelbsiella   Cipro for UTI based on sensitivities    Lines: R PICC, left Axillary A line     Patient is critically ill due to SAH and at high risk for neurological deterioration or death due to: hydrocephalus requiring an EVD for CSF diversion, at risk for vasospasm

## 2021-12-19 NOTE — PROGRESS NOTE ADULT - ASSESSMENT
66F HH2MF3 SAH s/p L pcom aneurysm embo and R evd c/b tract ICH likely iso coagulopathy now s/p new L EVD, further cb fevers and inc lethargy.   - q1h neuro checks  - evd at 0,   - CTH ON stable for reduced responsiveness

## 2021-12-19 NOTE — PROGRESS NOTE ADULT - SUBJECTIVE AND OBJECTIVE BOX
EVENTS:   Overnight patient not verbal, CT head stable with no increase in size of tract hemorrhage and no hydrocephalus.  Afebrile.   Tolerating nimodipine.  300cc positive.   EVD output 329cc in 24 hours.   Last BM yesterday.     VITALS:  T(C): , Max: 37.5 (12-18-21 @ 15:00)  HR:  (63 - 100)  BP: --  ABP:  (133/64 - 178/81)  RR:  (16 - 26)  SpO2:  (94% - 99%)  Wt(kg): --      12-18-21 @ 07:01  -  12-19-21 @ 07:00  --------------------------------------------------------  IN: 1700 mL / OUT: 1429 mL / NET: 271 mL    12-19-21 @ 07:01  -  12-19-21 @ 10:19  --------------------------------------------------------  IN: 180 mL / OUT: 40 mL / NET: 140 mL      LABS:  Na: 148 (12-19 @ 00:34), 147 (12-18 @ 15:29), 145 (12-18 @ 07:49), 145 (12-18 @ 00:19), 144 (12-17 @ 17:46), 143 (12-17 @ 08:04), 144 (12-17 @ 01:46), 139 (12-16 @ 19:11)  K: 3.1 (12-19 @ 00:34), 3.8 (12-18 @ 15:29), 3.3 (12-18 @ 07:49), 3.5 (12-18 @ 00:19), 3.4 (12-17 @ 17:46), 3.2 (12-17 @ 08:04), 3.7 (12-17 @ 01:46), 3.8 (12-16 @ 19:11)  Cl: 111 (12-19 @ 00:34), 115 (12-18 @ 15:29), 113 (12-18 @ 07:49), 112 (12-18 @ 00:19), 110 (12-17 @ 17:46), 111 (12-17 @ 08:04), 110 (12-17 @ 01:46), 104 (12-16 @ 19:11)  CO2: 22 (12-19 @ 00:34), 21 (12-18 @ 15:29), 21 (12-18 @ 07:49), 21 (12-18 @ 00:19), 21 (12-17 @ 17:46), 22 (12-17 @ 08:04), 21 (12-17 @ 01:46), 23 (12-16 @ 19:11)  BUN: 20 (12-19 @ 00:34), 18 (12-18 @ 15:29), 15 (12-18 @ 07:49), 12 (12-18 @ 00:19), 11 (12-17 @ 17:46), 11 (12-17 @ 08:04), 10 (12-17 @ 01:46), 8 (12-16 @ 19:11)  Cr: 0.39 (12-19 @ 00:34), 0.47 (12-18 @ 15:29), 0.45 (12-18 @ 07:49), 0.45 (12-18 @ 00:19), 0.44 (12-17 @ 17:46), 0.41 (12-17 @ 08:04), 0.44 (12-17 @ 01:46), 0.50 (12-16 @ 19:11)  Glu: 162(12-19 @ 00:34), 192(12-18 @ 15:29), 161(12-18 @ 07:49), 118(12-18 @ 00:19), 124(12-17 @ 17:46), 108(12-17 @ 08:04), 121(12-17 @ 01:46), 117(12-16 @ 19:11)    Hgb: 11.7 (12-19 @ 00:24), 11.2 (12-18 @ 00:19), 12.4 (12-17 @ 01:46)  Hct: 35.2 (12-19 @ 00:24), 33.9 (12-18 @ 00:19), 37.1 (12-17 @ 01:46)  WBC: 8.70 (12-19 @ 00:24), 7.73 (12-18 @ 00:19), 6.81 (12-17 @ 01:46)  Plt: 186 (12-19 @ 00:24), 201 (12-18 @ 00:19), 223 (12-17 @ 01:46)    INR: 1.01 12-19-21 @ 00:23  PTT: 29.4 12-19-21 @ 00:23    MEDICATIONS:  acetaminophen     Tablet .. 650 milliGRAM(s) Oral every 6 hours PRN  cefTRIAXone   IVPB      cefTRIAXone   IVPB 1000 milliGRAM(s) IV Intermittent every 24 hours  chlorhexidine 4% Liquid 1 Application(s) Topical <User Schedule>  heparin   Injectable 5000 Unit(s) SubCutaneous every 12 hours  levETIRAcetam  IVPB 500 milliGRAM(s) IV Intermittent every 12 hours  niMODipine Oral Solution 30 milliGRAM(s) Enteral Tube every 2 hours  polyethylene glycol 3350 17 Gram(s) Oral two times a day  senna 2 Tablet(s) Oral at bedtime  sodium chloride 0.9% lock flush 10 milliLiter(s) IV Push every 1 hour PRN    EXAMINATION: examined with family at bedside   General:  calm  HEENT:  MMM  Neuro:  Alert, oriented to self, hospital, month and year, intermittently follows simple commands, pupils R 5 mm, L 7 mm non reactive b/l, midline gaze, face symmetric, able to raise b/l arms antigravity without drift, able to raise b/l legs antigravity without drift   Cards:  RRR  Respiratory:  no respiratory distress  Abdomen:  soft  Extremities:  no edema    Assessment/Plan:   65 yo woman admitted 12/11/21 with HA/vomiting since 12/10 found to have HH2mF3 SAH c/b hydro s/p coiling of L Pcomm aneurysm, found to have a partial left m2 thrombus now s/p thrombectomy with TICI 3 reperfusion. S/p R EVD for hydro c/b sizable tract hemorrhage with IVH now with L EVD. PBD 9. More lethargic this evening but fluctuates.     NEURO:  NC q1h  HCP: EVD at 0 cmH2O, goal ICP<22 mmhg , CPP >65 mmhg, discuss with neurosurg re raising EVD since with resolved hydrocephalus and high output.   Keppra 500mg BID for seizure ppx  nimodipine 30mg q2h    CVS: TTE EF 65 %   SBP goal 100-180 mmhg     PULM:  RA    RENAL:  Strict IS and Os, goal net even since in the window for vasospasm   Na 140-150  q12h BMP, off hypertonics and salt tabs     GI:  - tube feeds at goal  - Bowel regimen: miralax, senna    ENDO:   - FS goal 120-180     HEME/ONC:  - SCDs  - on heparin 500mg BID for DVT ppx, coags wnl today     ID: UCX 2/16 Michbsiella   Broaden to Zosyn for UTI      Lines: R PICC, left Axillary A line    EVENTS:   Overnight patient not verbal, CT head stable with no increase in size of tract hemorrhage and no hydrocephalus.  Afebrile.   Tolerating nimodipine.  300cc positive.   EVD output 329cc in 24 hours.   Last BM yesterday.     VITALS:  T(C): , Max: 37.5 (12-18-21 @ 15:00)  HR:  (63 - 100)  BP: --  ABP:  (133/64 - 178/81)  RR:  (16 - 26)  SpO2:  (94% - 99%)  Wt(kg): --      12-18-21 @ 07:01  -  12-19-21 @ 07:00  --------------------------------------------------------  IN: 1700 mL / OUT: 1429 mL / NET: 271 mL    12-19-21 @ 07:01  -  12-19-21 @ 10:19  --------------------------------------------------------  IN: 180 mL / OUT: 40 mL / NET: 140 mL      LABS:  Na: 148 (12-19 @ 00:34), 147 (12-18 @ 15:29), 145 (12-18 @ 07:49), 145 (12-18 @ 00:19), 144 (12-17 @ 17:46), 143 (12-17 @ 08:04), 144 (12-17 @ 01:46), 139 (12-16 @ 19:11)  K: 3.1 (12-19 @ 00:34), 3.8 (12-18 @ 15:29), 3.3 (12-18 @ 07:49), 3.5 (12-18 @ 00:19), 3.4 (12-17 @ 17:46), 3.2 (12-17 @ 08:04), 3.7 (12-17 @ 01:46), 3.8 (12-16 @ 19:11)  Cl: 111 (12-19 @ 00:34), 115 (12-18 @ 15:29), 113 (12-18 @ 07:49), 112 (12-18 @ 00:19), 110 (12-17 @ 17:46), 111 (12-17 @ 08:04), 110 (12-17 @ 01:46), 104 (12-16 @ 19:11)  CO2: 22 (12-19 @ 00:34), 21 (12-18 @ 15:29), 21 (12-18 @ 07:49), 21 (12-18 @ 00:19), 21 (12-17 @ 17:46), 22 (12-17 @ 08:04), 21 (12-17 @ 01:46), 23 (12-16 @ 19:11)  BUN: 20 (12-19 @ 00:34), 18 (12-18 @ 15:29), 15 (12-18 @ 07:49), 12 (12-18 @ 00:19), 11 (12-17 @ 17:46), 11 (12-17 @ 08:04), 10 (12-17 @ 01:46), 8 (12-16 @ 19:11)  Cr: 0.39 (12-19 @ 00:34), 0.47 (12-18 @ 15:29), 0.45 (12-18 @ 07:49), 0.45 (12-18 @ 00:19), 0.44 (12-17 @ 17:46), 0.41 (12-17 @ 08:04), 0.44 (12-17 @ 01:46), 0.50 (12-16 @ 19:11)  Glu: 162(12-19 @ 00:34), 192(12-18 @ 15:29), 161(12-18 @ 07:49), 118(12-18 @ 00:19), 124(12-17 @ 17:46), 108(12-17 @ 08:04), 121(12-17 @ 01:46), 117(12-16 @ 19:11)    Hgb: 11.7 (12-19 @ 00:24), 11.2 (12-18 @ 00:19), 12.4 (12-17 @ 01:46)  Hct: 35.2 (12-19 @ 00:24), 33.9 (12-18 @ 00:19), 37.1 (12-17 @ 01:46)  WBC: 8.70 (12-19 @ 00:24), 7.73 (12-18 @ 00:19), 6.81 (12-17 @ 01:46)  Plt: 186 (12-19 @ 00:24), 201 (12-18 @ 00:19), 223 (12-17 @ 01:46)    INR: 1.01 12-19-21 @ 00:23  PTT: 29.4 12-19-21 @ 00:23    MEDICATIONS:  acetaminophen     Tablet .. 650 milliGRAM(s) Oral every 6 hours PRN  cefTRIAXone   IVPB      cefTRIAXone   IVPB 1000 milliGRAM(s) IV Intermittent every 24 hours  chlorhexidine 4% Liquid 1 Application(s) Topical <User Schedule>  heparin   Injectable 5000 Unit(s) SubCutaneous every 12 hours  levETIRAcetam  IVPB 500 milliGRAM(s) IV Intermittent every 12 hours  niMODipine Oral Solution 30 milliGRAM(s) Enteral Tube every 2 hours  polyethylene glycol 3350 17 Gram(s) Oral two times a day  senna 2 Tablet(s) Oral at bedtime  sodium chloride 0.9% lock flush 10 milliLiter(s) IV Push every 1 hour PRN    EXAMINATION: examined with family at bedside   General:  calm  HEENT:  MMM  Neuro:  Alert, oriented to self, hospital, month and year, intermittently follows simple commands, pupils R 5 mm, L 7 mm non reactive b/l, midline gaze, face symmetric, able to raise b/l arms antigravity without drift, able to raise b/l legs antigravity without drift   Cards:  RRR  Respiratory:  no respiratory distress  Abdomen:  soft  Extremities:  no edema    Assessment/Plan:   65 yo woman admitted 12/11/21 with HA/vomiting since 12/10 found to have HH2mF3 SAH c/b hydro s/p coiling of L Pcomm aneurysm, found to have a partial left m2 thrombus now s/p thrombectomy with TICI 3 reperfusion. S/p R EVD for hydro c/b sizable tract hemorrhage with IVH now with L EVD. PBD 9. With fluctuating mental status.     NEURO:  NC q1h  Hydrocephalus: c/w EVD at 0 cmH2O as per nrsg, goal ICP<22 mmhg  Keppra 500mg BID for seizure ppx  nimodipine 30mg q2h  monitor for vasospasm    CVS: TTE EF 65 %   SBP goal 100-180 mmhg     PULM:  RA    RENAL:  Strict IS and Os, goal net even since in the window for vasospasm   Na 140-150  q12h BMP, off hypertonics and salt tabs     GI:  tube feeds at goal  Bowel regimen: Miralax, senna    ENDO:   FS goal 120-180     HEME/ONC:  SCDs  on heparin 500mg BID for DVT ppx, coags wnl today     ID: UCX 2/16 Michbsiella   Broaden to Zosyn for UTI, f/u sensitivities       Lines: R PICC, left Axillary A line     Patient seen and examined by attending on 12/19/2021.    Patient is critically ill due to SAH and at high risk for neurological deterioration or death due to: hydrocephalus requiring an EVD for CSF diversion, at risk for vasospasm

## 2021-12-19 NOTE — PROGRESS NOTE ADULT - SUBJECTIVE AND OBJECTIVE BOX
Patient seen and examined at bedside.    --Anticoagulation--  heparin   Injectable 5000 Unit(s) SubCutaneous every 12 hours    T(C): 37.3 (12-18-21 @ 23:00), Max: 37.5 (12-18-21 @ 15:00)  HR: 97 (12-19-21 @ 01:00) (63 - 100)  BP: --  RR: 19 (12-19-21 @ 01:00) (16 - 26)  SpO2: 98% (12-19-21 @ 01:00) (93% - 99%)  Wt(kg): --    Exam:    Exam:Lethargic, Ox1, EO spont, nonverbal, pupil Rt 5mm NR, Lt 6 mm NR, Lt facial, LAWTON AG but for me not following commands

## 2021-12-20 LAB
ANION GAP SERPL CALC-SCNC: 13 MMOL/L — SIGNIFICANT CHANGE UP (ref 5–17)
ANION GAP SERPL CALC-SCNC: 15 MMOL/L — SIGNIFICANT CHANGE UP (ref 5–17)
ANION GAP SERPL CALC-SCNC: 15 MMOL/L — SIGNIFICANT CHANGE UP (ref 5–17)
BUN SERPL-MCNC: 34 MG/DL — HIGH (ref 7–23)
BUN SERPL-MCNC: 36 MG/DL — HIGH (ref 7–23)
BUN SERPL-MCNC: 36 MG/DL — HIGH (ref 7–23)
CALCIUM SERPL-MCNC: 9.6 MG/DL — SIGNIFICANT CHANGE UP (ref 8.4–10.5)
CALCIUM SERPL-MCNC: 9.6 MG/DL — SIGNIFICANT CHANGE UP (ref 8.4–10.5)
CALCIUM SERPL-MCNC: 9.9 MG/DL — SIGNIFICANT CHANGE UP (ref 8.4–10.5)
CHLORIDE SERPL-SCNC: 118 MMOL/L — HIGH (ref 96–108)
CHLORIDE SERPL-SCNC: 118 MMOL/L — HIGH (ref 96–108)
CHLORIDE SERPL-SCNC: 119 MMOL/L — HIGH (ref 96–108)
CO2 SERPL-SCNC: 22 MMOL/L — SIGNIFICANT CHANGE UP (ref 22–31)
CO2 SERPL-SCNC: 22 MMOL/L — SIGNIFICANT CHANGE UP (ref 22–31)
CO2 SERPL-SCNC: 23 MMOL/L — SIGNIFICANT CHANGE UP (ref 22–31)
CREAT SERPL-MCNC: 0.4 MG/DL — LOW (ref 0.5–1.3)
CREAT SERPL-MCNC: 0.43 MG/DL — LOW (ref 0.5–1.3)
CREAT SERPL-MCNC: 0.46 MG/DL — LOW (ref 0.5–1.3)
GLUCOSE SERPL-MCNC: 146 MG/DL — HIGH (ref 70–99)
GLUCOSE SERPL-MCNC: 150 MG/DL — HIGH (ref 70–99)
GLUCOSE SERPL-MCNC: 155 MG/DL — HIGH (ref 70–99)
HCT VFR BLD CALC: 34.6 % — SIGNIFICANT CHANGE UP (ref 34.5–45)
HCT VFR BLD CALC: 37.7 % — SIGNIFICANT CHANGE UP (ref 34.5–45)
HGB BLD-MCNC: 11.2 G/DL — LOW (ref 11.5–15.5)
HGB BLD-MCNC: 12.2 G/DL — SIGNIFICANT CHANGE UP (ref 11.5–15.5)
MAGNESIUM SERPL-MCNC: 2.5 MG/DL — SIGNIFICANT CHANGE UP (ref 1.6–2.6)
MAGNESIUM SERPL-MCNC: 2.6 MG/DL — SIGNIFICANT CHANGE UP (ref 1.6–2.6)
MAGNESIUM SERPL-MCNC: 2.7 MG/DL — HIGH (ref 1.6–2.6)
MCHC RBC-ENTMCNC: 30.7 PG — SIGNIFICANT CHANGE UP (ref 27–34)
MCHC RBC-ENTMCNC: 30.8 PG — SIGNIFICANT CHANGE UP (ref 27–34)
MCHC RBC-ENTMCNC: 32.4 GM/DL — SIGNIFICANT CHANGE UP (ref 32–36)
MCHC RBC-ENTMCNC: 32.4 GM/DL — SIGNIFICANT CHANGE UP (ref 32–36)
MCV RBC AUTO: 94.7 FL — SIGNIFICANT CHANGE UP (ref 80–100)
MCV RBC AUTO: 95.1 FL — SIGNIFICANT CHANGE UP (ref 80–100)
NRBC # BLD: 0 /100 WBCS — SIGNIFICANT CHANGE UP (ref 0–0)
NRBC # BLD: 0 /100 WBCS — SIGNIFICANT CHANGE UP (ref 0–0)
PHOSPHATE SERPL-MCNC: 3.3 MG/DL — SIGNIFICANT CHANGE UP (ref 2.5–4.5)
PHOSPHATE SERPL-MCNC: 4 MG/DL — SIGNIFICANT CHANGE UP (ref 2.5–4.5)
PHOSPHATE SERPL-MCNC: 4.1 MG/DL — SIGNIFICANT CHANGE UP (ref 2.5–4.5)
PLATELET # BLD AUTO: 123 K/UL — LOW (ref 150–400)
PLATELET # BLD AUTO: 138 K/UL — LOW (ref 150–400)
POTASSIUM SERPL-MCNC: 3.3 MMOL/L — LOW (ref 3.5–5.3)
POTASSIUM SERPL-MCNC: 3.6 MMOL/L — SIGNIFICANT CHANGE UP (ref 3.5–5.3)
POTASSIUM SERPL-MCNC: 3.8 MMOL/L — SIGNIFICANT CHANGE UP (ref 3.5–5.3)
POTASSIUM SERPL-SCNC: 3.3 MMOL/L — LOW (ref 3.5–5.3)
POTASSIUM SERPL-SCNC: 3.6 MMOL/L — SIGNIFICANT CHANGE UP (ref 3.5–5.3)
POTASSIUM SERPL-SCNC: 3.8 MMOL/L — SIGNIFICANT CHANGE UP (ref 3.5–5.3)
RBC # BLD: 3.64 M/UL — LOW (ref 3.8–5.2)
RBC # BLD: 3.98 M/UL — SIGNIFICANT CHANGE UP (ref 3.8–5.2)
RBC # FLD: 13.2 % — SIGNIFICANT CHANGE UP (ref 10.3–14.5)
RBC # FLD: 13.2 % — SIGNIFICANT CHANGE UP (ref 10.3–14.5)
SODIUM SERPL-SCNC: 153 MMOL/L — HIGH (ref 135–145)
SODIUM SERPL-SCNC: 156 MMOL/L — HIGH (ref 135–145)
SODIUM SERPL-SCNC: 156 MMOL/L — HIGH (ref 135–145)
TROPONIN T, HIGH SENSITIVITY RESULT: <6 NG/L — SIGNIFICANT CHANGE UP (ref 0–51)
WBC # BLD: 10.31 K/UL — SIGNIFICANT CHANGE UP (ref 3.8–10.5)
WBC # BLD: 8.21 K/UL — SIGNIFICANT CHANGE UP (ref 3.8–10.5)
WBC # FLD AUTO: 10.31 K/UL — SIGNIFICANT CHANGE UP (ref 3.8–10.5)
WBC # FLD AUTO: 8.21 K/UL — SIGNIFICANT CHANGE UP (ref 3.8–10.5)

## 2021-12-20 PROCEDURE — 99291 CRITICAL CARE FIRST HOUR: CPT

## 2021-12-20 PROCEDURE — 70450 CT HEAD/BRAIN W/O DYE: CPT | Mod: 26

## 2021-12-20 RX ORDER — POTASSIUM CHLORIDE 20 MEQ
40 PACKET (EA) ORAL ONCE
Refills: 0 | Status: DISCONTINUED | OUTPATIENT
Start: 2021-12-20 | End: 2021-12-20

## 2021-12-20 RX ORDER — ACETAMINOPHEN 500 MG
1000 TABLET ORAL ONCE
Refills: 0 | Status: COMPLETED | OUTPATIENT
Start: 2021-12-20 | End: 2021-12-20

## 2021-12-20 RX ORDER — POTASSIUM CHLORIDE 20 MEQ
40 PACKET (EA) ORAL ONCE
Refills: 0 | Status: COMPLETED | OUTPATIENT
Start: 2021-12-20 | End: 2021-12-20

## 2021-12-20 RX ORDER — LEVETIRACETAM 250 MG/1
500 TABLET, FILM COATED ORAL
Refills: 0 | Status: DISCONTINUED | OUTPATIENT
Start: 2021-12-20 | End: 2021-12-21

## 2021-12-20 RX ORDER — PHENYLEPHRINE HYDROCHLORIDE 10 MG/ML
0.1 INJECTION INTRAVENOUS
Qty: 40 | Refills: 0 | Status: DISCONTINUED | OUTPATIENT
Start: 2021-12-20 | End: 2021-12-20

## 2021-12-20 RX ADMIN — CHLORHEXIDINE GLUCONATE 1 APPLICATION(S): 213 SOLUTION TOPICAL at 22:22

## 2021-12-20 RX ADMIN — PHENYLEPHRINE HYDROCHLORIDE 2.55 MICROGRAM(S)/KG/MIN: 10 INJECTION INTRAVENOUS at 11:45

## 2021-12-20 RX ADMIN — Medication 40 MILLIEQUIVALENT(S): at 04:00

## 2021-12-20 RX ADMIN — NIMODIPINE 30 MILLIGRAM(S): 60 SOLUTION ORAL at 06:04

## 2021-12-20 RX ADMIN — Medication 1000 MILLIGRAM(S): at 02:15

## 2021-12-20 RX ADMIN — LEVETIRACETAM 500 MILLIGRAM(S): 250 TABLET, FILM COATED ORAL at 17:14

## 2021-12-20 RX ADMIN — NIMODIPINE 30 MILLIGRAM(S): 60 SOLUTION ORAL at 00:02

## 2021-12-20 RX ADMIN — NIMODIPINE 30 MILLIGRAM(S): 60 SOLUTION ORAL at 04:03

## 2021-12-20 RX ADMIN — HEPARIN SODIUM 5000 UNIT(S): 5000 INJECTION INTRAVENOUS; SUBCUTANEOUS at 17:15

## 2021-12-20 RX ADMIN — HEPARIN SODIUM 5000 UNIT(S): 5000 INJECTION INTRAVENOUS; SUBCUTANEOUS at 06:04

## 2021-12-20 RX ADMIN — NIMODIPINE 30 MILLIGRAM(S): 60 SOLUTION ORAL at 13:50

## 2021-12-20 RX ADMIN — NIMODIPINE 30 MILLIGRAM(S): 60 SOLUTION ORAL at 10:56

## 2021-12-20 RX ADMIN — LEVETIRACETAM 500 MILLIGRAM(S): 250 TABLET, FILM COATED ORAL at 06:04

## 2021-12-20 RX ADMIN — NIMODIPINE 30 MILLIGRAM(S): 60 SOLUTION ORAL at 16:35

## 2021-12-20 RX ADMIN — POLYETHYLENE GLYCOL 3350 17 GRAM(S): 17 POWDER, FOR SOLUTION ORAL at 06:04

## 2021-12-20 RX ADMIN — Medication 500 MILLIGRAM(S): at 06:05

## 2021-12-20 RX ADMIN — NIMODIPINE 30 MILLIGRAM(S): 60 SOLUTION ORAL at 20:18

## 2021-12-20 RX ADMIN — NIMODIPINE 30 MILLIGRAM(S): 60 SOLUTION ORAL at 22:22

## 2021-12-20 RX ADMIN — NIMODIPINE 30 MILLIGRAM(S): 60 SOLUTION ORAL at 17:14

## 2021-12-20 RX ADMIN — NIMODIPINE 30 MILLIGRAM(S): 60 SOLUTION ORAL at 11:50

## 2021-12-20 RX ADMIN — NIMODIPINE 30 MILLIGRAM(S): 60 SOLUTION ORAL at 02:04

## 2021-12-20 RX ADMIN — Medication 400 MILLIGRAM(S): at 02:00

## 2021-12-20 RX ADMIN — Medication 500 MILLIGRAM(S): at 17:14

## 2021-12-20 RX ADMIN — Medication 40 MILLIEQUIVALENT(S): at 22:22

## 2021-12-20 RX ADMIN — Medication 40 MILLIEQUIVALENT(S): at 11:38

## 2021-12-20 RX ADMIN — NIMODIPINE 30 MILLIGRAM(S): 60 SOLUTION ORAL at 08:38

## 2021-12-20 NOTE — PROGRESS NOTE ADULT - ASSESSMENT
67 yo woman admitted 12/11/21 with HA/vomiting since 12/10 found to have HH2mF3 SAH c/b hydro s/p coiling of L Pcomm aneurysm, found to have a partial left m2 thrombus now s/p thrombectomy with TICI 3 reperfusion. S/p R EVD for hydro c/b sizable tract hemorrhage with IVH now with L EVD. PBD 10. More lethargic this evening but fluctuates.     NEURO:  NC q1h  HCP: EVD at 0 cmH2O, goal ICP<22 mmhg , CPP >65 mmhg, discuss with neurosurg re raising EVD since with resolved hydrocephalus and high output.   Keppra 500mg BID for seizure ppx  re-trial nimodipine     CVS: TTE EF 65 %   SBP goal 100-180 mmhg     PULM:  RA    RENAL:  Strict IS and Os, goal net even since in the window for vasospasm   Na 140-150  Hold 2% saline   q8h BMP    GI:  - NDGT feeding  - Bowel regimen: miralax, senna    ENDO:   - FS goal 120-180     HEME/ONC:  - SCDs  - Lovenox on hold for b/l tract hemorrhages. Consider resuming DVT prophylaxis (heparin) after discussion with neurosurg.     ID: UCX 2/16 gram negative rods  Ceftriaxone for UTI       Lines: R PICC, left Axillary A line    65 yo woman admitted 12/11/21 with HA/vomiting since 12/10 found to have HH2mF3 SAH c/b hydro s/p coiling of L Pcomm aneurysm, found to have a partial left m2 thrombus now s/p thrombectomy with TICI 3 reperfusion. S/p R EVD for hydro c/b sizable tract hemorrhage with IVH now with L EVD. PBD 10. More lethargic this evening but fluctuates.     NEURO:  NC q1h  HCP: EVD at 0 cmH2O, output 290, goal ICP<22 mmhg , CPP >65 mmhg  Keppra 500mg BID for seizure ppx  nimodipine     CVS: TTE EF 65 %   SBP goal 100-180 mmhg     PULM:  RA  O2 Sat >92%    RENAL:  Strict IS and Os  Na 140-150  q8h BMP    GI:  - NDGT feeding  - Bowel regimen: miralax, senna    ENDO:   - FS goal 120-180     HEME/ONC:  - SCDs  - Heparin 5000 units Q12 hrs     ID:   Urine culture grown Klebsiella  Ciprofloxacin       Lines: R PICC, left Axillary A line

## 2021-12-20 NOTE — CHART NOTE - NSCHARTNOTEFT_GEN_A_CORE
Nutrition Follow Up Note  Patient seen for: Nutrition follow up    Chart reviewed, events noted. "67 yo woman admitted 21 with HA/vomiting since 12/10 found to have HH2mF3 SAH c/b hydro s/p coiling of L Pcomm aneurysm, found to have a partial left m2 thrombus now s/p thrombectomy with TICI 3 reperfusion. S/p R EVD for hydro c/b sizable tract hemorrhage with IVH now with L EVD. PBD 10. More lethargic this evening but fluctuates". Continues on antibiotics for UTI.     Source: [] Patient       [x] EMR        [x] RN        [] Family at bedside       [x] Other: interdisciplinary medical team    -If unable to interview patient: [] Trach/Vent/BiPAP  [x] Disoriented/confused/inappropriate to interview    Diet Order:   Diet, NPO with Tube Feed:   Tube Feeding Modality: Nasogastric  Jevity 1.5 Daquan (JEVITY1.5RTH)  Total Volume for 24 Hours (mL): 1440  Continuous  Starting Tube Feed Rate {mL per Hour}: 10  Increase Tube Feed Rate by (mL): 10     Every 3 hours  Until Goal Tube Feed Rate (mL per Hour): 60  Tube Feed Duration (in Hours): 24  Tube Feed Start Time: 21:00 (-)    EN Order Provides: 1440ml, 2160kcal and 92g protein     EN Provision (per nursing flow sheet):   (): 840ml (thus far)  (): 100% of goal  (): 53% of goal (titrating up towards goal rate)  Tube feeds initiated  at 10ml.     Nutrition-related concerns:  -Pt with ongoing lethargy; EN feeds initiated via NGT on  as primary source of nutrition and hydrations. Appears to be tolerating without s/s of GI distress.   -Last BM (): x 1; (): x 1. On bowel regimen (senna, Miralax).   -Pt continues on antibiotics as ordered. Recommend Reji Active 2x daily.   -Pt continues on potassium chloride powder in context of hypokalemia; now within desirable range ( 3.8).   -Sodium goal 140-150. Team trending.    Weights:   Daily Weight in k (12-15)    MEDICATIONS  (STANDING):  ciprofloxacin     Tablet  niMODipine Oral Solution  phenylephrine    Infusion  polyethylene glycol 3350  potassium chloride   Powder  senna    Pertinent Labs:  @ 01:56: Na 153<H>, BUN 36<H>, Cr 0.43<L>, <H>, K+ 3.8, Phos 4.0, Mg 2.5, Alk Phos --, ALT/SGPT --, AST/SGOT --, HbA1c --   @ 15:29: Na 149<H>, BUN 31<H>, Cr 0.50, <H>, K+ 3.3<L>, Phos --, Mg --, Alk Phos --, ALT/SGPT --, AST/SGOT --, HbA1c --    A1C with Estimated Average Glucose Result: 5.4 % (21 @ 12:03)    Finger Sticks:    Triglycerides, Serum: 87 mg/dL (21 @ 11:16)    Skin per nursing documentation: surgical incision right groin  Edema: none noted    (based on IBW 54.5kg):   Estimated Energy Needs: (25-30kcal/kg): 1360-1632kcal  Estimated Protein Needs: (1.2-1.4g protein/kg): 65-76g protein    Previous Nutrition Diagnosis: 1) increased nutrient needs 2) inadequate energy intake  Nutrition Diagnosis is: 1) remains appropriate, ongoing with EN feeds 2) improving, with provision of EN feeds    New Nutrition Diagnosis: [x] Not applicable    Nutrition Care Plan:  [x] In Progress  [] Achieved  [] Not applicable    Nutrition Interventions:     Education Provided:       [] Yes:  [x] No:        Recommendations:      1. Recommend change feeds: Jevity 1.2 at 55ml/hr x 24 hrs. To provide (based on IBW 54.5kg): 1320ml, 1584kcal (29kcal/kg) and 73g protein (1.3g protein/kg).   2. Monitor GI tolerance. RD to remain available to adjust EN formulary, volume/rate PRN.   3. Defer initiation of PO diet to medical team; consistency per medical team, SLP.   4. Monitor wt trends/labs/skin integrity/hydration status/bowel regularity.  5. Recommend Reji Active 2x daily in context of antibiotics use.     Monitoring and Evaluation:   Continue to monitor nutritional intake, tolerance to diet prescription, weights, labs, skin integrity    RD remains available upon request and will follow up per protocol Nutrition Follow Up Note  Patient seen for: Nutrition follow up    Chart reviewed, events noted. "65 yo woman admitted 21 with HA/vomiting since 12/10 found to have HH2mF3 SAH c/b hydro s/p coiling of L Pcomm aneurysm, found to have a partial left m2 thrombus now s/p thrombectomy with TICI 3 reperfusion. S/P R EVD for hydro c/b sizable tract hemorrhage with IVH now with L EVD. PBD 10. More lethargic this evening but fluctuates". Continues on antibiotics for UTI.     Source: [] Patient       [x] EMR        [x] RN        [] Family at bedside       [x] Other: interdisciplinary medical team    -If unable to interview patient: [] Trach/Vent/BiPAP  [x] Disoriented/confused/inappropriate to interview    Diet Order:   Diet, NPO with Tube Feed:   Tube Feeding Modality: Nasogastric  Jevity 1.5 Daquan (JEVITY1.5RTH)  Total Volume for 24 Hours (mL): 1440  Continuous  Starting Tube Feed Rate {mL per Hour}: 10  Increase Tube Feed Rate by (mL): 10     Every 3 hours  Until Goal Tube Feed Rate (mL per Hour): 60  Tube Feed Duration (in Hours): 24  Tube Feed Start Time: 21:00 (-)    EN Order Provides: 1440ml, 2160kcal and 92g protein     EN Provision (per nursing flow sheet):   (): 840ml (thus far)  (): 100% of goal  (): 53% of goal (titrating up towards goal rate)  Tube feeds initiated  at 10ml.     Nutrition-related concerns:  -Pt with ongoing lethargy; EN feeds initiated via NGT on  as primary source of nutrition and hydrations. Appears to be tolerating without s/s of GI distress.   -Last BM (): x 1; (): x 1. On bowel regimen (senna, Miralax).   -Pt continues on antibiotics as ordered. Recommend Erji Active 2x daily.   -Pt continues on potassium chloride powder in context of hypokalemia; now within desirable range ( 3.8).   -Sodium goal 140-150. Team trending.    Weights:   Daily Weight in k (12-15)    MEDICATIONS  (STANDING):  ciprofloxacin     Tablet  niMODipine Oral Solution  phenylephrine    Infusion  polyethylene glycol 3350  potassium chloride   Powder  senna    Pertinent Labs:  @ 01:56: Na 153<H>, BUN 36<H>, Cr 0.43<L>, <H>, K+ 3.8, Phos 4.0, Mg 2.5, Alk Phos --, ALT/SGPT --, AST/SGOT --, HbA1c --   @ 15:29: Na 149<H>, BUN 31<H>, Cr 0.50, <H>, K+ 3.3<L>, Phos --, Mg --, Alk Phos --, ALT/SGPT --, AST/SGOT --, HbA1c --    A1C with Estimated Average Glucose Result: 5.4 % (21 @ 12:03)    Finger Sticks:    Triglycerides, Serum: 87 mg/dL (21 @ 11:16)    Skin per nursing documentation: surgical incision right groin  Edema: none noted    (based on IBW 54.5kg):   Estimated Energy Needs: (25-30kcal/kg): 1360-1632kcal  Estimated Protein Needs: (1.2-1.4g protein/kg): 65-76g protein    Previous Nutrition Diagnosis: 1) increased nutrient needs 2) inadequate energy intake  Nutrition Diagnosis is: 1) remains appropriate, ongoing with EN feeds 2) improving, with provision of EN feeds    New Nutrition Diagnosis: [x] Not applicable    Nutrition Care Plan:  [x] In Progress  [] Achieved  [] Not applicable    Nutrition Interventions:     Education Provided:       [] Yes:  [x] No:        Recommendations:      1. Recommend change feeds: Jevity 1.2 at 55ml/hr x 24 hrs. To provide (based on IBW 54.5kg): 1320ml, 1584kcal (29kcal/kg) and 73g protein (1.3g protein/kg).   2. Monitor GI tolerance. RD to remain available to adjust EN formulary, volume/rate PRN.   3. Defer initiation of PO diet to medical team; consistency per medical team, SLP.   4. Monitor wt trends/labs/skin integrity/hydration status/bowel regularity.  5. Recommend Reji Active 2x daily in context of antibiotics use.     Monitoring and Evaluation:   Continue to monitor nutritional intake, tolerance to diet prescription, weights, labs, skin integrity    RD remains available upon request and will follow up per protocol

## 2021-12-20 NOTE — PROGRESS NOTE ADULT - ASSESSMENT
67 yo woman admitted 12/11/21 with HA/vomiting since 12/10 found to have HH2mF3 SAH c/b hydro s/p coiling of L Pcomm aneurysm, found to have a partial left m2 thrombus now s/p thrombectomy with TICI 3 reperfusion. S/p R EVD for hydro c/b sizable tract hemorrhage with IVH now with L EVD. PBD 10.    NEURO:  NC q2h  HCP: EVD at 0 cmH2O, goal ICP<22 mmhg , CPP >65 mmhg  Keppra 500mg BID for seizure ppx    nimodipine --tolerating   PT/OT     CVS: TTE EF 65 %   SBP goal 100-180 mmhg     PULM:  RA  O2 Sat >92%  incentive spirometry as able    RENAL:  Strict IS and Os  Na 140-150  q12h BMP    GI:  - NDGT feeding--swallow re eval  - Bowel regimen: miralax, senna    ENDO:   - FS goal 120-180     HEME/ONC:  - SCDs  - Heparin 5000 units Q12 hrs-->hold 6am dose and f/u repeat labs   acute thrombocytopenia--d/c keppra  f/u HIT panel--intermediate risk, however given recent SAH/ICH would hold off on argatroban gtt, repeat BLE dopplers    ID:   Urine culture grown Klebsiella  Ciprofloxacin till 12/29    Lines: R PICC, R Axillary A line   Patient is critically ill due to SAH and at high risk for neurological deterioration or death due to: hydrocephalus requiring an EVD for CSF diversion, at risk for vasospasm   no swelling

## 2021-12-20 NOTE — PROGRESS NOTE ADULT - SUBJECTIVE AND OBJECTIVE BOX
HPI:  67 yo woman admitted 12/11/21 with HA/vomiting since 12/10 found to have HH2mF3 SAH c/b hydro s/p coiling of L Pcomm aneurysm, found to have a partial left m2 thrombus now s/p thrombectomy with TICI 3 reperfusion. S/p R EVD for hydro c/b sizable tract hemorrhage with IVH now with L EVD. PBD 10. More lethargic this evening but fluctuates.     OVERNIGHT EVENTS:   No acute events overnight.    VITALS:  T(C): , Max: 37.6 (12-19-21 @ 15:00)  HR:  (70 - 106)  BP: --  ABP:  (121/59 - 172/85)  RR:  (15 - 24)  SpO2:  (93% - 99%)  Wt(kg): --      12-19-21 @ 07:01  -  12-20-21 @ 07:00  --------------------------------------------------------  IN: 2235 mL / OUT: 590 mL / NET: 1645 mL      LABS:  Na: 153 (12-20 @ 01:56), 149 (12-19 @ 15:29), 148 (12-19 @ 00:34), 147 (12-18 @ 15:29), 145 (12-18 @ 07:49), 145 (12-18 @ 00:19), 144 (12-17 @ 17:46), 143 (12-17 @ 08:04)  K: 3.8 (12-20 @ 01:56), 3.3 (12-19 @ 15:29), 3.1 (12-19 @ 00:34), 3.8 (12-18 @ 15:29), 3.3 (12-18 @ 07:49), 3.5 (12-18 @ 00:19), 3.4 (12-17 @ 17:46), 3.2 (12-17 @ 08:04)  Cl: 118 (12-20 @ 01:56), 114 (12-19 @ 15:29), 111 (12-19 @ 00:34), 115 (12-18 @ 15:29), 113 (12-18 @ 07:49), 112 (12-18 @ 00:19), 110 (12-17 @ 17:46), 111 (12-17 @ 08:04)  CO2: 22 (12-20 @ 01:56), 22 (12-19 @ 15:29), 22 (12-19 @ 00:34), 21 (12-18 @ 15:29), 21 (12-18 @ 07:49), 21 (12-18 @ 00:19), 21 (12-17 @ 17:46), 22 (12-17 @ 08:04)  BUN: 36 (12-20 @ 01:56), 31 (12-19 @ 15:29), 20 (12-19 @ 00:34), 18 (12-18 @ 15:29), 15 (12-18 @ 07:49), 12 (12-18 @ 00:19), 11 (12-17 @ 17:46), 11 (12-17 @ 08:04)  Cr: 0.43 (12-20 @ 01:56), 0.50 (12-19 @ 15:29), 0.39 (12-19 @ 00:34), 0.47 (12-18 @ 15:29), 0.45 (12-18 @ 07:49), 0.45 (12-18 @ 00:19), 0.44 (12-17 @ 17:46), 0.41 (12-17 @ 08:04)  Glu: 146(12-20 @ 01:56), 180(12-19 @ 15:29), 162(12-19 @ 00:34), 192(12-18 @ 15:29), 161(12-18 @ 07:49), 118(12-18 @ 00:19), 124(12-17 @ 17:46), 108(12-17 @ 08:04)    Hgb: 11.2 (12-20 @ 01:56), 11.7 (12-19 @ 00:24), 11.2 (12-18 @ 00:19)  Hct: 34.6 (12-20 @ 01:56), 35.2 (12-19 @ 00:24), 33.9 (12-18 @ 00:19)  WBC: 8.21 (12-20 @ 01:56), 8.70 (12-19 @ 00:24), 7.73 (12-18 @ 00:19)  Plt: 138 (12-20 @ 01:56), 186 (12-19 @ 00:24), 201 (12-18 @ 00:19)    INR: 1.01 12-19-21 @ 00:23  PTT: 29.4 12-19-21 @ 00:23    IMAGING:   Recent imaging studies were reviewed.    MEDICATIONS:  acetaminophen     Tablet .. 650 milliGRAM(s) Oral every 6 hours PRN  chlorhexidine 4% Liquid 1 Application(s) Topical <User Schedule>  ciprofloxacin     Tablet 500 milliGRAM(s) Oral every 12 hours  heparin   Injectable 5000 Unit(s) SubCutaneous every 12 hours  levETIRAcetam  Solution 500 milliGRAM(s) Oral two times a day  niMODipine Oral Solution 30 milliGRAM(s) Enteral Tube every 2 hours  polyethylene glycol 3350 17 Gram(s) Oral two times a day  potassium chloride   Powder 40 milliEquivalent(s) Enteral Tube daily  senna 2 Tablet(s) Oral at bedtime  sodium chloride 0.9% lock flush 10 milliLiter(s) IV Push every 1 hour PRN    EXAMINATION:  General:  calm  HEENT:  MMM  Neuro:  lethargic, oriented to hospital, month and year, intermittently follows simple commands, pupils R 5 mm, L 7 mm non reactive b/l, midline gaze, face symmetric, able to raise b/l arms antigravity with drift, both LL at least 3/5  Cards:  RRR  Respiratory:  no respiratory distress  Abdomen:  soft  Extremities:  no edema   HPI:  65 yo woman admitted 12/11/21 with HA/vomiting since 12/10 found to have HH2mF3 SAH c/b hydro s/p coiling of L Pcomm aneurysm, found to have a partial left m2 thrombus now s/p thrombectomy with TICI 3 reperfusion. S/p R EVD for hydro c/b sizable tract hemorrhage with IVH now with L EVD. PBD 10.     OVERNIGHT EVENTS:   Alternating sensorium, transient right lower limb weakness this morning couldn't move against gravity), improved    VITALS:  T(C): , Max: 37.6 (12-19-21 @ 15:00)  HR:  (70 - 106)  BP: --  ABP:  (121/59 - 172/85)  RR:  (15 - 24)  SpO2:  (93% - 99%)  Wt(kg): --      12-19-21 @ 07:01  -  12-20-21 @ 07:00  --------------------------------------------------------  IN: 2235 mL / OUT: 590 mL / NET: 1645 mL      LABS:  Na: 153 (12-20 @ 01:56), 149 (12-19 @ 15:29), 148 (12-19 @ 00:34), 147 (12-18 @ 15:29), 145 (12-18 @ 07:49), 145 (12-18 @ 00:19), 144 (12-17 @ 17:46), 143 (12-17 @ 08:04)  K: 3.8 (12-20 @ 01:56), 3.3 (12-19 @ 15:29), 3.1 (12-19 @ 00:34), 3.8 (12-18 @ 15:29), 3.3 (12-18 @ 07:49), 3.5 (12-18 @ 00:19), 3.4 (12-17 @ 17:46), 3.2 (12-17 @ 08:04)  Cl: 118 (12-20 @ 01:56), 114 (12-19 @ 15:29), 111 (12-19 @ 00:34), 115 (12-18 @ 15:29), 113 (12-18 @ 07:49), 112 (12-18 @ 00:19), 110 (12-17 @ 17:46), 111 (12-17 @ 08:04)  CO2: 22 (12-20 @ 01:56), 22 (12-19 @ 15:29), 22 (12-19 @ 00:34), 21 (12-18 @ 15:29), 21 (12-18 @ 07:49), 21 (12-18 @ 00:19), 21 (12-17 @ 17:46), 22 (12-17 @ 08:04)  BUN: 36 (12-20 @ 01:56), 31 (12-19 @ 15:29), 20 (12-19 @ 00:34), 18 (12-18 @ 15:29), 15 (12-18 @ 07:49), 12 (12-18 @ 00:19), 11 (12-17 @ 17:46), 11 (12-17 @ 08:04)  Cr: 0.43 (12-20 @ 01:56), 0.50 (12-19 @ 15:29), 0.39 (12-19 @ 00:34), 0.47 (12-18 @ 15:29), 0.45 (12-18 @ 07:49), 0.45 (12-18 @ 00:19), 0.44 (12-17 @ 17:46), 0.41 (12-17 @ 08:04)  Glu: 146(12-20 @ 01:56), 180(12-19 @ 15:29), 162(12-19 @ 00:34), 192(12-18 @ 15:29), 161(12-18 @ 07:49), 118(12-18 @ 00:19), 124(12-17 @ 17:46), 108(12-17 @ 08:04)    Hgb: 11.2 (12-20 @ 01:56), 11.7 (12-19 @ 00:24), 11.2 (12-18 @ 00:19)  Hct: 34.6 (12-20 @ 01:56), 35.2 (12-19 @ 00:24), 33.9 (12-18 @ 00:19)  WBC: 8.21 (12-20 @ 01:56), 8.70 (12-19 @ 00:24), 7.73 (12-18 @ 00:19)  Plt: 138 (12-20 @ 01:56), 186 (12-19 @ 00:24), 201 (12-18 @ 00:19)    INR: 1.01 12-19-21 @ 00:23  PTT: 29.4 12-19-21 @ 00:23    IMAGING:   Recent imaging studies were reviewed.    MEDICATIONS:  acetaminophen     Tablet .. 650 milliGRAM(s) Oral every 6 hours PRN  chlorhexidine 4% Liquid 1 Application(s) Topical <User Schedule>  ciprofloxacin     Tablet 500 milliGRAM(s) Oral every 12 hours  heparin   Injectable 5000 Unit(s) SubCutaneous every 12 hours  levETIRAcetam  Solution 500 milliGRAM(s) Oral two times a day  niMODipine Oral Solution 30 milliGRAM(s) Enteral Tube every 2 hours  polyethylene glycol 3350 17 Gram(s) Oral two times a day  potassium chloride   Powder 40 milliEquivalent(s) Enteral Tube daily  senna 2 Tablet(s) Oral at bedtime  sodium chloride 0.9% lock flush 10 milliLiter(s) IV Push every 1 hour PRN    EXAMINATION:  General:  calm  HEENT:  MMM  Neuro:  lethargic, oriented to her name and month, follows simple commands, pupils R 5 mm, L 7 mm non reactive b/l, midline gaze, face symmetric, able to raise b/l arms antigravity, both LL at least 3/5, had transient right lower limb weakness this morning couldn't move against gravity), improve on reassessment   Cards:  RRR  Respiratory:  no respiratory distress  Abdomen:  soft  Extremities:  no edema

## 2021-12-21 LAB
-  AMIKACIN: SIGNIFICANT CHANGE UP
-  AMOXICILLIN/CLAVULANIC ACID: SIGNIFICANT CHANGE UP
-  AMPICILLIN/SULBACTAM: SIGNIFICANT CHANGE UP
-  AMPICILLIN: SIGNIFICANT CHANGE UP
-  AZTREONAM: SIGNIFICANT CHANGE UP
-  CEFAZOLIN: SIGNIFICANT CHANGE UP
-  CEFEPIME: SIGNIFICANT CHANGE UP
-  CEFOXITIN: SIGNIFICANT CHANGE UP
-  CEFTRIAXONE: SIGNIFICANT CHANGE UP
-  CIPROFLOXACIN: SIGNIFICANT CHANGE UP
-  ERTAPENEM: SIGNIFICANT CHANGE UP
-  GENTAMICIN: SIGNIFICANT CHANGE UP
-  IMIPENEM: SIGNIFICANT CHANGE UP
-  LEVOFLOXACIN: SIGNIFICANT CHANGE UP
-  MEROPENEM: SIGNIFICANT CHANGE UP
-  NITROFURANTOIN: SIGNIFICANT CHANGE UP
-  PIPERACILLIN/TAZOBACTAM: SIGNIFICANT CHANGE UP
-  TIGECYCLINE: SIGNIFICANT CHANGE UP
-  TOBRAMYCIN: SIGNIFICANT CHANGE UP
-  TRIMETHOPRIM/SULFAMETHOXAZOLE: SIGNIFICANT CHANGE UP
ANION GAP SERPL CALC-SCNC: 13 MMOL/L — SIGNIFICANT CHANGE UP (ref 5–17)
ANION GAP SERPL CALC-SCNC: 15 MMOL/L — SIGNIFICANT CHANGE UP (ref 5–17)
APTT BLD: 35.3 SEC — SIGNIFICANT CHANGE UP (ref 27.5–35.5)
APTT BLD: 36.2 SEC — HIGH (ref 27.5–35.5)
APTT BLD: 40.7 SEC — HIGH (ref 27.5–35.5)
APTT BLD: 43.9 SEC — HIGH (ref 27.5–35.5)
APTT BLD: 46.9 SEC — HIGH (ref 27.5–35.5)
BLD GP AB SCN SERPL QL: NEGATIVE — SIGNIFICANT CHANGE UP
BUN SERPL-MCNC: 36 MG/DL — HIGH (ref 7–23)
BUN SERPL-MCNC: 41 MG/DL — HIGH (ref 7–23)
CALCIUM SERPL-MCNC: 10.3 MG/DL — SIGNIFICANT CHANGE UP (ref 8.4–10.5)
CALCIUM SERPL-MCNC: 9.6 MG/DL — SIGNIFICANT CHANGE UP (ref 8.4–10.5)
CHLORIDE SERPL-SCNC: 116 MMOL/L — HIGH (ref 96–108)
CHLORIDE SERPL-SCNC: 117 MMOL/L — HIGH (ref 96–108)
CO2 SERPL-SCNC: 22 MMOL/L — SIGNIFICANT CHANGE UP (ref 22–31)
CO2 SERPL-SCNC: 25 MMOL/L — SIGNIFICANT CHANGE UP (ref 22–31)
CREAT SERPL-MCNC: 0.35 MG/DL — LOW (ref 0.5–1.3)
CREAT SERPL-MCNC: 0.45 MG/DL — LOW (ref 0.5–1.3)
CULTURE RESULTS: SIGNIFICANT CHANGE UP
GLUCOSE SERPL-MCNC: 155 MG/DL — HIGH (ref 70–99)
GLUCOSE SERPL-MCNC: 180 MG/DL — HIGH (ref 70–99)
HCT VFR BLD CALC: 39.7 % — SIGNIFICANT CHANGE UP (ref 34.5–45)
HCT VFR BLD CALC: 40.2 % — SIGNIFICANT CHANGE UP (ref 34.5–45)
HCT VFR BLD CALC: 40.7 % — SIGNIFICANT CHANGE UP (ref 34.5–45)
HCT VFR BLD CALC: 41 % — SIGNIFICANT CHANGE UP (ref 34.5–45)
HEPARIN-PF4 AB RESULT: SIGNIFICANT CHANGE UP U/ML (ref 0–0.9)
HGB BLD-MCNC: 12.6 G/DL — SIGNIFICANT CHANGE UP (ref 11.5–15.5)
HGB BLD-MCNC: 12.8 G/DL — SIGNIFICANT CHANGE UP (ref 11.5–15.5)
HGB BLD-MCNC: 12.9 G/DL — SIGNIFICANT CHANGE UP (ref 11.5–15.5)
HGB BLD-MCNC: 12.9 G/DL — SIGNIFICANT CHANGE UP (ref 11.5–15.5)
INR BLD: 1.05 RATIO — SIGNIFICANT CHANGE UP (ref 0.88–1.16)
INR BLD: 1.08 RATIO — SIGNIFICANT CHANGE UP (ref 0.88–1.16)
INR BLD: 1.11 RATIO — SIGNIFICANT CHANGE UP (ref 0.88–1.16)
MAGNESIUM SERPL-MCNC: 2.6 MG/DL — SIGNIFICANT CHANGE UP (ref 1.6–2.6)
MAGNESIUM SERPL-MCNC: 2.7 MG/DL — HIGH (ref 1.6–2.6)
MCHC RBC-ENTMCNC: 30.2 PG — SIGNIFICANT CHANGE UP (ref 27–34)
MCHC RBC-ENTMCNC: 30.4 PG — SIGNIFICANT CHANGE UP (ref 27–34)
MCHC RBC-ENTMCNC: 30.4 PG — SIGNIFICANT CHANGE UP (ref 27–34)
MCHC RBC-ENTMCNC: 30.6 PG — SIGNIFICANT CHANGE UP (ref 27–34)
MCHC RBC-ENTMCNC: 31.4 GM/DL — LOW (ref 32–36)
MCHC RBC-ENTMCNC: 31.5 GM/DL — LOW (ref 32–36)
MCHC RBC-ENTMCNC: 31.7 GM/DL — LOW (ref 32–36)
MCHC RBC-ENTMCNC: 32.1 GM/DL — SIGNIFICANT CHANGE UP (ref 32–36)
MCV RBC AUTO: 95.3 FL — SIGNIFICANT CHANGE UP (ref 80–100)
MCV RBC AUTO: 95.7 FL — SIGNIFICANT CHANGE UP (ref 80–100)
MCV RBC AUTO: 96 FL — SIGNIFICANT CHANGE UP (ref 80–100)
MCV RBC AUTO: 96.7 FL — SIGNIFICANT CHANGE UP (ref 80–100)
METHOD TYPE: SIGNIFICANT CHANGE UP
NRBC # BLD: 0 /100 WBCS — SIGNIFICANT CHANGE UP (ref 0–0)
ORGANISM # SPEC MICROSCOPIC CNT: SIGNIFICANT CHANGE UP
PF4 HEPARIN CMPLX AB SER-ACNC: POSITIVE — SIGNIFICANT CHANGE UP
PHOSPHATE SERPL-MCNC: 4 MG/DL — SIGNIFICANT CHANGE UP (ref 2.5–4.5)
PHOSPHATE SERPL-MCNC: 4.6 MG/DL — HIGH (ref 2.5–4.5)
PLATELET # BLD AUTO: 111 K/UL — LOW (ref 150–400)
PLATELET # BLD AUTO: 112 K/UL — LOW (ref 150–400)
PLATELET # BLD AUTO: 116 K/UL — LOW (ref 150–400)
PLATELET # BLD AUTO: 119 K/UL — LOW (ref 150–400)
POTASSIUM SERPL-MCNC: 3.2 MMOL/L — LOW (ref 3.5–5.3)
POTASSIUM SERPL-MCNC: 3.4 MMOL/L — LOW (ref 3.5–5.3)
POTASSIUM SERPL-SCNC: 3.2 MMOL/L — LOW (ref 3.5–5.3)
POTASSIUM SERPL-SCNC: 3.4 MMOL/L — LOW (ref 3.5–5.3)
PROTHROM AB SERPL-ACNC: 12.6 SEC — SIGNIFICANT CHANGE UP (ref 10.6–13.6)
PROTHROM AB SERPL-ACNC: 12.9 SEC — SIGNIFICANT CHANGE UP (ref 10.6–13.6)
PROTHROM AB SERPL-ACNC: 13.3 SEC — SIGNIFICANT CHANGE UP (ref 10.6–13.6)
RBC # BLD: 4.15 M/UL — SIGNIFICANT CHANGE UP (ref 3.8–5.2)
RBC # BLD: 4.21 M/UL — SIGNIFICANT CHANGE UP (ref 3.8–5.2)
RBC # BLD: 4.22 M/UL — SIGNIFICANT CHANGE UP (ref 3.8–5.2)
RBC # BLD: 4.27 M/UL — SIGNIFICANT CHANGE UP (ref 3.8–5.2)
RBC # FLD: 13.2 % — SIGNIFICANT CHANGE UP (ref 10.3–14.5)
RBC # FLD: 13.3 % — SIGNIFICANT CHANGE UP (ref 10.3–14.5)
RH IG SCN BLD-IMP: POSITIVE — SIGNIFICANT CHANGE UP
SODIUM SERPL-SCNC: 153 MMOL/L — HIGH (ref 135–145)
SODIUM SERPL-SCNC: 155 MMOL/L — HIGH (ref 135–145)
SPECIMEN SOURCE: SIGNIFICANT CHANGE UP
WBC # BLD: 10.46 K/UL — SIGNIFICANT CHANGE UP (ref 3.8–10.5)
WBC # BLD: 10.51 K/UL — HIGH (ref 3.8–10.5)
WBC # BLD: 10.55 K/UL — HIGH (ref 3.8–10.5)
WBC # BLD: 12.1 K/UL — HIGH (ref 3.8–10.5)
WBC # FLD AUTO: 10.46 K/UL — SIGNIFICANT CHANGE UP (ref 3.8–10.5)
WBC # FLD AUTO: 10.51 K/UL — HIGH (ref 3.8–10.5)
WBC # FLD AUTO: 10.55 K/UL — HIGH (ref 3.8–10.5)
WBC # FLD AUTO: 12.1 K/UL — HIGH (ref 3.8–10.5)

## 2021-12-21 PROCEDURE — 99291 CRITICAL CARE FIRST HOUR: CPT

## 2021-12-21 PROCEDURE — 99292 CRITICAL CARE ADDL 30 MIN: CPT

## 2021-12-21 PROCEDURE — 70450 CT HEAD/BRAIN W/O DYE: CPT | Mod: 26

## 2021-12-21 PROCEDURE — 70450 CT HEAD/BRAIN W/O DYE: CPT | Mod: 26,77

## 2021-12-21 PROCEDURE — 93970 EXTREMITY STUDY: CPT | Mod: 26

## 2021-12-21 PROCEDURE — 71275 CT ANGIOGRAPHY CHEST: CPT | Mod: 26

## 2021-12-21 RX ORDER — POTASSIUM CHLORIDE 20 MEQ
20 PACKET (EA) ORAL
Refills: 0 | Status: COMPLETED | OUTPATIENT
Start: 2021-12-21 | End: 2021-12-21

## 2021-12-21 RX ORDER — ARGATROBAN 50 MG/50ML
0.75 INJECTION, SOLUTION INTRAVENOUS
Qty: 250 | Refills: 0 | Status: DISCONTINUED | OUTPATIENT
Start: 2021-12-21 | End: 2021-12-22

## 2021-12-21 RX ORDER — TRAMADOL HYDROCHLORIDE 50 MG/1
25 TABLET ORAL EVERY 6 HOURS
Refills: 0 | Status: DISCONTINUED | OUTPATIENT
Start: 2021-12-21 | End: 2021-12-27

## 2021-12-21 RX ORDER — POTASSIUM CHLORIDE 20 MEQ
40 PACKET (EA) ORAL ONCE
Refills: 0 | Status: COMPLETED | OUTPATIENT
Start: 2021-12-21 | End: 2021-12-21

## 2021-12-21 RX ADMIN — NIMODIPINE 30 MILLIGRAM(S): 60 SOLUTION ORAL at 02:31

## 2021-12-21 RX ADMIN — CHLORHEXIDINE GLUCONATE 1 APPLICATION(S): 213 SOLUTION TOPICAL at 21:21

## 2021-12-21 RX ADMIN — Medication 650 MILLIGRAM(S): at 11:20

## 2021-12-21 RX ADMIN — Medication 650 MILLIGRAM(S): at 23:13

## 2021-12-21 RX ADMIN — NIMODIPINE 30 MILLIGRAM(S): 60 SOLUTION ORAL at 20:01

## 2021-12-21 RX ADMIN — NIMODIPINE 30 MILLIGRAM(S): 60 SOLUTION ORAL at 00:10

## 2021-12-21 RX ADMIN — ARGATROBAN 2.04 MICROGRAM(S)/KG/MIN: 50 INJECTION, SOLUTION INTRAVENOUS at 04:02

## 2021-12-21 RX ADMIN — Medication 40 MILLIEQUIVALENT(S): at 11:24

## 2021-12-21 RX ADMIN — ARGATROBAN 2.04 MICROGRAM(S)/KG/MIN: 50 INJECTION, SOLUTION INTRAVENOUS at 18:03

## 2021-12-21 RX ADMIN — NIMODIPINE 30 MILLIGRAM(S): 60 SOLUTION ORAL at 16:15

## 2021-12-21 RX ADMIN — Medication 500 MILLIGRAM(S): at 06:18

## 2021-12-21 RX ADMIN — NIMODIPINE 30 MILLIGRAM(S): 60 SOLUTION ORAL at 10:07

## 2021-12-21 RX ADMIN — NIMODIPINE 30 MILLIGRAM(S): 60 SOLUTION ORAL at 14:03

## 2021-12-21 RX ADMIN — Medication 650 MILLIGRAM(S): at 22:43

## 2021-12-21 RX ADMIN — ARGATROBAN 2.04 MICROGRAM(S)/KG/MIN: 50 INJECTION, SOLUTION INTRAVENOUS at 19:05

## 2021-12-21 RX ADMIN — ARGATROBAN 2.04 MICROGRAM(S)/KG/MIN: 50 INJECTION, SOLUTION INTRAVENOUS at 03:11

## 2021-12-21 RX ADMIN — Medication 50 MILLIEQUIVALENT(S): at 11:24

## 2021-12-21 RX ADMIN — NIMODIPINE 30 MILLIGRAM(S): 60 SOLUTION ORAL at 06:18

## 2021-12-21 RX ADMIN — NIMODIPINE 30 MILLIGRAM(S): 60 SOLUTION ORAL at 22:01

## 2021-12-21 RX ADMIN — NIMODIPINE 30 MILLIGRAM(S): 60 SOLUTION ORAL at 04:01

## 2021-12-21 RX ADMIN — NIMODIPINE 30 MILLIGRAM(S): 60 SOLUTION ORAL at 08:11

## 2021-12-21 RX ADMIN — Medication 40 MILLIEQUIVALENT(S): at 21:21

## 2021-12-21 RX ADMIN — LEVETIRACETAM 500 MILLIGRAM(S): 250 TABLET, FILM COATED ORAL at 06:18

## 2021-12-21 RX ADMIN — NIMODIPINE 30 MILLIGRAM(S): 60 SOLUTION ORAL at 18:03

## 2021-12-21 RX ADMIN — Medication 50 MILLIEQUIVALENT(S): at 10:06

## 2021-12-21 RX ADMIN — Medication 500 MILLIGRAM(S): at 17:02

## 2021-12-21 RX ADMIN — ARGATROBAN 2.04 MICROGRAM(S)/KG/MIN: 50 INJECTION, SOLUTION INTRAVENOUS at 08:11

## 2021-12-21 RX ADMIN — ARGATROBAN 2.45 MICROGRAM(S)/KG/MIN: 50 INJECTION, SOLUTION INTRAVENOUS at 20:46

## 2021-12-21 RX ADMIN — Medication 650 MILLIGRAM(S): at 11:50

## 2021-12-21 RX ADMIN — NIMODIPINE 30 MILLIGRAM(S): 60 SOLUTION ORAL at 12:13

## 2021-12-21 NOTE — PROGRESS NOTE ADULT - ASSESSMENT
7 yo woman admitted 12/11/21 with HA/vomiting since 12/10 found to have HH2mF3 SAH c/b hydro s/p coiling of L Pcomm aneurysm, found to have a partial left m2 thrombus now s/p thrombectomy with TICI 3 reperfusion. S/p R EVD for hydro c/b sizable tract hemorrhage with IVH now with L EVD. PBD 11    NEURO:  NC q1h  HCP: EVD at 0 cmH2O, output 290, goal ICP<22 mmhg , CPP >65 mmhg  Keppra 500mg BID for seizure ppx  nimodipine     CVS: TTE EF 65 %   SBP goal 100-180 mmhg     PULM:  RA  O2 Sat >92%    RENAL:  Strict IS and Os  Na 140-150  q8h BMP    GI:  - NDGT feeding  - Bowel regimen: miralax, senna    ENDO:   - FS goal 120-180     HEME/ONC:  - SCDs  - Heparin 5000 units Q12 hrs     ID:   Urine culture grown Klebsiella  Ciprofloxacin       Lines: R PICC, left Axillary A line    7 yo woman admitted 12/11/21 with HA/vomiting since 12/10 found to have HH2mF3 SAH c/b hydro s/p coiling of L Pcomm aneurysm, found to have a partial left m2 thrombus now s/p thrombectomy with TICI 3 reperfusion. S/p R EVD for hydro c/b sizable tract hemorrhage with IVH now with L EVD. PBD 11    NEURO:  NC q1h  HCP: EVD at 0 cmH2O, output 321, goal ICP<22 mmhg , CPP >65 mmhg  DC Keppra   nimodipine     CVS: TTE EF 65 %   SBP goal 100-180 mmhg     PULM:  RA  O2 Sat >92%    RENAL:  IVL  Strict IS and Os  Na 140-150  q8h BMP    GI:  - NDGT feeding  - Bowel regimen: miralax, senna    ENDO:   - FS goal 120-180     HEME/ONC:  - SCDs  -     ID:   Urine culture grown Klebsiella  Ciprofloxacin       Lines: R PICC, left Axillary A line    7 yo woman admitted 12/11/21 with HA/vomiting since 12/10 found to have HH2mF3 SAH c/b hydro s/p coiling of L Pcomm aneurysm, found to have a partial left m2 thrombus now s/p thrombectomy with TICI 3 reperfusion. S/p R EVD for hydro c/b sizable tract hemorrhage with IVH now with L EVD. PBD 11    NEURO:  NC q1h  HCP: EVD at 0 cmH2O, output 321, goal ICP<22 mmhg , CPP >65 mmhg  DC Keppra   nimodipine     CVS: TTE EF 65 %   SBP goal 100-180 mmhg     PULM:  RA  O2 Sat >92%    RENAL:  IVL  Strict IS and Os  Na 140-150  q8h BMP    GI:  - NDGT feeding  - Bowel regimen: miralax, senna    ENDO:   - FS goal 120-180     HEME/ONC:  - SCDs  - Trend PTT Q6 hrs     ID:   Urine culture grown Klebsiella  Ciprofloxacin       Lines: R PICC, left Axillary A line

## 2021-12-21 NOTE — PROGRESS NOTE ADULT - SUBJECTIVE AND OBJECTIVE BOX
HPI:  67 yo woman admitted 12/11/21 with HA/vomiting since 12/10 found to have HH2mF3 SAH c/b hydro s/p coiling of L Pcomm aneurysm, found to have a partial left m2 thrombus now s/p thrombectomy with TICI 3 reperfusion. S/p R EVD for hydro c/b sizable tract hemorrhage with IVH now with L EVD. 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  Heparin was DC, started on Argatroban     VITALS:  T(C): , Max: 37.7 (12-21-21 @ 03:00)  HR:  (75 - 123)  BP: --  ABP:  (121/65 - 204/100)  RR:  (15 - 34)  SpO2:  (94% - 100%)  Wt(kg): --      12-20-21 @ 07:01  -  12-21-21 @ 07:00  --------------------------------------------------------  IN: 2076.8 mL / OUT: 1121 mL / NET: 955.8 mL      LABS:  Na: 156 (12-20 @ 19:51), 156 (12-20 @ 13:56), 153 (12-20 @ 01:56), 149 (12-19 @ 15:29), 148 (12-19 @ 00:34), 147 (12-18 @ 15:29), 145 (12-18 @ 07:49)  K: 3.3 (12-20 @ 19:51), 3.6 (12-20 @ 13:56), 3.8 (12-20 @ 01:56), 3.3 (12-19 @ 15:29), 3.1 (12-19 @ 00:34), 3.8 (12-18 @ 15:29), 3.3 (12-18 @ 07:49)  Cl: 118 (12-20 @ 19:51), 119 (12-20 @ 13:56), 118 (12-20 @ 01:56), 114 (12-19 @ 15:29), 111 (12-19 @ 00:34), 115 (12-18 @ 15:29), 113 (12-18 @ 07:49)  CO2: 23 (12-20 @ 19:51), 22 (12-20 @ 13:56), 22 (12-20 @ 01:56), 22 (12-19 @ 15:29), 22 (12-19 @ 00:34), 21 (12-18 @ 15:29), 21 (12-18 @ 07:49)  BUN: 36 (12-20 @ 19:51), 34 (12-20 @ 13:56), 36 (12-20 @ 01:56), 31 (12-19 @ 15:29), 20 (12-19 @ 00:34), 18 (12-18 @ 15:29), 15 (12-18 @ 07:49)  Cr: 0.40 (12-20 @ 19:51), 0.46 (12-20 @ 13:56), 0.43 (12-20 @ 01:56), 0.50 (12-19 @ 15:29), 0.39 (12-19 @ 00:34), 0.47 (12-18 @ 15:29), 0.45 (12-18 @ 07:49)  Glu: 150(12-20 @ 19:51), 155(12-20 @ 13:56), 146(12-20 @ 01:56), 180(12-19 @ 15:29), 162(12-19 @ 00:34), 192(12-18 @ 15:29), 161(12-18 @ 07:49)    Hgb: 12.6 (12-21 @ 05:15), 12.2 (12-20 @ 19:51), 11.2 (12-20 @ 01:56), 11.7 (12-19 @ 00:24)  Hct: 39.7 (12-21 @ 05:15), 37.7 (12-20 @ 19:51), 34.6 (12-20 @ 01:56), 35.2 (12-19 @ 00:24)  WBC: 10.46 (12-21 @ 05:15), 10.31 (12-20 @ 19:51), 8.21 (12-20 @ 01:56), 8.70 (12-19 @ 00:24)  Plt: 111 (12-21 @ 05:15), 123 (12-20 @ 19:51), 138 (12-20 @ 01:56), 186 (12-19 @ 00:24)    INR: 1.08 12-21-21 @ 05:15, 1.01 12-19-21 @ 00:23  PTT: 46.9 12-21-21 @ 05:15, 29.4 12-19-21 @ 00:23    IMAGING:   Recent imaging studies were reviewed.    MEDICATIONS:  acetaminophen     Tablet .. 650 milliGRAM(s) Oral every 6 hours PRN  argatroban Infusion 0.5 MICROgram(s)/kG/Min IV Continuous <Continuous>  chlorhexidine 4% Liquid 1 Application(s) Topical <User Schedule>  ciprofloxacin     Tablet 500 milliGRAM(s) Oral every 12 hours  levETIRAcetam  Solution 500 milliGRAM(s) Oral two times a day  niMODipine Oral Solution 30 milliGRAM(s) Enteral Tube every 2 hours  potassium chloride   Powder 40 milliEquivalent(s) Enteral Tube daily  sodium chloride 0.9% lock flush 10 milliLiter(s) IV Push every 1 hour PRN    EXAMINATION:  General:  calm  HEENT:  MMM  Neuro:  lethargic, oriented to her name and month, follows simple commands, pupils R 5 mm, L 7 mm non reactive b/l, midline gaze, face symmetric, able to raise b/l arms antigravity, both LL at least 3/5, had transient right lower limb weakness this morning couldn't move against gravity), improve on reassessment   Cards:  RRR  Respiratory:  no respiratory distress  Abdomen:  soft  Extremities:  no edema   HPI:  67 yo woman admitted 12/11/21 with HA/vomiting since 12/10 found to have HH2mF3 SAH c/b hydro s/p coiling of L Pcomm aneurysm, found to have a partial left m2 thrombus now s/p thrombectomy with TICI 3 reperfusion. S/p R EVD for hydro c/b sizable tract hemorrhage with IVH now with L EVD. 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  Heparin was DC, started on Argatroban     VITALS:  T(C): , Max: 37.7 (12-21-21 @ 03:00)  HR:  (75 - 123)  BP: --  ABP:  (121/65 - 204/100)  RR:  (15 - 34)  SpO2:  (94% - 100%)  Wt(kg): --      12-20-21 @ 07:01  -  12-21-21 @ 07:00  --------------------------------------------------------  IN: 2076.8 mL / OUT: 1121 mL / NET: 955.8 mL      LABS:  Na: 156 (12-20 @ 19:51), 156 (12-20 @ 13:56), 153 (12-20 @ 01:56), 149 (12-19 @ 15:29), 148 (12-19 @ 00:34), 147 (12-18 @ 15:29), 145 (12-18 @ 07:49)  K: 3.3 (12-20 @ 19:51), 3.6 (12-20 @ 13:56), 3.8 (12-20 @ 01:56), 3.3 (12-19 @ 15:29), 3.1 (12-19 @ 00:34), 3.8 (12-18 @ 15:29), 3.3 (12-18 @ 07:49)  Cl: 118 (12-20 @ 19:51), 119 (12-20 @ 13:56), 118 (12-20 @ 01:56), 114 (12-19 @ 15:29), 111 (12-19 @ 00:34), 115 (12-18 @ 15:29), 113 (12-18 @ 07:49)  CO2: 23 (12-20 @ 19:51), 22 (12-20 @ 13:56), 22 (12-20 @ 01:56), 22 (12-19 @ 15:29), 22 (12-19 @ 00:34), 21 (12-18 @ 15:29), 21 (12-18 @ 07:49)  BUN: 36 (12-20 @ 19:51), 34 (12-20 @ 13:56), 36 (12-20 @ 01:56), 31 (12-19 @ 15:29), 20 (12-19 @ 00:34), 18 (12-18 @ 15:29), 15 (12-18 @ 07:49)  Cr: 0.40 (12-20 @ 19:51), 0.46 (12-20 @ 13:56), 0.43 (12-20 @ 01:56), 0.50 (12-19 @ 15:29), 0.39 (12-19 @ 00:34), 0.47 (12-18 @ 15:29), 0.45 (12-18 @ 07:49)  Glu: 150(12-20 @ 19:51), 155(12-20 @ 13:56), 146(12-20 @ 01:56), 180(12-19 @ 15:29), 162(12-19 @ 00:34), 192(12-18 @ 15:29), 161(12-18 @ 07:49)    Hgb: 12.6 (12-21 @ 05:15), 12.2 (12-20 @ 19:51), 11.2 (12-20 @ 01:56), 11.7 (12-19 @ 00:24)  Hct: 39.7 (12-21 @ 05:15), 37.7 (12-20 @ 19:51), 34.6 (12-20 @ 01:56), 35.2 (12-19 @ 00:24)  WBC: 10.46 (12-21 @ 05:15), 10.31 (12-20 @ 19:51), 8.21 (12-20 @ 01:56), 8.70 (12-19 @ 00:24)  Plt: 111 (12-21 @ 05:15), 123 (12-20 @ 19:51), 138 (12-20 @ 01:56), 186 (12-19 @ 00:24)    INR: 1.08 12-21-21 @ 05:15, 1.01 12-19-21 @ 00:23  PTT: 46.9 12-21-21 @ 05:15, 29.4 12-19-21 @ 00:23    IMAGING:   Recent imaging studies were reviewed.    MEDICATIONS:  acetaminophen     Tablet .. 650 milliGRAM(s) Oral every 6 hours PRN  argatroban Infusion 0.5 MICROgram(s)/kG/Min IV Continuous <Continuous>  chlorhexidine 4% Liquid 1 Application(s) Topical <User Schedule>  ciprofloxacin     Tablet 500 milliGRAM(s) Oral every 12 hours  levETIRAcetam  Solution 500 milliGRAM(s) Oral two times a day  niMODipine Oral Solution 30 milliGRAM(s) Enteral Tube every 2 hours  potassium chloride   Powder 40 milliEquivalent(s) Enteral Tube daily  sodium chloride 0.9% lock flush 10 milliLiter(s) IV Push every 1 hour PRN    EXAMINATION:  General:  calm  HEENT:  MMM  Neuro:  lethargic, oriented to her name, follows simple commands, pupils R 5 mm, L 7 mm non reactive b/l, midline gaze, face symmetric, able to raise b/l arms antigravity, both LL at least 3/5  Cards:  RRR  Respiratory:  no respiratory distress  Abdomen:  soft  Extremities:  no edema

## 2021-12-21 NOTE — PROGRESS NOTE ADULT - SUBJECTIVE AND OBJECTIVE BOX
HPI:  67 yo woman admitted 12/11/21 with HA/vomiting since 12/10 found to have HH2mF3 SAH c/b hydro s/p coiling of L Pcomm aneurysm, found to have a partial left m2 thrombus now s/p thrombectomy with TICI 3 reperfusion. S/p R EVD for hydro c/b sizable tract hemorrhage with IVH now with L EVD. PBD 11.     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  MEDICATIONS: reviewed    EXAMINATION:  General:  calm  HEENT:  MMM  Neuro:  examined in Thai by me, EO to stim, with stimulation able to say her name and hospital, follows simple commands, pupils unchanged surgical b/l, midline gaze, face symmetric, RUE 4+5, LUE 4/5, BLE 4/5 effort dependent at -170's   Cards:  RRR  Respiratory:  no respiratory distress  Abdomen:  soft  Extremities:  no edema   HPI:  67 yo woman admitted 12/11/21 with HA/vomiting since 12/10 found to have HH2mF3 SAH c/b hydro s/p coiling of L Pcomm aneurysm, found to have a partial left m2 thrombus now s/p thrombectomy with TICI 3 reperfusion. S/p R EVD for hydro c/b sizable tract hemorrhage with IVH now with L EVD. PBD 11.     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  MEDICATIONS: reviewed    EXAMINATION:  General:  calm  HEENT:  MMM  Neuro:  examined in Kiswahili by me, EO to stim, with stimulation able to say her name and hospital, minimal command following with prompt, pupils unchanged surgical b/l, midline gaze, face symmetric, RUE drift 4-/5, LUE at least 3/5, RLE 3/5, LLE 2/5/WD at SBP  140's   Cards:  RRR  Respiratory:  no respiratory distress  Abdomen:  soft  Extremities:  no edema

## 2021-12-21 NOTE — CHART NOTE - NSCHARTNOTEFT_GEN_A_CORE
Patient haa been thrombocytopenic and was found to be HIT positive. Hematology consult was called ( Dr Farhat Munoz). He will follow the serotonin assay today to r/o false positive and will make some recommendations tomorrow once serotomin assay is available. Full consult to follow by Hematology on 12/23/21

## 2021-12-21 NOTE — CHART NOTE - NSCHARTNOTEFT_GEN_A_CORE
positive HF-4 antibody reported--confirmed with lab over the phone  6d ago, had R frontal ICH associated with EVD tract and now has L EVD in place  d/c SQH  start argatroban with lower PTT goal 40-50 now, repeat coags and CBC in three hours   hematology consult in am, may need plasmapheresis if ICH expands and/or PLT counts don't recover?    critical care time 35minutes positive heparin-PF4 antibody reported--confirmed with lab over the phone  6d ago, had R frontal ICH associated with EVD tract and now has L EVD in place  d/c SQH  start argatroban at lower dose--0.5mic/kg/min with lower PTT goal 40-50 now, repeat coags and CBC in two hours   repeat CTH this am  hematology consult in am, may need plasmapheresis if ICH expands therefore unable to anticoagulate and/or PLT counts don't recover    critical care time 35minutes positive heparin-PF4 antibody reported--confirmed with lab over the phone  6d ago, had R frontal ICH associated with EVD tract and now has L EVD in place  d/c SQH  start argatroban at lower dose--0.5mic/kg/min with lower PTT goal 40-50 now, repeat coags and CBC in two hours   repeat CTH this am  check BLE dopplers, monitor for s/s PE although currently has stable vital signs   hematology consult in am, may need plasmapheresis if ICH expands therefore unable to anticoagulate and/or PLT counts don't recover    Patient is at a risk of neurological deterioration and death due to HIT, thrombosis, intracranial hemorrhage    critical care time 35minutes

## 2021-12-21 NOTE — PROGRESS NOTE ADULT - ASSESSMENT
65 yo woman admitted 12/11/21 with HA/vomiting since 12/10 found to have HH2mF3 SAH c/b hydro s/p coiling of L Pcomm aneurysm, found to have a partial left m2 thrombus now s/p thrombectomy with TICI 3 reperfusion. S/p R EVD for hydro c/b sizable tract hemorrhage with IVH now with L EVD. PBD 11.    NEURO:  NC q2h  HCP: EVD at 0 cmH2O, goal ICP<22 mmhg , CPP >65 mmhg  Keppra 500mg BID for seizure ppx    nimodipine --tolerating   PT/OT     CVS: TTE EF 65 %   SBP goal 100-180 mmhg     PULM:  RA  O2 Sat >92%  incentive spirometry as able    RENAL:  Strict IS and Os  Na 140-150  q12h BMP    GI:  - NDGT feeding--swallow re eval  - Bowel regimen: miralax, senna    ENDO:   - FS goal 120-180     HEME/ONC:  - SCDs  - HIT+, f/u BALJEET  - argatroban gtt for PTT goal 40-50, hematology called, has not come to see the patient    ID:   Urine culture grown Klebsiella  Ciprofloxacin till 12/29    Lines: R PICC, R Axillary A line   Patient is critically ill due to SAH and at high risk for neurological deterioration or death due to: hydrocephalus requiring an EVD for CSF diversion, at risk for vasospasm   67 yo woman admitted 12/11/21 with HA/vomiting since 12/10 found to have HH2mF3 SAH c/b hydro s/p coiling of L Pcomm aneurysm, found to have a partial left m2 thrombus now s/p thrombectomy with TICI 3 reperfusion. S/p R EVD for hydro c/b sizable tract hemorrhage with IVH now with L EVD. PBD 11.    NEURO:  CTH now  NC q2h  HCP: EVD at 0 cmH2O, goal ICP<22 mmhg , CPP >65 mmhg  Keppra 500mg BID for seizure ppx    nimodipine --tolerating   PT/OT     CVS: TTE EF 65 %   SBP goal 100-180 mmhg     PULM:  RA  O2 Sat >92%  incentive spirometry as able    RENAL:  Strict IS and Os  Na 140-150  q12h BMP    GI:  - NDGT feeding--swallow re eval  - Bowel regimen: miralax, senna    ENDO:   - FS goal 120-180     HEME/ONC:  - SCDs  - HIT+, f/u BALJEET  - argatroban gtt for PTT goal 40-50, hematology called, has not come to see the patient    ID:   Urine culture grown Klebsiella  Ciprofloxacin till 12/29  uptrending leukocytosis, low grade fever got tylenol, loose multiple BM-->send UA, CSF, cdiff, bcx  no pna on CTA chest this am so hold off on CXR     Lines: R PICC, R Axillary A line   Patient is critically ill due to SAH and at high risk for neurological deterioration or death due to: hydrocephalus requiring an EVD for CSF diversion, at risk for vasospasm

## 2021-12-22 LAB
ANION GAP SERPL CALC-SCNC: 13 MMOL/L — SIGNIFICANT CHANGE UP (ref 5–17)
ANION GAP SERPL CALC-SCNC: 15 MMOL/L — SIGNIFICANT CHANGE UP (ref 5–17)
APPEARANCE CSF: ABNORMAL
APPEARANCE SPUN FLD: SIGNIFICANT CHANGE UP
APPEARANCE UR: CLEAR — SIGNIFICANT CHANGE UP
APTT BLD: 35.9 SEC — HIGH (ref 27.5–35.5)
APTT BLD: 39.4 SEC — HIGH (ref 27.5–35.5)
APTT BLD: 39.9 SEC — HIGH (ref 27.5–35.5)
APTT BLD: 41.6 SEC — HIGH (ref 27.5–35.5)
BACTERIA # UR AUTO: NEGATIVE — SIGNIFICANT CHANGE UP
BILIRUB UR-MCNC: NEGATIVE — SIGNIFICANT CHANGE UP
BUN SERPL-MCNC: 37 MG/DL — HIGH (ref 7–23)
BUN SERPL-MCNC: 41 MG/DL — HIGH (ref 7–23)
C DIFF GDH STL QL: NEGATIVE — SIGNIFICANT CHANGE UP
C DIFF GDH STL QL: SIGNIFICANT CHANGE UP
CALCIUM SERPL-MCNC: 9.3 MG/DL — SIGNIFICANT CHANGE UP (ref 8.4–10.5)
CALCIUM SERPL-MCNC: 9.4 MG/DL — SIGNIFICANT CHANGE UP (ref 8.4–10.5)
CHLORIDE SERPL-SCNC: 115 MMOL/L — HIGH (ref 96–108)
CHLORIDE SERPL-SCNC: 117 MMOL/L — HIGH (ref 96–108)
CO2 SERPL-SCNC: 23 MMOL/L — SIGNIFICANT CHANGE UP (ref 22–31)
CO2 SERPL-SCNC: 24 MMOL/L — SIGNIFICANT CHANGE UP (ref 22–31)
COLOR CSF: ABNORMAL
COLOR SPEC: YELLOW — SIGNIFICANT CHANGE UP
COMMENT - URINE: SIGNIFICANT CHANGE UP
CREAT SERPL-MCNC: 0.41 MG/DL — LOW (ref 0.5–1.3)
CREAT SERPL-MCNC: 0.41 MG/DL — LOW (ref 0.5–1.3)
DIFF PNL FLD: NEGATIVE — SIGNIFICANT CHANGE UP
EPI CELLS # UR: 2 — SIGNIFICANT CHANGE UP
GLUCOSE CSF-MCNC: 93 MG/DL — HIGH (ref 40–70)
GLUCOSE SERPL-MCNC: 124 MG/DL — HIGH (ref 70–99)
GLUCOSE SERPL-MCNC: 174 MG/DL — HIGH (ref 70–99)
GLUCOSE UR QL: NEGATIVE — SIGNIFICANT CHANGE UP
GRAM STN FLD: SIGNIFICANT CHANGE UP
HCT VFR BLD CALC: 38.5 % — SIGNIFICANT CHANGE UP (ref 34.5–45)
HCT VFR BLD CALC: 39.6 % — SIGNIFICANT CHANGE UP (ref 34.5–45)
HGB BLD-MCNC: 12.2 G/DL — SIGNIFICANT CHANGE UP (ref 11.5–15.5)
HGB BLD-MCNC: 12.5 G/DL — SIGNIFICANT CHANGE UP (ref 11.5–15.5)
HYALINE CASTS # UR AUTO: 2 /LPF — SIGNIFICANT CHANGE UP (ref 0–7)
INR BLD: 1.21 RATIO — HIGH (ref 0.88–1.16)
KETONES UR-MCNC: NEGATIVE — SIGNIFICANT CHANGE UP
LACTATE CSF-MCNC: 3.2 MMOL/L — HIGH (ref 1.1–2.4)
LACTATE SERPL-SCNC: 1.6 MMOL/L — SIGNIFICANT CHANGE UP (ref 0.7–2)
LDH CSF L TO P-CCNC: 107 U/L — SIGNIFICANT CHANGE UP
LDH FLD-CCNC: 107 U/L — SIGNIFICANT CHANGE UP
LEUKOCYTE ESTERASE UR-ACNC: ABNORMAL
LYMPHOCYTES # CSF: 12 % — LOW (ref 40–80)
MAGNESIUM SERPL-MCNC: 2.6 MG/DL — SIGNIFICANT CHANGE UP (ref 1.6–2.6)
MAGNESIUM SERPL-MCNC: 2.6 MG/DL — SIGNIFICANT CHANGE UP (ref 1.6–2.6)
MCHC RBC-ENTMCNC: 30.6 PG — SIGNIFICANT CHANGE UP (ref 27–34)
MCHC RBC-ENTMCNC: 30.6 PG — SIGNIFICANT CHANGE UP (ref 27–34)
MCHC RBC-ENTMCNC: 31.6 GM/DL — LOW (ref 32–36)
MCHC RBC-ENTMCNC: 31.7 GM/DL — LOW (ref 32–36)
MCV RBC AUTO: 96.5 FL — SIGNIFICANT CHANGE UP (ref 80–100)
MCV RBC AUTO: 97.1 FL — SIGNIFICANT CHANGE UP (ref 80–100)
MONOS+MACROS NFR CSF: 34 % — SIGNIFICANT CHANGE UP (ref 15–45)
NEUTROPHILS # CSF: 54 % — HIGH (ref 0–6)
NITRITE UR-MCNC: NEGATIVE — SIGNIFICANT CHANGE UP
NRBC # BLD: 0 /100 WBCS — SIGNIFICANT CHANGE UP (ref 0–0)
NRBC # BLD: 0 /100 WBCS — SIGNIFICANT CHANGE UP (ref 0–0)
NRBC NFR CSF: 40 /UL — HIGH (ref 0–5)
PH UR: 5.5 — SIGNIFICANT CHANGE UP (ref 5–8)
PHOSPHATE SERPL-MCNC: 3.3 MG/DL — SIGNIFICANT CHANGE UP (ref 2.5–4.5)
PHOSPHATE SERPL-MCNC: 4.4 MG/DL — SIGNIFICANT CHANGE UP (ref 2.5–4.5)
PLATELET # BLD AUTO: 124 K/UL — LOW (ref 150–400)
PLATELET # BLD AUTO: 125 K/UL — LOW (ref 150–400)
POTASSIUM SERPL-MCNC: 3.2 MMOL/L — LOW (ref 3.5–5.3)
POTASSIUM SERPL-MCNC: 3.6 MMOL/L — SIGNIFICANT CHANGE UP (ref 3.5–5.3)
POTASSIUM SERPL-SCNC: 3.2 MMOL/L — LOW (ref 3.5–5.3)
POTASSIUM SERPL-SCNC: 3.6 MMOL/L — SIGNIFICANT CHANGE UP (ref 3.5–5.3)
PROCALCITONIN SERPL-MCNC: 0.04 NG/ML — SIGNIFICANT CHANGE UP (ref 0.02–0.1)
PROT CSF-MCNC: 25 MG/DL — SIGNIFICANT CHANGE UP (ref 15–45)
PROT UR-MCNC: ABNORMAL
PROTHROM AB SERPL-ACNC: 14.4 SEC — HIGH (ref 10.6–13.6)
RBC # BLD: 3.99 M/UL — SIGNIFICANT CHANGE UP (ref 3.8–5.2)
RBC # BLD: 4.08 M/UL — SIGNIFICANT CHANGE UP (ref 3.8–5.2)
RBC # CSF: HIGH /UL (ref 0–0)
RBC # FLD: 13.2 % — SIGNIFICANT CHANGE UP (ref 10.3–14.5)
RBC # FLD: 13.3 % — SIGNIFICANT CHANGE UP (ref 10.3–14.5)
RBC CASTS # UR COMP ASSIST: 1 /HPF — SIGNIFICANT CHANGE UP (ref 0–4)
SARS-COV-2 RNA SPEC QL NAA+PROBE: SIGNIFICANT CHANGE UP
SODIUM SERPL-SCNC: 152 MMOL/L — HIGH (ref 135–145)
SODIUM SERPL-SCNC: 155 MMOL/L — HIGH (ref 135–145)
SP GR SPEC: 1.03 — HIGH (ref 1.01–1.02)
SPECIMEN SOURCE: SIGNIFICANT CHANGE UP
TUBE TYPE: SIGNIFICANT CHANGE UP
UROBILINOGEN FLD QL: NEGATIVE — SIGNIFICANT CHANGE UP
WBC # BLD: 12.92 K/UL — HIGH (ref 3.8–10.5)
WBC # BLD: 14.65 K/UL — HIGH (ref 3.8–10.5)
WBC # FLD AUTO: 12.92 K/UL — HIGH (ref 3.8–10.5)
WBC # FLD AUTO: 14.65 K/UL — HIGH (ref 3.8–10.5)
WBC UR QL: 15 /HPF — HIGH (ref 0–5)

## 2021-12-22 PROCEDURE — 99291 CRITICAL CARE FIRST HOUR: CPT

## 2021-12-22 PROCEDURE — 61651 EVASC PRLNG ADMN RX AGNT ADD: CPT

## 2021-12-22 PROCEDURE — 36226 PLACE CATH VERTEBRAL ART: CPT | Mod: 59,LT

## 2021-12-22 PROCEDURE — 61650 EVASC PRLNG ADMN RX AGNT 1ST: CPT

## 2021-12-22 RX ORDER — PHENYLEPHRINE HYDROCHLORIDE 10 MG/ML
0.4 INJECTION INTRAVENOUS
Qty: 40 | Refills: 0 | Status: DISCONTINUED | OUTPATIENT
Start: 2021-12-22 | End: 2021-12-22

## 2021-12-22 RX ORDER — MILRINONE LACTATE 1 MG/ML
0.12 INJECTION, SOLUTION INTRAVENOUS
Qty: 20 | Refills: 0 | Status: DISCONTINUED | OUTPATIENT
Start: 2021-12-22 | End: 2021-12-22

## 2021-12-22 RX ORDER — SODIUM CHLORIDE 9 MG/ML
1000 INJECTION INTRAMUSCULAR; INTRAVENOUS; SUBCUTANEOUS
Refills: 0 | Status: DISCONTINUED | OUTPATIENT
Start: 2021-12-22 | End: 2021-12-22

## 2021-12-22 RX ORDER — ARGATROBAN 50 MG/50ML
1.5 INJECTION, SOLUTION INTRAVENOUS
Qty: 250 | Refills: 0 | Status: DISCONTINUED | OUTPATIENT
Start: 2021-12-22 | End: 2021-12-31

## 2021-12-22 RX ORDER — NIMODIPINE 60 MG/10ML
60 SOLUTION ORAL EVERY 4 HOURS
Refills: 0 | Status: DISCONTINUED | OUTPATIENT
Start: 2021-12-22 | End: 2021-12-22

## 2021-12-22 RX ORDER — NIMODIPINE 60 MG/10ML
30 SOLUTION ORAL
Refills: 0 | Status: DISCONTINUED | OUTPATIENT
Start: 2021-12-22 | End: 2021-12-29

## 2021-12-22 RX ORDER — SODIUM CHLORIDE 9 MG/ML
1000 INJECTION, SOLUTION INTRAVENOUS
Refills: 0 | Status: DISCONTINUED | OUTPATIENT
Start: 2021-12-22 | End: 2021-12-22

## 2021-12-22 RX ORDER — SODIUM CHLORIDE 9 MG/ML
500 INJECTION, SOLUTION INTRAVENOUS ONCE
Refills: 0 | Status: COMPLETED | OUTPATIENT
Start: 2021-12-22 | End: 2021-12-22

## 2021-12-22 RX ADMIN — ARGATROBAN 4.08 MICROGRAM(S)/KG/MIN: 50 INJECTION, SOLUTION INTRAVENOUS at 17:03

## 2021-12-22 RX ADMIN — Medication 650 MILLIGRAM(S): at 20:21

## 2021-12-22 RX ADMIN — ARGATROBAN 4.08 MICROGRAM(S)/KG/MIN: 50 INJECTION, SOLUTION INTRAVENOUS at 20:21

## 2021-12-22 RX ADMIN — Medication 40 MILLIEQUIVALENT(S): at 12:05

## 2021-12-22 RX ADMIN — PHENYLEPHRINE HYDROCHLORIDE 10.2 MICROGRAM(S)/KG/MIN: 10 INJECTION INTRAVENOUS at 20:21

## 2021-12-22 RX ADMIN — SODIUM CHLORIDE 50 MILLILITER(S): 9 INJECTION, SOLUTION INTRAVENOUS at 12:07

## 2021-12-22 RX ADMIN — Medication 500 MILLIGRAM(S): at 17:03

## 2021-12-22 RX ADMIN — ARGATROBAN 3.06 MICROGRAM(S)/KG/MIN: 50 INJECTION, SOLUTION INTRAVENOUS at 12:06

## 2021-12-22 RX ADMIN — SODIUM CHLORIDE 50 MILLILITER(S): 9 INJECTION, SOLUTION INTRAVENOUS at 06:00

## 2021-12-22 RX ADMIN — SODIUM CHLORIDE 1000 MILLILITER(S): 9 INJECTION, SOLUTION INTRAVENOUS at 19:18

## 2021-12-22 RX ADMIN — PHENYLEPHRINE HYDROCHLORIDE 10.2 MICROGRAM(S)/KG/MIN: 10 INJECTION INTRAVENOUS at 12:07

## 2021-12-22 RX ADMIN — ARGATROBAN 3.06 MICROGRAM(S)/KG/MIN: 50 INJECTION, SOLUTION INTRAVENOUS at 05:03

## 2021-12-22 RX ADMIN — Medication 500 MILLIGRAM(S): at 05:44

## 2021-12-22 RX ADMIN — NIMODIPINE 30 MILLIGRAM(S): 60 SOLUTION ORAL at 02:37

## 2021-12-22 RX ADMIN — Medication 650 MILLIGRAM(S): at 21:00

## 2021-12-22 RX ADMIN — CHLORHEXIDINE GLUCONATE 1 APPLICATION(S): 213 SOLUTION TOPICAL at 22:10

## 2021-12-22 RX ADMIN — NIMODIPINE 30 MILLIGRAM(S): 60 SOLUTION ORAL at 22:05

## 2021-12-22 RX ADMIN — PHENYLEPHRINE HYDROCHLORIDE 10.2 MICROGRAM(S)/KG/MIN: 10 INJECTION INTRAVENOUS at 07:00

## 2021-12-22 NOTE — PROVIDER CONTACT NOTE (MEDICATION) - RECOMMENDATIONS
Hold nimodipine
Notify provider, hold dose per parameter
Hold nimodipine
Hold nimodipine
Notify provider, hold dose per parameter

## 2021-12-22 NOTE — PROGRESS NOTE ADULT - SUBJECTIVE AND OBJECTIVE BOX
HPI:  65 yo woman admitted 12/11/21 with HA/vomiting since 12/10 found to have HH2mF3 SAH c/b hydro s/p coiling of L Pcomm aneurysm, found to have a partial left m2 thrombus now s/p thrombectomy with TICI 3 reperfusion. S/p R EVD for hydro c/b sizable tract hemorrhage with IVH now with L EVD. PBD 11     OVERNIGHT EVENTS:   Fluctuating exam, started on pressors to augment BP    VITALS:  T(C): , Max: 37.8 (12-21-21 @ 19:00)  HR:  (79 - 118)  BP: --  ABP:  (133/70 - 195/101)  RR:  (13 - 24)  SpO2:  (95% - 98%)  Wt(kg): --      12-21-21 @ 07:01  -  12-22-21 @ 07:00  --------------------------------------------------------  IN: 1785.6 mL / OUT: 1336 mL / NET: 449.6 mL      LABS:  Na: 155 (12-21 @ 20:24), 153 (12-21 @ 08:27), 156 (12-20 @ 19:51), 156 (12-20 @ 13:56), 153 (12-20 @ 01:56), 149 (12-19 @ 15:29)  K: 3.4 (12-21 @ 20:24), 3.2 (12-21 @ 08:27), 3.3 (12-20 @ 19:51), 3.6 (12-20 @ 13:56), 3.8 (12-20 @ 01:56), 3.3 (12-19 @ 15:29)  Cl: 117 (12-21 @ 20:24), 116 (12-21 @ 08:27), 118 (12-20 @ 19:51), 119 (12-20 @ 13:56), 118 (12-20 @ 01:56), 114 (12-19 @ 15:29)  CO2: 25 (12-21 @ 20:24), 22 (12-21 @ 08:27), 23 (12-20 @ 19:51), 22 (12-20 @ 13:56), 22 (12-20 @ 01:56), 22 (12-19 @ 15:29)  BUN: 41 (12-21 @ 20:24), 36 (12-21 @ 08:27), 36 (12-20 @ 19:51), 34 (12-20 @ 13:56), 36 (12-20 @ 01:56), 31 (12-19 @ 15:29)  Cr: 0.45 (12-21 @ 20:24), 0.35 (12-21 @ 08:27), 0.40 (12-20 @ 19:51), 0.46 (12-20 @ 13:56), 0.43 (12-20 @ 01:56), 0.50 (12-19 @ 15:29)  Glu: 180(12-21 @ 20:24), 155(12-21 @ 08:27), 150(12-20 @ 19:51), 155(12-20 @ 13:56), 146(12-20 @ 01:56), 180(12-19 @ 15:29)    Hgb: 12.9 (12-21 @ 20:24), 12.8 (12-21 @ 14:28), 12.9 (12-21 @ 08:27), 12.6 (12-21 @ 05:15), 12.2 (12-20 @ 19:51), 11.2 (12-20 @ 01:56)  Hct: 41.0 (12-21 @ 20:24), 40.7 (12-21 @ 14:28), 40.2 (12-21 @ 08:27), 39.7 (12-21 @ 05:15), 37.7 (12-20 @ 19:51), 34.6 (12-20 @ 01:56)  WBC: 12.10 (12-21 @ 20:24), 10.51 (12-21 @ 14:28), 10.55 (12-21 @ 08:27), 10.46 (12-21 @ 05:15), 10.31 (12-20 @ 19:51), 8.21 (12-20 @ 01:56)  Plt: 116 (12-21 @ 20:24), 119 (12-21 @ 14:28), 112 (12-21 @ 08:27), 111 (12-21 @ 05:15), 123 (12-20 @ 19:51), 138 (12-20 @ 01:56)    INR: 1.05 12-21-21 @ 14:28, 1.11 12-21-21 @ 08:27, 1.08 12-21-21 @ 05:15  PTT: 35.9 12-22-21 @ 04:20, 36.2 12-21-21 @ 22:41, 35.3 12-21-21 @ 20:24, 40.7 12-21-21 @ 14:28, 43.9 12-21-21 @ 08:27, 46.9 12-21-21 @ 05:15    IMAGING:   Recent imaging studies were reviewed.    MEDICATIONS:  acetaminophen     Tablet .. 650 milliGRAM(s) Oral every 6 hours PRN  argatroban Infusion 0.75 MICROgram(s)/kG/Min IV Continuous <Continuous>  chlorhexidine 4% Liquid 1 Application(s) Topical <User Schedule>  ciprofloxacin     Tablet 500 milliGRAM(s) Oral every 12 hours  multiple electrolytes Injection Type 1 1000 milliLiter(s) IV Continuous <Continuous>  niMODipine Oral Solution 30 milliGRAM(s) Enteral Tube every 2 hours  phenylephrine    Infusion 0.4 MICROgram(s)/kG/Min IV Continuous <Continuous>  potassium chloride   Powder 40 milliEquivalent(s) Enteral Tube daily  sodium chloride 0.9% lock flush 10 milliLiter(s) IV Push every 1 hour PRN  traMADol 25 milliGRAM(s) Oral every 6 hours PRN    EXAMINATION:  General:  calm  HEENT:  MMM  Neuro:  lethargic, oriented to her name, follows simple commands, pupils R 5 mm, L 7 mm non reactive b/l, midline gaze, face symmetric, able to raise b/l arms antigravity, both LL at least 3/5  Cards:  RRR  Respiratory:  no respiratory distress  Abdomen:  soft  Extremities:  no edema   HPI:  65 yo woman admitted 12/11/21 with HA/vomiting since 12/10 found to have HH2mF3 SAH c/b hydro s/p coiling of L Pcomm aneurysm, found to have a partial left m2 thrombus now s/p thrombectomy with TICI 3 reperfusion. S/p R EVD for hydro c/b sizable tract hemorrhage with IVH now with L EVD. PBD 11     OVERNIGHT EVENTS:   Fluctuating exam, started on pressors to augment BP    VITALS:  T(C): , Max: 37.8 (12-21-21 @ 19:00)  HR:  (79 - 118)  BP: --  ABP:  (133/70 - 195/101)  RR:  (13 - 24)  SpO2:  (95% - 98%)  Wt(kg): --      12-21-21 @ 07:01  -  12-22-21 @ 07:00  --------------------------------------------------------  IN: 1785.6 mL / OUT: 1336 mL / NET: 449.6 mL      LABS:  Na: 155 (12-21 @ 20:24), 153 (12-21 @ 08:27), 156 (12-20 @ 19:51), 156 (12-20 @ 13:56), 153 (12-20 @ 01:56), 149 (12-19 @ 15:29)  K: 3.4 (12-21 @ 20:24), 3.2 (12-21 @ 08:27), 3.3 (12-20 @ 19:51), 3.6 (12-20 @ 13:56), 3.8 (12-20 @ 01:56), 3.3 (12-19 @ 15:29)  Cl: 117 (12-21 @ 20:24), 116 (12-21 @ 08:27), 118 (12-20 @ 19:51), 119 (12-20 @ 13:56), 118 (12-20 @ 01:56), 114 (12-19 @ 15:29)  CO2: 25 (12-21 @ 20:24), 22 (12-21 @ 08:27), 23 (12-20 @ 19:51), 22 (12-20 @ 13:56), 22 (12-20 @ 01:56), 22 (12-19 @ 15:29)  BUN: 41 (12-21 @ 20:24), 36 (12-21 @ 08:27), 36 (12-20 @ 19:51), 34 (12-20 @ 13:56), 36 (12-20 @ 01:56), 31 (12-19 @ 15:29)  Cr: 0.45 (12-21 @ 20:24), 0.35 (12-21 @ 08:27), 0.40 (12-20 @ 19:51), 0.46 (12-20 @ 13:56), 0.43 (12-20 @ 01:56), 0.50 (12-19 @ 15:29)  Glu: 180(12-21 @ 20:24), 155(12-21 @ 08:27), 150(12-20 @ 19:51), 155(12-20 @ 13:56), 146(12-20 @ 01:56), 180(12-19 @ 15:29)    Hgb: 12.9 (12-21 @ 20:24), 12.8 (12-21 @ 14:28), 12.9 (12-21 @ 08:27), 12.6 (12-21 @ 05:15), 12.2 (12-20 @ 19:51), 11.2 (12-20 @ 01:56)  Hct: 41.0 (12-21 @ 20:24), 40.7 (12-21 @ 14:28), 40.2 (12-21 @ 08:27), 39.7 (12-21 @ 05:15), 37.7 (12-20 @ 19:51), 34.6 (12-20 @ 01:56)  WBC: 12.10 (12-21 @ 20:24), 10.51 (12-21 @ 14:28), 10.55 (12-21 @ 08:27), 10.46 (12-21 @ 05:15), 10.31 (12-20 @ 19:51), 8.21 (12-20 @ 01:56)  Plt: 116 (12-21 @ 20:24), 119 (12-21 @ 14:28), 112 (12-21 @ 08:27), 111 (12-21 @ 05:15), 123 (12-20 @ 19:51), 138 (12-20 @ 01:56)    INR: 1.05 12-21-21 @ 14:28, 1.11 12-21-21 @ 08:27, 1.08 12-21-21 @ 05:15  PTT: 35.9 12-22-21 @ 04:20, 36.2 12-21-21 @ 22:41, 35.3 12-21-21 @ 20:24, 40.7 12-21-21 @ 14:28, 43.9 12-21-21 @ 08:27, 46.9 12-21-21 @ 05:15    IMAGING:   Recent imaging studies were reviewed.    MEDICATIONS:  acetaminophen     Tablet .. 650 milliGRAM(s) Oral every 6 hours PRN  argatroban Infusion 0.75 MICROgram(s)/kG/Min IV Continuous <Continuous>  chlorhexidine 4% Liquid 1 Application(s) Topical <User Schedule>  ciprofloxacin     Tablet 500 milliGRAM(s) Oral every 12 hours  multiple electrolytes Injection Type 1 1000 milliLiter(s) IV Continuous <Continuous>  niMODipine Oral Solution 30 milliGRAM(s) Enteral Tube every 2 hours  phenylephrine    Infusion 0.4 MICROgram(s)/kG/Min IV Continuous <Continuous>  potassium chloride   Powder 40 milliEquivalent(s) Enteral Tube daily  sodium chloride 0.9% lock flush 10 milliLiter(s) IV Push every 1 hour PRN  traMADol 25 milliGRAM(s) Oral every 6 hours PRN    EXAMINATION:  General:  calm  HEENT:  MMM  Neuro:  lethargic, oriented to her name only, follows simple commands, pupils R 5 mm, L 7 mm non reactive b/l, midline gaze, face symmetric, able to raise b/l arms antigravity, right LL at least 3/5, left LL 3/5  Cards:  RRR  Respiratory:  no respiratory distress  Abdomen:  soft  Extremities:  no edema

## 2021-12-22 NOTE — PROVIDER CONTACT NOTE (MEDICATION) - REASON
Hold 0400 Nimodipine
0600 dose of Nimodipine 30mg held
Held 0600 Nimodipine
2200 dose of Nimodipine 30mg held
0200 dose of Nimodipine 30mg held
0400 dose of Nimodipine 30mg held
Held 0000 Nimodipine

## 2021-12-22 NOTE — PRE-ANESTHESIA EVALUATION ADULT - NSANTHPMHFT_GEN_ALL_CORE
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; partial left m2 thrombus now s/p thrombectomy now with vasospasm
SAH   cerebral aneurysm

## 2021-12-22 NOTE — CHART NOTE - NSCHARTNOTEFT_GEN_A_CORE
Interventional Neuro Radiology  Pre-Procedure Note    This is a 67 yo woman admitted 12/11/21 with HA/vomiting since 12/10 found to have HH2mF3 SAH c/b hydro s/p coiling of L Pcomm aneurysm, found to have a partial left m2 thrombus now s/p thrombectomy with TICI 3 reperfusion. S/p R EVD for hydro c/b sizable tract hemorrhage with IVH now with L EVD. PBD 11. Patient presents now to neuro IR for selective cerebral angiography and vasospasm treatment.    Neuro Exam:  lethargic, oriented to her name, follows simple commands, pupils R 5 mm, L 7 mm non reactive b/l, midline gaze, face symmetric, able to raise b/l arms antigravity, both LL at least 3/5    PAST MEDICAL & SURGICAL HISTORY:  No pertinent past medical history  No significant past surgical history    Social History:   Denies tobacco use    FAMILY HISTORY:  No pertinent family history    Allergies: No Known Allergies      Current Medications: acetaminophen     Tablet .. 650 milliGRAM(s) Oral every 6 hours PRN  argatroban Infusion 0.75 MICROgram(s)/kG/Min IV Continuous <Continuous>  chlorhexidine 4% Liquid 1 Application(s) Topical <User Schedule>  ciprofloxacin     Tablet 500 milliGRAM(s) Oral every 12 hours  milrinone Infusion 0.125 MICROgram(s)/kG/Min IV Continuous <Continuous>  multiple electrolytes Injection Type 1 1000 milliLiter(s) IV Continuous <Continuous>  niMODipine Oral Solution 30 milliGRAM(s) Enteral Tube every 2 hours  phenylephrine    Infusion 0.4 MICROgram(s)/kG/Min IV Continuous <Continuous>  potassium chloride   Powder 40 milliEquivalent(s) Enteral Tube daily  sodium chloride 0.9% lock flush 10 milliLiter(s) IV Push every 1 hour PRN  traMADol 25 milliGRAM(s) Oral every 6 hours PRN      Labs:                         12.9   12.10 )-----------( 116      ( 21 Dec 2021 20:24 )             41.0       12-22    155<H>  |  117<H>  |  41<H>  ----------------------------<  124<H>  3.6   |  23  |  0.41<L>    Ca    9.4      22 Dec 2021 08:12  Phos  4.4     12-22  Mg     2.6     12-22      Blood Bank: 12-21-21  AB  --  Positive      Assessment/Plan:   This is a 65yo female  presents with SAH, 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 Patient presents to neuro-IR for selective cerebral angiography. Procedure/ risks/ benefits/ goals/ alternatives were explained. Risks include but are not limited to stroke/ vessel injury/ hemorrhage/ groin hematoma. All questions answered. Informed content obtained from patient's daughter and . Consent placed in chart.

## 2021-12-22 NOTE — PRE-ANESTHESIA EVALUATION ADULT - MALLAMPATI CLASS
Class I (easy) - visualization of the soft palate, fauces, uvula, and both anterior and posterior pillars
Class II - visualization of the soft palate, fauces, and uvula
Class III - visualization of the soft palate and the base of the uvula

## 2021-12-22 NOTE — PROVIDER CONTACT NOTE (MEDICATION) - BACKGROUND
66YOG p/w headache. DX: diffuse SAH 12/11 s/p 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, partial L m2 thrombus now s/p thrombectomy w tici 3 reperfusion.
66YOG p/w headache. DX: diffuse SAH 12/11 s/p ruptureed left pcomm aneurysm embolized with coils, partial L m2 thrombus now s/p thrombectomy w tici 3 reperfusion.

## 2021-12-22 NOTE — PROGRESS NOTE ADULT - SUBJECTIVE AND OBJECTIVE BOX
patient with fluctuating exam, not following commands briskly earlier in the evening, -140's   CTH done-->unchanged  low grade fever, preliminary infectious w/u negative--UA/CSF  decision made to augment blood pressure to SBP >160, on phenylephrine, reexamined-->EO to voice, oriented xself/hospital in [place] without physical stimulation, following commands and LAWTON  keep -200, pre op for potential angiogram in am per neuroIR

## 2021-12-22 NOTE — PROVIDER CONTACT NOTE (MEDICATION) - ASSESSMENT
HR: 56 ABP: 155/68(101) RR: 16 98% room air. Sinus hetal on monitor. Anox4 no neurological changes
Vitals in mar, patient lethargic, follows commands intermittently.
Vitals in mar, patient lethargic, follows commands intermittently.
HR: 55 ABP: 140/62 (91) RR: 17 97% room air. Sinus hetal on monitor. Anox4 no neurological changes
HR: 58 ABP: 133/58 (86) RR: 18 94% room air. Sinus hetal on monitor. Anox4 no neurological changes
HR: 57 ABP: 134/55 (83) RR: 17 97% room air. Sinus hetal on monitor. Anox4 no neurological changes
Vitals in mar, patient lethargic, follows commands intermittently.

## 2021-12-22 NOTE — PROGRESS NOTE ADULT - ASSESSMENT
7 yo woman admitted 12/11/21 with HA/vomiting since 12/10 found to have HH2mF3 SAH c/b hydro s/p coiling of L Pcomm aneurysm, found to have a partial left m2 thrombus now s/p thrombectomy with TICI 3 reperfusion. S/p R EVD for hydro c/b sizable tract hemorrhage with IVH now with L EVD. PBD 12    NEURO:  NC q1h  HCP: EVD at 0 cmH2O, output , goal ICP<22 mmhg , CPP >65 mmhg  DC Keppra   nimodipine     CVS: TTE EF 65 %   SBP goal 100-180 mmhg     PULM:  RA  O2 Sat >92%    RENAL:  IVL  Strict IS and Os  Na 140-150  q8h BMP    GI:  - NDGT feeding  - Bowel regimen: miralax, senna    ENDO:   - FS goal 120-180     HEME/ONC:  - SCDs  - Trend PTT Q6 hrs     ID:   Urine culture grown Klebsiella  Ciprofloxacin       Lines: R PICC, left Axillary A line    7 yo woman admitted 12/11/21 with HA/vomiting since 12/10 found to have HH2mF3 SAH c/b hydro s/p coiling of L Pcomm aneurysm, found to have a partial left m2 thrombus now s/p thrombectomy with TICI 3 reperfusion. S/p R EVD for hydro c/b sizable tract hemorrhage with IVH now with L EVD. PBD 12    NEURO:  NC q1h  HCP: EVD at 0 cmH2O, output 236 , goal ICP<22 mmhg , CPP >65 mmhg  Cerebral angiography/Angioplasty   DC Keppra   nimodipine     CVS: TTE EF 65 %   SBP goal 100-180 mmhg     PULM:  RA  O2 Sat >92%    RENAL:  IVL  Strict IS and Os  Na 140-150  q8h BMP    GI:  - NDGT feeding  - Bowel regimen: miralax, senna    ENDO:   - FS goal 120-180     HEME/ONC:  - SCDs  - Argatroban gtt  - Trend PTT Q6 hrs     ID:   Urine culture grown Klebsiella  Ciprofloxacin       Lines: R PICC, left Axillary A line

## 2021-12-22 NOTE — CHART NOTE - NSCHARTNOTEFT_GEN_A_CORE
Interventional Neuro- Radiology   Procedure Note    Procedure: Selective Cerebral Angiography and vasospasm treatment   Pre- Procedure Diagnosis: SAH, vasospasm   Post- Procedure Diagnosis: mild to moderate spasm bilaterally     : Dr. Lance MD  NP: Cynthia Eric     RN: Graham Bliss: Gennaro     Anesthesia: Dr. Obi MD (MAC)     I/Os:  Fluids: 250cc   Ty: DTV   Contrast: 47cc   Estimated Blood Loss: <10cc      Preliminary Report:  Under MAC, using a 5Fr short sheath to the right groin examination of left vertebral artery/ left internal carotid artery/ right vertebral artery/ right internal carotid artery via selective cerebral angiography demonstrates mild to moderate spasm bilaterally, s/p 2mg milrinone to right ICA and 2mg milrinone to left ICA. ( Official note to follow).    Patient tolerated procedure well, vital signs stable, hemodynamically stable, no change in neurological status compared to baseline. Results discussed with patient's family. Groin sheath d/c'ed, manual compression held to hemostasis, no active bleeding, no hematoma, Vascade applied, quick clot and safeguard balloon dressing applied at 1045h.  Patient transferred to NSCU for further care/ monitoring.     Magy Mills PA-C  [phone]

## 2021-12-22 NOTE — PROVIDER CONTACT NOTE (MEDICATION) - ACTION/TREATMENT ORDERED:
Aware
Hold nimodipine @0000 patient on pressors
Hold nimodipine @0600 patient on pressors
Aware
Hold nimodipine @0400 patient on pressors

## 2021-12-22 NOTE — PROGRESS NOTE ADULT - SUBJECTIVE AND OBJECTIVE BOX
NSCU-- ADDITIONAL PROGRESS NOTE    Nighttime rounds were performed -- please refer to earlier Progress Note for HPI details.    EXAMINATION:  lethargic, oriented to her name only, follows simple commands, pupils R 5 mm, L 7 mm non reactive b/l, midline gaze, face symmetric, able to raise b/l arms antigravity, right LL at least 3/5, left LL 3/5    T(C): 37.3 (12-22-21 @ 19:00), Max: 37.6 (12-21-21 @ 22:40)  HR: 83 (12-22-21 @ 20:45) (76 - 108)  RR: 18 (12-22-21 @ 20:45) (11 - 24)  SpO2: 96% (12-22-21 @ 20:45) (93% - 100%)    Relevant labwork and imaging reviewed.    Patient remains critically ill.    65 yo woman admitted 12/11/21 with HA/vomiting since 12/10 found to have HH2mF3 SAH c/b hydro s/p coiling of L Pcomm aneurysm, found to have a partial left m2 thrombus now s/p thrombectomy with TICI 3 reperfusion. S/p R EVD for hydro c/b sizable tract hemorrhage with IVH now with L EVD. PBD 11, HIT + now on argatroban     PLAN:  s/p angio found to have mild to moderate vasospasm   keep euvolemic, normonatremic   c/w nimodipine   SBP goal 120-200 d/c pressors serial exams to establish new SBP goal  c/w argatroban pending BALJEET   f/u blood culture

## 2021-12-22 NOTE — PRE-ANESTHESIA EVALUATION ADULT - NSANTHOSAYNRD_GEN_A_CORE
No. PARMJIT screening performed.  STOP BANG Legend: 0-2 = LOW Risk; 3-4 = INTERMEDIATE Risk; 5-8 = HIGH Risk
No. PARMJIT screening performed.  STOP BANG Legend: 0-2 = LOW Risk; 3-4 = INTERMEDIATE Risk; 5-8 = HIGH Risk

## 2021-12-23 LAB
ANION GAP SERPL CALC-SCNC: 11 MMOL/L — SIGNIFICANT CHANGE UP (ref 5–17)
ANION GAP SERPL CALC-SCNC: 14 MMOL/L — SIGNIFICANT CHANGE UP (ref 5–17)
APTT BLD: 38.9 SEC — HIGH (ref 27.5–35.5)
APTT BLD: 42.3 SEC — HIGH (ref 27.5–35.5)
APTT BLD: 42.7 SEC — HIGH (ref 27.5–35.5)
APTT BLD: 44.5 SEC — HIGH (ref 27.5–35.5)
BUN SERPL-MCNC: 32 MG/DL — HIGH (ref 7–23)
BUN SERPL-MCNC: 35 MG/DL — HIGH (ref 7–23)
CALCIUM SERPL-MCNC: 8.7 MG/DL — SIGNIFICANT CHANGE UP (ref 8.4–10.5)
CALCIUM SERPL-MCNC: 9 MG/DL — SIGNIFICANT CHANGE UP (ref 8.4–10.5)
CHLORIDE SERPL-SCNC: 117 MMOL/L — HIGH (ref 96–108)
CHLORIDE SERPL-SCNC: 117 MMOL/L — HIGH (ref 96–108)
CO2 SERPL-SCNC: 23 MMOL/L — SIGNIFICANT CHANGE UP (ref 22–31)
CO2 SERPL-SCNC: 24 MMOL/L — SIGNIFICANT CHANGE UP (ref 22–31)
CREAT SERPL-MCNC: 0.4 MG/DL — LOW (ref 0.5–1.3)
CREAT SERPL-MCNC: 0.52 MG/DL — SIGNIFICANT CHANGE UP (ref 0.5–1.3)
GLUCOSE SERPL-MCNC: 113 MG/DL — HIGH (ref 70–99)
GLUCOSE SERPL-MCNC: 153 MG/DL — HIGH (ref 70–99)
HCT VFR BLD CALC: 34.7 % — SIGNIFICANT CHANGE UP (ref 34.5–45)
HCT VFR BLD CALC: 37.9 % — SIGNIFICANT CHANGE UP (ref 34.5–45)
HGB BLD-MCNC: 11.1 G/DL — LOW (ref 11.5–15.5)
HGB BLD-MCNC: 12.2 G/DL — SIGNIFICANT CHANGE UP (ref 11.5–15.5)
INR BLD: 1.17 RATIO — HIGH (ref 0.88–1.16)
MAGNESIUM SERPL-MCNC: 2.5 MG/DL — SIGNIFICANT CHANGE UP (ref 1.6–2.6)
MAGNESIUM SERPL-MCNC: 2.6 MG/DL — SIGNIFICANT CHANGE UP (ref 1.6–2.6)
MCHC RBC-ENTMCNC: 30.9 PG — SIGNIFICANT CHANGE UP (ref 27–34)
MCHC RBC-ENTMCNC: 31.1 PG — SIGNIFICANT CHANGE UP (ref 27–34)
MCHC RBC-ENTMCNC: 32 GM/DL — SIGNIFICANT CHANGE UP (ref 32–36)
MCHC RBC-ENTMCNC: 32.2 GM/DL — SIGNIFICANT CHANGE UP (ref 32–36)
MCV RBC AUTO: 96.7 FL — SIGNIFICANT CHANGE UP (ref 80–100)
MCV RBC AUTO: 96.7 FL — SIGNIFICANT CHANGE UP (ref 80–100)
NRBC # BLD: 0 /100 WBCS — SIGNIFICANT CHANGE UP (ref 0–0)
NRBC # BLD: 0 /100 WBCS — SIGNIFICANT CHANGE UP (ref 0–0)
PHOSPHATE SERPL-MCNC: 3.1 MG/DL — SIGNIFICANT CHANGE UP (ref 2.5–4.5)
PHOSPHATE SERPL-MCNC: 3.4 MG/DL — SIGNIFICANT CHANGE UP (ref 2.5–4.5)
PLATELET # BLD AUTO: 114 K/UL — LOW (ref 150–400)
PLATELET # BLD AUTO: 121 K/UL — LOW (ref 150–400)
POTASSIUM SERPL-MCNC: 3 MMOL/L — LOW (ref 3.5–5.3)
POTASSIUM SERPL-MCNC: 3.6 MMOL/L — SIGNIFICANT CHANGE UP (ref 3.5–5.3)
POTASSIUM SERPL-SCNC: 3 MMOL/L — LOW (ref 3.5–5.3)
POTASSIUM SERPL-SCNC: 3.6 MMOL/L — SIGNIFICANT CHANGE UP (ref 3.5–5.3)
PROTHROM AB SERPL-ACNC: 13.9 SEC — HIGH (ref 10.6–13.6)
RBC # BLD: 3.59 M/UL — LOW (ref 3.8–5.2)
RBC # BLD: 3.92 M/UL — SIGNIFICANT CHANGE UP (ref 3.8–5.2)
RBC # FLD: 13.1 % — SIGNIFICANT CHANGE UP (ref 10.3–14.5)
RBC # FLD: 13.2 % — SIGNIFICANT CHANGE UP (ref 10.3–14.5)
SODIUM SERPL-SCNC: 152 MMOL/L — HIGH (ref 135–145)
SODIUM SERPL-SCNC: 154 MMOL/L — HIGH (ref 135–145)
WBC # BLD: 10.84 K/UL — HIGH (ref 3.8–10.5)
WBC # BLD: 12.97 K/UL — HIGH (ref 3.8–10.5)
WBC # FLD AUTO: 10.84 K/UL — HIGH (ref 3.8–10.5)
WBC # FLD AUTO: 12.97 K/UL — HIGH (ref 3.8–10.5)

## 2021-12-23 PROCEDURE — 99291 CRITICAL CARE FIRST HOUR: CPT

## 2021-12-23 PROCEDURE — 99223 1ST HOSP IP/OBS HIGH 75: CPT

## 2021-12-23 PROCEDURE — 93970 EXTREMITY STUDY: CPT | Mod: 26

## 2021-12-23 RX ORDER — POTASSIUM CHLORIDE 20 MEQ
20 PACKET (EA) ORAL
Refills: 0 | Status: COMPLETED | OUTPATIENT
Start: 2021-12-23 | End: 2021-12-23

## 2021-12-23 RX ORDER — SODIUM CHLORIDE 9 MG/ML
500 INJECTION INTRAMUSCULAR; INTRAVENOUS; SUBCUTANEOUS ONCE
Refills: 0 | Status: COMPLETED | OUTPATIENT
Start: 2021-12-23 | End: 2021-12-23

## 2021-12-23 RX ORDER — POTASSIUM CHLORIDE 20 MEQ
40 PACKET (EA) ORAL ONCE
Refills: 0 | Status: COMPLETED | OUTPATIENT
Start: 2021-12-23 | End: 2021-12-24

## 2021-12-23 RX ORDER — DIPHENHYDRAMINE HCL 50 MG
12.5 CAPSULE ORAL ONCE
Refills: 0 | Status: COMPLETED | OUTPATIENT
Start: 2021-12-23 | End: 2021-12-23

## 2021-12-23 RX ADMIN — NIMODIPINE 30 MILLIGRAM(S): 60 SOLUTION ORAL at 00:17

## 2021-12-23 RX ADMIN — NIMODIPINE 30 MILLIGRAM(S): 60 SOLUTION ORAL at 18:17

## 2021-12-23 RX ADMIN — NIMODIPINE 30 MILLIGRAM(S): 60 SOLUTION ORAL at 12:09

## 2021-12-23 RX ADMIN — NIMODIPINE 30 MILLIGRAM(S): 60 SOLUTION ORAL at 02:23

## 2021-12-23 RX ADMIN — Medication 50 MILLIEQUIVALENT(S): at 11:03

## 2021-12-23 RX ADMIN — TRAMADOL HYDROCHLORIDE 25 MILLIGRAM(S): 50 TABLET ORAL at 12:14

## 2021-12-23 RX ADMIN — Medication 12.5 MILLIGRAM(S): at 04:38

## 2021-12-23 RX ADMIN — NIMODIPINE 30 MILLIGRAM(S): 60 SOLUTION ORAL at 22:30

## 2021-12-23 RX ADMIN — Medication 650 MILLIGRAM(S): at 15:30

## 2021-12-23 RX ADMIN — NIMODIPINE 30 MILLIGRAM(S): 60 SOLUTION ORAL at 16:14

## 2021-12-23 RX ADMIN — Medication 50 MILLIEQUIVALENT(S): at 13:00

## 2021-12-23 RX ADMIN — NIMODIPINE 30 MILLIGRAM(S): 60 SOLUTION ORAL at 04:07

## 2021-12-23 RX ADMIN — TRAMADOL HYDROCHLORIDE 25 MILLIGRAM(S): 50 TABLET ORAL at 23:00

## 2021-12-23 RX ADMIN — TRAMADOL HYDROCHLORIDE 25 MILLIGRAM(S): 50 TABLET ORAL at 12:44

## 2021-12-23 RX ADMIN — Medication 50 MILLIEQUIVALENT(S): at 15:00

## 2021-12-23 RX ADMIN — NIMODIPINE 30 MILLIGRAM(S): 60 SOLUTION ORAL at 04:00

## 2021-12-23 RX ADMIN — NIMODIPINE 30 MILLIGRAM(S): 60 SOLUTION ORAL at 08:14

## 2021-12-23 RX ADMIN — NIMODIPINE 30 MILLIGRAM(S): 60 SOLUTION ORAL at 06:17

## 2021-12-23 RX ADMIN — NIMODIPINE 30 MILLIGRAM(S): 60 SOLUTION ORAL at 14:07

## 2021-12-23 RX ADMIN — Medication 500 MILLIGRAM(S): at 18:17

## 2021-12-23 RX ADMIN — CHLORHEXIDINE GLUCONATE 1 APPLICATION(S): 213 SOLUTION TOPICAL at 22:00

## 2021-12-23 RX ADMIN — ARGATROBAN 6.12 MICROGRAM(S)/KG/MIN: 50 INJECTION, SOLUTION INTRAVENOUS at 08:11

## 2021-12-23 RX ADMIN — SODIUM CHLORIDE 1000 MILLILITER(S): 9 INJECTION INTRAMUSCULAR; INTRAVENOUS; SUBCUTANEOUS at 18:45

## 2021-12-23 RX ADMIN — NIMODIPINE 30 MILLIGRAM(S): 60 SOLUTION ORAL at 20:59

## 2021-12-23 RX ADMIN — NIMODIPINE 30 MILLIGRAM(S): 60 SOLUTION ORAL at 10:20

## 2021-12-23 RX ADMIN — TRAMADOL HYDROCHLORIDE 25 MILLIGRAM(S): 50 TABLET ORAL at 22:14

## 2021-12-23 RX ADMIN — Medication 500 MILLIGRAM(S): at 06:20

## 2021-12-23 RX ADMIN — Medication 650 MILLIGRAM(S): at 15:00

## 2021-12-23 NOTE — SWALLOW BEDSIDE ASSESSMENT ADULT - SWALLOW EVAL: DIAGNOSIS
Order received and appreciated. Chart reviewed. [facility] pt with waxing and waning exam. Not medically appropriate at this time for bedside swallow evaluation. Will attempt evaluation as medically appropriate.
65 y/o F found to have SAH s/p coiling of L Pcomm aneurysm and thrombectomy with TICI 3 reperfusion, s/p R EVD for hydro c/b sizable tract hemorrhage with IVH now with L EVD now s/p angioplasty for mild vasospasm. Pt was seen today for bedside swallow evaluation which revealed oral dysphagia superimposed upon lethargy. The swallow sequence was characterized by mildly reduced oral grading, mildly delayed oral transit/reduced AP bolus transfer of puree textures, suspected delayed trigger of pharyngeal swallow, and reduced hyolarngeal elevation upon palpation. No overt clinical s/s of aspiration or penetration with thin liquids or puree. Of note, pt's hospital course has been c/b waxing and waning lethargy. Would recommend PO diet only when pt FULLY AWAKE AND ALERT.

## 2021-12-23 NOTE — PROVIDER CONTACT NOTE (OTHER) - BACKGROUND
SAH s/p PCOMM aneurysm embo with coils, and L M2 partial occlusion s/p thrombectomy with TICI 3 reperfusion
SAH s/p PCOMM aneurysm embo with coils, and L M2 partial occlusion s/p thrombectomy with TICI 3 reperfusion

## 2021-12-23 NOTE — PROGRESS NOTE ADULT - ASSESSMENT
5 yo woman admitted 12/11/21 with HA/vomiting since 12/10 found to have HH2mF3 SAH c/b hydro s/p coiling of L Pcomm aneurysm, found to have a partial left m2 thrombus now s/p thrombectomy with TICI 3 reperfusion. S/p R EVD for hydro c/b sizable tract hemorrhage with IVH now with L EVD. PBD 13    NEURO:  NC q1h  HCP: EVD at 0 cmH2O, output 236 , goal ICP<22 mmhg , CPP >65 mmhg  Cerebral angiography/Angioplasty   DC Keppra   nimodipine     CVS: TTE EF 65 %   SBP goal 100-180 mmhg     PULM:  RA  O2 Sat >92%    RENAL:  IVL  Strict IS and Os  Na 140-150  q8h BMP    GI:  - NDGT feeding  - Bowel regimen: miralax, senna    ENDO:   - FS goal 120-180     HEME/ONC:  - SCDs  - Argatroban gtt  - Trend PTT Q6 hrs     ID:   Urine culture grown Klebsiella  Ciprofloxacin    5 yo woman admitted 12/11/21 with HA/vomiting since 12/10 found to have HH2mF3 SAH c/b hydro s/p coiling of L Pcomm aneurysm, found to have a partial left m2 thrombus now s/p thrombectomy with TICI 3 reperfusion. S/p R EVD for hydro c/b sizable tract hemorrhage with IVH now with L EVD. PBD 13    NEURO:  NC q1h  HCP: EVD at 0 cmH2O, output 189 , goal ICP<22 mmhg , CPP >65 mmhg   nimodipine     CVS: TTE EF 65 %   SBP goal 100-180 mmhg     PULM:  RA  O2 Sat >92%    RENAL:  IVL  Strict IS and Os  Na 140-150  q8h BMP    GI:  - NDGT feeding  - Bowel regimen: miralax, senna    ENDO:   - FS goal 120-180     HEME/ONC:  - SCDs  - Argatroban gtt  - Trend PTT Q6 hrs     ID:   Urine culture grown Klebsiella  Ciprofloxacin     Full code    ICU

## 2021-12-23 NOTE — CONSULT NOTE ADULT - SUBJECTIVE AND OBJECTIVE BOX
Vascular Cardiology Consult Note    DIRECT SERVICE NUMBER:  [phone]           EMAIL [email or web address]   OFFICE [phone]    CC:  Subclavian thrombus    HPI:     66 F 12/11 SAH with coiling of aneurysm.  M2 thrombus with thrombectomy.  Daughter at bedside.  She has a R sided PICC line.  R a-line.  Duplex today showed R subclavian thrombus lining the picc line.  The thrombus burden is very small.  The arm is not edematous or swollen.  She is able to move the right arm and hand without issues.  The PICC line is functioning.  She is currently on argatroban gtt for concern for HIT.  But BALJEET is pending.  Hematology consult pending.         Allergies    No Known Allergies    Intolerances    	    MEDICATIONS:  argatroban Infusion 1.5 MICROgram(s)/kG/Min IV Continuous <Continuous>  niMODipine Oral Solution 30 milliGRAM(s) Enteral Tube every 2 hours    ciprofloxacin     Tablet 500 milliGRAM(s) Oral every 12 hours      acetaminophen     Tablet .. 650 milliGRAM(s) Oral every 6 hours PRN  traMADol 25 milliGRAM(s) Oral every 6 hours PRN        chlorhexidine 4% Liquid 1 Application(s) Topical <User Schedule>  potassium chloride  20 mEq/100 mL IVPB 20 milliEquivalent(s) IV Intermittent every 2 hours  sodium chloride 0.9% lock flush 10 milliLiter(s) IV Push every 1 hour PRN      PAST MEDICAL & SURGICAL HISTORY:  No pertinent past medical history    No significant past surgical history        FAMILY HISTORY:      SOCIAL HISTORY:  unchanged    REVIEW OF SYSTEMS:  CONSTITUTIONAL: No fever, weight loss, or fatigue  EYES: No eye pain, visual disturbances, or discharge  ENT:  No difficulty hearing, tinnitus, vertigo; No sinus or throat pain  NECK: No pain or stiffness  RESPIRATORY:  no SOB  CARDIOVASCULAR:  No CP   GASTROINTESTINAL: No abdominal or epigastric pain. No nausea, vomiting, or hematemesis; No diarrhea or constipation. No melena or hematochezia.  GENITOURINARY: No dysuria, frequency, hematuria, or incontinence  NEUROLOGICAL: No headaches, memory loss, loss of strength, numbness, or tremors  SKIN:   LYMPH Nodes: No enlarged glands  ENDOCRINE: No heat or cold intolerance; No hair loss  MUSCULOSKELETAL: No joint pain or swelling; No muscle, back, or extremity pain  PSYCHIATRIC: No depression, anxiety, mood swings, or difficulty sleeping  HEME/LYMPH: No easy bruising, or bleeding gums  ALLERGY AND IMMUNOLOGIC: No hives or eczema	    [ x] All others negative	  [ ] Unable to obtain    PHYSICAL EXAM:  T(C): 37.2 (12-23-21 @ 07:00), Max: 37.3 (12-22-21 @ 19:00)  HR: 100 (12-23-21 @ 12:00) (76 - 129)  BP: --  RR: 19 (12-23-21 @ 12:00) (11 - 22)  SpO2: 97% (12-23-21 @ 12:00) (92% - 99%)  Wt(kg): --  I&O's Summary    22 Dec 2021 07:01  -  23 Dec 2021 07:00  --------------------------------------------------------  IN: 2255.4 mL / OUT: 1064 mL / NET: 1191.4 mL    23 Dec 2021 07:01  -  23 Dec 2021 12:57  --------------------------------------------------------  IN: 420 mL / OUT: 42 mL / NET: 378 mL        Appearance:  	  HEENT:   Normal oral mucosa, PERRL, EOMI	  Lymphatic: No lymphadenopathy  Cardiovascular:  S1S2 Tachy at 110  Respiratory:  	Clear  Psychiatry:  AAO x 3  Gastrointestinal:  Soft, Non-tender, + BS	  Skin: No rashes, No ecchymoses, No cyanosis	  Extremities:  R arm no edema.  PICC line in place.  R a- line.    NG tube.   SCDs in place  No edema            LABS:	 	    CBC Full  -  ( 22 Dec 2021 19:41 )  WBC Count : 14.65 K/uL  Hemoglobin : 12.2 g/dL  Hematocrit : 38.5 %  Platelet Count - Automated : 124 K/uL  Mean Cell Volume : 96.5 fl  Mean Cell Hemoglobin : 30.6 pg  Mean Cell Hemoglobin Concentration : 31.7 gm/dL  Auto Neutrophil # : x  Auto Lymphocyte # : x  Auto Monocyte # : x  Auto Eosinophil # : x  Auto Basophil # : x  Auto Neutrophil % : x  Auto Lymphocyte % : x  Auto Monocyte % : x  Auto Eosinophil % : x  Auto Basophil % : x    12-23    154<H>  |  117<H>  |  32<H>  ----------------------------<  153<H>  3.0<L>   |  23  |  0.40<L>  12-22    152<H>  |  115<H>  |  37<H>  ----------------------------<  174<H>  3.2<L>   |  24  |  0.41<L>    Ca    9.0      23 Dec 2021 08:11  Ca    9.3      22 Dec 2021 19:41  Phos  3.1     12-23  Phos  3.3     12-22  Mg     2.6     12-23  Mg     2.6     12-22            Assessment:  1. R subclavian PICC line associated thrombus  - small clot burden  2.  ICH  3.  PCOM Aneurysm  4.  HIT?      Plan:  1. The patient is already on Argatroban  2.  If the PICC line is functioning, and needed, it can remain in for now  3.  Await BALJEET results  4.  Repeat duplex in 5-7 days of the upper extremity to assure stability  5.  If the PICC line is not needed in the near future, it should be removed.           Thank you    Jose Hill    Vascular Cardiology Service    Please call with any questions:   DIRECT SERVICE NUMBER:  [phone]  Office [phone]  email:  [email or web address]

## 2021-12-23 NOTE — PROVIDER CONTACT NOTE (OTHER) - ACTION/TREATMENT ORDERED:
At bedside to examine
EVD flushed X2. CT scan ordered. Protamine given for lovenox reversal, conscious sedation given for new L EVD insertion.  R EVD removed at bedside. Continue Q1 stroke checks. CT scan in the AM.

## 2021-12-23 NOTE — PROGRESS NOTE ADULT - SUBJECTIVE AND OBJECTIVE BOX
NSCU ATTENDING -- ADDITIONAL PROGRESS NOTE    Nighttime rounds were performed -- please refer to earlier Progress Note for HPI details.    EXAM PREFORMED AT :   AOx3, no facial, follows commands, upper extremity 5/5, LE 5/5, EOMI    T(C): 36.8 (12-23-21 @ 19:00), Max: 37.6 (12-23-21 @ 15:00)  HR: 90 (12-23-21 @ 20:00) (84 - 129)  RR: 19 (12-23-21 @ 20:00) (12 - 22)  SpO2: 100% (12-23-21 @ 20:00) (92% - 100%)    Relevant labwork and imaging reviewed.    Patient remains critically ill.    [A/P]  65 yo woman admitted 12/11/21 with HA/vomiting since 12/10 found to have HH2mF3 SAH c/b hydro s/p coiling of L Pcomm aneurysm, found to have a partial left m2 thrombus now s/p thrombectomy with TICI 3 reperfusion. S/p R EVD for hydro c/b sizable tract hemorrhage with IVH now with L EVD. PBD 13      -c/w Argatroban ggt goal 40-50  -found to have right subclavian thrombus   -keep EVD @ 0 until Saturday per NSGY; likely clamp trial   -keep euvolemic normonatremic       Additional 30 minutes of critical care time.

## 2021-12-23 NOTE — SWALLOW BEDSIDE ASSESSMENT ADULT - SWALLOW EVAL: RECOMMENDED FEEDING/EATING TECHNIQUES
slow rate/check mouth frequently for oral residue/pocketing/crush medication (when feasible)/maintain upright posture during/after eating for 30 mins/oral hygiene/small sips/bites

## 2021-12-23 NOTE — PROVIDER CONTACT NOTE (OTHER) - RECOMMENDATIONS
Notify provider
ICU team at bedside. Continue to monitor pt for worsening s/s of neurologic deterioration

## 2021-12-23 NOTE — PROVIDER CONTACT NOTE (OTHER) - ASSESSMENT
Pt. remains A&O x3, no drift on any extremities. Pupils remain unchanged, dilated and fixed. EVD open at 5 CMh20, absent meniscus, absent waveform. See assessment flowsheet for additional findings.
Vitals in mar. No neurological changes, on argatroban

## 2021-12-23 NOTE — PROGRESS NOTE ADULT - SUBJECTIVE AND OBJECTIVE BOX
HPI:  65 yo woman admitted 12/11/21 with HA/vomiting since 12/10 found to have HH2mF3 SAH c/b hydro s/p coiling of L Pcomm aneurysm, found to have a partial left m2 thrombus now s/p thrombectomy with TICI 3 reperfusion. S/p R EVD for hydro c/b sizable tract hemorrhage with IVH now with L EVD. PBD 13  Day 1 post angioplasty for mild vasospasm     OVERNIGHT EVENTS:   No acute events overnight.    VITALS:  T(C): , Max: 37.3 (12-22-21 @ 19:00)  HR:  (76 - 129)  BP: --  ABP:  (109/61 - 202/114)  RR:  (11 - 22)  SpO2:  (92% - 100%)  Wt(kg): --      12-22-21 @ 07:01  -  12-23-21 @ 07:00  --------------------------------------------------------  IN: 2255.4 mL / OUT: 1064 mL / NET: 1191.4 mL      LABS:  Na: 152 (12-22 @ 19:41), 155 (12-22 @ 08:12), 155 (12-21 @ 20:24), 153 (12-21 @ 08:27), 156 (12-20 @ 19:51), 156 (12-20 @ 13:56)  K: 3.2 (12-22 @ 19:41), 3.6 (12-22 @ 08:12), 3.4 (12-21 @ 20:24), 3.2 (12-21 @ 08:27), 3.3 (12-20 @ 19:51), 3.6 (12-20 @ 13:56)  Cl: 115 (12-22 @ 19:41), 117 (12-22 @ 08:12), 117 (12-21 @ 20:24), 116 (12-21 @ 08:27), 118 (12-20 @ 19:51), 119 (12-20 @ 13:56)  CO2: 24 (12-22 @ 19:41), 23 (12-22 @ 08:12), 25 (12-21 @ 20:24), 22 (12-21 @ 08:27), 23 (12-20 @ 19:51), 22 (12-20 @ 13:56)  BUN: 37 (12-22 @ 19:41), 41 (12-22 @ 08:12), 41 (12-21 @ 20:24), 36 (12-21 @ 08:27), 36 (12-20 @ 19:51), 34 (12-20 @ 13:56)  Cr: 0.41 (12-22 @ 19:41), 0.41 (12-22 @ 08:12), 0.45 (12-21 @ 20:24), 0.35 (12-21 @ 08:27), 0.40 (12-20 @ 19:51), 0.46 (12-20 @ 13:56)  Glu: 174(12-22 @ 19:41), 124(12-22 @ 08:12), 180(12-21 @ 20:24), 155(12-21 @ 08:27), 150(12-20 @ 19:51), 155(12-20 @ 13:56)    Hgb: 12.2 (12-22 @ 19:41), 12.5 (12-22 @ 15:15), 12.9 (12-21 @ 20:24), 12.8 (12-21 @ 14:28), 12.9 (12-21 @ 08:27), 12.6 (12-21 @ 05:15), 12.2 (12-20 @ 19:51)  Hct: 38.5 (12-22 @ 19:41), 39.6 (12-22 @ 15:15), 41.0 (12-21 @ 20:24), 40.7 (12-21 @ 14:28), 40.2 (12-21 @ 08:27), 39.7 (12-21 @ 05:15), 37.7 (12-20 @ 19:51)  WBC: 14.65 (12-22 @ 19:41), 12.92 (12-22 @ 15:15), 12.10 (12-21 @ 20:24), 10.51 (12-21 @ 14:28), 10.55 (12-21 @ 08:27), 10.46 (12-21 @ 05:15), 10.31 (12-20 @ 19:51)  Plt: 124 (12-22 @ 19:41), 125 (12-22 @ 15:15), 116 (12-21 @ 20:24), 119 (12-21 @ 14:28), 112 (12-21 @ 08:27), 111 (12-21 @ 05:15), 123 (12-20 @ 19:51)    INR: 1.21 12-22-21 @ 16:14, 1.05 12-21-21 @ 14:28, 1.11 12-21-21 @ 08:27, 1.08 12-21-21 @ 05:15  PTT: 42.3 12-23-21 @ 01:05, 41.6 12-22-21 @ 19:41, 39.4 12-22-21 @ 16:14, 39.9 12-22-21 @ 07:12, 35.9 12-22-21 @ 04:20, 36.2 12-21-21 @ 22:41, 35.3 12-21-21 @ 20:24, 40.7 12-21-21 @ 14:28, 43.9 12-21-21 @ 08:27, 46.9 12-21-21 @ 05:15    IMAGING:   Recent imaging studies were reviewed.    MEDICATIONS:  acetaminophen     Tablet .. 650 milliGRAM(s) Oral every 6 hours PRN  argatroban Infusion 1 MICROgram(s)/kG/Min IV Continuous <Continuous>  chlorhexidine 4% Liquid 1 Application(s) Topical <User Schedule>  ciprofloxacin     Tablet 500 milliGRAM(s) Oral every 12 hours  niMODipine Oral Solution 30 milliGRAM(s) Enteral Tube every 2 hours  potassium chloride   Powder 40 milliEquivalent(s) Enteral Tube daily  sodium chloride 0.9% lock flush 10 milliLiter(s) IV Push every 1 hour PRN  traMADol 25 milliGRAM(s) Oral every 6 hours PRN    EXAMINATION:  General:  calm  HEENT:  MMM  Neuro:  lethargic, oriented to her name only, follows simple commands, pupils R 5 mm, L 7 mm non reactive b/l, midline gaze, face symmetric, able to raise b/l arms antigravity, right LL at least 3/5, left LL 3/5  Cards:  RRR  Respiratory:  no respiratory distress  Abdomen:  soft  Extremities:  no edema

## 2021-12-23 NOTE — SWALLOW BEDSIDE ASSESSMENT ADULT - COMMENTS
Hospital Course:   12/11: PAD 0 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; partial left m2 thrombus now s/p thrombectomy with tici 3 reperfusion. EVD@10. Extubated to RA.   12/12: Awake, alert, fully oriented, pupils reactive, moves all limbs strongly, though slightly brisker on left.   12/13: Patient somewhat sleepier and EVD still not working. CTH showed rapidly enlarged tract hemorrhage and new R IVH. Plan to Place LEFT frontal EVD after protamine reversal and repeat coags.   12/15: More lethargic this evening but fluctuates.   12/16: Dietician note: "Per spouse, pt with good appetite PTA. Consumes on average two meals daily; enjoys most foods, specifically vegetables, beef, fish and pork. Denies intolerance to chewing/swallowing. Pt had received breakfast tray prior to discontinuation of diet, however per spouse, pt too sleepy to participate in diet."  12/17: s/p angio which showed no CVS. low grade fever. NGT feeds initiated.   12/18: EVD at 0. ICP 5-12. Output 424. CT head this AM with decrease in vent size. VEEG with mild-moderate generalized slowing. Increased lethargy, AAOx1, drift on all extremities, expressive aphasia, EVD remains unclamped @0cm H2O.  12/19: Overnight patient not verbal, CTH stable. EVD output 329cc in 24 hours.   12/21: Thrombocytopenia due to HIT. Hematology consulted.     IMAGING:  CT HEAD: 12/21/21  IMPRESSION: Slight decrease mass effect on the right lateral ventricle with decreased midline shift compared with the prior 12/28/2021. Unchanged appearance to bifrontal parenchymal acute hematoma.    CXR: 12/18/21  Lungs are clear.    Pt is unknown to this service.
Hospital Course:   12/11: PAD 0 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; partial left m2 thrombus now s/p thrombectomy with tici 3 reperfusion. EVD@10. Extubated to RA.   12/12: Awake, alert, fully oriented, pupils reactive, moves all limbs strongly, though slightly brisker on left.   12/13: Patient somewhat sleepier and EVD still not working. CTH showed rapidly enlarged tract hemorrhage and new R IVH. Plan to Place LEFT frontal EVD after protamine reversal and repeat coags.   12/15: More lethargic this evening but fluctuates.   12/16: Dietician note: "Per spouse, pt with good appetite PTA. Consumes on average two meals daily; enjoys most foods, specifically vegetables, beef, fish and pork. Denies intolerance to chewing/swallowing. Pt had received breakfast tray prior to discontinuation of diet, however per spouse, pt too sleepy to participate in diet."  12/17: s/p angio which showed no CVS. low grade fever. NGT feeds initiated.   12/18: EVD at 0. ICP 5-12. Output 424. CT head this AM with decrease in vent size. VEEG with mild-moderate generalized slowing. Increased lethargy, AAOx1, drift on all extremities, expressive aphasia, EVD remains unclamped @0cm H2O.  12/19: Overnight patient not verbal, CTH stable. EVD output 329cc in 24 hours.   12/21: Thrombocytopenia due to HIT. Hematology consulted. BSE deferred 2/2 lethargy.   12/22: s/p angio found to have mild to moderate vasospasm.    IMAGING:  CT HEAD: 12/21/21  IMPRESSION: Slight decrease mass effect on the right lateral ventricle with decreased midline shift compared with the prior 12/28/2021. Unchanged appearance to bifrontal parenchymal acute hematoma.    CXR: 12/18/21  Lungs are clear.    Pt is unknown to this service.

## 2021-12-23 NOTE — SWALLOW BEDSIDE ASSESSMENT ADULT - SWALLOW EVAL: RECOMMENDED DIET
Puree diet and thin liquids only when FULLY AWAKE AND ALERT. Utilize NGT for nutrition/hydration/medication during periods of lethargy

## 2021-12-23 NOTE — SWALLOW BEDSIDE ASSESSMENT ADULT - ORAL PHASE
mildly reduced anterior-posterior movement of bolus; mildly delayed oral transit mildly delayed oral transit

## 2021-12-23 NOTE — SWALLOW BEDSIDE ASSESSMENT ADULT - SLP GENERAL OBSERVATIONS
Pt was received at bedside sleeping. Daughter, Mendy, present. +room air, +KFT, +b/l wrist restraints, +L EVD, +tele (HR: 106, O2: 96, RR: 16)-- pt has been baseline tachy per RN. RNNayana assisted with repositioning pt upright to 90 degrees and adjusting EVD. Pt was AAOx1 to self only. Limited verbal output. Did not respond to other orientation questions. Daughter reported pt is typically AAOx2; however, pt with waxing/waning verbal output. Pt able to maintain alertness throughout PO trials, though lethargic at completion of evaluation.

## 2021-12-23 NOTE — SWALLOW BEDSIDE ASSESSMENT ADULT - SWALLOW EVAL: THERAPY FREQUENCY
Will f/u for assessment of diet tolerance and possible diet upgrade if improvement in mentation./as schedule permits

## 2021-12-23 NOTE — SWALLOW BEDSIDE ASSESSMENT ADULT - SPECIFY REASON(S)
to clinically evaluate pt's jordi-pharyngeal swallow mechanism and r/o dysphagia.
to clinically evaluate pt's jordi-pharyngeal swallow mechanism and r/o dysphagia.

## 2021-12-23 NOTE — SWALLOW BEDSIDE ASSESSMENT ADULT - SWALLOW EVAL: PATIENT/FAMILY GOALS STATEMENT
"It's a step forward if she can eat a little." Daughter denied any h/o dysphagia. "The issue isn't her swallowing, it's just her being too sleepy."

## 2021-12-23 NOTE — SWALLOW BEDSIDE ASSESSMENT ADULT - ADDITIONAL RECOMMENDATIONS
GOALS:  1. Pt/family/caregiver will demonstrate understanding and carryover of dysphagia management (safe swallow guidelines, compensatory strategies, dysphagia diet).  2. Pt will tolerate recommended diet with no overt, clinical s/s of aspiration.    *Recommend Speech Language Evaluation given reduced verbal output and suspected cognitive-linguistic deficits.

## 2021-12-23 NOTE — SWALLOW BEDSIDE ASSESSMENT ADULT - SLP PERTINENT HISTORY OF CURRENT PROBLEM
67 y/o F with no sig PMH, txfered from [facility] for HA since 12/10 morning. Had associated nausea/vomiting and posterior neck pain. CT: diffuse SAH, most prominent at Sylvian fissures. Exam: Somnolent, Ox2.5 (said November), Left pupil 6NR; Right pupil 5NR, EOMI, no facial, Right drift, LAWTON 5/5. Adm NSCU. Plan: Preop Angio/embolization, external ventricular drain placement today. Keppra 500 BID.
67 y/o F with no sig PMH, txfered from [facility] for HA since 12/10 morning. Had associated nausea/vomiting and posterior neck pain. CT: diffuse SAH, most prominent at Sylvian fissures. Exam: Somnolent, Ox2.5 (said November), Left pupil 6NR; Right pupil 5NR, EOMI, no facial, Right drift, LAWTON 5/5. Adm NSCU. Plan: Preop Angio/embolization, external ventricular drain placement today. Keppra 500 BID.

## 2021-12-24 LAB
ANION GAP SERPL CALC-SCNC: 12 MMOL/L — SIGNIFICANT CHANGE UP (ref 5–17)
APTT BLD: 46.2 SEC — HIGH (ref 27.5–35.5)
BUN SERPL-MCNC: 39 MG/DL — HIGH (ref 7–23)
CALCIUM SERPL-MCNC: 9.1 MG/DL — SIGNIFICANT CHANGE UP (ref 8.4–10.5)
CHLORIDE SERPL-SCNC: 113 MMOL/L — HIGH (ref 96–108)
CO2 SERPL-SCNC: 22 MMOL/L — SIGNIFICANT CHANGE UP (ref 22–31)
CREAT SERPL-MCNC: 0.57 MG/DL — SIGNIFICANT CHANGE UP (ref 0.5–1.3)
FACT XIII ACT/NOR PPP CHRO: 110 % ACTIV. — SIGNIFICANT CHANGE UP (ref 57–192)
GLUCOSE SERPL-MCNC: 113 MG/DL — HIGH (ref 70–99)
POTASSIUM SERPL-MCNC: 3.5 MMOL/L — SIGNIFICANT CHANGE UP (ref 3.5–5.3)
POTASSIUM SERPL-SCNC: 3.5 MMOL/L — SIGNIFICANT CHANGE UP (ref 3.5–5.3)
SODIUM SERPL-SCNC: 147 MMOL/L — HIGH (ref 135–145)

## 2021-12-24 PROCEDURE — 99291 CRITICAL CARE FIRST HOUR: CPT

## 2021-12-24 RX ORDER — POTASSIUM CHLORIDE 20 MEQ
40 PACKET (EA) ORAL ONCE
Refills: 0 | Status: COMPLETED | OUTPATIENT
Start: 2021-12-24 | End: 2021-12-24

## 2021-12-24 RX ORDER — POTASSIUM CHLORIDE 20 MEQ
40 PACKET (EA) ORAL ONCE
Refills: 0 | Status: DISCONTINUED | OUTPATIENT
Start: 2021-12-24 | End: 2021-12-24

## 2021-12-24 RX ORDER — NYSTATIN 500MM UNIT
500000 POWDER (EA) MISCELLANEOUS EVERY 6 HOURS
Refills: 0 | Status: COMPLETED | OUTPATIENT
Start: 2021-12-24 | End: 2021-12-31

## 2021-12-24 RX ADMIN — Medication 500 MILLIGRAM(S): at 18:09

## 2021-12-24 RX ADMIN — CHLORHEXIDINE GLUCONATE 1 APPLICATION(S): 213 SOLUTION TOPICAL at 22:03

## 2021-12-24 RX ADMIN — Medication 500 MILLIGRAM(S): at 05:24

## 2021-12-24 RX ADMIN — NIMODIPINE 30 MILLIGRAM(S): 60 SOLUTION ORAL at 12:20

## 2021-12-24 RX ADMIN — NIMODIPINE 30 MILLIGRAM(S): 60 SOLUTION ORAL at 08:38

## 2021-12-24 RX ADMIN — NIMODIPINE 30 MILLIGRAM(S): 60 SOLUTION ORAL at 10:24

## 2021-12-24 RX ADMIN — NIMODIPINE 30 MILLIGRAM(S): 60 SOLUTION ORAL at 00:30

## 2021-12-24 RX ADMIN — NIMODIPINE 30 MILLIGRAM(S): 60 SOLUTION ORAL at 22:03

## 2021-12-24 RX ADMIN — Medication 40 MILLIEQUIVALENT(S): at 00:28

## 2021-12-24 RX ADMIN — NIMODIPINE 30 MILLIGRAM(S): 60 SOLUTION ORAL at 16:16

## 2021-12-24 RX ADMIN — Medication 500000 UNIT(S): at 18:09

## 2021-12-24 RX ADMIN — NIMODIPINE 30 MILLIGRAM(S): 60 SOLUTION ORAL at 20:02

## 2021-12-24 RX ADMIN — NIMODIPINE 30 MILLIGRAM(S): 60 SOLUTION ORAL at 18:09

## 2021-12-24 RX ADMIN — Medication 40 MILLIEQUIVALENT(S): at 16:17

## 2021-12-24 RX ADMIN — NIMODIPINE 30 MILLIGRAM(S): 60 SOLUTION ORAL at 02:23

## 2021-12-24 RX ADMIN — NIMODIPINE 30 MILLIGRAM(S): 60 SOLUTION ORAL at 14:02

## 2021-12-24 RX ADMIN — NIMODIPINE 30 MILLIGRAM(S): 60 SOLUTION ORAL at 05:24

## 2021-12-24 RX ADMIN — ARGATROBAN 6.12 MICROGRAM(S)/KG/MIN: 50 INJECTION, SOLUTION INTRAVENOUS at 00:30

## 2021-12-24 NOTE — PROGRESS NOTE ADULT - SUBJECTIVE AND OBJECTIVE BOX
NSCU ATTENDING -- ADDITIONAL PROGRESS NOTE    Nighttime rounds were performed -- please refer to earlier Progress Note for HPI details.    EXAM PREFORMED AT :   AOx3, no facial, follows commands, upper extremity 5/5, LE 5/5, EOMI    T(C): 37.4 (12-24-21 @ 15:00), Max: 37.4 (12-24-21 @ 11:00)  HR: 101 (12-24-21 @ 19:00) (78 - 111)  RR: 20 (12-24-21 @ 19:00) (12 - 21)  SpO2: 94% (12-24-21 @ 19:00) (94% - 100%)    Relevant lab work and imaging reviewed.    Patient remains critically ill.    [A/P]  65 yo woman admitted 12/11/21 with HA/vomiting since 12/10 found to have HH2mF3 SAH c/b hydro s/p coiling of L Pcomm aneurysm, found to have a partial left m2 thrombus now s/p thrombectomy with TICI 3 reperfusion. S/p R EVD for hydro c/b sizable tract hemorrhage with IVH now with L EVD. PBD 14    -c/w Argatroban ggt goal 40-50  -keep EVD @ 0 until Saturday per NSGY; likely clamp trial   -SBP goal 100-200  -Ciprofloxacin 500mg BID for UTI  -keep euvolemic normonatremic; hypernatremia downtrending     Additional 30 minutes of critical care time. NSCU ATTENDING -- ADDITIONAL PROGRESS NOTE    Nighttime rounds were performed -- please refer to earlier Progress Note for HPI details.    EXAM PREFORMED AT :   AOx3, no facial, follows commands, upper extremity 5/5, LE 5/5, EOMI    T(C): 37.4 (12-24-21 @ 15:00), Max: 37.4 (12-24-21 @ 11:00)  HR: 101 (12-24-21 @ 19:00) (78 - 111)  RR: 20 (12-24-21 @ 19:00) (12 - 21)  SpO2: 94% (12-24-21 @ 19:00) (94% - 100%)    Relevant lab work and imaging reviewed.    Patient remains critically ill.    [A/P]  65 yo woman admitted 12/11/21 with HA/vomiting since 12/10 found to have HH2mF3 SAH c/b hydro s/p coiling of L Pcomm aneurysm, found to have a partial left m2 thrombus now s/p thrombectomy with TICI 3 reperfusion. S/p R EVD for hydro c/b sizable tract hemorrhage with IVH now with L EVD. PBD 14    -c/w Argatroban ggt goal 40-50  -keep EVD @ 0 until Saturday per NSGY; likely clamp trial   -SBP goal 100-200  -Ciprofloxacin 500mg BID for UTI  -keep euvolemic normonatremic; hypernatremia downtrending; free water discontinued in evening     Additional 30 minutes of critical care time. NSCU ATTENDING -- ADDITIONAL PROGRESS NOTE    Nighttime rounds were performed -- please refer to earlier Progress Note for HPI details.    EXAM PREFORMED AT :   eyes open spontaneous, verbal to nox however does not answer orientation questions, no facial, follows commands, upper extremity 5/5, LE 5/5, EOMI    T(C): 37.4 (12-24-21 @ 15:00), Max: 37.4 (12-24-21 @ 11:00)  HR: 101 (12-24-21 @ 19:00) (78 - 111)  RR: 20 (12-24-21 @ 19:00) (12 - 21)  SpO2: 94% (12-24-21 @ 19:00) (94% - 100%)    Relevant lab work and imaging reviewed.    Patient remains critically ill.    [A/P]  65 yo woman admitted 12/11/21 with HA/vomiting since 12/10 found to have HH2mF3 SAH c/b hydro s/p coiling of L Pcomm aneurysm, found to have a partial left m2 thrombus now s/p thrombectomy with TICI 3 reperfusion. S/p R EVD for hydro c/b sizable tract hemorrhage with IVH now with L EVD. PBD 14    -c/w Argatroban ggt goal 40-50  -keep EVD @ 0 until Saturday per NSGY; likely clamp trial   -SBP goal 100-200  -Ciprofloxacin 500mg BID for UTI  -keep euvolemic normonatremic; hypernatremia downtrending; free water discontinued in evening     Additional 30 minutes of critical care time.

## 2021-12-24 NOTE — PROGRESS NOTE ADULT - SUBJECTIVE AND OBJECTIVE BOX
HPI:  HPI:  65 yo woman admitted 12/11/21 with HA/vomiting since 12/10 found to have HH2mF3 SAH c/b hydro s/p coiling of L Pcomm aneurysm, found to have a partial left m2 thrombus now s/p thrombectomy with TICI 3 reperfusion. S/p R EVD for hydro c/b sizable tract hemorrhage with IVH now with L EVD. PBD 14  Day 2 post angioplasty for mild vasospasm     OVERNIGHT EVENTS:   No acute events overnight.    VITALS:  T(C): , Max: 37.6 (12-23-21 @ 15:00)  HR:  (78 - 116)  BP: --  ABP:  (110/69 - 166/98)  RR:  (12 - 21)  SpO2:  (93% - 100%)  Wt(kg): --      12-23-21 @ 07:01  -  12-24-21 @ 07:00  --------------------------------------------------------  IN: 1165.2 mL / OUT: 1919 mL / NET: -753.8 mL    12-24-21 @ 07:01  -  12-24-21 @ 10:45  --------------------------------------------------------  IN: 508.3 mL / OUT: 34 mL / NET: 474.3 mL      LABS:  Na: 152 (12-23 @ 20:44), 154 (12-23 @ 08:11), 152 (12-22 @ 19:41), 155 (12-22 @ 08:12), 155 (12-21 @ 20:24)  K: 3.6 (12-23 @ 20:44), 3.0 (12-23 @ 08:11), 3.2 (12-22 @ 19:41), 3.6 (12-22 @ 08:12), 3.4 (12-21 @ 20:24)  Cl: 117 (12-23 @ 20:44), 117 (12-23 @ 08:11), 115 (12-22 @ 19:41), 117 (12-22 @ 08:12), 117 (12-21 @ 20:24)  CO2: 24 (12-23 @ 20:44), 23 (12-23 @ 08:11), 24 (12-22 @ 19:41), 23 (12-22 @ 08:12), 25 (12-21 @ 20:24)  BUN: 35 (12-23 @ 20:44), 32 (12-23 @ 08:11), 37 (12-22 @ 19:41), 41 (12-22 @ 08:12), 41 (12-21 @ 20:24)  Cr: 0.52 (12-23 @ 20:44), 0.40 (12-23 @ 08:11), 0.41 (12-22 @ 19:41), 0.41 (12-22 @ 08:12), 0.45 (12-21 @ 20:24)  Glu: 113(12-23 @ 20:44), 153(12-23 @ 08:11), 174(12-22 @ 19:41), 124(12-22 @ 08:12), 180(12-21 @ 20:24)    Hgb: 11.1 (12-23 @ 20:44), 12.2 (12-23 @ 13:48), 12.2 (12-22 @ 19:41), 12.5 (12-22 @ 15:15), 12.9 (12-21 @ 20:24), 12.8 (12-21 @ 14:28)  Hct: 34.7 (12-23 @ 20:44), 37.9 (12-23 @ 13:48), 38.5 (12-22 @ 19:41), 39.6 (12-22 @ 15:15), 41.0 (12-21 @ 20:24), 40.7 (12-21 @ 14:28)  WBC: 10.84 (12-23 @ 20:44), 12.97 (12-23 @ 13:48), 14.65 (12-22 @ 19:41), 12.92 (12-22 @ 15:15), 12.10 (12-21 @ 20:24), 10.51 (12-21 @ 14:28)  Plt: 114 (12-23 @ 20:44), 121 (12-23 @ 13:48), 124 (12-22 @ 19:41), 125 (12-22 @ 15:15), 116 (12-21 @ 20:24), 119 (12-21 @ 14:28)    INR: 1.17 12-23-21 @ 13:48, 1.21 12-22-21 @ 16:14, 1.05 12-21-21 @ 14:28  PTT: 44.5 12-23-21 @ 20:44, 42.7 12-23-21 @ 13:48, 38.9 12-23-21 @ 07:05, 42.3 12-23-21 @ 01:05, 41.6 12-22-21 @ 19:41, 39.4 12-22-21 @ 16:14, 39.9 12-22-21 @ 07:12, 35.9 12-22-21 @ 04:20, 36.2 12-21-21 @ 22:41, 35.3 12-21-21 @ 20:24, 40.7 12-21-21 @ 14:28    IMAGING:   Recent imaging studies were reviewed.    MEDICATIONS:  acetaminophen     Tablet .. 650 milliGRAM(s) Oral every 6 hours PRN  argatroban Infusion 1.5 MICROgram(s)/kG/Min IV Continuous <Continuous>  chlorhexidine 4% Liquid 1 Application(s) Topical <User Schedule>  ciprofloxacin     Tablet 500 milliGRAM(s) Oral every 12 hours  niMODipine Oral Solution 30 milliGRAM(s) Enteral Tube every 2 hours  sodium chloride 0.9% lock flush 10 milliLiter(s) IV Push every 1 hour PRN  traMADol 25 milliGRAM(s) Oral every 6 hours PRN    EXAMINATION:  General:  calm  HEENT:  MMM  Neuro:  Alert , oriented x 3, follows commands, pupils R 5 mm, L 7 mm non reactive b/l, midline gaze, face symmetric, moves all limbs against garvity  Cards:  RRR  Respiratory:  no respiratory distress  Abdomen:  soft  Extremities:  no edema

## 2021-12-24 NOTE — PROGRESS NOTE ADULT - ASSESSMENT
7 yo woman admitted 12/11/21 with HA/vomiting since 12/10 found to have HH2mF3 SAH c/b hydro s/p coiling of L Pcomm aneurysm, found to have a partial left m2 thrombus now s/p thrombectomy with TICI 3 reperfusion. S/p R EVD for hydro c/b sizable tract hemorrhage with IVH now with L EVD. PBD 14    NEURO:  NC q1h  HCP: EVD at 0 cmH2O, output 269 , goal ICP<22 mmhg , CPP >65 mmhg   nimodipine     CVS: TTE EF 65 %   SBP goal 100-180 mmhg     PULM:  RA  O2 Sat >92%    RENAL:  IVL  Strict IS and Os  Na 140-150  q8h BMP    GI:  - Pureed diet   - Bowel regimen: miralax, senna    ENDO:   - FS goal 120-180     HEME/ONC:  - SCDs  - Argatroban gtt  - Trend PTT Q6 hrs     ID:   Urine culture grown Klebsiella  Ciprofloxacin     Full code    ICU

## 2021-12-25 LAB
ANION GAP SERPL CALC-SCNC: 12 MMOL/L — SIGNIFICANT CHANGE UP (ref 5–17)
APTT BLD: 40.4 SEC — HIGH (ref 27.5–35.5)
BUN SERPL-MCNC: 36 MG/DL — HIGH (ref 7–23)
CALCIUM SERPL-MCNC: 8.9 MG/DL — SIGNIFICANT CHANGE UP (ref 8.4–10.5)
CHLORIDE SERPL-SCNC: 113 MMOL/L — HIGH (ref 96–108)
CO2 SERPL-SCNC: 21 MMOL/L — LOW (ref 22–31)
CREAT SERPL-MCNC: 0.42 MG/DL — LOW (ref 0.5–1.3)
GLUCOSE SERPL-MCNC: 108 MG/DL — HIGH (ref 70–99)
HCT VFR BLD CALC: 33.6 % — LOW (ref 34.5–45)
HGB BLD-MCNC: 11 G/DL — LOW (ref 11.5–15.5)
MAGNESIUM SERPL-MCNC: 2.4 MG/DL — SIGNIFICANT CHANGE UP (ref 1.6–2.6)
MCHC RBC-ENTMCNC: 30.6 PG — SIGNIFICANT CHANGE UP (ref 27–34)
MCHC RBC-ENTMCNC: 32.7 GM/DL — SIGNIFICANT CHANGE UP (ref 32–36)
MCV RBC AUTO: 93.6 FL — SIGNIFICANT CHANGE UP (ref 80–100)
NRBC # BLD: 0 /100 WBCS — SIGNIFICANT CHANGE UP (ref 0–0)
PHOSPHATE SERPL-MCNC: 2.9 MG/DL — SIGNIFICANT CHANGE UP (ref 2.5–4.5)
PLATELET # BLD AUTO: 152 K/UL — SIGNIFICANT CHANGE UP (ref 150–400)
POTASSIUM SERPL-MCNC: 3.5 MMOL/L — SIGNIFICANT CHANGE UP (ref 3.5–5.3)
POTASSIUM SERPL-SCNC: 3.5 MMOL/L — SIGNIFICANT CHANGE UP (ref 3.5–5.3)
RBC # BLD: 3.59 M/UL — LOW (ref 3.8–5.2)
RBC # FLD: 12.9 % — SIGNIFICANT CHANGE UP (ref 10.3–14.5)
SODIUM SERPL-SCNC: 146 MMOL/L — HIGH (ref 135–145)
WBC # BLD: 9.72 K/UL — SIGNIFICANT CHANGE UP (ref 3.8–10.5)
WBC # FLD AUTO: 9.72 K/UL — SIGNIFICANT CHANGE UP (ref 3.8–10.5)

## 2021-12-25 PROCEDURE — 70450 CT HEAD/BRAIN W/O DYE: CPT | Mod: 26

## 2021-12-25 PROCEDURE — 99291 CRITICAL CARE FIRST HOUR: CPT

## 2021-12-25 RX ORDER — POTASSIUM CHLORIDE 20 MEQ
20 PACKET (EA) ORAL
Refills: 0 | Status: COMPLETED | OUTPATIENT
Start: 2021-12-25 | End: 2021-12-25

## 2021-12-25 RX ADMIN — NIMODIPINE 30 MILLIGRAM(S): 60 SOLUTION ORAL at 04:14

## 2021-12-25 RX ADMIN — Medication 650 MILLIGRAM(S): at 13:15

## 2021-12-25 RX ADMIN — NIMODIPINE 30 MILLIGRAM(S): 60 SOLUTION ORAL at 08:07

## 2021-12-25 RX ADMIN — NIMODIPINE 30 MILLIGRAM(S): 60 SOLUTION ORAL at 00:00

## 2021-12-25 RX ADMIN — TRAMADOL HYDROCHLORIDE 25 MILLIGRAM(S): 50 TABLET ORAL at 17:00

## 2021-12-25 RX ADMIN — Medication 20 MILLIEQUIVALENT(S): at 08:08

## 2021-12-25 RX ADMIN — NIMODIPINE 30 MILLIGRAM(S): 60 SOLUTION ORAL at 18:12

## 2021-12-25 RX ADMIN — NIMODIPINE 30 MILLIGRAM(S): 60 SOLUTION ORAL at 02:02

## 2021-12-25 RX ADMIN — Medication 20 MILLIEQUIVALENT(S): at 05:59

## 2021-12-25 RX ADMIN — Medication 500000 UNIT(S): at 05:59

## 2021-12-25 RX ADMIN — Medication 500 MILLIGRAM(S): at 05:59

## 2021-12-25 RX ADMIN — NIMODIPINE 30 MILLIGRAM(S): 60 SOLUTION ORAL at 12:03

## 2021-12-25 RX ADMIN — NIMODIPINE 30 MILLIGRAM(S): 60 SOLUTION ORAL at 20:01

## 2021-12-25 RX ADMIN — NIMODIPINE 30 MILLIGRAM(S): 60 SOLUTION ORAL at 21:59

## 2021-12-25 RX ADMIN — Medication 500000 UNIT(S): at 18:12

## 2021-12-25 RX ADMIN — NIMODIPINE 30 MILLIGRAM(S): 60 SOLUTION ORAL at 14:05

## 2021-12-25 RX ADMIN — Medication 650 MILLIGRAM(S): at 14:00

## 2021-12-25 RX ADMIN — NIMODIPINE 30 MILLIGRAM(S): 60 SOLUTION ORAL at 16:13

## 2021-12-25 RX ADMIN — Medication 20 MILLIEQUIVALENT(S): at 10:06

## 2021-12-25 RX ADMIN — Medication 500000 UNIT(S): at 00:26

## 2021-12-25 RX ADMIN — NIMODIPINE 30 MILLIGRAM(S): 60 SOLUTION ORAL at 05:59

## 2021-12-25 RX ADMIN — NIMODIPINE 30 MILLIGRAM(S): 60 SOLUTION ORAL at 10:05

## 2021-12-25 RX ADMIN — TRAMADOL HYDROCHLORIDE 25 MILLIGRAM(S): 50 TABLET ORAL at 16:17

## 2021-12-25 RX ADMIN — CHLORHEXIDINE GLUCONATE 1 APPLICATION(S): 213 SOLUTION TOPICAL at 21:59

## 2021-12-25 RX ADMIN — Medication 500 MILLIGRAM(S): at 18:12

## 2021-12-25 RX ADMIN — Medication 500000 UNIT(S): at 12:24

## 2021-12-25 NOTE — PROGRESS NOTE ADULT - SUBJECTIVE AND OBJECTIVE BOX
Patient seen and examined at bedside.    --Anticoagulation--  argatroban Infusion 1.5 MICROgram(s)/kG/Min IV Continuous <Continuous>    T(C): 37.1 (12-24-21 @ 23:00), Max: 37.4 (12-24-21 @ 11:00)  HR: 79 (12-25-21 @ 03:00) (70 - 111)  BP: --  RR: 14 (12-25-21 @ 03:00) (13 - 21)  SpO2: 98% (12-25-21 @ 03:00) (94% - 100%)  Wt(kg): --    Exam:  Awake, Ox1-2 (name and hospital) trace Lt facial, FC, BUE 4+/5, B/L lowers 4/5(R>L effort dependent)   [place]    Labs:                        11.0   9.72  )-----------( 152      ( 25 Dec 2021 03:42 )             33.6     12-24    147<H>  |  113<H>  |  39<H>  ----------------------------<  113<H>  3.5   |  22  |  0.57    Ca    9.1      24 Dec 2021 15:16  Phos  3.4     12-23  Mg     2.5     12-23

## 2021-12-25 NOTE — PROGRESS NOTE ADULT - ASSESSMENT
67 yo woman admitted 12/11/21 with HA/vomiting since 12/10 found to have HH2mF3 SAH c/b hydro s/p coiling of L Pcomm aneurysm, found to have a partial left m2 thrombus now s/p thrombectomy with TICI 3 reperfusion. S/p R EVD for hydro c/b sizable tract hemorrhage with IVH now with L EVD. PBD 15    NEURO:  NC q1h  HCP: EVD at 0 cmH2O, output 350 , goal ICP<22 mmhg , CPP >65 mmhg   EVD clamping today   nimodipine     CVS: TTE EF 65 %   SBP goal 100-180 mmhg     PULM:  RA  O2 Sat >92%    RENAL:  IVL  Strict IS and Os  Na 140-150  q8h BMP    GI:  - Pureed diet   - Bowel regimen: miralax, senna    ENDO:   - FS goal 120-180     HEME/ONC:  - SCDs  - Argatroban gtt  - Trend PTT Q6 hrs     ID:   Urine culture grown Klebsiella  Ciprofloxacin     Full code    ICU

## 2021-12-25 NOTE — PROGRESS NOTE ADULT - SUBJECTIVE AND OBJECTIVE BOX
NSCU  -- ADDITIONAL PROGRESS NOTE    Nighttime rounds were performed -- please refer to earlier Progress Note for HPI details.  Neuro:  Alert , oriented x 3, follows commands, pupils R 5 mm, L 7 mm non reactive b/l, midline gaze, face symmetric, moves all limbs against resistance, no drift   T(C): 37 (12-25-21 @ 23:00), Max: 37 (12-25-21 @ 23:00)  HR: 78 (12-26-21 @ 00:30) (68 - 90)  RR: 16 (12-26-21 @ 00:30) (12 - 20)  SpO2: 96% (12-26-21 @ 00:30) (96% - 100%)      Relevant labwork and imaging reviewed.      [A/P]  67 yo woman admitted 12/11/21 with HA/vomiting since 12/10 found to have HH2mF3 SAH c/b hydro s/p coiling of L Pcomm aneurysm, found to have a partial left m2 thrombus now s/p thrombectomy with TICI 3 reperfusion. S/p R EVD for hydro c/b sizable tract hemorrhage with IVH now with L EVD. PBD 16, now undergoing clamp trial     -clamp trial started 9:30AM 12/25, monitor for hydrocephalus goal ICP<22 mmhg , CPP >65 mmhg   -c/w Ciprofloxacin for UTI klebsiella   -c/w argatroban infusion for HIT[+], BALJEET pending(sent 12/20)  -SBP goal 100-180, keep euvolemic/normonatremic

## 2021-12-25 NOTE — PROGRESS NOTE ADULT - ASSESSMENT
66F HH2 MF3 SAH s/p coil L pcom aneurysm and M2 thrombectomy of iatrogenic thrombus requiring EVD placement c/b tract hemorrhage and contralateral evd placement, now neurostable, minimal spasm.   - neuro checks q1  - CTH in AM, consider clamp trial  - passed SS  - fu HIT panel, on argatroban gtt  - TCDs poor windows, monitor BP  - EVD at 0, output 244cc, icps 0-15  - cipro for uti  - sbp 100-200

## 2021-12-25 NOTE — PROGRESS NOTE ADULT - SUBJECTIVE AND OBJECTIVE BOX
HPI:  65 yo woman admitted 12/11/21 with HA/vomiting since 12/10 found to have HH2mF3 SAH c/b hydro s/p coiling of L Pcomm aneurysm, found to have a partial left m2 thrombus now s/p thrombectomy with TICI 3 reperfusion. S/p R EVD for hydro c/b sizable tract hemorrhage with IVH now with L EVD. PBD 14    OVERNIGHT EVENTS:   No acute events overnight.    VITALS:  T(C): , Max: 37.4 (12-24-21 @ 11:00)  HR:  (70 - 111)  BP: --  ABP:  (109/64 - 163/91)  RR:  (13 - 21)  SpO2:  (94% - 100%)  Wt(kg): --      12-24-21 @ 07:01  -  12-25-21 @ 07:00  --------------------------------------------------------  IN: 1596.4 mL / OUT: 1025 mL / NET: 571.4 mL      LABS:  Na: 146 (12-25 @ 03:42), 147 (12-24 @ 15:16), 152 (12-23 @ 20:44), 154 (12-23 @ 08:11), 152 (12-22 @ 19:41), 155 (12-22 @ 08:12)  K: 3.5 (12-25 @ 03:42), 3.5 (12-24 @ 15:16), 3.6 (12-23 @ 20:44), 3.0 (12-23 @ 08:11), 3.2 (12-22 @ 19:41), 3.6 (12-22 @ 08:12)  Cl: 113 (12-25 @ 03:42), 113 (12-24 @ 15:16), 117 (12-23 @ 20:44), 117 (12-23 @ 08:11), 115 (12-22 @ 19:41), 117 (12-22 @ 08:12)  CO2: 21 (12-25 @ 03:42), 22 (12-24 @ 15:16), 24 (12-23 @ 20:44), 23 (12-23 @ 08:11), 24 (12-22 @ 19:41), 23 (12-22 @ 08:12)  BUN: 36 (12-25 @ 03:42), 39 (12-24 @ 15:16), 35 (12-23 @ 20:44), 32 (12-23 @ 08:11), 37 (12-22 @ 19:41), 41 (12-22 @ 08:12)  Cr: 0.42 (12-25 @ 03:42), 0.57 (12-24 @ 15:16), 0.52 (12-23 @ 20:44), 0.40 (12-23 @ 08:11), 0.41 (12-22 @ 19:41), 0.41 (12-22 @ 08:12)  Glu: 108(12-25 @ 03:42), 113(12-24 @ 15:16), 113(12-23 @ 20:44), 153(12-23 @ 08:11), 174(12-22 @ 19:41), 124(12-22 @ 08:12)    Hgb: 11.0 (12-25 @ 03:42), 11.1 (12-23 @ 20:44), 12.2 (12-23 @ 13:48), 12.2 (12-22 @ 19:41), 12.5 (12-22 @ 15:15)  Hct: 33.6 (12-25 @ 03:42), 34.7 (12-23 @ 20:44), 37.9 (12-23 @ 13:48), 38.5 (12-22 @ 19:41), 39.6 (12-22 @ 15:15)  WBC: 9.72 (12-25 @ 03:42), 10.84 (12-23 @ 20:44), 12.97 (12-23 @ 13:48), 14.65 (12-22 @ 19:41), 12.92 (12-22 @ 15:15)  Plt: 152 (12-25 @ 03:42), 114 (12-23 @ 20:44), 121 (12-23 @ 13:48), 124 (12-22 @ 19:41), 125 (12-22 @ 15:15)    INR: 1.17 12-23-21 @ 13:48, 1.21 12-22-21 @ 16:14  PTT: 46.2 12-24-21 @ 15:16, 44.5 12-23-21 @ 20:44, 42.7 12-23-21 @ 13:48, 38.9 12-23-21 @ 07:05, 42.3 12-23-21 @ 01:05, 41.6 12-22-21 @ 19:41, 39.4 12-22-21 @ 16:14, 39.9 12-22-21 @ 07:12    IMAGING:   Recent imaging studies were reviewed.    MEDICATIONS:  acetaminophen     Tablet .. 650 milliGRAM(s) Oral every 6 hours PRN  argatroban Infusion 1.5 MICROgram(s)/kG/Min IV Continuous <Continuous>  chlorhexidine 4% Liquid 1 Application(s) Topical <User Schedule>  ciprofloxacin     Tablet 500 milliGRAM(s) Oral every 12 hours  niMODipine Oral Solution 30 milliGRAM(s) Enteral Tube every 2 hours  nystatin    Suspension 202694 Unit(s) Oral every 6 hours  potassium chloride   Solution 20 milliEquivalent(s) Enteral Tube every 2 hours  sodium chloride 0.9% lock flush 10 milliLiter(s) IV Push every 1 hour PRN  traMADol 25 milliGRAM(s) Oral every 6 hours PRN    EXAMINATION:  General:  calm  HEENT:  MMM  Neuro:  Alert , oriented x 3, follows commands, pupils R 5 mm, L 7 mm non reactive b/l, midline gaze, face symmetric, moves all limbs against garvity  Cards:  RRR  Respiratory:  no respiratory distress  Abdomen:  soft  Extremities:  no edema   HPI:  67 yo woman admitted 12/11/21 with HA/vomiting since 12/10 found to have HH2mF3 SAH c/b hydro s/p coiling of L Pcomm aneurysm, found to have a partial left m2 thrombus now s/p thrombectomy with TICI 3 reperfusion. S/p R EVD for hydro c/b sizable tract hemorrhage with IVH now with L EVD. PBD 15    OVERNIGHT EVENTS:   No acute events overnight.    VITALS:  T(C): , Max: 37.4 (12-24-21 @ 11:00)  HR:  (70 - 111)  BP: --  ABP:  (109/64 - 163/91)  RR:  (13 - 21)  SpO2:  (94% - 100%)  Wt(kg): --      12-24-21 @ 07:01  -  12-25-21 @ 07:00  --------------------------------------------------------  IN: 1596.4 mL / OUT: 1025 mL / NET: 571.4 mL      LABS:  Na: 146 (12-25 @ 03:42), 147 (12-24 @ 15:16), 152 (12-23 @ 20:44), 154 (12-23 @ 08:11), 152 (12-22 @ 19:41), 155 (12-22 @ 08:12)  K: 3.5 (12-25 @ 03:42), 3.5 (12-24 @ 15:16), 3.6 (12-23 @ 20:44), 3.0 (12-23 @ 08:11), 3.2 (12-22 @ 19:41), 3.6 (12-22 @ 08:12)  Cl: 113 (12-25 @ 03:42), 113 (12-24 @ 15:16), 117 (12-23 @ 20:44), 117 (12-23 @ 08:11), 115 (12-22 @ 19:41), 117 (12-22 @ 08:12)  CO2: 21 (12-25 @ 03:42), 22 (12-24 @ 15:16), 24 (12-23 @ 20:44), 23 (12-23 @ 08:11), 24 (12-22 @ 19:41), 23 (12-22 @ 08:12)  BUN: 36 (12-25 @ 03:42), 39 (12-24 @ 15:16), 35 (12-23 @ 20:44), 32 (12-23 @ 08:11), 37 (12-22 @ 19:41), 41 (12-22 @ 08:12)  Cr: 0.42 (12-25 @ 03:42), 0.57 (12-24 @ 15:16), 0.52 (12-23 @ 20:44), 0.40 (12-23 @ 08:11), 0.41 (12-22 @ 19:41), 0.41 (12-22 @ 08:12)  Glu: 108(12-25 @ 03:42), 113(12-24 @ 15:16), 113(12-23 @ 20:44), 153(12-23 @ 08:11), 174(12-22 @ 19:41), 124(12-22 @ 08:12)    Hgb: 11.0 (12-25 @ 03:42), 11.1 (12-23 @ 20:44), 12.2 (12-23 @ 13:48), 12.2 (12-22 @ 19:41), 12.5 (12-22 @ 15:15)  Hct: 33.6 (12-25 @ 03:42), 34.7 (12-23 @ 20:44), 37.9 (12-23 @ 13:48), 38.5 (12-22 @ 19:41), 39.6 (12-22 @ 15:15)  WBC: 9.72 (12-25 @ 03:42), 10.84 (12-23 @ 20:44), 12.97 (12-23 @ 13:48), 14.65 (12-22 @ 19:41), 12.92 (12-22 @ 15:15)  Plt: 152 (12-25 @ 03:42), 114 (12-23 @ 20:44), 121 (12-23 @ 13:48), 124 (12-22 @ 19:41), 125 (12-22 @ 15:15)    INR: 1.17 12-23-21 @ 13:48, 1.21 12-22-21 @ 16:14  PTT: 46.2 12-24-21 @ 15:16, 44.5 12-23-21 @ 20:44, 42.7 12-23-21 @ 13:48, 38.9 12-23-21 @ 07:05, 42.3 12-23-21 @ 01:05, 41.6 12-22-21 @ 19:41, 39.4 12-22-21 @ 16:14, 39.9 12-22-21 @ 07:12    IMAGING:   Recent imaging studies were reviewed.    MEDICATIONS:  acetaminophen     Tablet .. 650 milliGRAM(s) Oral every 6 hours PRN  argatroban Infusion 1.5 MICROgram(s)/kG/Min IV Continuous <Continuous>  chlorhexidine 4% Liquid 1 Application(s) Topical <User Schedule>  ciprofloxacin     Tablet 500 milliGRAM(s) Oral every 12 hours  niMODipine Oral Solution 30 milliGRAM(s) Enteral Tube every 2 hours  nystatin    Suspension 481944 Unit(s) Oral every 6 hours  potassium chloride   Solution 20 milliEquivalent(s) Enteral Tube every 2 hours  sodium chloride 0.9% lock flush 10 milliLiter(s) IV Push every 1 hour PRN  traMADol 25 milliGRAM(s) Oral every 6 hours PRN    EXAMINATION:  General:  calm  HEENT:  MMM  Neuro:  Alert , oriented x 3, follows commands, pupils R 5 mm, L 7 mm non reactive b/l, midline gaze, face symmetric, moves all limbs against resistance, no drift   Cards:  RRR  Respiratory:  no respiratory distress  Abdomen:  soft  Extremities:  no edema

## 2021-12-26 LAB
ANION GAP SERPL CALC-SCNC: 10 MMOL/L — SIGNIFICANT CHANGE UP (ref 5–17)
ANION GAP SERPL CALC-SCNC: 10 MMOL/L — SIGNIFICANT CHANGE UP (ref 5–17)
ANION GAP SERPL CALC-SCNC: 11 MMOL/L — SIGNIFICANT CHANGE UP (ref 5–17)
APTT BLD: 45.3 SEC — HIGH (ref 27.5–35.5)
APTT BLD: 49.6 SEC — HIGH (ref 27.5–35.5)
BUN SERPL-MCNC: 26 MG/DL — HIGH (ref 7–23)
BUN SERPL-MCNC: 27 MG/DL — HIGH (ref 7–23)
BUN SERPL-MCNC: 30 MG/DL — HIGH (ref 7–23)
CALCIUM SERPL-MCNC: 8.7 MG/DL — SIGNIFICANT CHANGE UP (ref 8.4–10.5)
CALCIUM SERPL-MCNC: 8.7 MG/DL — SIGNIFICANT CHANGE UP (ref 8.4–10.5)
CALCIUM SERPL-MCNC: 9 MG/DL — SIGNIFICANT CHANGE UP (ref 8.4–10.5)
CHLORIDE SERPL-SCNC: 108 MMOL/L — SIGNIFICANT CHANGE UP (ref 96–108)
CHLORIDE SERPL-SCNC: 109 MMOL/L — HIGH (ref 96–108)
CHLORIDE SERPL-SCNC: 109 MMOL/L — HIGH (ref 96–108)
CO2 SERPL-SCNC: 20 MMOL/L — LOW (ref 22–31)
CO2 SERPL-SCNC: 20 MMOL/L — LOW (ref 22–31)
CO2 SERPL-SCNC: 21 MMOL/L — LOW (ref 22–31)
CREAT SERPL-MCNC: 0.3 MG/DL — LOW (ref 0.5–1.3)
CREAT SERPL-MCNC: 0.43 MG/DL — LOW (ref 0.5–1.3)
CREAT SERPL-MCNC: 0.47 MG/DL — LOW (ref 0.5–1.3)
GLUCOSE SERPL-MCNC: 109 MG/DL — HIGH (ref 70–99)
GLUCOSE SERPL-MCNC: 109 MG/DL — HIGH (ref 70–99)
GLUCOSE SERPL-MCNC: 116 MG/DL — HIGH (ref 70–99)
HCT VFR BLD CALC: 32.9 % — LOW (ref 34.5–45)
HCT VFR BLD CALC: 36.2 % — SIGNIFICANT CHANGE UP (ref 34.5–45)
HGB BLD-MCNC: 11.2 G/DL — LOW (ref 11.5–15.5)
HGB BLD-MCNC: 12 G/DL — SIGNIFICANT CHANGE UP (ref 11.5–15.5)
MAGNESIUM SERPL-MCNC: 2.2 MG/DL — SIGNIFICANT CHANGE UP (ref 1.6–2.6)
MCHC RBC-ENTMCNC: 31 PG — SIGNIFICANT CHANGE UP (ref 27–34)
MCHC RBC-ENTMCNC: 31.5 PG — SIGNIFICANT CHANGE UP (ref 27–34)
MCHC RBC-ENTMCNC: 33.1 GM/DL — SIGNIFICANT CHANGE UP (ref 32–36)
MCHC RBC-ENTMCNC: 34 GM/DL — SIGNIFICANT CHANGE UP (ref 32–36)
MCV RBC AUTO: 91.1 FL — SIGNIFICANT CHANGE UP (ref 80–100)
MCV RBC AUTO: 95 FL — SIGNIFICANT CHANGE UP (ref 80–100)
NRBC # BLD: 0 /100 WBCS — SIGNIFICANT CHANGE UP (ref 0–0)
NRBC # BLD: 0 /100 WBCS — SIGNIFICANT CHANGE UP (ref 0–0)
PHOSPHATE SERPL-MCNC: 2.7 MG/DL — SIGNIFICANT CHANGE UP (ref 2.5–4.5)
PHOSPHATE SERPL-MCNC: 3.1 MG/DL — SIGNIFICANT CHANGE UP (ref 2.5–4.5)
PHOSPHATE SERPL-MCNC: 3.5 MG/DL — SIGNIFICANT CHANGE UP (ref 2.5–4.5)
PLATELET # BLD AUTO: 195 K/UL — SIGNIFICANT CHANGE UP (ref 150–400)
PLATELET # BLD AUTO: 225 K/UL — SIGNIFICANT CHANGE UP (ref 150–400)
POTASSIUM SERPL-MCNC: 3.2 MMOL/L — LOW (ref 3.5–5.3)
POTASSIUM SERPL-MCNC: 3.7 MMOL/L — SIGNIFICANT CHANGE UP (ref 3.5–5.3)
POTASSIUM SERPL-MCNC: 3.9 MMOL/L — SIGNIFICANT CHANGE UP (ref 3.5–5.3)
POTASSIUM SERPL-SCNC: 3.2 MMOL/L — LOW (ref 3.5–5.3)
POTASSIUM SERPL-SCNC: 3.7 MMOL/L — SIGNIFICANT CHANGE UP (ref 3.5–5.3)
POTASSIUM SERPL-SCNC: 3.9 MMOL/L — SIGNIFICANT CHANGE UP (ref 3.5–5.3)
RBC # BLD: 3.61 M/UL — LOW (ref 3.8–5.2)
RBC # BLD: 3.81 M/UL — SIGNIFICANT CHANGE UP (ref 3.8–5.2)
RBC # FLD: 12.8 % — SIGNIFICANT CHANGE UP (ref 10.3–14.5)
RBC # FLD: 12.9 % — SIGNIFICANT CHANGE UP (ref 10.3–14.5)
SODIUM SERPL-SCNC: 139 MMOL/L — SIGNIFICANT CHANGE UP (ref 135–145)
SODIUM SERPL-SCNC: 139 MMOL/L — SIGNIFICANT CHANGE UP (ref 135–145)
SODIUM SERPL-SCNC: 140 MMOL/L — SIGNIFICANT CHANGE UP (ref 135–145)
SRA INTERP SER-IMP: SIGNIFICANT CHANGE UP
WBC # BLD: 10.87 K/UL — HIGH (ref 3.8–10.5)
WBC # BLD: 9.74 K/UL — SIGNIFICANT CHANGE UP (ref 3.8–10.5)
WBC # FLD AUTO: 10.87 K/UL — HIGH (ref 3.8–10.5)
WBC # FLD AUTO: 9.74 K/UL — SIGNIFICANT CHANGE UP (ref 3.8–10.5)

## 2021-12-26 PROCEDURE — 99291 CRITICAL CARE FIRST HOUR: CPT

## 2021-12-26 RX ORDER — POTASSIUM CHLORIDE 20 MEQ
40 PACKET (EA) ORAL ONCE
Refills: 0 | Status: COMPLETED | OUTPATIENT
Start: 2021-12-26 | End: 2021-12-26

## 2021-12-26 RX ORDER — POTASSIUM CHLORIDE 20 MEQ
40 PACKET (EA) ORAL ONCE
Refills: 0 | Status: DISCONTINUED | OUTPATIENT
Start: 2021-12-26 | End: 2021-12-26

## 2021-12-26 RX ORDER — POTASSIUM CHLORIDE 20 MEQ
40 PACKET (EA) ORAL EVERY 4 HOURS
Refills: 0 | Status: COMPLETED | OUTPATIENT
Start: 2021-12-26 | End: 2021-12-26

## 2021-12-26 RX ORDER — NYSTATIN CREAM 100000 [USP'U]/G
1 CREAM TOPICAL EVERY 12 HOURS
Refills: 0 | Status: DISCONTINUED | OUTPATIENT
Start: 2021-12-26 | End: 2022-01-05

## 2021-12-26 RX ORDER — POTASSIUM PHOSPHATE, MONOBASIC POTASSIUM PHOSPHATE, DIBASIC 236; 224 MG/ML; MG/ML
30 INJECTION, SOLUTION INTRAVENOUS ONCE
Refills: 0 | Status: COMPLETED | OUTPATIENT
Start: 2021-12-26 | End: 2021-12-26

## 2021-12-26 RX ADMIN — NIMODIPINE 30 MILLIGRAM(S): 60 SOLUTION ORAL at 08:59

## 2021-12-26 RX ADMIN — Medication 500000 UNIT(S): at 05:54

## 2021-12-26 RX ADMIN — TRAMADOL HYDROCHLORIDE 25 MILLIGRAM(S): 50 TABLET ORAL at 23:48

## 2021-12-26 RX ADMIN — NIMODIPINE 30 MILLIGRAM(S): 60 SOLUTION ORAL at 16:48

## 2021-12-26 RX ADMIN — NIMODIPINE 30 MILLIGRAM(S): 60 SOLUTION ORAL at 00:03

## 2021-12-26 RX ADMIN — NIMODIPINE 30 MILLIGRAM(S): 60 SOLUTION ORAL at 14:29

## 2021-12-26 RX ADMIN — NIMODIPINE 30 MILLIGRAM(S): 60 SOLUTION ORAL at 05:53

## 2021-12-26 RX ADMIN — Medication 500000 UNIT(S): at 12:25

## 2021-12-26 RX ADMIN — POTASSIUM PHOSPHATE, MONOBASIC POTASSIUM PHOSPHATE, DIBASIC 83.33 MILLIMOLE(S): 236; 224 INJECTION, SOLUTION INTRAVENOUS at 04:01

## 2021-12-26 RX ADMIN — Medication 500 MILLIGRAM(S): at 05:54

## 2021-12-26 RX ADMIN — Medication 500000 UNIT(S): at 17:41

## 2021-12-26 RX ADMIN — ARGATROBAN 6.12 MICROGRAM(S)/KG/MIN: 50 INJECTION, SOLUTION INTRAVENOUS at 12:25

## 2021-12-26 RX ADMIN — Medication 40 MILLIEQUIVALENT(S): at 05:54

## 2021-12-26 RX ADMIN — NIMODIPINE 30 MILLIGRAM(S): 60 SOLUTION ORAL at 12:25

## 2021-12-26 RX ADMIN — NIMODIPINE 30 MILLIGRAM(S): 60 SOLUTION ORAL at 10:19

## 2021-12-26 RX ADMIN — NIMODIPINE 30 MILLIGRAM(S): 60 SOLUTION ORAL at 02:12

## 2021-12-26 RX ADMIN — NIMODIPINE 30 MILLIGRAM(S): 60 SOLUTION ORAL at 20:45

## 2021-12-26 RX ADMIN — Medication 500000 UNIT(S): at 00:03

## 2021-12-26 RX ADMIN — Medication 500 MILLIGRAM(S): at 17:41

## 2021-12-26 RX ADMIN — Medication 40 MILLIEQUIVALENT(S): at 04:02

## 2021-12-26 RX ADMIN — NIMODIPINE 30 MILLIGRAM(S): 60 SOLUTION ORAL at 17:41

## 2021-12-26 RX ADMIN — NIMODIPINE 30 MILLIGRAM(S): 60 SOLUTION ORAL at 04:01

## 2021-12-26 RX ADMIN — Medication 40 MILLIEQUIVALENT(S): at 20:53

## 2021-12-26 RX ADMIN — NIMODIPINE 30 MILLIGRAM(S): 60 SOLUTION ORAL at 22:22

## 2021-12-26 NOTE — PROGRESS NOTE ADULT - SUBJECTIVE AND OBJECTIVE BOX
INTERVAL HISTORY: HPI:  66F, no sig PMH, txfered from [facility] for HA since 12/10 morning. Had associated nausea/vomiting and posterior neck pain. CT: diffuse SAH, most prominent at Sylvian fissures. Exam: Somnolent, Ox2.5 (said November), Left pupil 6NR; Right pupil 5NR, EOMI, no facial, Right drift, LAWTON 5/5      MEDICATIONS  (STANDING):  argatroban Infusion 1.5 MICROgram(s)/kG/Min (6.12 mL/Hr) IV Continuous <Continuous>  chlorhexidine 4% Liquid 1 Application(s) Topical <User Schedule>  ciprofloxacin     Tablet 500 milliGRAM(s) Oral every 12 hours  niMODipine Oral Solution 30 milliGRAM(s) Enteral Tube every 2 hours  nystatin    Suspension 598802 Unit(s) Oral every 6 hours    MEDICATIONS  (PRN):  acetaminophen     Tablet .. 650 milliGRAM(s) Oral every 6 hours PRN Temp greater or equal to 38C (100.4F), Mild Pain (1 - 3)  sodium chloride 0.9% lock flush 10 milliLiter(s) IV Push every 1 hour PRN Pre/post blood products, medications, blood draw, and to maintain line patency  traMADol 25 milliGRAM(s) Oral every 6 hours PRN Moderate Pain (4 - 6)      Drug Dosing Weight  Height (cm): 162.6 (22 Dec 2021 09:15)  Weight (kg): 68 (22 Dec 2021 09:15)  BMI (kg/m2): 25.7 (22 Dec 2021 09:15)  BSA (m2): 1.73 (22 Dec 2021 09:15)    PAST MEDICAL & SURGICAL HISTORY:  No pertinent past medical history    No significant past surgical history        REVIEW OF SYSTEMS: [ ] Unable to Assess due to neurologic exam   [x] All ROS addressed below are non-contributory, except:  Neuro: [ ] Headache [ ] Back pain [ ] Numbness [ ] Weakness [ ] Ataxia [ ] Dizziness [ ] Aphasia [ ] Dysarthria [ ] Visual disturbance  Resp: [ ] Shortness of breath/dyspnea, [ ] Orthopnea [ ] Cough  CV: [ ] Chest pain [ ] Palpitation [ ] Lightheadedness [ ] Syncope  Renal: [ ] Thirst [ ] Edema  GI: [ ] Nausea [ ] Emesis [ ] Abdominal pain [ ] Constipation [ ] Diarrhea  Hem: [ ] Hematemesis [ ] bright red blood per rectum  ID: [ ] Fever [ ] Chills [ ] Dysuria  ENT: [ ] Rhinorrhea    PHYSICAL EXAM:    General: No Acute Distress     Neurological: Awake, alert oriented to person, place and time, Following Commands, PERRL, EOMI, Face Symmetrical, Speech Fluent, Moving all extremities, Muscle Strength normal in all four extremities, No Drift, Sensation to Light Touch Intact    Pulmonary: Clear to Auscultation, No Rales, No Rhonchi, No Wheezes     Cardiovascular: S1, S2, Regular Rate and Rhythm     Gastrointestinal: Soft, Nontender, Nondistended     Extremities: No calf tenderness       ICU Vital Signs Last 24 Hrs  T(C): 36.7 (26 Dec 2021 07:00), Max: 37 (25 Dec 2021 23:00)  T(F): 98.1 (26 Dec 2021 07:00), Max: 98.6 (25 Dec 2021 23:00)  HR: 79 (26 Dec 2021 08:00) (68 - 94)  ABP: 166/90 (26 Dec 2021 08:00) (121/64 - 166/90)  ABP(mean): 118 (26 Dec 2021 08:00) (87 - 118)  RR: 16 (26 Dec 2021 08:00) (12 - 20)  SpO2: 100% (26 Dec 2021 08:00) (96% - 100%)      I&O's Detail    25 Dec 2021 07:01  -  26 Dec 2021 07:00  --------------------------------------------------------  IN:    Argatroban: 146.4 mL    Enteral Tube Flush: 400 mL    IV PiggyBack: 249.9 mL  Total IN: 796.3 mL    OUT:    External Ventricular Device (mL): 37 mL    Voided (mL): 700 mL  Total OUT: 737 mL    Total NET: 59.3 mL              LABS:  CBC Full  -  ( 25 Dec 2021 23:54 )  WBC Count : 10.87 K/uL  RBC Count : 3.81 M/uL  Hemoglobin : 12.0 g/dL  Hematocrit : 36.2 %  Platelet Count - Automated : 195 K/uL  Mean Cell Volume : 95.0 fl  Mean Cell Hemoglobin : 31.5 pg  Mean Cell Hemoglobin Concentration : 33.1 gm/dL  Auto Neutrophil # : x  Auto Lymphocyte # : x  Auto Monocyte # : x  Auto Eosinophil # : x  Auto Basophil # : x  Auto Neutrophil % : x  Auto Lymphocyte % : x  Auto Monocyte % : x  Auto Eosinophil % : x  Auto Basophil % : x    12-25    140  |  109<H>  |  26<H>  ----------------------------<  109<H>  3.2<L>   |  21<L>  |  0.30<L>    Ca    8.7      25 Dec 2021 23:54  Phos  2.7     12-25  Mg     2.2     12-25      PTT - ( 25 Dec 2021 23:54 )  PTT:45.3 sec      RADIOLOGY & ADDITIONAL STUDIES:

## 2021-12-26 NOTE — PROGRESS NOTE ADULT - ASSESSMENT
67 yo woman admitted 12/11/21 with HA/vomiting since 12/10 found to have HH2mF3 SAH c/b hydro s/p coiling of L Pcomm aneurysm, found to have a partial left m2 thrombus now s/p thrombectomy with TICI 3 reperfusion. S/p R EVD for hydro c/b sizable tract hemorrhage with IVH now with L EVD. PBD 16    NEURO:  NC q1h  HCP:goal ICP<22 mmhg , CPP >65 mmhg   clamp trial started 9:30AM 12/25, monitor for hydrocephalus goal ICP<22 mmhg , CPP >65 mmhg   nimodipine     CVS: TTE EF 65 %   SBP goal 100-180 mmhg     PULM:  RA  O2 Sat >92%    RENAL:  IVL  Strict IS and Os  Na 140-150  q8h BMP    GI:  - Pureed diet   - Bowel regimen: miralax, senna    ENDO:   - FS goal 120-180     HEME/ONC:  - SCDs  - Argatroban gtt  - Trend PTT Q6 hrs     ID:   Urine culture grown Klebsiella  Ciprofloxacin     Full code    ICU

## 2021-12-26 NOTE — PROGRESS NOTE ADULT - ASSESSMENT
67 yo woman admitted 12/11/21 with HA/vomiting since 12/10 found to have HH2mF3 SAH c/b hydro s/p coiling of L Pcomm aneurysm, found to have a partial left m2 thrombus now s/p thrombectomy with TICI 3 reperfusion. S/p R EVD for hydro c/b sizable tract hemorrhage with IVH now with L EVD. PBD 16.    NEURO:  NC q1hr  HCP: EVD clamped, monitor ICPs, s/s hydrocephalus  CTH in am  Keppra 500mg BID for seizure ppx    nimodipine --tolerating   PT/OT     CVS: TTE EF 65 %   SBP goal 100-180 mmhg     PULM:  RA  O2 Sat >92%  incentive spirometry as able    RENAL:  Strict IS and Os  Na 135-145  maintain euvolemia    GI:  - NDGT feeding--swallow re eval  - Bowel regimen: miralax, senna    ENDO:   - FS goal 120-180     HEME/ONC:  - SCDs  - HIT+, BALJEET+  - argatroban gtt continue--f/u with heme re: duration of anticoagulation  - Positive deep venous thrombosis involving the right subclavian vein.   Thrombus is nonocclusive and related to the PICC line. No evidence of deep venous thrombosis in the left upper extremity. Superficial thrombophlebitis left basilic vein.    ID:   Urine culture grown Klebsiella  Ciprofloxacin till 12/29  no pna on CTA chest this am so hold off on CXR     Lines: R PICC--d/c, R Axillary A line   Patient is critically ill due to SAH and at high risk for neurological deterioration or death due to: hydrocephalus requiring an EVD for CSF diversion, at risk for vasospasm   65 yo woman admitted 12/11/21 with HA/vomiting since 12/10 found to have HH2mF3 SAH c/b hydro s/p coiling of L Pcomm aneurysm, found to have a partial left m2 thrombus now s/p thrombectomy with TICI 3 reperfusion. S/p R EVD for hydro c/b sizable tract hemorrhage with IVH now with L EVD. PBD 16.    NEURO:  NC q1hr; q2 overnight   HCP: EVD clamped, monitor ICPs, s/s hydrocephalus  CTH in am  Keppra 500mg BID for seizure ppx    nimodipine --tolerating   PT/OT     CVS: TTE EF 65 %   SBP goal 100-180 mmhg     PULM:  RA  O2 Sat >92%  incentive spirometry as able    RENAL:  Strict IS and Os  Na 135-145  maintain euvolemia    GI:  - puree diet; due to fluctuating exam, requires NGT  - consider calorie count and supplementing tube feeding   - Bowel regimen: miralax, senna    ENDO:   - FS goal 120-180     HEME/ONC:  - SCDs  - HIT+, BALJEET+  - argatroban gtt continue--f/u with heme re: duration of anticoagulation  - Positive deep venous thrombosis involving the right subclavian vein. -->vascular following, will d/c when adequate access  Thrombus is nonocclusive and related to the PICC line. No evidence of deep venous thrombosis in the left upper extremity. Superficial thrombophlebitis left basilic vein.    ID:   Urine culture grown Klebsiella  Ciprofloxacin till 12/29  no pna on CTA chest this am so hold off on CXR     Lines: R PICC, R Axillary A line   Patient is critically ill due to SAH and at high risk for neurological deterioration or death due to: hydrocephalus requiring an EVD for CSF diversion, at risk for vasospasm

## 2021-12-26 NOTE — PROGRESS NOTE ADULT - SUBJECTIVE AND OBJECTIVE BOX
HPI:  65 yo woman admitted 12/11/21 with HA/vomiting since 12/10 found to have HH2mF3 SAH c/b hydro s/p coiling of L Pcomm aneurysm, found to have a partial left m2 thrombus now s/p thrombectomy with TICI 3 reperfusion. S/p R EVD for hydro c/b sizable tract hemorrhage with IVH now with L EVD. PBD 16.     24hr events  EVD clamped   BALJEET+    VITALS: reviewed  LABS: reviewed  IMAGING:   Recent imaging studies were reviewed.  MEDICATIONS: reviewed    EXAMINATION:  General:  calm  HEENT:  MMM  Neuro:  examined in Lithuanian by me, EO to stim, with stimulation able to say her name and hospital, minimal command following with prompt, pupils unchanged surgical b/l, midline gaze, face symmetric, RUE drift 4-/5, LUE at least 3/5, RLE 3/5, LLE 2/5/WD at SBP  140's   Cards:  RRR  Respiratory:  no respiratory distress  Abdomen:  soft  Extremities:  no edema   HPI:  67 yo woman admitted 12/11/21 with HA/vomiting since 12/10 found to have HH2mF3 SAH c/b hydro s/p coiling of L Pcomm aneurysm, found to have a partial left m2 thrombus now s/p thrombectomy with TICI 3 reperfusion. S/p R EVD for hydro c/b sizable tract hemorrhage with IVH now with L EVD. PBD 16.     24hr events  EVD clamped   BALJEET+    VITALS: reviewed  LABS: reviewed  IMAGING:   Recent imaging studies were reviewed.  MEDICATIONS: reviewed    EXAMINATION:  General:  calm  HEENT:  MMM  Neuro:  examined in Telugu by me, alert, oriented to self/hospital but not to month/year even with choices in Telugu, follows simple commands, BUE 4/5 no drift, BLE at least 4/5 effort dependent exam  Cards:  RRR  Respiratory:  no respiratory distress  Abdomen:  soft  Extremities:  no edema

## 2021-12-26 NOTE — CHART NOTE - NSCHARTNOTEFT_GEN_A_CORE
Was called by team for r/o HIT last week given thrombocytopenia. BALJEET is back and it is positive. c/w Argatroban, managed by Argatroban nomogram.    Please document under patient allergies Heparin given this new information. Pt should not receive heparin products. Inform patient of this as she should be made aware.    Please page with questions or concerns.       Alexander Dougherty M.D.  Hematology/Oncology Fellow PGY4  Pager [phone]  After 5pm, please contact on-call team.

## 2021-12-27 LAB
ANION GAP SERPL CALC-SCNC: 11 MMOL/L — SIGNIFICANT CHANGE UP (ref 5–17)
ANION GAP SERPL CALC-SCNC: 12 MMOL/L — SIGNIFICANT CHANGE UP (ref 5–17)
APTT BLD: 44.1 SEC — HIGH (ref 27.5–35.5)
BUN SERPL-MCNC: 20 MG/DL — SIGNIFICANT CHANGE UP (ref 7–23)
BUN SERPL-MCNC: 22 MG/DL — SIGNIFICANT CHANGE UP (ref 7–23)
CALCIUM SERPL-MCNC: 8.8 MG/DL — SIGNIFICANT CHANGE UP (ref 8.4–10.5)
CALCIUM SERPL-MCNC: 8.9 MG/DL — SIGNIFICANT CHANGE UP (ref 8.4–10.5)
CHLORIDE SERPL-SCNC: 103 MMOL/L — SIGNIFICANT CHANGE UP (ref 96–108)
CHLORIDE SERPL-SCNC: 104 MMOL/L — SIGNIFICANT CHANGE UP (ref 96–108)
CO2 SERPL-SCNC: 20 MMOL/L — LOW (ref 22–31)
CO2 SERPL-SCNC: 21 MMOL/L — LOW (ref 22–31)
CREAT SERPL-MCNC: 0.37 MG/DL — LOW (ref 0.5–1.3)
CREAT SERPL-MCNC: 0.41 MG/DL — LOW (ref 0.5–1.3)
CULTURE RESULTS: SIGNIFICANT CHANGE UP
CULTURE RESULTS: SIGNIFICANT CHANGE UP
GLUCOSE SERPL-MCNC: 128 MG/DL — HIGH (ref 70–99)
GLUCOSE SERPL-MCNC: 90 MG/DL — SIGNIFICANT CHANGE UP (ref 70–99)
HCT VFR BLD CALC: 34.6 % — SIGNIFICANT CHANGE UP (ref 34.5–45)
HGB BLD-MCNC: 11.8 G/DL — SIGNIFICANT CHANGE UP (ref 11.5–15.5)
INR BLD: 1.2 RATIO — HIGH (ref 0.88–1.16)
MAGNESIUM SERPL-MCNC: 2.1 MG/DL — SIGNIFICANT CHANGE UP (ref 1.6–2.6)
MCHC RBC-ENTMCNC: 31.3 PG — SIGNIFICANT CHANGE UP (ref 27–34)
MCHC RBC-ENTMCNC: 34.1 GM/DL — SIGNIFICANT CHANGE UP (ref 32–36)
MCV RBC AUTO: 91.8 FL — SIGNIFICANT CHANGE UP (ref 80–100)
NRBC # BLD: 0 /100 WBCS — SIGNIFICANT CHANGE UP (ref 0–0)
PHOSPHATE SERPL-MCNC: 2.6 MG/DL — SIGNIFICANT CHANGE UP (ref 2.5–4.5)
PLATELET # BLD AUTO: 251 K/UL — SIGNIFICANT CHANGE UP (ref 150–400)
POTASSIUM SERPL-MCNC: 3.8 MMOL/L — SIGNIFICANT CHANGE UP (ref 3.5–5.3)
POTASSIUM SERPL-MCNC: 4.3 MMOL/L — SIGNIFICANT CHANGE UP (ref 3.5–5.3)
POTASSIUM SERPL-SCNC: 3.8 MMOL/L — SIGNIFICANT CHANGE UP (ref 3.5–5.3)
POTASSIUM SERPL-SCNC: 4.3 MMOL/L — SIGNIFICANT CHANGE UP (ref 3.5–5.3)
PROTHROM AB SERPL-ACNC: 14.3 SEC — HIGH (ref 10.6–13.6)
RBC # BLD: 3.77 M/UL — LOW (ref 3.8–5.2)
RBC # FLD: 13.1 % — SIGNIFICANT CHANGE UP (ref 10.3–14.5)
SODIUM SERPL-SCNC: 135 MMOL/L — SIGNIFICANT CHANGE UP (ref 135–145)
SODIUM SERPL-SCNC: 136 MMOL/L — SIGNIFICANT CHANGE UP (ref 135–145)
SPECIMEN SOURCE: SIGNIFICANT CHANGE UP
SPECIMEN SOURCE: SIGNIFICANT CHANGE UP
WBC # BLD: 9.18 K/UL — SIGNIFICANT CHANGE UP (ref 3.8–10.5)
WBC # FLD AUTO: 9.18 K/UL — SIGNIFICANT CHANGE UP (ref 3.8–10.5)

## 2021-12-27 PROCEDURE — 99291 CRITICAL CARE FIRST HOUR: CPT

## 2021-12-27 PROCEDURE — 70450 CT HEAD/BRAIN W/O DYE: CPT | Mod: 26

## 2021-12-27 PROCEDURE — 99233 SBSQ HOSP IP/OBS HIGH 50: CPT

## 2021-12-27 RX ORDER — POTASSIUM CHLORIDE 20 MEQ
40 PACKET (EA) ORAL ONCE
Refills: 0 | Status: COMPLETED | OUTPATIENT
Start: 2021-12-27 | End: 2021-12-27

## 2021-12-27 RX ORDER — SODIUM CHLORIDE 9 MG/ML
1 INJECTION INTRAMUSCULAR; INTRAVENOUS; SUBCUTANEOUS EVERY 6 HOURS
Refills: 0 | Status: DISCONTINUED | OUTPATIENT
Start: 2021-12-27 | End: 2022-01-05

## 2021-12-27 RX ADMIN — NIMODIPINE 30 MILLIGRAM(S): 60 SOLUTION ORAL at 14:00

## 2021-12-27 RX ADMIN — TRAMADOL HYDROCHLORIDE 25 MILLIGRAM(S): 50 TABLET ORAL at 06:12

## 2021-12-27 RX ADMIN — NIMODIPINE 30 MILLIGRAM(S): 60 SOLUTION ORAL at 02:58

## 2021-12-27 RX ADMIN — Medication 500000 UNIT(S): at 17:48

## 2021-12-27 RX ADMIN — NIMODIPINE 30 MILLIGRAM(S): 60 SOLUTION ORAL at 10:40

## 2021-12-27 RX ADMIN — NIMODIPINE 30 MILLIGRAM(S): 60 SOLUTION ORAL at 08:46

## 2021-12-27 RX ADMIN — Medication 500000 UNIT(S): at 12:41

## 2021-12-27 RX ADMIN — NIMODIPINE 30 MILLIGRAM(S): 60 SOLUTION ORAL at 18:41

## 2021-12-27 RX ADMIN — ARGATROBAN 6.12 MICROGRAM(S)/KG/MIN: 50 INJECTION, SOLUTION INTRAVENOUS at 10:41

## 2021-12-27 RX ADMIN — Medication 500 MILLIGRAM(S): at 17:49

## 2021-12-27 RX ADMIN — Medication 40 MILLIEQUIVALENT(S): at 20:20

## 2021-12-27 RX ADMIN — NIMODIPINE 30 MILLIGRAM(S): 60 SOLUTION ORAL at 22:17

## 2021-12-27 RX ADMIN — NIMODIPINE 30 MILLIGRAM(S): 60 SOLUTION ORAL at 06:13

## 2021-12-27 RX ADMIN — NYSTATIN CREAM 1 APPLICATION(S): 100000 CREAM TOPICAL at 17:48

## 2021-12-27 RX ADMIN — SODIUM CHLORIDE 1 GRAM(S): 9 INJECTION INTRAMUSCULAR; INTRAVENOUS; SUBCUTANEOUS at 22:22

## 2021-12-27 RX ADMIN — Medication 500 MILLIGRAM(S): at 05:05

## 2021-12-27 RX ADMIN — NIMODIPINE 30 MILLIGRAM(S): 60 SOLUTION ORAL at 20:20

## 2021-12-27 RX ADMIN — NYSTATIN CREAM 1 APPLICATION(S): 100000 CREAM TOPICAL at 05:05

## 2021-12-27 RX ADMIN — NIMODIPINE 30 MILLIGRAM(S): 60 SOLUTION ORAL at 04:41

## 2021-12-27 RX ADMIN — Medication 500000 UNIT(S): at 05:05

## 2021-12-27 RX ADMIN — TRAMADOL HYDROCHLORIDE 25 MILLIGRAM(S): 50 TABLET ORAL at 07:00

## 2021-12-27 RX ADMIN — NIMODIPINE 30 MILLIGRAM(S): 60 SOLUTION ORAL at 12:42

## 2021-12-27 RX ADMIN — TRAMADOL HYDROCHLORIDE 25 MILLIGRAM(S): 50 TABLET ORAL at 00:30

## 2021-12-27 RX ADMIN — Medication 500000 UNIT(S): at 00:10

## 2021-12-27 RX ADMIN — NIMODIPINE 30 MILLIGRAM(S): 60 SOLUTION ORAL at 00:10

## 2021-12-27 NOTE — PROGRESS NOTE ADULT - SUBJECTIVE AND OBJECTIVE BOX
HPI:  65 yo woman admitted 12/11/21 with HA/vomiting since 12/10 found to have HH2mF3 SAH c/b hydro s/p coiling of L Pcomm aneurysm, found to have a partial left m2 thrombus now s/p thrombectomy with TICI 3 reperfusion. S/p R EVD for hydro c/b sizable tract hemorrhage with IVH now with L EVD. PBD 17, the lumbar drain is clamped.    OVERNIGHT EVENTS:   No acute events overnight.    VITALS:  T(C): , Max: 37 (12-26-21 @ 15:00)  HR:  (66 - 94)  BP: --  ABP:  (124/72 - 174/94)  RR:  (11 - 19)  SpO2:  (93% - 100%)  Wt(kg): --      12-26-21 @ 07:01  -  12-27-21 @ 07:00  --------------------------------------------------------  IN: 1806.3 mL / OUT: 1250 mL / NET: 556.3 mL      LABS:  Na: 139 (12-26 @ 20:51), 139 (12-26 @ 17:10), 140 (12-25 @ 23:54), 146 (12-25 @ 03:42), 147 (12-24 @ 15:16)  K: 3.9 (12-26 @ 20:51), 3.7 (12-26 @ 17:10), 3.2 (12-25 @ 23:54), 3.5 (12-25 @ 03:42), 3.5 (12-24 @ 15:16)  Cl: 109 (12-26 @ 20:51), 108 (12-26 @ 17:10), 109 (12-25 @ 23:54), 113 (12-25 @ 03:42), 113 (12-24 @ 15:16)  CO2: 20 (12-26 @ 20:51), 20 (12-26 @ 17:10), 21 (12-25 @ 23:54), 21 (12-25 @ 03:42), 22 (12-24 @ 15:16)  BUN: 30 (12-26 @ 20:51), 27 (12-26 @ 17:10), 26 (12-25 @ 23:54), 36 (12-25 @ 03:42), 39 (12-24 @ 15:16)  Cr: 0.47 (12-26 @ 20:51), 0.43 (12-26 @ 17:10), 0.30 (12-25 @ 23:54), 0.42 (12-25 @ 03:42), 0.57 (12-24 @ 15:16)  Glu: 109(12-26 @ 20:51), 116(12-26 @ 17:10), 109(12-25 @ 23:54), 108(12-25 @ 03:42), 113(12-24 @ 15:16)    Hgb: 11.2 (12-26 @ 20:51), 12.0 (12-25 @ 23:54), 11.0 (12-25 @ 03:42)  Hct: 32.9 (12-26 @ 20:51), 36.2 (12-25 @ 23:54), 33.6 (12-25 @ 03:42)  WBC: 9.74 (12-26 @ 20:51), 10.87 (12-25 @ 23:54), 9.72 (12-25 @ 03:42)  Plt: 225 (12-26 @ 20:51), 195 (12-25 @ 23:54), 152 (12-25 @ 03:42)    INR:   PTT: 49.6 12-26-21 @ 20:51, 45.3 12-25-21 @ 23:54, 40.4 12-25-21 @ 17:55, 46.2 12-24-21 @ 15:16    IMAGING:   Recent imaging studies were reviewed.    MEDICATIONS:  acetaminophen     Tablet .. 650 milliGRAM(s) Oral every 6 hours PRN  argatroban Infusion 1.5 MICROgram(s)/kG/Min IV Continuous <Continuous>  ciprofloxacin     Tablet 500 milliGRAM(s) Oral every 12 hours  niMODipine Oral Solution 30 milliGRAM(s) Enteral Tube every 2 hours  nystatin    Suspension 930283 Unit(s) Oral every 6 hours  nystatin Powder 1 Application(s) Topical every 12 hours  sodium chloride 0.9% lock flush 10 milliLiter(s) IV Push every 1 hour PRN  traMADol 25 milliGRAM(s) Oral every 6 hours PRN    EXAMINATION:  General:  calm  HEENT:  MMM  Neuro:  Alert , oriented x 3, follows commands, pupils R 5 mm, L 7 mm non reactive b/l, midline gaze, face symmetric, moves all limbs against resistance, no drift   Cards:  RRR  Respiratory:  no respiratory distress  Abdomen:  soft  Extremities:  no edema     HPI:  65 yo woman admitted 12/11/21 with HA/vomiting since 12/10 found to have HH2mF3 SAH c/b hydro s/p coiling of L Pcomm aneurysm, found to have a partial left m2 thrombus now s/p thrombectomy with TICI 3 reperfusion. S/p R EVD for hydro c/b sizable tract hemorrhage with IVH now with L EVD. PBD 17, the EVD is clamped.    OVERNIGHT EVENTS:   No acute events overnight.    VITALS:  T(C): , Max: 37 (12-26-21 @ 15:00)  HR:  (66 - 94)  BP: --  ABP:  (124/72 - 174/94)  RR:  (11 - 19)  SpO2:  (93% - 100%)  Wt(kg): --      12-26-21 @ 07:01  -  12-27-21 @ 07:00  --------------------------------------------------------  IN: 1806.3 mL / OUT: 1250 mL / NET: 556.3 mL      LABS:  Na: 139 (12-26 @ 20:51), 139 (12-26 @ 17:10), 140 (12-25 @ 23:54), 146 (12-25 @ 03:42), 147 (12-24 @ 15:16)  K: 3.9 (12-26 @ 20:51), 3.7 (12-26 @ 17:10), 3.2 (12-25 @ 23:54), 3.5 (12-25 @ 03:42), 3.5 (12-24 @ 15:16)  Cl: 109 (12-26 @ 20:51), 108 (12-26 @ 17:10), 109 (12-25 @ 23:54), 113 (12-25 @ 03:42), 113 (12-24 @ 15:16)  CO2: 20 (12-26 @ 20:51), 20 (12-26 @ 17:10), 21 (12-25 @ 23:54), 21 (12-25 @ 03:42), 22 (12-24 @ 15:16)  BUN: 30 (12-26 @ 20:51), 27 (12-26 @ 17:10), 26 (12-25 @ 23:54), 36 (12-25 @ 03:42), 39 (12-24 @ 15:16)  Cr: 0.47 (12-26 @ 20:51), 0.43 (12-26 @ 17:10), 0.30 (12-25 @ 23:54), 0.42 (12-25 @ 03:42), 0.57 (12-24 @ 15:16)  Glu: 109(12-26 @ 20:51), 116(12-26 @ 17:10), 109(12-25 @ 23:54), 108(12-25 @ 03:42), 113(12-24 @ 15:16)    Hgb: 11.2 (12-26 @ 20:51), 12.0 (12-25 @ 23:54), 11.0 (12-25 @ 03:42)  Hct: 32.9 (12-26 @ 20:51), 36.2 (12-25 @ 23:54), 33.6 (12-25 @ 03:42)  WBC: 9.74 (12-26 @ 20:51), 10.87 (12-25 @ 23:54), 9.72 (12-25 @ 03:42)  Plt: 225 (12-26 @ 20:51), 195 (12-25 @ 23:54), 152 (12-25 @ 03:42)    INR:   PTT: 49.6 12-26-21 @ 20:51, 45.3 12-25-21 @ 23:54, 40.4 12-25-21 @ 17:55, 46.2 12-24-21 @ 15:16    IMAGING:   Recent imaging studies were reviewed.    MEDICATIONS:  acetaminophen     Tablet .. 650 milliGRAM(s) Oral every 6 hours PRN  argatroban Infusion 1.5 MICROgram(s)/kG/Min IV Continuous <Continuous>  ciprofloxacin     Tablet 500 milliGRAM(s) Oral every 12 hours  niMODipine Oral Solution 30 milliGRAM(s) Enteral Tube every 2 hours  nystatin    Suspension 349115 Unit(s) Oral every 6 hours  nystatin Powder 1 Application(s) Topical every 12 hours  sodium chloride 0.9% lock flush 10 milliLiter(s) IV Push every 1 hour PRN  traMADol 25 milliGRAM(s) Oral every 6 hours PRN    EXAMINATION:  General:  calm  HEENT:  MMM  Neuro:  Alert , oriented x 3, follows commands, pupils R 5 mm, L 7 mm non reactive b/l, midline gaze, face symmetric, moves all limbs against resistance, no drift   Cards:  RRR  Respiratory:  no respiratory distress  Abdomen:  soft  Extremities:  no edema

## 2021-12-27 NOTE — PROGRESS NOTE ADULT - SUBJECTIVE AND OBJECTIVE BOX
Vascular Cardiology Progress Note    DIRECT SERVICE NUMBER:  [phone]           EMAIL [email or web address]   OFFICE [phone]    CC:  Subclavian thrombus    HPI:     Remains on argatroban gtt  R arm is stable  Remains in NSICU    Allergies    No Known Allergies    Intolerances    	 MEDICATIONS  (STANDING):  argatroban Infusion 1.5 MICROgram(s)/kG/Min (6.12 mL/Hr) IV Continuous <Continuous>  ciprofloxacin     Tablet 500 milliGRAM(s) Oral every 12 hours  niMODipine Oral Solution 30 milliGRAM(s) Enteral Tube every 2 hours  nystatin    Suspension 475602 Unit(s) Oral every 6 hours  nystatin Powder 1 Application(s) Topical every 12 hours      PAST MEDICAL & SURGICAL HISTORY:  No pertinent past medical history    No significant past surgical history        FAMILY HISTORY:      SOCIAL HISTORY:  unchanged    REVIEW OF SYSTEMS:  Negative    ICU Vital Signs Last 24 Hrs  T(C): 36.8 (27 Dec 2021 07:00), Max: 37 (26 Dec 2021 15:00)  T(F): 98.2 (27 Dec 2021 07:00), Max: 98.6 (26 Dec 2021 15:00)  HR: 73 (27 Dec 2021 10:00) (67 - 90)  BP: --  BP(mean): --  ABP: 142/76 (27 Dec 2021 10:00) (124/72 - 174/94)  ABP(mean): 103 (27 Dec 2021 10:00) (92 - 126)  RR: 14 (27 Dec 2021 10:00) (11 - 19)  SpO2: 100% (27 Dec 2021 10:00) (93% - 100%)        Appearance:  	  HEENT:   Normal oral mucosa, PERRL, EOMI	  Lymphatic: No lymphadenopathy  Cardiovascular:  S1S2 Tachy at 110  Respiratory:  	Clear  Psychiatry:  AAO x 3  Gastrointestinal:  Soft, Non-tender, + BS	  Skin: No rashes, No ecchymoses, No cyanosis	  Extremities:  R arm no edema.  PICC line in place.  R a- line.    SCDs in place  No edema                              11.2   9.74  )-----------( 225      ( 26 Dec 2021 20:51 )             32.9   12-26    139  |  109<H>  |  30<H>  ----------------------------<  109<H>  3.9   |  20<L>  |  0.47<L>    Ca    8.7      26 Dec 2021 20:51  Phos  3.5     12-26  Mg     2.2     12-26        Assessment:  1. R subclavian PICC line associated thrombus  - small clot burden  2.  ICH  3.  PCOM Aneurysm  4.  HIT      Plan:  1. The patient is already on Argatroban Goal , can keep closer to 55.    2.  If the PICC line is functioning, and needed, it can remain in for now  3.  Hematology followup  4.  Repeat duplex of the upper extremity to assure stability on Wednesday   5.  If the PICC line is not needed in the near future, it should be removed.           Thank you    Jose Hill    Vascular Cardiology Service    Please call with any questions:   DIRECT SERVICE NUMBER:  [phone]  Office [phone]  email:  [email or web address]

## 2021-12-27 NOTE — PROGRESS NOTE ADULT - ASSESSMENT
66F HH2 MF3 SAH s/p coil L pcom aneurysm and M2 thrombectomy of iatrogenic thrombus requiring EVD placement c/b tract hemorrhage and contralateral evd placement, now neurostable, minimal spasm.   - neuro checks q1, q2 overnight  - HIT positive, BALJEET positive, argatroban gtt  - continue evd clamp trial for 72h, tomorrow night hold argatroban for poss removal of evd in AM  - CTH in AM  - passed SS, on soft diet  - TCDs poor windows, monitor BP  - cipro for uti  - re consult heme for AC recs

## 2021-12-27 NOTE — PROGRESS NOTE ADULT - SUBJECTIVE AND OBJECTIVE BOX
Patient seen and examined at bedside.    --Anticoagulation--  argatroban Infusion 1.5 MICROgram(s)/kG/Min IV Continuous <Continuous>    T(C): 36.3 (12-26-21 @ 23:00), Max: 37 (12-26-21 @ 15:00)  HR: 67 (12-27-21 @ 01:00) (66 - 94)  BP: --  RR: 16 (12-27-21 @ 01:00) (12 - 20)  SpO2: 98% (12-27-21 @ 01:00) (93% - 100%)  Wt(kg): --    Exam:  AOx2 (self and hospital), FC, BUE 4+/5, B/L lowers 4/5(R>L effort dependent)   [place]aning    Labs:                        11.2   9.74  )-----------( 225      ( 26 Dec 2021 20:51 )             32.9     12-26    139  |  109<H>  |  30<H>  ----------------------------<  109<H>  3.9   |  20<L>  |  0.47<L>    Ca    8.7      26 Dec 2021 20:51  Phos  3.5     12-26  Mg     2.2     12-26

## 2021-12-27 NOTE — PROGRESS NOTE ADULT - ASSESSMENT
67 yo woman admitted 12/11/21 with HA/vomiting since 12/10 found to have HH2mF3 SAH c/b hydro s/p coiling of L Pcomm aneurysm, found to have a partial left m2 thrombus now s/p thrombectomy with TICI 3 reperfusion. S/p R EVD for hydro c/b sizable tract hemorrhage with IVH now with L EVD. PBD 17.    NEURO:  NC q1hr; q2 overnight   HCP: EVD clamped, monitor ICPs, s/s hydrocephalus  CTH in am  Keppra 500mg BID for seizure ppx    nimodipine --tolerating   PT/OT     CVS: TTE EF 65 %   SBP goal 100-180 mmhg     PULM:  RA  O2 Sat >92%  incentive spirometry as able    RENAL:  Strict IS and Os  Na 135-145; downtrending Na, add salt tabs  maintain euvolemia    GI:  - puree diet; due to fluctuating exam, requires NGT  - consider calorie count and supplementing tube feeding   - Bowel regimen: miralax, senna    ENDO:   - FS goal 120-180     HEME/ONC:  - SCDs  - HIT+, BALJEET+  - argatroban gtt continue--f/u with heme re: duration of anticoagulation  - Positive deep venous thrombosis involving the right subclavian vein. -->vascular following, will d/c when adequate access  Thrombus is nonocclusive and related to the PICC line. No evidence of deep venous thrombosis in the left upper extremity. Superficial thrombophlebitis left basilic vein.    ID:   Urine culture grown Klebsiella  Ciprofloxacin till 12/29  no pna on CTA chest this am so hold off on CXR     Lines: R PICC, R Axillary A line   Patient is critically ill due to SAH and at high risk for neurological deterioration or death due to: hydrocephalus requiring an EVD for CSF diversion, at risk for vasospasm

## 2021-12-27 NOTE — PROGRESS NOTE ADULT - ASSESSMENT
65 yo woman admitted 12/11/21 with HA/vomiting since 12/10 found to have HH2mF3 SAH c/b hydro s/p coiling of L Pcomm aneurysm, found to have a partial left m2 thrombus now s/p thrombectomy with TICI 3 reperfusion. S/p R EVD for hydro c/b sizable tract hemorrhage with IVH now with L EVD. PBD 15    NEURO:  NC q1h  HCP: EVD at 0 cmH2O, output 350 , goal ICP<22 mmhg , CPP >65 mmhg   EVD clamping today   nimodipine     CVS: TTE EF 65 %   SBP goal 100-180 mmhg     PULM:  RA  O2 Sat >92%    RENAL:  IVL  Strict IS and Os  Na 140-150  q8h BMP    GI:  - Pureed diet   - Bowel regimen: miralax, senna    ENDO:   - FS goal 120-180     HEME/ONC:  - SCDs  - Argatroban gtt  - Trend PTT Q6 hrs     ID:   Urine culture grown Klebsiella  Ciprofloxacin     Full code    ICU   67 yo woman admitted 12/11/21 with HA/vomiting since 12/10 found to have HH2mF3 SAH c/b hydro s/p coiling of L Pcomm aneurysm, found to have a partial left m2 thrombus now s/p thrombectomy with TICI 3 reperfusion. S/p R EVD for hydro c/b sizable tract hemorrhage with IVH now with L EVD. PBD 15    NEURO:  NC q1h  HCP: EVD at 0 cmH2O, output 350 , goal ICP<22 mmhg , CPP >65 mmhg   EVD clamping today - CT vents larger and sleepier imroved with 7.5 cc taken off.   Discuss with IR opening drain   nimodipine     CVS: TTE EF 65 %   SBP goal 100-180 mmhg     PULM:  RA  O2 Sat >92%    RENAL:  IVL  Strict IS and Os  Na 140-150  q8h BMP    GI:  - Pureed diet   - Bowel regimen: miralax, senna    ENDO:   - FS goal 120-180     HEME/ONC:  - SCDs  - Argatroban gtt  - Trend PTT Q6 hrs     ID:   Urine culture grown Klebsiella  Ciprofloxacin     Full code    ICU   65 yo woman admitted 12/11/21 with HA/vomiting since 12/10 found to have HH2mF3 SAH c/b hydro s/p coiling of L Pcomm aneurysm, found to have a partial left m2 thrombus now s/p thrombectomy with TICI 3 reperfusion. S/p R EVD for hydro c/b sizable tract hemorrhage with IVH now with L EVD. PBD 17    NEURO:  NC q1h  HCP: EVD at 0 cmH2O, output 350 , goal ICP<22 mmhg , CPP >65 mmhg   EVD clamping today - CT vents larger and sleepier imroved with 7.5 cc taken off.   Discuss with IR opening drain   nimodipine     CVS: TTE EF 65 %   SBP goal 100-180 mmhg     PULM:  RA  O2 Sat >92%    RENAL:  IVL  Strict IS and Os  Na 140-150  q8h BMP    GI:  - Pureed diet   - Bowel regimen: miralax, senna    ENDO:   - FS goal 120-180     HEME/ONC:  - SCDs  - Argatroban gtt  - Trend PTT Q6 hrs     ID:   Urine culture grown Klebsiella  Ciprofloxacin     Full code    ICU

## 2021-12-27 NOTE — PROGRESS NOTE ADULT - SUBJECTIVE AND OBJECTIVE BOX
HPI:  65 yo woman admitted 12/11/21 with HA/vomiting since 12/10 found to have HH2mF3 SAH c/b hydro s/p coiling of L Pcomm aneurysm, found to have a partial left m2 thrombus now s/p thrombectomy with TICI 3 reperfusion. S/p R EVD for hydro c/b sizable tract hemorrhage with IVH now with L EVD. PBD 17.     24hr events  EVD remains clamped  fluctuating exam, ??improved with 7cc drainage, but per neurosurgery, keeping clamped for now  BALJEET+    VITALS: reviewed  LABS: reviewed  IMAGING:   Recent imaging studies were reviewed.  MEDICATIONS: reviewed    EXAMINATION:  General:  calm  HEENT:  MMM  Neuro:  examined in Lithuanian by me, alert, oriented to self/hospital but not to month/year even with choices in Lithuanian, follows simple commands, BUE 4/5 no drift, BLE at least 4/5 effort dependent exam  Cards:  RRR  Respiratory:  no respiratory distress  Abdomen:  soft  Extremities:  no edema

## 2021-12-28 LAB
ANION GAP SERPL CALC-SCNC: 12 MMOL/L — SIGNIFICANT CHANGE UP (ref 5–17)
ANION GAP SERPL CALC-SCNC: 13 MMOL/L — SIGNIFICANT CHANGE UP (ref 5–17)
APTT BLD: 47.5 SEC — HIGH (ref 27.5–35.5)
BUN SERPL-MCNC: 20 MG/DL — SIGNIFICANT CHANGE UP (ref 7–23)
BUN SERPL-MCNC: 22 MG/DL — SIGNIFICANT CHANGE UP (ref 7–23)
CALCIUM SERPL-MCNC: 8.7 MG/DL — SIGNIFICANT CHANGE UP (ref 8.4–10.5)
CALCIUM SERPL-MCNC: 8.8 MG/DL — SIGNIFICANT CHANGE UP (ref 8.4–10.5)
CHLORIDE SERPL-SCNC: 102 MMOL/L — SIGNIFICANT CHANGE UP (ref 96–108)
CHLORIDE SERPL-SCNC: 104 MMOL/L — SIGNIFICANT CHANGE UP (ref 96–108)
CO2 SERPL-SCNC: 21 MMOL/L — LOW (ref 22–31)
CO2 SERPL-SCNC: 21 MMOL/L — LOW (ref 22–31)
CREAT SERPL-MCNC: 0.34 MG/DL — LOW (ref 0.5–1.3)
CREAT SERPL-MCNC: 0.42 MG/DL — LOW (ref 0.5–1.3)
GLUCOSE SERPL-MCNC: 115 MG/DL — HIGH (ref 70–99)
GLUCOSE SERPL-MCNC: 126 MG/DL — HIGH (ref 70–99)
INR BLD: 1.24 RATIO — HIGH (ref 0.88–1.16)
MAGNESIUM SERPL-MCNC: 2.1 MG/DL — SIGNIFICANT CHANGE UP (ref 1.6–2.6)
MAGNESIUM SERPL-MCNC: 2.1 MG/DL — SIGNIFICANT CHANGE UP (ref 1.6–2.6)
PHOSPHATE SERPL-MCNC: 2.8 MG/DL — SIGNIFICANT CHANGE UP (ref 2.5–4.5)
PHOSPHATE SERPL-MCNC: 3.2 MG/DL — SIGNIFICANT CHANGE UP (ref 2.5–4.5)
POTASSIUM SERPL-MCNC: 3.5 MMOL/L — SIGNIFICANT CHANGE UP (ref 3.5–5.3)
POTASSIUM SERPL-MCNC: 3.6 MMOL/L — SIGNIFICANT CHANGE UP (ref 3.5–5.3)
POTASSIUM SERPL-SCNC: 3.5 MMOL/L — SIGNIFICANT CHANGE UP (ref 3.5–5.3)
POTASSIUM SERPL-SCNC: 3.6 MMOL/L — SIGNIFICANT CHANGE UP (ref 3.5–5.3)
PROTHROM AB SERPL-ACNC: 14.7 SEC — HIGH (ref 10.6–13.6)
SODIUM SERPL-SCNC: 136 MMOL/L — SIGNIFICANT CHANGE UP (ref 135–145)
SODIUM SERPL-SCNC: 137 MMOL/L — SIGNIFICANT CHANGE UP (ref 135–145)

## 2021-12-28 PROCEDURE — 99291 CRITICAL CARE FIRST HOUR: CPT

## 2021-12-28 PROCEDURE — 99222 1ST HOSP IP/OBS MODERATE 55: CPT | Mod: GC

## 2021-12-28 PROCEDURE — 70450 CT HEAD/BRAIN W/O DYE: CPT | Mod: 26

## 2021-12-28 PROCEDURE — 99232 SBSQ HOSP IP/OBS MODERATE 35: CPT

## 2021-12-28 PROCEDURE — 70450 CT HEAD/BRAIN W/O DYE: CPT | Mod: 26,77

## 2021-12-28 PROCEDURE — 99223 1ST HOSP IP/OBS HIGH 75: CPT

## 2021-12-28 RX ORDER — POTASSIUM CHLORIDE 20 MEQ
40 PACKET (EA) ORAL ONCE
Refills: 0 | Status: COMPLETED | OUTPATIENT
Start: 2021-12-28 | End: 2021-12-29

## 2021-12-28 RX ADMIN — ARGATROBAN 6.12 MICROGRAM(S)/KG/MIN: 50 INJECTION, SOLUTION INTRAVENOUS at 08:32

## 2021-12-28 RX ADMIN — Medication 500000 UNIT(S): at 05:55

## 2021-12-28 RX ADMIN — Medication 500 MILLIGRAM(S): at 05:55

## 2021-12-28 RX ADMIN — ARGATROBAN 6.12 MICROGRAM(S)/KG/MIN: 50 INJECTION, SOLUTION INTRAVENOUS at 21:30

## 2021-12-28 RX ADMIN — NIMODIPINE 30 MILLIGRAM(S): 60 SOLUTION ORAL at 05:55

## 2021-12-28 RX ADMIN — NIMODIPINE 30 MILLIGRAM(S): 60 SOLUTION ORAL at 02:00

## 2021-12-28 RX ADMIN — SODIUM CHLORIDE 1 GRAM(S): 9 INJECTION INTRAMUSCULAR; INTRAVENOUS; SUBCUTANEOUS at 17:51

## 2021-12-28 RX ADMIN — NYSTATIN CREAM 1 APPLICATION(S): 100000 CREAM TOPICAL at 17:50

## 2021-12-28 RX ADMIN — Medication 500000 UNIT(S): at 12:09

## 2021-12-28 RX ADMIN — NIMODIPINE 30 MILLIGRAM(S): 60 SOLUTION ORAL at 12:25

## 2021-12-28 RX ADMIN — Medication 500 MILLIGRAM(S): at 17:50

## 2021-12-28 RX ADMIN — ARGATROBAN 6.12 MICROGRAM(S)/KG/MIN: 50 INJECTION, SOLUTION INTRAVENOUS at 05:57

## 2021-12-28 RX ADMIN — NIMODIPINE 30 MILLIGRAM(S): 60 SOLUTION ORAL at 04:05

## 2021-12-28 RX ADMIN — NIMODIPINE 30 MILLIGRAM(S): 60 SOLUTION ORAL at 14:05

## 2021-12-28 RX ADMIN — NIMODIPINE 30 MILLIGRAM(S): 60 SOLUTION ORAL at 22:05

## 2021-12-28 RX ADMIN — SODIUM CHLORIDE 1 GRAM(S): 9 INJECTION INTRAMUSCULAR; INTRAVENOUS; SUBCUTANEOUS at 05:46

## 2021-12-28 RX ADMIN — NYSTATIN CREAM 1 APPLICATION(S): 100000 CREAM TOPICAL at 05:54

## 2021-12-28 RX ADMIN — NIMODIPINE 30 MILLIGRAM(S): 60 SOLUTION ORAL at 20:25

## 2021-12-28 RX ADMIN — NIMODIPINE 30 MILLIGRAM(S): 60 SOLUTION ORAL at 17:58

## 2021-12-28 RX ADMIN — Medication 500000 UNIT(S): at 17:50

## 2021-12-28 RX ADMIN — SODIUM CHLORIDE 1 GRAM(S): 9 INJECTION INTRAMUSCULAR; INTRAVENOUS; SUBCUTANEOUS at 12:09

## 2021-12-28 RX ADMIN — Medication 650 MILLIGRAM(S): at 16:00

## 2021-12-28 RX ADMIN — NIMODIPINE 30 MILLIGRAM(S): 60 SOLUTION ORAL at 10:24

## 2021-12-28 RX ADMIN — NIMODIPINE 30 MILLIGRAM(S): 60 SOLUTION ORAL at 16:00

## 2021-12-28 RX ADMIN — NIMODIPINE 30 MILLIGRAM(S): 60 SOLUTION ORAL at 08:32

## 2021-12-28 RX ADMIN — SODIUM CHLORIDE 1 GRAM(S): 9 INJECTION INTRAMUSCULAR; INTRAVENOUS; SUBCUTANEOUS at 05:58

## 2021-12-28 RX ADMIN — Medication 500000 UNIT(S): at 00:02

## 2021-12-28 RX ADMIN — NIMODIPINE 30 MILLIGRAM(S): 60 SOLUTION ORAL at 00:01

## 2021-12-28 RX ADMIN — Medication 650 MILLIGRAM(S): at 15:30

## 2021-12-28 NOTE — CONSULT NOTE ADULT - SUBJECTIVE AND OBJECTIVE BOX
HPI:  Pt is a 65 y/o F with no sig PMHx, who was transferred from [facility] on 12/11 for HA since 12/10 morning. Had associated nausea/vomiting and posterior neck pain. CT showed diffuse SAH, most prominent at Sylvian fissures complicated by hydrocephalus. She is s/p coiling of L PCOMM aneurysm and was also found to have a partial left M2 thrombus. She underwent thrombectomy with TICI 3 reperfusion. She is also s/p R EVD for hydrocephalus that was complicated by sizable tract hemorrhage with IVH, now with a L EVD. The EVD is now clamped.      Imaging showed (reviewed):  HEAD CT (12/28)- Parenchymal hemorrhage involving the right frontal region is seen with surrounding edema. This area of acute parenchymal hemorrhage measures approximately 3.6 x 3.3 cm and previously measured approximately 3.6 x 3.4 cm. Localized mass effect is seen consisting sulcal effacement. Mass effect is seen on the right lateral ventricle. Right to left shift (5.1 mm). Left frontal shunt catheter is again seen. The size and configuration of ventricles appear unchanged. Intraventricular hemorrhage is again seen. Embolic material is again seen involving the left parasellar region and appears unchanged. No significant change when allowing for differences in technique.      REVIEW OF SYSTEMS  Pt with waxing and waning lethargy, difficulty with responding to ROS  Constitutional - No fever, no pain  HEENT - No eye pain, No visual disturbances, No neck pain  Respiratory - No cough, No wheezing, No shortness of breath  Gastrointestinal - No vomiting, No diarrhea, No constipation  Genitourinary -  No frequency, No hematuria, No incontinence  Neurological - +memory loss, +loss of strength   Skin - No itching, No rashes, No lesions   Musculoskeletal - No joint pain, No joint swelling, No muscle pain  Psychiatric - No depression, No anxiety    VITALS  T(C): 36.7 (12-28-21 @ 11:00), Max: 36.7 (12-27-21 @ 15:00)  HR: 83 (12-28-21 @ 11:00) (67 - 100)  BP: --  RR: 20 (12-28-21 @ 11:00) (13 - 25)  SpO2: 93% (12-28-21 @ 11:00) (89% - 100%)  Wt(kg): --    PAST MEDICAL & SURGICAL HISTORY  No pertinent past medical history    No significant past surgical history        SOCIAL HISTORY  Smoking - Denied  EtOH - Denied   Drugs - Denied    FUNCTIONAL HISTORY  Lives in an apartment with her . There are 3 steps to enter and 10 steps inside. Prior to admission, pt was independent with her ADLs and ambulation.    CURRENT FUNCTIONAL STATUS  12/27  Bed Mobility  Bed Mobility Training Sit-to-Supine: moderate assist (50% patient effort);  1 person assist;  bed rails;  verbal cues  Bed Mobility Training Supine-to-Sit: moderate assist (50% patient effort);  1 person assist;  verbal cues;  bed rails  Bed Mobility Training Limitations: decreased ability to use arms for pushing/pulling;  decreased ability to use legs for bridging/pushing;  decreased strength;  impaired balance;  impaired postural control    Sit-Stand Transfer Training  Transfer Training Sit-to-Stand Transfer: moderate assist (50% patient effort);  1 person assist;  verbal cues;  full weight-bearing   PT in front.  Transfer Training Stand-to-Sit Transfer: moderate assist (50% patient effort);  1 person assist;  verbal cues;  full weight-bearing   PT in front.  Sit-to-Stand Transfer Training Transfer Safety Analysis: decreased balance;  decreased step length;  decreased strength;  impaired balance;  impaired postural control;  cognitive, decreased safety awareness;  PT in front.    Gait Training  Gait Training: moderate assist (50% patient effort);  1 person assist;  verbal cues;  full weight-bearing   Hand held assist PT in front for trunk stabilization.;  2 short steps towards HOB.  Gait Analysis: 2-point gait   shuffling;  decreased step length;  decreased strength;  impaired balance;  impaired postural control;  2 short steps towards HOB.;  Hand held assist PT in front for trunk stabilization.    Therapeutic Exercise  Therapeutic Exercise Detail: Pt. performed AROM of Bilat. Hip flexion, Bilat. knee flexion/ extension, bilat. ankle DF and PF, and bilat. Elbow ,shoulder flexion/ extension. Each 10 reps x 2 sets; standing balance activities- weight shifting side/side- req mod a.Rest breaks provided throughout therex session. No complains of pain or discomfort with therex       RECENT LABS/IMAGING  CBC Full  -  ( 27 Dec 2021 21:56 )  WBC Count : 9.18 K/uL  RBC Count : 3.77 M/uL  Hemoglobin : 11.8 g/dL  Hematocrit : 34.6 %  Platelet Count - Automated : 251 K/uL  Mean Cell Volume : 91.8 fl  Mean Cell Hemoglobin : 31.3 pg  Mean Cell Hemoglobin Concentration : 34.1 gm/dL  Auto Neutrophil # : x  Auto Lymphocyte # : x  Auto Monocyte # : x  Auto Eosinophil # : x  Auto Basophil # : x  Auto Neutrophil % : x  Auto Lymphocyte % : x  Auto Monocyte % : x  Auto Eosinophil % : x  Auto Basophil % : x    12-28    136  |  102  |  20  ----------------------------<  126<H>  3.6   |  21<L>  |  0.34<L>    Ca    8.8      28 Dec 2021 10:40  Phos  3.2     12-28  Mg     2.1     12-28    ALLERGIES  heparin (Other (Fatal))      MEDICATIONS   acetaminophen     Tablet .. 650 milliGRAM(s) Oral every 6 hours PRN  argatroban Infusion 1.5 MICROgram(s)/kG/Min IV Continuous <Continuous>  ciprofloxacin     Tablet 500 milliGRAM(s) Oral every 12 hours  niMODipine Oral Solution 30 milliGRAM(s) Enteral Tube every 2 hours  nystatin    Suspension 943309 Unit(s) Oral every 6 hours  nystatin Powder 1 Application(s) Topical every 12 hours  sodium chloride 1 Gram(s) Oral every 6 hours  sodium chloride 0.9% lock flush 10 milliLiter(s) IV Push every 1 hour PRN  traMADol 25 milliGRAM(s) Oral every 6 hours PRN      ----------------------------------------------------------------------------------------  PHYSICAL EXAM  Constitutional - NAD, Comfortably lying in bed  HEENT - +left EVD  Neck - Supple, No limited ROM  Chest - Breathing comfortably, No wheezing  Cardiovascular - S1S2, RRR   Abdomen - Soft, nontender, nondistended  Extremities - No C/C/E, No calf tenderness   Neurologic Exam -                    Cognitive - Awake, Alert, AAO to self     Communication - +dysarthric, intermittently can follow simple one step commands     Motor - able to move all extremities spontaneously against gravity      Sensory - reacts to LT  Psychiatric - Mood stable, Affect WNL   HPI:  Pt is a 65 y/o F with no sig PMHx, who was transferred from [facility] on 12/11 for HA since 12/10 morning. Had associated nausea/vomiting and posterior neck pain. CT showed diffuse SAH, most prominent at Sylvian fissures complicated by hydrocephalus. She is s/p coiling of L PCOMM aneurysm and was also found to have a partial left M2 thrombus. She underwent thrombectomy with TICI 3 reperfusion. She is also s/p R EVD for hydrocephalus that was complicated by sizable tract hemorrhage with IVH, now with a L EVD. The EVD is now clamped.      Imaging showed (reviewed):  HEAD CT (12/28)- Parenchymal hemorrhage involving the right frontal region is seen with surrounding edema. This area of acute parenchymal hemorrhage measures approximately 3.6 x 3.3 cm and previously measured approximately 3.6 x 3.4 cm. Localized mass effect is seen consisting sulcal effacement. Mass effect is seen on the right lateral ventricle. Right to left shift (5.1 mm). Left frontal shunt catheter is again seen. The size and configuration of ventricles appear unchanged. Intraventricular hemorrhage is again seen. Embolic material is again seen involving the left parasellar region and appears unchanged. No significant change when allowing for differences in technique.      REVIEW OF SYSTEMS  Pt with waxing and waning lethargy, difficulty with responding to ROS  unable to obtain     VITALS  T(C): 36.7 (12-28-21 @ 11:00), Max: 36.7 (12-27-21 @ 15:00)  HR: 83 (12-28-21 @ 11:00) (67 - 100)  BP: --  RR: 20 (12-28-21 @ 11:00) (13 - 25)  SpO2: 93% (12-28-21 @ 11:00) (89% - 100%)  Wt(kg): --    PAST MEDICAL & SURGICAL HISTORY  No pertinent past medical history    No significant past surgical history        SOCIAL HISTORY  Smoking - Denied  EtOH - Denied   Drugs - Denied    FUNCTIONAL HISTORY  Lives in an apartment with her . There are 3 steps to enter and 10 steps inside. Prior to admission, pt was independent with her ADLs and ambulation.    CURRENT FUNCTIONAL STATUS  12/27  Bed Mobility  Bed Mobility Training Sit-to-Supine: moderate assist (50% patient effort);  1 person assist;  bed rails;  verbal cues  Bed Mobility Training Supine-to-Sit: moderate assist (50% patient effort);  1 person assist;  verbal cues;  bed rails  Bed Mobility Training Limitations: decreased ability to use arms for pushing/pulling;  decreased ability to use legs for bridging/pushing;  decreased strength;  impaired balance;  impaired postural control    Sit-Stand Transfer Training  Transfer Training Sit-to-Stand Transfer: moderate assist (50% patient effort);  1 person assist;  verbal cues;  full weight-bearing   PT in front.  Transfer Training Stand-to-Sit Transfer: moderate assist (50% patient effort);  1 person assist;  verbal cues;  full weight-bearing   PT in front.  Sit-to-Stand Transfer Training Transfer Safety Analysis: decreased balance;  decreased step length;  decreased strength;  impaired balance;  impaired postural control;  cognitive, decreased safety awareness;  PT in front.    Gait Training  Gait Training: moderate assist (50% patient effort);  1 person assist;  verbal cues;  full weight-bearing   Hand held assist PT in front for trunk stabilization.;  2 short steps towards HOB.  Gait Analysis: 2-point gait   shuffling;  decreased step length;  decreased strength;  impaired balance;  impaired postural control;  2 short steps towards HOB.;  Hand held assist PT in front for trunk stabilization.    Therapeutic Exercise  Therapeutic Exercise Detail: Pt. performed AROM of Bilat. Hip flexion, Bilat. knee flexion/ extension, bilat. ankle DF and PF, and bilat. Elbow ,shoulder flexion/ extension. Each 10 reps x 2 sets; standing balance activities- weight shifting side/side- req mod a.Rest breaks provided throughout therex session. No complains of pain or discomfort with therex       RECENT LABS/IMAGING  CBC Full  -  ( 27 Dec 2021 21:56 )  WBC Count : 9.18 K/uL  RBC Count : 3.77 M/uL  Hemoglobin : 11.8 g/dL  Hematocrit : 34.6 %  Platelet Count - Automated : 251 K/uL  Mean Cell Volume : 91.8 fl  Mean Cell Hemoglobin : 31.3 pg  Mean Cell Hemoglobin Concentration : 34.1 gm/dL  Auto Neutrophil # : x  Auto Lymphocyte # : x  Auto Monocyte # : x  Auto Eosinophil # : x  Auto Basophil # : x  Auto Neutrophil % : x  Auto Lymphocyte % : x  Auto Monocyte % : x  Auto Eosinophil % : x  Auto Basophil % : x    12-28    136  |  102  |  20  ----------------------------<  126<H>  3.6   |  21<L>  |  0.34<L>    Ca    8.8      28 Dec 2021 10:40  Phos  3.2     12-28  Mg     2.1     12-28    ALLERGIES  heparin (Other (Fatal))      MEDICATIONS   acetaminophen     Tablet .. 650 milliGRAM(s) Oral every 6 hours PRN  argatroban Infusion 1.5 MICROgram(s)/kG/Min IV Continuous <Continuous>  ciprofloxacin     Tablet 500 milliGRAM(s) Oral every 12 hours  niMODipine Oral Solution 30 milliGRAM(s) Enteral Tube every 2 hours  nystatin    Suspension 209886 Unit(s) Oral every 6 hours  nystatin Powder 1 Application(s) Topical every 12 hours  sodium chloride 1 Gram(s) Oral every 6 hours  sodium chloride 0.9% lock flush 10 milliLiter(s) IV Push every 1 hour PRN  traMADol 25 milliGRAM(s) Oral every 6 hours PRN      ----------------------------------------------------------------------------------------  PHYSICAL EXAM  Constitutional - NAD, Comfortable, in bed  HEENT - +left EVD  Neck - Supple, No limited ROM  Chest - Breathing comfortably, No wheezing  Cardiovascular - S1S2, RRR   Abdomen - Soft, nontender, nondistended  Extremities - No C/C/E, No calf tenderness   Neurologic Exam -                    Cognitive - Awake, Alert, AAO to self     Communication - +dysarthric, intermittently can follow simple one step commands     Motor - able to move all extremities spontaneously against gravity      Sensory - reacts to LT  Psychiatric - Mood stable, Affect WNL

## 2021-12-28 NOTE — PROGRESS NOTE ADULT - ASSESSMENT
67 yo woman admitted 12/11/21 with HA/vomiting since 12/10 found to have HH2mF3 SAH c/b hydro s/p coiling of L Pcomm aneurysm, found to have a partial left m2 thrombus now s/p thrombectomy with TICI 3 reperfusion. S/p R EVD for hydro c/b sizable tract hemorrhage with IVH now with L EVD. PBD 18.    NEURO:  HCP: EVD d/c'ed today, monitor for s/s hydrocephalus, concern for gradual worsening   CTH on Thursday  Keppra 500mg BID for seizure ppx    nimodipine --tolerating   PT/OT     CVS: TTE EF 65 %   SBP goal 100-200 mmhg     PULM:  RA  O2 Sat >92%  incentive spirometry as able    RENAL:  Strict IS and Os  Na 135-145; downtrending Na, add salt tabs  maintain euvolemia    GI:  - puree diet; due to fluctuating exam, requires NGT  - consider calorie count   - Bowel regimen: miralax, senna; last BM 12/28    ENDO:   - FS goal 120-180     HEME/ONC:  - SCDs  - HIT+, BALJEET+  - argatroban gtt continue for now, if no concern for worsening hydro, d/w neurosurgery re; starting DOAC, per heme, [id number]  - Positive deep venous thrombosis involving the right subclavian vein. -->vascular following, will d/c when adequate access  Thrombus is nonocclusive and related to the PICC line. No evidence of deep venous thrombosis in the left upper extremity. Superficial thrombophlebitis left basilic vein.  - f/u repeat UE dopplers    ID:   Urine culture grown Klebsiella  Ciprofloxacin till 12/29  no pna on CTA chest this am so hold off on CXR     Lines: R PICC, R Axillary A line   Patient is critically ill due to SAH and at high risk for neurological deterioration or death due to: hydrocephalus requiring an EVD for CSF diversion, at risk for vasospasm

## 2021-12-28 NOTE — CHART NOTE - NSCHARTNOTEFT_GEN_A_CORE
EVD was cleared for removal per neurosurgery. EVD was clamped and patient was laid flat. Aseptic technique was performed and chlorhexidine was used at exit site. 5 staples were removed and 1 suture. EVD was removed without complication.  [place] applied for hemostasis. No csf or blood leaks from exit wound. Patient kept flat for 10 minutes.  Patient tolerated procedure well.

## 2021-12-28 NOTE — CONSULT NOTE ADULT - ATTENDING COMMENTS
65 y/o F with no sig PMHx, with functional deficits due to SAH  s/p EVD, to be removed today   continue bedside therapy  will continue to follow
66-yr-old woman consulted anticoagulation in the setting of intracranial bleed (ruptured aneurysm) and GILLIAN (PICC related thrombosis?). Patient is currently on Argatroban and there is no evidence of active bleeding. Patient will need 3 months of anticoagulation with non-heparin product prior to discharge; may be switched to a DOAC starting with a loading dose. Monitor closely for bleeding.

## 2021-12-28 NOTE — CONSULT NOTE ADULT - ASSESSMENT
66 year old woman admitted 12/11/21 with HA/vomiting since 12/10 found to have SAH c/b hydrocephalus  s/p coiling of L Pcomm aneurysm, found to have a partial L M2 thrombus now s/p thrombectomy s/p R EVD for hydrocephalus c/b hemorrhage with IVH now with L EVD. 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 hematology consulted for HIT management.    #HIT  -BALJEET positive  -Pt should not receive heparin products  -Plts have improved drastically  -c/w Argatroban gtt. Can hold 4 hours prior to removal of drain  -Monitor on argatroban gtt until certain pt not going for further procedures and no longer bleeding  -She can be switched to any DOAC once cleared by neurosurgery team  -DOAC can include Apixaban 5mg BID for 90 days versus Rivaroxaban 20mg daily for 90 days given platelets have recovered  -Daily CBC    Please page with questions or concerns. Will follow with you.      Alexander Dougherty M.D.  Hematology/Oncology Fellow PGY4  Pager [phone]  After 5pm, please contact on-call team.   66 year old woman admitted 12/11/21 with HA/vomiting since 12/10 found to have SAH c/b hydrocephalus  s/p coiling of L Pcomm aneurysm, found to have a partial L M2 thrombus now s/p thrombectomy s/p R EVD for hydrocephalus c/b hemorrhage with IVH now with L EVD. 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 hematology consulted for HIT management.    #HIT  -BALJEET positive  -Pt should not receive heparin products  -Plts have improved drastically  -c/w Argatroban gtt. Can hold 4 hours prior to removal of drain  -Monitor on argatroban gtt until certain pt not going for further procedures and no longer bleeding  -She can be switched to any DOAC once cleared by neurosurgery team  -DOAC can include Apixaban 5mg BID for 90 days versus Rivaroxaban 20mg daily for 90 days given platelets have recovered  -Daily CBC    Will refer pt to [facility] upon discharge. Heme to sign off for now.       Alexander Dougherty M.D.  Hematology/Oncology Fellow PGY4  Pager [phone]  After 5pm, please contact on-call team.

## 2021-12-28 NOTE — CONSULT NOTE ADULT - ASSESSMENT
Pt is a 65 y/o F with no sig PMHx, found to have SAH, most prominent at Sylvian fissures complicated by hydrocephalus.    #SAH  - s/p coiling of L pcomm aneurysm and thrombectomy, now with left EVD  - Nimodipine  - PT/OT/ST    #Pain   - Tylenol PRN, Tramadol PRN    #DVT PPX   - Argatroban, SCDs    #Rehab   - Will continue to follow for ongoing rehab needs and recommendations.  - Continue bedside therapy as well as OOB throughout the day with mobilization throughout the day with staff to maintain cardiopulmonary function and prevention of secondary complications related to debility.

## 2021-12-28 NOTE — CHART NOTE - NSCHARTNOTEFT_GEN_A_CORE
Nutrition Follow Up Note  Patient seen for: Nutrition follow up/Calorie count     Chart reviewed, events noted. Pt is a 65 yo F admitted 12/11/21 with HA/vomiting since 12/10. Found to have HH2, mF3 SAH c/b hydrocephalus s/p coiling of L PCOMM aneurysm. Found to have a partial left M2 thrombus now s/p thrombectomy with TICI 3 reperfusion. S/P R EVD for hydro c/b sizable trach hemorrhage with IVH. Now with L EVD. PBD18. EVD clamped at this time. Pt continues on antibiotics in context of urine culture grown Klebsiella.     Source: [x] Patient       [x] EMR        [x] RN        [] Family at bedside       [x] Other: interdisciplinary medical team    Diet Order:   Diet, Pureed:   Mildly Thick Liquids (MILDTHICKLIQS)  Supplement Feeding Modality:  Oral  Ensure Enlive Cans or Servings Per Day:  1       Frequency:  Two Times a day  Probiotic Yogurt/Smoothie Cans or Servings Per Day:  2       Frequency:  Daily (12-27-21)    Is current diet order appropriate/adequate? [x] Yes  []  No:     Nutrition-related concerns:  -S/P swallow evaluation 12/23. Recommendations noted for pt to continue on a Pureed diet with thin liquids only when FULLY AWAKE AND ALERT. Tube feeds discontinued 12/23 and advanced to PO diet. On 12/26, Thin liquids changed to Mildly Thick Liquids. Ensure Enlive 2x daily added on 12/27. Calorie count initiated to determine if supplemental NGT feeds appropriate due to fluctuating PO intake, variable neurological status/level of consciousness.   -Pt continues on antibiotics; Reji Active 2x daily added to diet regimen to aid in gut isabel.   -Pt continues on NaCl tablets 1gm q 6 hrs. Na goal 135-145.   -Last BM (12/28): x 1; (12/26): x 7. Bowel regimen discontinued at this time.     Weights:   Daily     MEDICATIONS  (STANDING):  ciprofloxacin     Tablet  niMODipine Oral Solution  nystatin    Suspension  sodium chloride    Pertinent Labs: 12-27 @ 21:56: Na 135, BUN 20, Cr 0.37<L>, <H>, K+ 4.3, Phos 2.6, Mg 2.1, Alk Phos --, ALT/SGPT --, AST/SGOT --, HbA1c --  12-27 @ 16:38: Na 136, BUN 22, Cr 0.41<L>, BG 90, K+ 3.8, Phos --, Mg --, Alk Phos --, ALT/SGPT --, AST/SGOT --, HbA1c --    A1C with Estimated Average Glucose Result: 5.4 % (12-11-21 @ 12:03)    Finger Sticks:    Triglycerides, Serum: 87 mg/dL (12-11-21 @ 11:16)    Skin per nursing documentation: surgical incision R head EVD removal  Edema: none noted    (based on IBW 54.5kg):   Estimated Energy Needs: (25-30kcal/kg): 1360-1632kcal  Estimated Protein Needs: (1.2-1.4g protein/kg): 65-76g protein    Previous Nutrition Diagnosis: 1) increased nutrient needs 2) inadequate energy intake  Nutrition Diagnosis is: 1) remains appropriate 2) remains appropriate     New Nutrition Diagnosis: [] Not applicable    Nutrition Care Plan:  [x] In Progress  [] Achieved  [] Not applicable       Recommendations:      1. Defer continuation of PO diet to medical team; texture and consistency per SLP, medical team. Continue Ensure Enlive 2x daily, Dan Active 2x daily.   2. 3-day calorie count in progress. RD to analyze and interpret results upon completion.   3. If supplemental EN feeds warranted, consider: Jevity 1.5 at 40ml/hr x 12 hrs (overnight, ~6pm to 6am) + No Carb Prosource TF 1x daily.  To provide: 480ml, 760kcal and 42g protein. Provides ~56% of low end calorie needs and ~65% low end protein needs.   4. If PO diet discontinued, consider EN feeds of Jevity 1.2 at 55ml/hr x 24 hr + Reji Active 2x daily. To provide (based on IBW 54.5kg): 1320ml, 1584kcal (29kcal/kg) and 73g protein (1.3g protein/kg)  5. RD to continue to monitor and adjust EN regimen accordingly IF initiated.   6. Recommend multivitamin (if no medication contraindications) to aid in prevention of micronutrient deficiencies  7. Consider Banatrol 1x daily if persistent multiple BM's noted.      Monitoring and Evaluation:   Continue to monitor nutritional intake, tolerance to diet prescription, weights, labs, skin integrity    RD remains available upon request and will follow up per protocol

## 2021-12-28 NOTE — PROGRESS NOTE ADULT - SUBJECTIVE AND OBJECTIVE BOX
HPI:  65 yo woman admitted 12/11/21 with HA/vomiting since 12/10 found to have HH2mF3 SAH c/b hydro s/p coiling of L Pcomm aneurysm, found to have a partial left m2 thrombus now s/p thrombectomy with TICI 3 reperfusion. S/p R EVD for hydro c/b sizable tract hemorrhage with IVH now with L EVD. PBD 18, the EVD is clamped.    OVERNIGHT EVENTS:   No acute events overnight.    VITALS:  T(C): , Max: 36.9 (12-27-21 @ 11:00)  HR:  (67 - 100)  BP: --  ABP:  (103/59 - 168/132)  RR:  (12 - 25)  SpO2:  (89% - 100%)  Wt(kg): --      12-26-21 @ 07:01  -  12-27-21 @ 07:00  --------------------------------------------------------  IN: 1806.3 mL / OUT: 1250 mL / NET: 556.3 mL    12-27-21 @ 07:01  -  12-28-21 @ 06:57  --------------------------------------------------------  IN: 856.4 mL / OUT: 355 mL / NET: 501.4 mL      LABS:  Na: 135 (12-27 @ 21:56), 136 (12-27 @ 16:38), 139 (12-26 @ 20:51), 139 (12-26 @ 17:10), 140 (12-25 @ 23:54)  K: 4.3 (12-27 @ 21:56), 3.8 (12-27 @ 16:38), 3.9 (12-26 @ 20:51), 3.7 (12-26 @ 17:10), 3.2 (12-25 @ 23:54)  Cl: 103 (12-27 @ 21:56), 104 (12-27 @ 16:38), 109 (12-26 @ 20:51), 108 (12-26 @ 17:10), 109 (12-25 @ 23:54)  CO2: 21 (12-27 @ 21:56), 20 (12-27 @ 16:38), 20 (12-26 @ 20:51), 20 (12-26 @ 17:10), 21 (12-25 @ 23:54)  BUN: 20 (12-27 @ 21:56), 22 (12-27 @ 16:38), 30 (12-26 @ 20:51), 27 (12-26 @ 17:10), 26 (12-25 @ 23:54)  Cr: 0.37 (12-27 @ 21:56), 0.41 (12-27 @ 16:38), 0.47 (12-26 @ 20:51), 0.43 (12-26 @ 17:10), 0.30 (12-25 @ 23:54)  Glu: 128(12-27 @ 21:56), 90(12-27 @ 16:38), 109(12-26 @ 20:51), 116(12-26 @ 17:10), 109(12-25 @ 23:54)    Hgb: 11.8 (12-27 @ 21:56), 11.2 (12-26 @ 20:51), 12.0 (12-25 @ 23:54)  Hct: 34.6 (12-27 @ 21:56), 32.9 (12-26 @ 20:51), 36.2 (12-25 @ 23:54)  WBC: 9.18 (12-27 @ 21:56), 9.74 (12-26 @ 20:51), 10.87 (12-25 @ 23:54)  Plt: 251 (12-27 @ 21:56), 225 (12-26 @ 20:51), 195 (12-25 @ 23:54)    INR: 1.20 12-27-21 @ 21:56  PTT: 44.1 12-27-21 @ 21:56, 49.6 12-26-21 @ 20:51, 45.3 12-25-21 @ 23:54, 40.4 12-25-21 @ 17:55    IMAGING:   Recent imaging studies were reviewed.    MEDICATIONS:  acetaminophen     Tablet .. 650 milliGRAM(s) Oral every 6 hours PRN  argatroban Infusion 1.5 MICROgram(s)/kG/Min IV Continuous <Continuous>  ciprofloxacin     Tablet 500 milliGRAM(s) Oral every 12 hours  niMODipine Oral Solution 30 milliGRAM(s) Enteral Tube every 2 hours  nystatin    Suspension 865495 Unit(s) Oral every 6 hours  nystatin Powder 1 Application(s) Topical every 12 hours  sodium chloride 1 Gram(s) Oral every 6 hours  sodium chloride 0.9% lock flush 10 milliLiter(s) IV Push every 1 hour PRN  traMADol 25 milliGRAM(s) Oral every 6 hours PRN    EXAMINATION:  General:  calm  HEENT:  MMM  Neuro:  Alert , oriented x 3, follows commands, pupils R 5 mm, L 7 mm non reactive b/l, midline gaze, face symmetric, moves all limbs against resistance, no drift   Cards:  RRR  Respiratory:  no respiratory distress  Abdomen:  soft  Extremities:  no edema   HPI:  65 yo woman admitted 12/11/21 with HA/vomiting since 12/10 found to have HH2mF3 SAH c/b hydro s/p coiling of L Pcomm aneurysm, found to have a partial left m2 thrombus now s/p thrombectomy with TICI 3 reperfusion. S/p R EVD for hydro c/b sizable tract hemorrhage with IVH now with L EVD. PBD 18, the EVD is clamped.    OVERNIGHT EVENTS:   No acute events overnight.    VITALS:  T(C): , Max: 36.9 (12-27-21 @ 11:00)  HR:  (67 - 100)  BP: --  ABP:  (103/59 - 168/132)  RR:  (12 - 25)  SpO2:  (89% - 100%)  Wt(kg): --      12-26-21 @ 07:01  -  12-27-21 @ 07:00  --------------------------------------------------------  IN: 1806.3 mL / OUT: 1250 mL / NET: 556.3 mL    12-27-21 @ 07:01  -  12-28-21 @ 06:57  --------------------------------------------------------  IN: 856.4 mL / OUT: 355 mL / NET: 501.4 mL      LABS:  Na: 135 (12-27 @ 21:56), 136 (12-27 @ 16:38), 139 (12-26 @ 20:51), 139 (12-26 @ 17:10), 140 (12-25 @ 23:54)  K: 4.3 (12-27 @ 21:56), 3.8 (12-27 @ 16:38), 3.9 (12-26 @ 20:51), 3.7 (12-26 @ 17:10), 3.2 (12-25 @ 23:54)  Cl: 103 (12-27 @ 21:56), 104 (12-27 @ 16:38), 109 (12-26 @ 20:51), 108 (12-26 @ 17:10), 109 (12-25 @ 23:54)  CO2: 21 (12-27 @ 21:56), 20 (12-27 @ 16:38), 20 (12-26 @ 20:51), 20 (12-26 @ 17:10), 21 (12-25 @ 23:54)  BUN: 20 (12-27 @ 21:56), 22 (12-27 @ 16:38), 30 (12-26 @ 20:51), 27 (12-26 @ 17:10), 26 (12-25 @ 23:54)  Cr: 0.37 (12-27 @ 21:56), 0.41 (12-27 @ 16:38), 0.47 (12-26 @ 20:51), 0.43 (12-26 @ 17:10), 0.30 (12-25 @ 23:54)  Glu: 128(12-27 @ 21:56), 90(12-27 @ 16:38), 109(12-26 @ 20:51), 116(12-26 @ 17:10), 109(12-25 @ 23:54)    Hgb: 11.8 (12-27 @ 21:56), 11.2 (12-26 @ 20:51), 12.0 (12-25 @ 23:54)  Hct: 34.6 (12-27 @ 21:56), 32.9 (12-26 @ 20:51), 36.2 (12-25 @ 23:54)  WBC: 9.18 (12-27 @ 21:56), 9.74 (12-26 @ 20:51), 10.87 (12-25 @ 23:54)  Plt: 251 (12-27 @ 21:56), 225 (12-26 @ 20:51), 195 (12-25 @ 23:54)    INR: 1.20 12-27-21 @ 21:56  PTT: 44.1 12-27-21 @ 21:56, 49.6 12-26-21 @ 20:51, 45.3 12-25-21 @ 23:54, 40.4 12-25-21 @ 17:55    IMAGING:   Recent imaging studies were reviewed.    MEDICATIONS:  acetaminophen     Tablet .. 650 milliGRAM(s) Oral every 6 hours PRN  argatroban Infusion 1.5 MICROgram(s)/kG/Min IV Continuous <Continuous>  ciprofloxacin     Tablet 500 milliGRAM(s) Oral every 12 hours  niMODipine Oral Solution 30 milliGRAM(s) Enteral Tube every 2 hours  nystatin    Suspension 620004 Unit(s) Oral every 6 hours  nystatin Powder 1 Application(s) Topical every 12 hours  sodium chloride 1 Gram(s) Oral every 6 hours  sodium chloride 0.9% lock flush 10 milliLiter(s) IV Push every 1 hour PRN  traMADol 25 milliGRAM(s) Oral every 6 hours PRN    EXAMINATION:  General:  calm  HEENT:  MMM  Neuro:  Alert , oriented x 1, dysarthric,  follows commands more on R than L UE, pupils R 5 mm, L 7 mm non reactive b/l, midline gaze, face symmetric, moves all limbs against resistance- UE 4/5 R > L and Moves B/L LE antigraviry prox sl weaker than distal ,  Cards:  RRR  Respiratory:  no respiratory distress  Abdomen:  soft  Extremities:  no edema

## 2021-12-28 NOTE — PROGRESS NOTE ADULT - SUBJECTIVE AND OBJECTIVE BOX
HPI:  67 yo woman admitted 12/11/21 with HA/vomiting since 12/10 found to have HH2mF3 SAH c/b hydro s/p coiling of L Pcomm aneurysm, found to have a partial left m2 thrombus now s/p thrombectomy with TICI 3 reperfusion. S/p R EVD for hydro c/b sizable tract hemorrhage with IVH now with L EVD. PBD 18.     24hr events  EVD d/c'ed, post pull CTH stable, restart argatroban     VITALS: reviewed  LABS: reviewed  IMAGING:   Recent imaging studies were reviewed.  MEDICATIONS: reviewed    EXAMINATION:  General:  calm  HEENT:  MMM  Neuro:  examined in Greenlandic by me, alert, oriented to self/hospital, follows simple commands, BUE 4/5 no drift, BLE at least 4/5 effort dependent exam  Cards:  RRR  Respiratory:  no respiratory distress  Abdomen:  soft  Extremities:  no edema

## 2021-12-28 NOTE — CONSULT NOTE ADULT - SUBJECTIVE AND OBJECTIVE BOX
Hematology/Oncology Consult Note    HPI:  66 year old man with no sig PMHx, who was transferred from [facility] on 12/11 for HA since 12/10 morning. Had associated nausea/vomiting and posterior neck pain. CT showed diffuse SAH, most prominent at Sylvian fissures complicated by hydrocephalus. She is s/p coiling of L PCOMM aneurysm and was also found to have a partial left M2 thrombus. She underwent thrombectomy with TICI 3 reperfusion. She is also s/p R EVD for hydrocephalus that was complicated by sizable tract hemorrhage with IVH, now with a L EVD. The EVD is now clamped.     Allergies: heparin (Other (Fatal))          MEDICATIONS  (STANDING):  argatroban Infusion 1.5 MICROgram(s)/kG/Min (6.12 mL/Hr) IV Continuous <Continuous>  ciprofloxacin     Tablet 500 milliGRAM(s) Oral every 12 hours  niMODipine Oral Solution 30 milliGRAM(s) Enteral Tube every 2 hours  nystatin    Suspension 492741 Unit(s) Oral every 6 hours  nystatin Powder 1 Application(s) Topical every 12 hours  sodium chloride 1 Gram(s) Oral every 6 hours    MEDICATIONS  (PRN):  acetaminophen     Tablet .. 650 milliGRAM(s) Oral every 6 hours PRN Temp greater or equal to 38C (100.4F), Mild Pain (1 - 3)  sodium chloride 0.9% lock flush 10 milliLiter(s) IV Push every 1 hour PRN Pre/post blood products, medications, blood draw, and to maintain line patency  traMADol 25 milliGRAM(s) Oral every 6 hours PRN Moderate Pain (4 - 6)      PAST MEDICAL & SURGICAL HISTORY:  No pertinent past medical history    No significant past surgical history        FAMILY HISTORY:      SOCIAL HISTORY: No EtOH, no tobacco    REVIEW OF SYSTEMS:    CONSTITUTIONAL: No weakness, fevers or chills  EYES/ENT: No visual changes;  No vertigo or throat pain   NECK: No pain or stiffness  RESPIRATORY: No cough, wheezing, hemoptysis; No shortness of breath  CARDIOVASCULAR: No chest pain or palpitations  GASTROINTESTINAL: No abdominal or epigastric pain. No nausea, vomiting, or hematemesis; No diarrhea or constipation. No melena or hematochezia.  GENITOURINARY: No dysuria, frequency or hematuria  NEUROLOGICAL: No numbness or weakness  SKIN: No itching, burning, rashes, or lesions   All other review of systems is negative unless indicated above.        T(F): 98.3 (12-28-21 @ 15:00), Max: 98.3 (12-28-21 @ 15:00)  HR: 86 (12-28-21 @ 15:00) (67 - 93)  BP: --  RR: 17 (12-28-21 @ 15:00)  SpO2: 100% (12-28-21 @ 15:00) (89% - 100%)    Physical Exam  GENERAL: NAD, well-developed  HEAD:  Atraumatic, Normocephalic  EYES: EOMI, PERRLA, conjunctiva and sclera clear  NECK: Supple, No JVD  CHEST/LUNG: Clear to auscultation bilaterally; No wheeze  HEART: Regular rate and rhythm; No murmurs, rubs, or gallops  ABDOMEN: Soft, Nontender, Nondistended; Bowel sounds present  EXTREMITIES:  2+ Peripheral Pulses, No clubbing, cyanosis, or edema  NEUROLOGY: non-focal  SKIN: No rashes or lesions                          11.8   9.18  )-----------( 251      ( 27 Dec 2021 21:56 )             34.6       12-28    136  |  102  |  20  ----------------------------<  126<H>  3.6   |  21<L>  |  0.34<L>    Ca    8.8      28 Dec 2021 10:40  Phos  3.2     12-28  Mg     2.1     12-28        Magnesium, Serum: 2.1 mg/dL (12-28 @ 10:40)  Phosphorus Level, Serum: 3.2 mg/dL (12-28 @ 10:40)  Magnesium, Serum: 2.1 mg/dL (12-27 @ 21:56)  Phosphorus Level, Serum: 2.6 mg/dL (12-27 @ 21:56)          Imaging:  < from: CT Head No Cont (12.28.21 @ 08:35) >    IMPRESSION: No significant change whenallowing for differences in   technique.      < from: CT Angio Head w/ IV Cont (12.11.21 @ 08:24) >  IMPRESSION:  NONCONTRAST CT BRAIN:  Bilateral acute subarachnoid hemorrhage. Trace intraventricular   hemorrhage.    Mildly prominent ventricles. Early hydrocephalus not excluded. Follow-up   recommended.    CTA BRAIN AND NECK:  4 mm aneurysm distal left internal carotid artery near the origin of left   posterior communicating artery and possible adjacent aneurysm as   described above. Evaluation limited due to motion. Repeat CTA head only   recommended for better characterization    No hemodynamically significant stenosis      < from: [facility] Duplex Lower Ext Vein Scan, Bilat (12.14.21 @ 16:04) >  IMPRESSION:  No evidence of deep venous thrombosis in either lower extremity.      < from: VA Duplex Upper Ext Vein Scan, Bilat (12.23.21 @ 12:04) >  IMPRESSION:  Positive deep venous thrombosis involving the right subclavian vein.   Thrombus is nonocclusive and related to the PICC line.  No evidence of deep venous thrombosis in the left upper extremity.  Superficial thrombophlebitis left basilic vein.

## 2021-12-28 NOTE — PROGRESS NOTE ADULT - ASSESSMENT
65 yo woman admitted 12/11/21 with HA/vomiting since 12/10 found to have HH2mF3 SAH c/b hydro s/p coiling of L Pcomm aneurysm, found to have a partial left m2 thrombus now s/p thrombectomy with TICI 3 reperfusion. S/p R EVD for hydro c/b sizable tract hemorrhage with IVH now with L EVD. PBD 18    NEURO:  NC q1h  HCP: EVD at 0 cmH2O, output 350 , goal ICP<22 mmhg , CPP >65 mmhg   EVD clamping today - CT vents larger and sleepier imroved with 7.5 cc taken off.   Discuss with IR opening drain   nimodipine     CVS: TTE EF 65 %   SBP goal 100-180 mmhg     PULM:  RA  O2 Sat >92%    RENAL:  IVL  Strict IS and Os  Na 140-150  q8h BMP    GI:  - Pureed diet   - Bowel regimen: miralax, senna    ENDO:   - FS goal 120-180     HEME/ONC:  - SCDs  - Argatroban gtt  - Trend PTT Q6 hrs     ID:   Urine culture grown Klebsiella  Ciprofloxacin     Full code    ICU   67 yo woman admitted 12/11/21 with HA/vomiting since 12/10 found to have HH2mF3 SAH c/b hydro s/p coiling of L Pcomm aneurysm, found to have a partial left m2 thrombus now s/p thrombectomy with TICI 3 reperfusion. S/p R EVD for hydro c/b sizable tract hemorrhage with IVH now with L EVD. PBD 18    NEURO:  NC q1h  clamprd to be removed today   Argatroban T 1/2 - 50 minutes - would D/C for 4 hrs before pulling EVD  nimodipine     CVS: TTE EF 65 %   SBP goal 100-180 mmhg     PULM:  RA  O2 Sat >92%    RENAL:  IVL  Strict IS and Os  Na 140-150  q8h BMP    GI:  - Pureed diet   - Bowel regimen: miralax, senna    ENDO:   - FS goal 120-180     HEME/ONC:  - SCDs  - Argatroban gtt  - Trend PTT Q6 hrs     ID:   Urine culture grown Klebsiella  Ciprofloxacin - UTI - D/C in am     Full code    ICU

## 2021-12-29 LAB
APTT BLD: 48.9 SEC — HIGH (ref 27.5–35.5)
APTT BLD: 49.6 SEC — HIGH (ref 27.5–35.5)
INR BLD: 1.31 RATIO — HIGH (ref 0.88–1.16)
PROTHROM AB SERPL-ACNC: 15.5 SEC — HIGH (ref 10.6–13.6)

## 2021-12-29 PROCEDURE — 99291 CRITICAL CARE FIRST HOUR: CPT

## 2021-12-29 PROCEDURE — 99232 SBSQ HOSP IP/OBS MODERATE 35: CPT

## 2021-12-29 PROCEDURE — 93971 EXTREMITY STUDY: CPT | Mod: 26,RT

## 2021-12-29 RX ORDER — NIMODIPINE 60 MG/10ML
60 SOLUTION ORAL EVERY 4 HOURS
Refills: 0 | Status: DISCONTINUED | OUTPATIENT
Start: 2021-12-29 | End: 2021-12-29

## 2021-12-29 RX ORDER — NIMODIPINE 60 MG/10ML
30 SOLUTION ORAL
Refills: 0 | Status: DISCONTINUED | OUTPATIENT
Start: 2021-12-29 | End: 2021-12-31

## 2021-12-29 RX ORDER — SODIUM CHLORIDE 9 MG/ML
500 INJECTION INTRAMUSCULAR; INTRAVENOUS; SUBCUTANEOUS ONCE
Refills: 0 | Status: COMPLETED | OUTPATIENT
Start: 2021-12-29 | End: 2021-12-29

## 2021-12-29 RX ORDER — AMANTADINE HCL 100 MG
100 CAPSULE ORAL
Refills: 0 | Status: DISCONTINUED | OUTPATIENT
Start: 2021-12-29 | End: 2022-01-05

## 2021-12-29 RX ORDER — LANOLIN ALCOHOL/MO/W.PET/CERES
3 CREAM (GRAM) TOPICAL AT BEDTIME
Refills: 0 | Status: DISCONTINUED | OUTPATIENT
Start: 2021-12-29 | End: 2021-12-30

## 2021-12-29 RX ADMIN — NIMODIPINE 30 MILLIGRAM(S): 60 SOLUTION ORAL at 17:55

## 2021-12-29 RX ADMIN — NIMODIPINE 30 MILLIGRAM(S): 60 SOLUTION ORAL at 22:31

## 2021-12-29 RX ADMIN — NIMODIPINE 30 MILLIGRAM(S): 60 SOLUTION ORAL at 10:12

## 2021-12-29 RX ADMIN — NIMODIPINE 30 MILLIGRAM(S): 60 SOLUTION ORAL at 16:21

## 2021-12-29 RX ADMIN — Medication 500000 UNIT(S): at 23:58

## 2021-12-29 RX ADMIN — NIMODIPINE 30 MILLIGRAM(S): 60 SOLUTION ORAL at 11:47

## 2021-12-29 RX ADMIN — NIMODIPINE 30 MILLIGRAM(S): 60 SOLUTION ORAL at 23:58

## 2021-12-29 RX ADMIN — NIMODIPINE 30 MILLIGRAM(S): 60 SOLUTION ORAL at 14:14

## 2021-12-29 RX ADMIN — NIMODIPINE 30 MILLIGRAM(S): 60 SOLUTION ORAL at 00:26

## 2021-12-29 RX ADMIN — Medication 40 MILLIEQUIVALENT(S): at 02:04

## 2021-12-29 RX ADMIN — NIMODIPINE 30 MILLIGRAM(S): 60 SOLUTION ORAL at 06:05

## 2021-12-29 RX ADMIN — NIMODIPINE 30 MILLIGRAM(S): 60 SOLUTION ORAL at 08:05

## 2021-12-29 RX ADMIN — SODIUM CHLORIDE 1 GRAM(S): 9 INJECTION INTRAMUSCULAR; INTRAVENOUS; SUBCUTANEOUS at 11:46

## 2021-12-29 RX ADMIN — SODIUM CHLORIDE 1 GRAM(S): 9 INJECTION INTRAMUSCULAR; INTRAVENOUS; SUBCUTANEOUS at 06:03

## 2021-12-29 RX ADMIN — Medication 1 TABLET(S): at 17:55

## 2021-12-29 RX ADMIN — Medication 500 MILLIGRAM(S): at 17:55

## 2021-12-29 RX ADMIN — Medication 500 MILLIGRAM(S): at 06:02

## 2021-12-29 RX ADMIN — NIMODIPINE 30 MILLIGRAM(S): 60 SOLUTION ORAL at 02:09

## 2021-12-29 RX ADMIN — NYSTATIN CREAM 1 APPLICATION(S): 100000 CREAM TOPICAL at 06:05

## 2021-12-29 RX ADMIN — Medication 100 MILLIGRAM(S): at 17:56

## 2021-12-29 RX ADMIN — Medication 500000 UNIT(S): at 00:26

## 2021-12-29 RX ADMIN — Medication 500000 UNIT(S): at 11:46

## 2021-12-29 RX ADMIN — SODIUM CHLORIDE 500 MILLILITER(S): 9 INJECTION INTRAMUSCULAR; INTRAVENOUS; SUBCUTANEOUS at 10:15

## 2021-12-29 RX ADMIN — SODIUM CHLORIDE 1 GRAM(S): 9 INJECTION INTRAMUSCULAR; INTRAVENOUS; SUBCUTANEOUS at 00:25

## 2021-12-29 RX ADMIN — SODIUM CHLORIDE 1 GRAM(S): 9 INJECTION INTRAMUSCULAR; INTRAVENOUS; SUBCUTANEOUS at 23:58

## 2021-12-29 RX ADMIN — NIMODIPINE 30 MILLIGRAM(S): 60 SOLUTION ORAL at 03:54

## 2021-12-29 RX ADMIN — SODIUM CHLORIDE 1 GRAM(S): 9 INJECTION INTRAMUSCULAR; INTRAVENOUS; SUBCUTANEOUS at 17:55

## 2021-12-29 RX ADMIN — Medication 500000 UNIT(S): at 17:55

## 2021-12-29 RX ADMIN — Medication 500000 UNIT(S): at 06:05

## 2021-12-29 NOTE — PROGRESS NOTE ADULT - ASSESSMENT
65 yo woman admitted 12/11/21 with HA/vomiting since 12/10 found to have HH2mF3 SAH c/b hydro s/p coiling of L Pcomm aneurysm, found to have a partial left m2 thrombus now s/p thrombectomy with TICI 3 reperfusion. S/p R EVD for hydro c/b sizable tract hemorrhage with IVH, PBD 19, the EVD was removed.    NEURO:  NC q1h  clamprd to be removed today   Argatroban T 1/2 - 50 minutes - would D/C for 4 hrs before pulling EVD  nimodipine     CVS: TTE EF 65 %   SBP goal 100-180 mmhg     PULM:  RA  O2 Sat >92%    RENAL:  IVL  Strict IS and Os  Na 140-150  q8h BMP    GI:  - Pureed diet   - Bowel regimen: miralax, senna    ENDO:   - FS goal 120-180     HEME/ONC:  - SCDs  - Argatroban gtt  - Trend PTT Q6 hrs     ID:   Urine culture grown Klebsiella  Ciprofloxacin - UTI - D/C in am     Full code    ICU   65 yo woman admitted 12/11/21 with HA/vomiting since 12/10 found to have HH2mF3 SAH c/b hydro s/p coiling of L Pcomm aneurysm, found to have a partial left m2 thrombus now s/p thrombectomy with TICI 3 reperfusion. S/p R EVD for hydro c/b sizable tract hemorrhage with IVH, PBD 19, the EVD was removed.    NEURO:  NC q2h  Hydro watch   clamped to be removed today   Melatonin 3mg q hs  Argatroban T 1/2 - 50 minutes - would D/C for 4 hrs before pulling EVD  nimodipine   Amantadine 100mg q 12 ( 0600 am and pm )    CVS: TTE EF 65 %   SBP goal 100-180 mmhg     PULM:  RA  O2 Sat >92%    RENAL:  IVL  Strict IS and Os  Bolus 500cc X1   Na 140-150  q8h BMP    GI:  - Pureed diet   - Bowel regimen: miralax, senna    ENDO:   - FS goal 120-180     HEME/ONC:  - SCDs  - Argatroban gtt for HIT-  pos  donald-  central line thrombus - small  When stable start DOAC for 3 months after stopping argatroban   - Trend PTT Q6 hrs     ID:   Urine culture grown Klebsiella  Ciprofloxacin - UTI - D/C in am     Full code    ICU

## 2021-12-29 NOTE — PROGRESS NOTE ADULT - SUBJECTIVE AND OBJECTIVE BOX
HPI:  65 yo woman admitted 12/11/21 with HA/vomiting since 12/10 found to have HH2mF3 SAH c/b hydro s/p coiling of L Pcomm aneurysm, found to have a partial left m2 thrombus now s/p thrombectomy with TICI 3 reperfusion. S/p R EVD for hydro c/b sizable tract hemorrhage with IVH now with L EVD. PBD 19.     24hr events  UE dopplers with persistent nonocclusive subclavian catheter associated thrombus    VITALS: reviewed  LABS: reviewed  IMAGING:   Recent imaging studies were reviewed.  MEDICATIONS: reviewed    EXAMINATION:  General:  calm  HEENT:  MMM  Neuro:  examined in Persian by me, alert, oriented to self/hospital, follows simple commands, BUE 4/5 no drift, BLE at least 4/5 effort dependent exam  Cards:  RRR  Respiratory:  no respiratory distress  Abdomen:  soft  Extremities:  no edema

## 2021-12-29 NOTE — CHART NOTE - NSCHARTNOTEFT_GEN_A_CORE
Nutrition Note - Chart Note     Pt observed consuming breakfast meal with RN present at bedside.  Pt requires encouragement, reinforcement and assistance by staff during meals. At time of RD visit, pt consumed ~1/3 thickened Ensure, ~half of hot cereal. Calorie count initiated 12/27, however inconsistently filled out based on review. RD to provide additional high caloric/high protein items to tray to promote optimal energy intake potential (yogurt, Mighty Shakes and high protein jello) to meal trays. See RD note 12/28 for EN recommendations if warranted. No EN access in place at this time. SLP following; last evaluation noted on 12/23. Will continue to monitor.     Alison Kleiner, RD, [facility] Pager # [phone] Nutrition Note - Chart Note     Pt observed consuming breakfast meal with RN present at bedside.  Pt requires encouragement, reinforcement and assistance by staff during meals. At time of RD visit, pt consumed ~1/3 thickened Ensure, ~half of hot cereal. Calorie count initiated 12/27, however inconsistently filled out based on review. RD to provide additional high caloric/high protein items to tray to promote optimal energy intake potential (yogurt, Mighty Shakes and high protein jello) to meal trays. See RD note 12/28 for EN recommendations if warranted. No EN access in place at this time. SLP following; last evaluation noted on 12/23. Pt continues on a pureed, mildly thickened liquid diet + Ensure Enlive 2x daily + Reji Active 2x daily. Will continue to monitor.     Alison Kleiner, RD, [facility] Pager # [phone]

## 2021-12-29 NOTE — PROGRESS NOTE ADULT - ASSESSMENT
65 yo woman admitted 12/11/21 with HA/vomiting since 12/10 found to have HH2mF3 SAH c/b hydro s/p coiling of L Pcomm aneurysm, found to have a partial left m2 thrombus now s/p thrombectomy with TICI 3 reperfusion. S/p R EVD for hydro c/b sizable tract hemorrhage with IVH now with L EVD. PBD 19.    NEURO:  HCP: EVD d/c'ed today, monitor for s/s hydrocephalus, concern for gradual worsening   CTH on Thursday  Keppra 500mg BID for seizure ppx    nimodipine --tolerating   PT/OT     CVS: TTE EF 65 %   SBP goal 100-200 mmhg     PULM:  RA  O2 Sat >92%  incentive spirometry as able    RENAL:  Strict IS and Os  Na 135-145; downtrending Na, add salt tabs  maintain euvolemia    GI:  - puree diet; due to fluctuating exam, requires NGT  - consider calorie count   - Bowel regimen: miralax, senna; last BM 12/28    ENDO:   - FS goal 120-180     HEME/ONC:  - SCDs  - HIT+, BALJEET+  - argatroban gtt continue for now, if no concern for worsening hydro, d/w neurosurgery re; starting DOAC, per heme, [id number]  - Positive deep venous thrombosis involving the right subclavian vein. -->vascular following, will d/c when adequate access  Thrombus is nonocclusive and related to the PICC line. No evidence of deep venous thrombosis in the left upper extremity. Superficial thrombophlebitis left basilic vein.  - f/u repeat UE dopplers-->persistent nonocclusive thrombus    ID:   Urine culture grown Klebsiella  Ciprofloxacin till today  no pna on CTA chest this am so hold off on CXR     Lines: R PICC, R Axillary A line   Patient is critically ill due to SAH and at high risk for neurological deterioration or death due to: hydrocephalus requiring an EVD for CSF diversion, at risk for vasospasm

## 2021-12-29 NOTE — PROGRESS NOTE ADULT - NS MD NEURO CONDITIONS_ENCEPHAL
Neurological

## 2021-12-29 NOTE — PROGRESS NOTE ADULT - SUBJECTIVE AND OBJECTIVE BOX
Vascular Cardiology Progress Note    DIRECT SERVICE NUMBER:  [phone]        OFFICE [phone]    CC:  subarachnoid hemorrhage    Interval Events:  Seen resting.  Remains on argatroban gtt.  R arm is stable.  Remains in NSCU.    Allergies  No Known Allergies    MEDICATIONS  (STANDING):  amantadine Syrup 100 milliGRAM(s) Oral <User Schedule>  argatroban Infusion 1.5 MICROgram(s)/kG/Min (6.12 mL/Hr) IV Continuous <Continuous>  ciprofloxacin     Tablet 500 milliGRAM(s) Oral every 12 hours  melatonin Liquid 3 milliGRAM(s) Oral at bedtime  niMODipine Oral Solution 30 milliGRAM(s) Enteral Tube every 2 hours  nystatin    Suspension 813320 Unit(s) Oral every 6 hours  nystatin Powder 1 Application(s) Topical every 12 hours  sodium chloride 1 Gram(s) Oral every 6 hours    PAST MEDICAL & SURGICAL HISTORY:  see HPI    FAMILY HISTORY: see HPI      SOCIAL HISTORY:  unchanged    REVIEW OF SYSTEMS:  12 Point ROS negative except as per subjective and HPI    Vital Signs Last 24 Hrs  Vital Signs Last 24 Hrs  T(C): 36.7 (29 Dec 2021 07:00), Max: 36.8 (28 Dec 2021 15:00)  T(F): 98.1 (29 Dec 2021 07:00), Max: 98.3 (28 Dec 2021 15:00)  HR: 92 (29 Dec 2021 10:00) (64 - 92)  RR: 17 (29 Dec 2021 10:00) (13 - 22)  SpO2: 100% (29 Dec 2021 10:00) (94% - 100%)    Appearance: NAD 	  HEENT: EVD was removed  Respiratory: CTA B/L  Cardiac: RRR, S1 and S2  Psychiatry:  Alert and Awake  Gastrointestinal:  Soft, Non-tender, + BS	  Skin: No rashes, No ecchymoses, No cyanosis	  Extremities:  R arm without edema.  R PICC line in place.   SCDs in place. No LE edema    Labs:                                        11.8   9.18  )-----------( 251      ( 27 Dec 2021 21:56 )             34.6     12-28    137  |  104  |  22  ----------------------------<  115<H>  3.5   |  21<L>  |  0.42<L>    Ca    8.7      28 Dec 2021 21:36  Phos  2.8     12-28  Mg     2.1     12-28    PT/INR - ( 29 Dec 2021 10:20 )   PT: 15.5 sec;   INR: 1.31 ratio    PTT - ( 29 Dec 2021 10:20 )  PTT:48.9 sec

## 2021-12-29 NOTE — PROGRESS NOTE ADULT - ASSESSMENT
Assessment:  1. R subclavian vein PICC line associated thrombus  -  small clot burden  2.  ICH  3.  PCOM Aneurysm  4. L Parietal M2 thrombus s/p thrombectomy  5. Hydrocephalus s/p EVD  6. HIT  - confirmed by BALJEET    Plan:  1. On Argatroban Goal , can keep closer to 55.    2. If the PICC line is functioning, and needed, it can remain in for now.  3. Appreciate Hematology follow-up. Plan to transition to DOAC later when safe from neurosurgical perspective.   4. Repeat venous duplex of the upper extremity to assure stability today on Wednesday.  5. If the PICC line is not needed in the near future, it should be removed.    6. Right upper extremity without edema and distal pulses intact.   7. Continue excellent neurosurgical care.     Thank you  OSMANY Louis, MPAS  Vascular Cardiology Service    Please call with any questions:   DIRECT SERVICE NUMBER:  [phone]  Office [phone]

## 2021-12-29 NOTE — PROGRESS NOTE ADULT - SUBJECTIVE AND OBJECTIVE BOX
HPI:  67 yo woman admitted 12/11/21 with HA/vomiting since 12/10 found to have HH2mF3 SAH c/b hydro s/p coiling of L Pcomm aneurysm, found to have a partial left m2 thrombus now s/p thrombectomy with TICI 3 reperfusion. S/p R EVD for hydro c/b sizable tract hemorrhage with IVH, PBD 19, the EVD was removed.    OVERNIGHT EVENTS:   No acute events overnight.    VITALS:  T(C): , Max: 36.8 (12-28-21 @ 15:00)  HR:  (64 - 86)  BP: --  ABP:  (108/65 - 142/82)  RR:  (13 - 22)  SpO2:  (93% - 100%)  Wt(kg): --      12-27-21 @ 07:01  -  12-28-21 @ 07:00  --------------------------------------------------------  IN: 856.4 mL / OUT: 355 mL / NET: 501.4 mL    12-28-21 @ 07:01  -  12-29-21 @ 06:48  --------------------------------------------------------  IN: 85.6 mL / OUT: 950 mL / NET: -864.4 mL      LABS:  Na: 137 (12-28 @ 21:36), 136 (12-28 @ 10:40), 135 (12-27 @ 21:56), 136 (12-27 @ 16:38), 139 (12-26 @ 20:51), 139 (12-26 @ 17:10)  K: 3.5 (12-28 @ 21:36), 3.6 (12-28 @ 10:40), 4.3 (12-27 @ 21:56), 3.8 (12-27 @ 16:38), 3.9 (12-26 @ 20:51), 3.7 (12-26 @ 17:10)  Cl: 104 (12-28 @ 21:36), 102 (12-28 @ 10:40), 103 (12-27 @ 21:56), 104 (12-27 @ 16:38), 109 (12-26 @ 20:51), 108 (12-26 @ 17:10)  CO2: 21 (12-28 @ 21:36), 21 (12-28 @ 10:40), 21 (12-27 @ 21:56), 20 (12-27 @ 16:38), 20 (12-26 @ 20:51), 20 (12-26 @ 17:10)  BUN: 22 (12-28 @ 21:36), 20 (12-28 @ 10:40), 20 (12-27 @ 21:56), 22 (12-27 @ 16:38), 30 (12-26 @ 20:51), 27 (12-26 @ 17:10)  Cr: 0.42 (12-28 @ 21:36), 0.34 (12-28 @ 10:40), 0.37 (12-27 @ 21:56), 0.41 (12-27 @ 16:38), 0.47 (12-26 @ 20:51), 0.43 (12-26 @ 17:10)  Glu: 115(12-28 @ 21:36), 126(12-28 @ 10:40), 128(12-27 @ 21:56), 90(12-27 @ 16:38), 109(12-26 @ 20:51), 116(12-26 @ 17:10)    Hgb: 11.8 (12-27 @ 21:56), 11.2 (12-26 @ 20:51)  Hct: 34.6 (12-27 @ 21:56), 32.9 (12-26 @ 20:51)  WBC: 9.18 (12-27 @ 21:56), 9.74 (12-26 @ 20:51)  Plt: 251 (12-27 @ 21:56), 225 (12-26 @ 20:51)    INR: 1.24 12-28-21 @ 10:40, 1.20 12-27-21 @ 21:56  PTT: 49.6 12-29-21 @ 04:40, 47.5 12-28-21 @ 10:40, 44.1 12-27-21 @ 21:56, 49.6 12-26-21 @ 20:51    IMAGING:   Recent imaging studies were reviewed.    MEDICATIONS:  acetaminophen     Tablet .. 650 milliGRAM(s) Oral every 6 hours PRN  argatroban Infusion 1.5 MICROgram(s)/kG/Min IV Continuous <Continuous>  ciprofloxacin     Tablet 500 milliGRAM(s) Oral every 12 hours  niMODipine Oral Solution 30 milliGRAM(s) Enteral Tube every 2 hours  nystatin    Suspension 293409 Unit(s) Oral every 6 hours  nystatin Powder 1 Application(s) Topical every 12 hours  sodium chloride 1 Gram(s) Oral every 6 hours  sodium chloride 0.9% lock flush 10 milliLiter(s) IV Push every 1 hour PRN    EXAMINATION:  General:  calm  HEENT:  MMM  Neuro:  Alert , oriented x 1, dysarthric,  follows commands more on R than L UE, pupils R 5 mm, L 7 mm non reactive b/l, midline gaze, face symmetric, moves all limbs against resistance- UE 4/5 R > L and Moves B/L LE antigraviry prox sl weaker than distal ,  Cards:  RRR  Respiratory:  no respiratory distress  Abdomen:  soft  Extremities:  no edema   HPI:  65 yo woman admitted 12/11/21 with HA/vomiting since 12/10 found to have HH2mF3 SAH c/b hydro s/p coiling of L Pcomm aneurysm, found to have a partial left m2 thrombus now s/p thrombectomy with TICI 3 reperfusion. S/p R EVD for hydro c/b sizable tract hemorrhage with IVH, PBD 19, the EVD was removed.    OVERNIGHT EVENTS:   EVD removed - 12/28/21    VITALS:  ICU Vital Signs Last 24 Hrs  T(C): 36.7 (29 Dec 2021 07:00), Max: 36.8 (28 Dec 2021 15:00)  T(F): 98.1 (29 Dec 2021 07:00), Max: 98.3 (28 Dec 2021 15:00)  HR: 84 (29 Dec 2021 07:00) (64 - 86)  ABP: 119/65 (29 Dec 2021 07:00) (108/65 - 142/82)  ABP(mean): 91 (29 Dec 2021 07:00) (81 - 105)  RR: 15 (29 Dec 2021 07:00) (13 - 22)  SpO2: 100% (29 Dec 2021 07:00) (93% - 100%)        12-27-21 @ 07:01  -  12-28-21 @ 07:00  --------------------------------------------------------  IN: 856.4 mL / OUT: 355 mL / NET: 501.4 mL    12-28-21 @ 07:01  -  12-29-21 @ 06:48  --------------------------------------------------------  IN: 85.6 mL / OUT: 950 mL / NET: -864.4 mL      LABS:  Na: 137 (12-28 @ 21:36), 136 (12-28 @ 10:40), 135 (12-27 @ 21:56), 136 (12-27 @ 16:38), 139 (12-26 @ 20:51), 139 (12-26 @ 17:10)  K: 3.5 (12-28 @ 21:36), 3.6 (12-28 @ 10:40), 4.3 (12-27 @ 21:56), 3.8 (12-27 @ 16:38), 3.9 (12-26 @ 20:51), 3.7 (12-26 @ 17:10)  Cl: 104 (12-28 @ 21:36), 102 (12-28 @ 10:40), 103 (12-27 @ 21:56), 104 (12-27 @ 16:38), 109 (12-26 @ 20:51), 108 (12-26 @ 17:10)  CO2: 21 (12-28 @ 21:36), 21 (12-28 @ 10:40), 21 (12-27 @ 21:56), 20 (12-27 @ 16:38), 20 (12-26 @ 20:51), 20 (12-26 @ 17:10)  BUN: 22 (12-28 @ 21:36), 20 (12-28 @ 10:40), 20 (12-27 @ 21:56), 22 (12-27 @ 16:38), 30 (12-26 @ 20:51), 27 (12-26 @ 17:10)  Cr: 0.42 (12-28 @ 21:36), 0.34 (12-28 @ 10:40), 0.37 (12-27 @ 21:56), 0.41 (12-27 @ 16:38), 0.47 (12-26 @ 20:51), 0.43 (12-26 @ 17:10)  Glu: 115(12-28 @ 21:36), 126(12-28 @ 10:40), 128(12-27 @ 21:56), 90(12-27 @ 16:38), 109(12-26 @ 20:51), 116(12-26 @ 17:10)  Phos- 2.8  Mg- 2.1      Hgb: 11.8 (12-27 @ 21:56), 11.2 (12-26 @ 20:51)  Hct: 34.6 (12-27 @ 21:56), 32.9 (12-26 @ 20:51)  WBC: 9.18 (12-27 @ 21:56), 9.74 (12-26 @ 20:51)  Plt: 251 (12-27 @ 21:56), 225 (12-26 @ 20:51)    INR: 1.24 12-28-21 @ 10:40, 1.20 12-27-21 @ 21:56  PTT: 49.6 12-29-21 @ 04:40, 47.5 12-28-21 @ 10:40, 44.1 12-27-21 @ 21:56, 49.6 12-26-21 @ 20:51    IMAGING:   < from: CT Head No Cont (12.28.21 @ 19:41) >  COMPARISON EXAMINATION: 12/28/2021 at 8:06 AM  FINDINGS:  VENTRICLES AND SULCI:  Left frontal EVD has been removed. Trace   hemorrhage layers in the left occipital horn of the lateral ventricle.   Ventricles measure slightly larger in size compared with the prior. 5 mm   midline shift to the left stable in appearance compared with prior.  INTRA-AXIAL:  Right frontal hematoma with surrounding edema measuring 2.8   AP by 3.4 TR with surrounding edema relatively stable in appearance   compared with the prior  EXTRA-AXIAL:  Left distal ICA coils  VISUALIZED SINUSES:  Clear.  VISUALIZED MASTOIDS:  Clear.  CALVARIUM:  Normal.  MISCELLANEOUS:  None.    IMPRESSION:  Left frontal EVD has been removed. Ventricles measure   slightly larger compared with the prior. Degree of midline shift remains   the same. Appearance of the right frontal hematoma is relatively stable   with surrounding edema and mass effect on the right lateral ventricle          MEDICATIONS:  acetaminophen     Tablet .. 650 milliGRAM(s) Oral every 6 hours PRN  argatroban Infusion 1.5 MICROgram(s)/kG/Min IV Continuous <Continuous>  ciprofloxacin     Tablet 500 milliGRAM(s) Oral every 12 hours  niMODipine Oral Solution 30 milliGRAM(s) Enteral Tube every 2 hours  nystatin    Suspension 830552 Unit(s) Oral every 6 hours  nystatin Powder 1 Application(s) Topical every 12 hours  sodium chloride 1 Gram(s) Oral every 6 hours  sodium chloride 0.9% lock flush 10 milliLiter(s) IV Push every 1 hour PRN    EXAMINATION:  General:  calm  HEENT:  MMM  Neuro:  Alert , oriented x 1, dysarthric,  follows commands more on R than L UE, pupils R 5 mm, L 7 mm non reactive b/l, midline gaze, face symmetric, moves all limbs against resistance- UE 4/5 R > L and Moves B/L LE antigraviry prox sl weaker than distal ,  Cards:  RRR  Respiratory:  no respiratory distress  Abdomen:  soft  Extremities:  no edema

## 2021-12-30 LAB
ANION GAP SERPL CALC-SCNC: 10 MMOL/L — SIGNIFICANT CHANGE UP (ref 5–17)
ANION GAP SERPL CALC-SCNC: 10 MMOL/L — SIGNIFICANT CHANGE UP (ref 5–17)
ANION GAP SERPL CALC-SCNC: 9 MMOL/L — SIGNIFICANT CHANGE UP (ref 5–17)
APTT BLD: 109.6 SEC — HIGH (ref 27.5–35.5)
APTT BLD: 30.6 SEC — SIGNIFICANT CHANGE UP (ref 27.5–35.5)
APTT BLD: 47.4 SEC — HIGH (ref 27.5–35.5)
BUN SERPL-MCNC: 14 MG/DL — SIGNIFICANT CHANGE UP (ref 7–23)
BUN SERPL-MCNC: 17 MG/DL — SIGNIFICANT CHANGE UP (ref 7–23)
BUN SERPL-MCNC: 20 MG/DL — SIGNIFICANT CHANGE UP (ref 7–23)
CALCIUM SERPL-MCNC: 8.4 MG/DL — SIGNIFICANT CHANGE UP (ref 8.4–10.5)
CALCIUM SERPL-MCNC: 8.6 MG/DL — SIGNIFICANT CHANGE UP (ref 8.4–10.5)
CALCIUM SERPL-MCNC: 9.1 MG/DL — SIGNIFICANT CHANGE UP (ref 8.4–10.5)
CHLORIDE SERPL-SCNC: 101 MMOL/L — SIGNIFICANT CHANGE UP (ref 96–108)
CHLORIDE SERPL-SCNC: 103 MMOL/L — SIGNIFICANT CHANGE UP (ref 96–108)
CHLORIDE SERPL-SCNC: 105 MMOL/L — SIGNIFICANT CHANGE UP (ref 96–108)
CO2 SERPL-SCNC: 21 MMOL/L — LOW (ref 22–31)
CO2 SERPL-SCNC: 24 MMOL/L — SIGNIFICANT CHANGE UP (ref 22–31)
CO2 SERPL-SCNC: 25 MMOL/L — SIGNIFICANT CHANGE UP (ref 22–31)
CREAT SERPL-MCNC: 0.4 MG/DL — LOW (ref 0.5–1.3)
CREAT SERPL-MCNC: 0.44 MG/DL — LOW (ref 0.5–1.3)
CREAT SERPL-MCNC: 0.45 MG/DL — LOW (ref 0.5–1.3)
CULTURE RESULTS: NO GROWTH — SIGNIFICANT CHANGE UP
GLUCOSE SERPL-MCNC: 106 MG/DL — HIGH (ref 70–99)
GLUCOSE SERPL-MCNC: 110 MG/DL — HIGH (ref 70–99)
GLUCOSE SERPL-MCNC: 94 MG/DL — SIGNIFICANT CHANGE UP (ref 70–99)
HCT VFR BLD CALC: 32 % — LOW (ref 34.5–45)
HGB BLD-MCNC: 10.6 G/DL — LOW (ref 11.5–15.5)
INR BLD: 1.28 RATIO — HIGH (ref 0.88–1.16)
MAGNESIUM SERPL-MCNC: 2.1 MG/DL — SIGNIFICANT CHANGE UP (ref 1.6–2.6)
MAGNESIUM SERPL-MCNC: 2.2 MG/DL — SIGNIFICANT CHANGE UP (ref 1.6–2.6)
MAGNESIUM SERPL-MCNC: 2.2 MG/DL — SIGNIFICANT CHANGE UP (ref 1.6–2.6)
MCHC RBC-ENTMCNC: 30.4 PG — SIGNIFICANT CHANGE UP (ref 27–34)
MCHC RBC-ENTMCNC: 33.1 GM/DL — SIGNIFICANT CHANGE UP (ref 32–36)
MCV RBC AUTO: 91.7 FL — SIGNIFICANT CHANGE UP (ref 80–100)
NRBC # BLD: 0 /100 WBCS — SIGNIFICANT CHANGE UP (ref 0–0)
PHOSPHATE SERPL-MCNC: 2.4 MG/DL — LOW (ref 2.5–4.5)
PHOSPHATE SERPL-MCNC: 3.1 MG/DL — SIGNIFICANT CHANGE UP (ref 2.5–4.5)
PHOSPHATE SERPL-MCNC: 3.4 MG/DL — SIGNIFICANT CHANGE UP (ref 2.5–4.5)
PLATELET # BLD AUTO: 270 K/UL — SIGNIFICANT CHANGE UP (ref 150–400)
POTASSIUM SERPL-MCNC: 3.4 MMOL/L — LOW (ref 3.5–5.3)
POTASSIUM SERPL-MCNC: 3.6 MMOL/L — SIGNIFICANT CHANGE UP (ref 3.5–5.3)
POTASSIUM SERPL-MCNC: 3.8 MMOL/L — SIGNIFICANT CHANGE UP (ref 3.5–5.3)
POTASSIUM SERPL-SCNC: 3.4 MMOL/L — LOW (ref 3.5–5.3)
POTASSIUM SERPL-SCNC: 3.6 MMOL/L — SIGNIFICANT CHANGE UP (ref 3.5–5.3)
POTASSIUM SERPL-SCNC: 3.8 MMOL/L — SIGNIFICANT CHANGE UP (ref 3.5–5.3)
PROTHROM AB SERPL-ACNC: 15.2 SEC — HIGH (ref 10.6–13.6)
RBC # BLD: 3.49 M/UL — LOW (ref 3.8–5.2)
RBC # FLD: 13.6 % — SIGNIFICANT CHANGE UP (ref 10.3–14.5)
SODIUM SERPL-SCNC: 135 MMOL/L — SIGNIFICANT CHANGE UP (ref 135–145)
SODIUM SERPL-SCNC: 135 MMOL/L — SIGNIFICANT CHANGE UP (ref 135–145)
SODIUM SERPL-SCNC: 138 MMOL/L — SIGNIFICANT CHANGE UP (ref 135–145)
SPECIMEN SOURCE: SIGNIFICANT CHANGE UP
WBC # BLD: 6.47 K/UL — SIGNIFICANT CHANGE UP (ref 3.8–10.5)
WBC # FLD AUTO: 6.47 K/UL — SIGNIFICANT CHANGE UP (ref 3.8–10.5)

## 2021-12-30 PROCEDURE — 99291 CRITICAL CARE FIRST HOUR: CPT

## 2021-12-30 PROCEDURE — 70450 CT HEAD/BRAIN W/O DYE: CPT | Mod: 26

## 2021-12-30 RX ORDER — OXYCODONE HYDROCHLORIDE 5 MG/1
5 TABLET ORAL EVERY 4 HOURS
Refills: 0 | Status: DISCONTINUED | OUTPATIENT
Start: 2021-12-30 | End: 2022-01-05

## 2021-12-30 RX ORDER — CHLORHEXIDINE GLUCONATE 213 G/1000ML
1 SOLUTION TOPICAL
Refills: 0 | Status: DISCONTINUED | OUTPATIENT
Start: 2021-12-30 | End: 2021-12-31

## 2021-12-30 RX ORDER — SENNA PLUS 8.6 MG/1
2 TABLET ORAL AT BEDTIME
Refills: 0 | Status: DISCONTINUED | OUTPATIENT
Start: 2021-12-30 | End: 2022-01-04

## 2021-12-30 RX ORDER — POTASSIUM CHLORIDE 20 MEQ
40 PACKET (EA) ORAL ONCE
Refills: 0 | Status: COMPLETED | OUTPATIENT
Start: 2021-12-30 | End: 2021-12-30

## 2021-12-30 RX ORDER — POTASSIUM PHOSPHATE, MONOBASIC POTASSIUM PHOSPHATE, DIBASIC 236; 224 MG/ML; MG/ML
15 INJECTION, SOLUTION INTRAVENOUS ONCE
Refills: 0 | Status: COMPLETED | OUTPATIENT
Start: 2021-12-30 | End: 2021-12-30

## 2021-12-30 RX ADMIN — NIMODIPINE 30 MILLIGRAM(S): 60 SOLUTION ORAL at 14:35

## 2021-12-30 RX ADMIN — Medication 40 MILLIEQUIVALENT(S): at 02:35

## 2021-12-30 RX ADMIN — Medication 500000 UNIT(S): at 05:10

## 2021-12-30 RX ADMIN — Medication 100 MILLIGRAM(S): at 17:25

## 2021-12-30 RX ADMIN — NIMODIPINE 30 MILLIGRAM(S): 60 SOLUTION ORAL at 06:00

## 2021-12-30 RX ADMIN — POTASSIUM PHOSPHATE, MONOBASIC POTASSIUM PHOSPHATE, DIBASIC 62.5 MILLIMOLE(S): 236; 224 INJECTION, SOLUTION INTRAVENOUS at 03:59

## 2021-12-30 RX ADMIN — Medication 40 MILLIEQUIVALENT(S): at 17:42

## 2021-12-30 RX ADMIN — NIMODIPINE 30 MILLIGRAM(S): 60 SOLUTION ORAL at 04:00

## 2021-12-30 RX ADMIN — SODIUM CHLORIDE 1 GRAM(S): 9 INJECTION INTRAMUSCULAR; INTRAVENOUS; SUBCUTANEOUS at 17:25

## 2021-12-30 RX ADMIN — SENNA PLUS 2 TABLET(S): 8.6 TABLET ORAL at 22:03

## 2021-12-30 RX ADMIN — NIMODIPINE 30 MILLIGRAM(S): 60 SOLUTION ORAL at 08:35

## 2021-12-30 RX ADMIN — SODIUM CHLORIDE 1 GRAM(S): 9 INJECTION INTRAMUSCULAR; INTRAVENOUS; SUBCUTANEOUS at 13:36

## 2021-12-30 RX ADMIN — NIMODIPINE 30 MILLIGRAM(S): 60 SOLUTION ORAL at 16:24

## 2021-12-30 RX ADMIN — NIMODIPINE 30 MILLIGRAM(S): 60 SOLUTION ORAL at 17:26

## 2021-12-30 RX ADMIN — Medication 1 TABLET(S): at 13:12

## 2021-12-30 RX ADMIN — NIMODIPINE 30 MILLIGRAM(S): 60 SOLUTION ORAL at 12:48

## 2021-12-30 RX ADMIN — NYSTATIN CREAM 1 APPLICATION(S): 100000 CREAM TOPICAL at 06:07

## 2021-12-30 RX ADMIN — Medication 500000 UNIT(S): at 17:25

## 2021-12-30 RX ADMIN — NYSTATIN CREAM 1 APPLICATION(S): 100000 CREAM TOPICAL at 17:25

## 2021-12-30 RX ADMIN — NIMODIPINE 30 MILLIGRAM(S): 60 SOLUTION ORAL at 02:36

## 2021-12-30 RX ADMIN — Medication 100 MILLIGRAM(S): at 05:11

## 2021-12-30 RX ADMIN — NIMODIPINE 30 MILLIGRAM(S): 60 SOLUTION ORAL at 20:00

## 2021-12-30 RX ADMIN — SODIUM CHLORIDE 1 GRAM(S): 9 INJECTION INTRAMUSCULAR; INTRAVENOUS; SUBCUTANEOUS at 05:12

## 2021-12-30 RX ADMIN — NIMODIPINE 30 MILLIGRAM(S): 60 SOLUTION ORAL at 22:00

## 2021-12-30 RX ADMIN — Medication 500000 UNIT(S): at 13:36

## 2021-12-30 NOTE — PROGRESS NOTE ADULT - ASSESSMENT
67 yo woman admitted 12/11/21 with HA/vomiting since 12/10 found to have HH2mF3 SAH c/b hydro s/p coiling of L Pcomm aneurysm, found to have a partial left m2 thrombus now s/p thrombectomy with TICI 3 reperfusion. S/p R EVD for hydro c/b sizable tract hemorrhage with IVH, PBD 20, the EVD was removed.    NEURO:  NC q2h  Hydro watch   clamped to be removed today   Melatonin 3mg q hs  Argatroban T 1/2 - 50 minutes - would D/C for 4 hrs before pulling EVD  nimodipine   Amantadine 100mg q 12 ( 0600 am and pm )    CVS: TTE EF 65 %   SBP goal 100-180 mmhg     PULM:  RA  O2 Sat >92%    RENAL:  IVL  Strict IS and Os  Bolus 500cc X1   Na 140-150  q8h BMP    GI:  - Pureed diet   - Bowel regimen: miralax, senna    ENDO:   - FS goal 120-180     HEME/ONC:  - SCDs  - Argatroban gtt for HIT-  pos  donald-  central line thrombus - small  When stable start DOAC for 3 months after stopping argatroban   - Trend PTT Q6 hrs     ID:   Urine culture grown Klebsiella  Ciprofloxacin - UTI - D/C in am     Full code    ICU   65 yo woman admitted 12/11/21 with HA/vomiting since 12/10 found to have HH2mF3 SAH c/b hydro s/p coiling of L Pcomm aneurysm, found to have a partial left m2 thrombus now s/p thrombectomy with TICI 3 reperfusion. S/p R EVD for hydro c/b sizable tract hemorrhage with IVH, PBD 20, the EVD was removed.  Abulia minor- lack of speech, thought     NEURO:  NC q2h  [place] watch   CT stable - dissipation of R frontal clot  Melatonin 3mg q hs  Argatroban T 1/2 - 50 minutes - would D/C for3 hrs before pulling R central line  nimodipine   Amantadine 100mg q 12 ( 0600 am and pm )    CVS: TTE EF 65 %   SBP goal 100-180 mmhg     PULM:  RA  O2 Sat >92%    RENAL:  IVL  Strict IS and Os  Bolus 500cc X1   Na 140-150  q8h BMP    GI:  - Pureed diet   - Bowel regimen: miralax, senna    ENDO:   - FS goal 120-180     HEME/ONC:  - SCDs  - Argatroban gtt for HIT-  pos  donald-  central line thrombus - small  When stable start DOAC for 3 months after stopping argatroban   - Trend PTT Q6 hrs     ID:   Urine culture grown Klebsiella  Ciprofloxacin - UTI - D/C  12/30/21     Full code    ICU

## 2021-12-30 NOTE — CHART NOTE - NSCHARTNOTEFT_GEN_A_CORE
Argatroban drip was held from [id number], repeat PTT 30.6. R axillary a-line site cleansed with chlorhexidine and a-line removed without complication. Hemostasis achieved with direct pressure and occlusive dressing applied. Vitals remained stable.

## 2021-12-30 NOTE — PROGRESS NOTE ADULT - CRITICAL CARE SERVICES
35
40
30
35
45
30
30
40
40
45
45
40
45
45
30
30
40
45
35
45

## 2021-12-30 NOTE — PROGRESS NOTE ADULT - SUBJECTIVE AND OBJECTIVE BOX
HPI:  67 yo woman admitted 12/11/21 with HA/vomiting since 12/10 found to have HH2mF3 SAH c/b hydro s/p coiling of L Pcomm aneurysm, found to have a partial left m2 thrombus now s/p thrombectomy with TICI 3 reperfusion. S/p R EVD for hydro c/b sizable tract hemorrhage with IVH, PBD 20, the EVD was removed 12/28/2021.    OVERNIGHT EVENTS:   No acute events overnight.    VITALS:  T(C): , Max: 37.7 (12-30-21 @ 03:00)  HR:  (67 - 95)  BP: --  ABP:  (99/50 - 144/71)  RR:  (11 - 21)  SpO2:  (94% - 100%)  Wt(kg): --      12-29-21 @ 07:01  -  12-30-21 @ 07:00  --------------------------------------------------------  IN: 816 mL / OUT: 850 mL / NET: -34 mL      LABS:  Na: 135 (12-30 @ 00:29), 137 (12-28 @ 21:36), 136 (12-28 @ 10:40), 135 (12-27 @ 21:56), 136 (12-27 @ 16:38)  K: 3.4 (12-30 @ 00:29), 3.5 (12-28 @ 21:36), 3.6 (12-28 @ 10:40), 4.3 (12-27 @ 21:56), 3.8 (12-27 @ 16:38)  Cl: 105 (12-30 @ 00:29), 104 (12-28 @ 21:36), 102 (12-28 @ 10:40), 103 (12-27 @ 21:56), 104 (12-27 @ 16:38)  CO2: 21 (12-30 @ 00:29), 21 (12-28 @ 21:36), 21 (12-28 @ 10:40), 21 (12-27 @ 21:56), 20 (12-27 @ 16:38)  BUN: 20 (12-30 @ 00:29), 22 (12-28 @ 21:36), 20 (12-28 @ 10:40), 20 (12-27 @ 21:56), 22 (12-27 @ 16:38)  Cr: 0.44 (12-30 @ 00:29), 0.42 (12-28 @ 21:36), 0.34 (12-28 @ 10:40), 0.37 (12-27 @ 21:56), 0.41 (12-27 @ 16:38)  Glu: 106(12-30 @ 00:29), 115(12-28 @ 21:36), 126(12-28 @ 10:40), 128(12-27 @ 21:56), 90(12-27 @ 16:38)    Hgb: 11.8 (12-27 @ 21:56)  Hct: 34.6 (12-27 @ 21:56)  WBC: 9.18 (12-27 @ 21:56)  Plt: 251 (12-27 @ 21:56)    INR: 1.28 12-30-21 @ 05:31, 1.31 12-29-21 @ 10:20, 1.24 12-28-21 @ 10:40, 1.20 12-27-21 @ 21:56  PTT: 47.4 12-30-21 @ 05:31, 48.9 12-29-21 @ 10:20, 49.6 12-29-21 @ 04:40, 47.5 12-28-21 @ 10:40, 44.1 12-27-21 @ 21:56    IMAGING:   Recent imaging studies were reviewed.    MEDICATIONS:  acetaminophen     Tablet .. 650 milliGRAM(s) Oral every 6 hours PRN  amantadine Syrup 100 milliGRAM(s) Oral <User Schedule>  argatroban Infusion 1.5 MICROgram(s)/kG/Min IV Continuous <Continuous>  melatonin Liquid 3 milliGRAM(s) Oral at bedtime  multivitamin 1 Tablet(s) Oral daily  niMODipine Oral Solution 30 milliGRAM(s) Enteral Tube every 2 hours  nystatin    Suspension 647647 Unit(s) Oral every 6 hours  nystatin Powder 1 Application(s) Topical every 12 hours  oxyCODONE    IR 5 milliGRAM(s) Oral every 4 hours PRN  sodium chloride 1 Gram(s) Oral every 6 hours  sodium chloride 0.9% lock flush 10 milliLiter(s) IV Push every 1 hour PRN    EXAMINATION:  General:  calm  HEENT:  MMM  Neuro:  Alert , oriented x 1, dysarthric,  follows commands more on R than L UE, pupils R 5 mm, L 7 mm non reactive b/l, midline gaze, face symmetric, moves all limbs against resistance- UE 4/5 R > L and Moves B/L LE antigraviry prox sl weaker than distal ,  Cards:  RRR  Respiratory:  no respiratory distress  Abdomen:  soft  Extremities:  no edema HPI:  67 yo woman admitted 12/11/21 with HA/vomiting since 12/10 found to have HH2mF3 SAH c/b hydro s/p coiling of L Pcomm aneurysm, found to have a partial left m2 thrombus now s/p thrombectomy with TICI 3 reperfusion. S/p R EVD for hydro c/b sizable tract hemorrhage with IVH, PBD 20, the EVD was removed 12/28/2021.    OVERNIGHT EVENTS:   No acute events overnight.    VITALS:  ICU Vital Signs Last 24 Hrs  T(C): 36.5 (30 Dec 2021 11:00), Max: 37.7 (30 Dec 2021 03:00)  T(F): 97.7 (30 Dec 2021 11:00), Max: 99.9 (30 Dec 2021 03:00)  HR: 80 (30 Dec 2021 11:00) (69 - 89)  BP: --  BP(mean): --  ABP: 107/55 (30 Dec 2021 11:00) (106/57 - 144/71)  ABP(mean): 76 (30 Dec 2021 11:00) (76 - 129)  RR: 19 (30 Dec 2021 11:00) (11 - 21)  SpO2: 98% (30 Dec 2021 11:00) (94% - 100%)        12-29-21 @ 07:01  -  12-30-21 @ 07:00  --------------------------------------------------------  IN: 816 mL / OUT: 850 mL / NET: -34 mL      LABS:  12-30    135  |  105  |  20  ----------------------------<  106<H>  3.4<L>   |  21<L>  |  0.44<L>    Ca    8.4      30 Dec 2021 00:29  Phos  2.4     12-30  Mg     2.1     12-30      Na: 135 (12-30 @ 00:29), 137 (12-28 @ 21:36), 136 (12-28 @ 10:40), 135 (12-27 @ 21:56), 136 (12-27 @ 16:38)  K: 3.4 (12-30 @ 00:29), 3.5 (12-28 @ 21:36), 3.6 (12-28 @ 10:40), 4.3 (12-27 @ 21:56), 3.8 (12-27 @ 16:38)  Cl: 105 (12-30 @ 00:29), 104 (12-28 @ 21:36), 102 (12-28 @ 10:40), 103 (12-27 @ 21:56), 104 (12-27 @ 16:38)  CO2: 21 (12-30 @ 00:29), 21 (12-28 @ 21:36), 21 (12-28 @ 10:40), 21 (12-27 @ 21:56), 20 (12-27 @ 16:38)  BUN: 20 (12-30 @ 00:29), 22 (12-28 @ 21:36), 20 (12-28 @ 10:40), 20 (12-27 @ 21:56), 22 (12-27 @ 16:38)  Cr: 0.44 (12-30 @ 00:29), 0.42 (12-28 @ 21:36), 0.34 (12-28 @ 10:40), 0.37 (12-27 @ 21:56), 0.41 (12-27 @ 16:38)  Glu: 106(12-30 @ 00:29), 115(12-28 @ 21:36), 126(12-28 @ 10:40), 128(12-27 @ 21:56), 90(12-27 @ 16:38)    Hgb: 11.8 (12-27 @ 21:56)  Hct: 34.6 (12-27 @ 21:56)  WBC: 9.18 (12-27 @ 21:56)  Plt: 251 (12-27 @ 21:56)    PT/INR - ( 30 Dec 2021 05:31 )   PT: 15.2 sec;   INR: 1.28 ratio    PTT - ( 30 Dec 2021 05:31 )  PTT:47.4 sec    INR: 1.28 12-30-21 @ 05:31, 1.31 12-29-21 @ 10:20, 1.24 12-28-21 @ 10:40, 1.20 12-27-21 @ 21:56  PTT: 47.4 12-30-21 @ 05:31, 48.9 12-29-21 @ 10:20, 49.6 12-29-21 @ 04:40, 47.5 12-28-21 @ 10:40, 44.1 12-27-21 @ 21:56    IMAGING:    CT Head No Cont (12.30.21 @ 09:35) >    IMPRESSION:    No significant interval change from 12/28/2021          MEDICATIONS:  acetaminophen     Tablet .. 650 milliGRAM(s) Oral every 6 hours PRN  amantadine Syrup 100 milliGRAM(s) Oral <User Schedule>  argatroban Infusion 1.5 MICROgram(s)/kG/Min IV Continuous <Continuous>  melatonin Liquid 3 milliGRAM(s) Oral at bedtime  multivitamin 1 Tablet(s) Oral daily  niMODipine Oral Solution 30 milliGRAM(s) Enteral Tube every 2 hours  nystatin    Suspension 288624 Unit(s) Oral every 6 hours  nystatin Powder 1 Application(s) Topical every 12 hours  oxyCODONE    IR 5 milliGRAM(s) Oral every 4 hours PRN  sodium chloride 1 Gram(s) Oral every 6 hours  sodium chloride 0.9% lock flush 10 milliLiter(s) IV Push every 1 hour PRN    EXAMINATION:  General:  calm  HEENT:  MMM  Neuro:  Alert , oriented x 1,  interacts smiling and waving intermittently; non communicative ,  not folowing  commands  yet moves spont more on R than L UE, pupils R 5 mm, L 7 mm non reactive b/l, midline gaze, face symmetric, moves all limbs against resistance- UE 4/5 R > L and Moves B/L LE antigraviry prox sl weaker than distal ,  Cards:  RRR  Respiratory:  no respiratory distress  Abdomen:  soft  Extremities:  no edema

## 2021-12-30 NOTE — PROGRESS NOTE ADULT - CRITICAL CARE SERVICES PROVIDED
Critical care services provided

## 2021-12-30 NOTE — PROGRESS NOTE ADULT - SUBJECTIVE AND OBJECTIVE BOX
[facility] ATTENDING -- ADDITIONAL PROGRESS NOTE    Nighttime rounds were performed -- please refer to earlier Progress Note for HPI details.    T(C): 36.4 (12-30-21 @ 19:00), Max: 37.7 (12-30-21 @ 03:00)  HR: 81 (12-30-21 @ 19:00) (69 - 91)  RR: 15 (12-30-21 @ 19:00) (13 - 21)  SpO2: 98% (12-30-21 @ 18:00) (94% - 100%)  EXAM: non verbal, intermittently follows commands, eyes open spon, tracks, b/l UE AG, b/l LE 2/5 effort dependent     Relevant lab work and imaging reviewed.    [A/P]  65 yo woman admitted 12/11/21 with HA/vomiting since 12/10 found to have HH2mF3 SAH c/b hydro s/p coiling of L Pcomm aneurysm, found to have a partial left m2 thrombus now s/p thrombectomy with TICI 3 reperfusion. S/p R EVD for hydro c/b sizable tract hemorrhage with IVH now with L EVD. PBD 20.   - protected sleep time 10-5, neurocheck q4   - c/w argatroban ggt goal 40-60, recheck ptt   - c/w nimodipine until 1/1/22   NSCU ATTENDING -- ADDITIONAL PROGRESS NOTE    Nighttime rounds were performed -- please refer to earlier Progress Note for HPI details.    T(C): 36.4 (12-30-21 @ 19:00), Max: 37.7 (12-30-21 @ 03:00)  HR: 81 (12-30-21 @ 19:00) (69 - 91)  RR: 15 (12-30-21 @ 19:00) (13 - 21)  SpO2: 98% (12-30-21 @ 18:00) (94% - 100%)  EXAM: non verbal, intermittently follows commands, eyes open spon, tracks, b/l UE AG, b/l LE 2/5 effort dependent     Relevant lab work and imaging reviewed.    [A/P]  67 yo woman admitted 12/11/21 with HA/vomiting since 12/10 found to have HH2mF3 SAH c/b hydro s/p coiling of L Pcomm aneurysm, found to have a partial left m2 thrombus now s/p thrombectomy with TICI 3 reperfusion. S/p R EVD for hydro c/b sizable tract hemorrhage with IVH now with L EVD, now out. PBD 20.   - protected sleep time 10-5, neurocheck q4   - c/w argatroban ggt goal 40-60, recheck ptt   - c/w nimodipine until 1/1/22

## 2021-12-31 DIAGNOSIS — I60.9 NONTRAUMATIC SUBARACHNOID HEMORRHAGE, UNSPECIFIED: ICD-10-CM

## 2021-12-31 DIAGNOSIS — D75.82 HEPARIN INDUCED THROMBOCYTOPENIA (HIT): ICD-10-CM

## 2021-12-31 LAB
ANION GAP SERPL CALC-SCNC: 10 MMOL/L — SIGNIFICANT CHANGE UP (ref 5–17)
APTT BLD: 46.2 SEC — HIGH (ref 27.5–35.5)
APTT BLD: 50.3 SEC — HIGH (ref 27.5–35.5)
APTT BLD: 58.3 SEC — HIGH (ref 27.5–35.5)
BUN SERPL-MCNC: 13 MG/DL — SIGNIFICANT CHANGE UP (ref 7–23)
CALCIUM SERPL-MCNC: 8.9 MG/DL — SIGNIFICANT CHANGE UP (ref 8.4–10.5)
CHLORIDE SERPL-SCNC: 102 MMOL/L — SIGNIFICANT CHANGE UP (ref 96–108)
CO2 SERPL-SCNC: 25 MMOL/L — SIGNIFICANT CHANGE UP (ref 22–31)
CREAT SERPL-MCNC: 0.46 MG/DL — LOW (ref 0.5–1.3)
GLUCOSE SERPL-MCNC: 90 MG/DL — SIGNIFICANT CHANGE UP (ref 70–99)
HCT VFR BLD CALC: 32.9 % — LOW (ref 34.5–45)
HGB BLD-MCNC: 10.9 G/DL — LOW (ref 11.5–15.5)
INR BLD: 1.09 RATIO — SIGNIFICANT CHANGE UP (ref 0.88–1.16)
INR BLD: 1.11 RATIO — SIGNIFICANT CHANGE UP (ref 0.88–1.16)
INR BLD: 1.21 RATIO — HIGH (ref 0.88–1.16)
MAGNESIUM SERPL-MCNC: 2.2 MG/DL — SIGNIFICANT CHANGE UP (ref 1.6–2.6)
MCHC RBC-ENTMCNC: 31.1 PG — SIGNIFICANT CHANGE UP (ref 27–34)
MCHC RBC-ENTMCNC: 33.1 GM/DL — SIGNIFICANT CHANGE UP (ref 32–36)
MCV RBC AUTO: 94 FL — SIGNIFICANT CHANGE UP (ref 80–100)
NRBC # BLD: 0 /100 WBCS — SIGNIFICANT CHANGE UP (ref 0–0)
PHOSPHATE SERPL-MCNC: 3.4 MG/DL — SIGNIFICANT CHANGE UP (ref 2.5–4.5)
PLATELET # BLD AUTO: 277 K/UL — SIGNIFICANT CHANGE UP (ref 150–400)
POTASSIUM SERPL-MCNC: 3.7 MMOL/L — SIGNIFICANT CHANGE UP (ref 3.5–5.3)
POTASSIUM SERPL-SCNC: 3.7 MMOL/L — SIGNIFICANT CHANGE UP (ref 3.5–5.3)
PROTHROM AB SERPL-ACNC: 13 SEC — SIGNIFICANT CHANGE UP (ref 10.6–13.6)
PROTHROM AB SERPL-ACNC: 13.2 SEC — SIGNIFICANT CHANGE UP (ref 10.6–13.6)
PROTHROM AB SERPL-ACNC: 14.4 SEC — HIGH (ref 10.6–13.6)
RBC # BLD: 3.5 M/UL — LOW (ref 3.8–5.2)
RBC # FLD: 13.7 % — SIGNIFICANT CHANGE UP (ref 10.3–14.5)
SODIUM SERPL-SCNC: 137 MMOL/L — SIGNIFICANT CHANGE UP (ref 135–145)
WBC # BLD: 6.11 K/UL — SIGNIFICANT CHANGE UP (ref 3.8–10.5)
WBC # FLD AUTO: 6.11 K/UL — SIGNIFICANT CHANGE UP (ref 3.8–10.5)

## 2021-12-31 PROCEDURE — 99223 1ST HOSP IP/OBS HIGH 75: CPT

## 2021-12-31 PROCEDURE — 99232 SBSQ HOSP IP/OBS MODERATE 35: CPT

## 2021-12-31 RX ORDER — NIMODIPINE 60 MG/10ML
60 SOLUTION ORAL EVERY 4 HOURS
Refills: 0 | Status: DISCONTINUED | OUTPATIENT
Start: 2021-12-31 | End: 2022-01-01

## 2021-12-31 RX ORDER — ARGATROBAN 50 MG/50ML
1 INJECTION, SOLUTION INTRAVENOUS
Qty: 250 | Refills: 0 | Status: DISCONTINUED | OUTPATIENT
Start: 2021-12-31 | End: 2022-01-03

## 2021-12-31 RX ORDER — POTASSIUM CHLORIDE 20 MEQ
40 PACKET (EA) ORAL ONCE
Refills: 0 | Status: DISCONTINUED | OUTPATIENT
Start: 2021-12-31 | End: 2022-01-05

## 2021-12-31 RX ADMIN — NIMODIPINE 60 MILLIGRAM(S): 60 SOLUTION ORAL at 11:47

## 2021-12-31 RX ADMIN — SODIUM CHLORIDE 1 GRAM(S): 9 INJECTION INTRAMUSCULAR; INTRAVENOUS; SUBCUTANEOUS at 11:50

## 2021-12-31 RX ADMIN — Medication 500000 UNIT(S): at 06:38

## 2021-12-31 RX ADMIN — Medication 100 MILLIGRAM(S): at 06:39

## 2021-12-31 RX ADMIN — NYSTATIN CREAM 1 APPLICATION(S): 100000 CREAM TOPICAL at 18:00

## 2021-12-31 RX ADMIN — Medication 1 TABLET(S): at 11:51

## 2021-12-31 RX ADMIN — Medication 500000 UNIT(S): at 00:20

## 2021-12-31 RX ADMIN — SODIUM CHLORIDE 1 GRAM(S): 9 INJECTION INTRAMUSCULAR; INTRAVENOUS; SUBCUTANEOUS at 00:21

## 2021-12-31 RX ADMIN — NIMODIPINE 30 MILLIGRAM(S): 60 SOLUTION ORAL at 06:59

## 2021-12-31 RX ADMIN — NYSTATIN CREAM 1 APPLICATION(S): 100000 CREAM TOPICAL at 06:40

## 2021-12-31 RX ADMIN — Medication 500000 UNIT(S): at 11:48

## 2021-12-31 RX ADMIN — NIMODIPINE 30 MILLIGRAM(S): 60 SOLUTION ORAL at 00:00

## 2021-12-31 RX ADMIN — NIMODIPINE 60 MILLIGRAM(S): 60 SOLUTION ORAL at 15:00

## 2021-12-31 RX ADMIN — SODIUM CHLORIDE 1 GRAM(S): 9 INJECTION INTRAMUSCULAR; INTRAVENOUS; SUBCUTANEOUS at 17:59

## 2021-12-31 RX ADMIN — SENNA PLUS 2 TABLET(S): 8.6 TABLET ORAL at 23:24

## 2021-12-31 RX ADMIN — Medication 100 MILLIGRAM(S): at 17:58

## 2021-12-31 RX ADMIN — ARGATROBAN 6.12 MICROGRAM(S)/KG/MIN: 50 INJECTION, SOLUTION INTRAVENOUS at 06:37

## 2021-12-31 RX ADMIN — SODIUM CHLORIDE 1 GRAM(S): 9 INJECTION INTRAMUSCULAR; INTRAVENOUS; SUBCUTANEOUS at 06:38

## 2021-12-31 RX ADMIN — SODIUM CHLORIDE 1 GRAM(S): 9 INJECTION INTRAMUSCULAR; INTRAVENOUS; SUBCUTANEOUS at 23:24

## 2021-12-31 RX ADMIN — NIMODIPINE 60 MILLIGRAM(S): 60 SOLUTION ORAL at 22:11

## 2021-12-31 NOTE — PROGRESS NOTE ADULT - SUBJECTIVE AND OBJECTIVE BOX
SUBJECTIVE: Pt seen and examined,     OVERNIGHT EVENTS:     Vital Signs Last 24 Hrs  T(C): 37 (31 Dec 2021 11:59), Max: 37 (31 Dec 2021 11:59)  T(F): 98.6 (31 Dec 2021 11:59), Max: 98.6 (31 Dec 2021 11:59)  HR: 76 (31 Dec 2021 11:59) (61 - 91)  BP: 133/87 (31 Dec 2021 11:59) (111/75 - 135/81)  BP(mean): --  RR: 18 (31 Dec 2021 11:59) (10 - 24)  SpO2: 98% (31 Dec 2021 11:59) (95% - 100%)    PHYSICAL EXAM:    General: No Acute Distress     Neurological: Awake, alert oriented to person, place and time, Following Commands, PERRL, EOMI, Face Symmetrical, Speech Fluent, Moving all extremities, Muscle Strength normal in all four extremities, No Drift, Sensation to Light Touch Intact    Pulmonary: Clear to Auscultation, No Rales, No Rhonchi, No Wheezes     Cardiovascular: S1, S2, Regular Rate and Rhythm     Gastrointestinal: Soft, Nontender, Nondistended     Incision:     LABS:                        10.9   6.11  )-----------( 277      ( 31 Dec 2021 06:15 )             32.9    12-31    137  |  102  |  13  ----------------------------<  90  3.7   |  25  |  0.46<L>    Ca    8.9      31 Dec 2021 06:15  Phos  3.4     12-31  Mg     2.2     12-31    PT/INR - ( 31 Dec 2021 09:52 )   PT: 14.4 sec;   INR: 1.21 ratio         PTT - ( 31 Dec 2021 09:52 )  PTT:58.3 sec      12-30 @ 07:01  -  12-31 @ 07:00  --------------------------------------------------------  IN: 799.3 mL / OUT: 1050 mL / NET: -250.7 mL      DRAINS:     MEDICATIONS:  Antibiotics:    Neuro:  acetaminophen     Tablet .. 650 milliGRAM(s) Oral every 6 hours PRN Temp greater or equal to 38C (100.4F), Mild Pain (1 - 3)  amantadine Syrup 100 milliGRAM(s) Oral <User Schedule>  oxyCODONE    IR 5 milliGRAM(s) Oral every 4 hours PRN Moderate Pain (4 - 6)    Cardiac:  niMODipine Oral Solution 60 milliGRAM(s) Enteral Tube every 4 hours    Pulm:    GI/:  senna 2 Tablet(s) Oral at bedtime    Other:   argatroban Infusion 1 MICROgram(s)/kG/Min IV Continuous <Continuous>  multivitamin 1 Tablet(s) Oral daily  nystatin Powder 1 Application(s) Topical every 12 hours  potassium chloride   Solution 40 milliEquivalent(s) Oral once  sodium chloride 1 Gram(s) Oral every 6 hours  sodium chloride 0.9% lock flush 10 milliLiter(s) IV Push every 1 hour PRN Pre/post blood products, medications, blood draw, and to maintain line patency    DIET: [] Regular [] CCD [] Renal [] Puree [] Dysphagia [] Tube Feeds:     IMAGING:    SUBJECTIVE: Pt seen and examined, resting in bed, calm and cooperative, awake and alert, interactive with staff, eating.     [facility]ERNIGHT EVENTS: [facility]    Vital Signs Last 24 Hrs  T(C): 37 (31 Dec 2021 11:59), Max: 37 (31 Dec 2021 11:59)  T(F): 98.6 (31 Dec 2021 11:59), Max: 98.6 (31 Dec 2021 11:59)  HR: 76 (31 Dec 2021 11:59) (61 - 91)  BP: 133/87 (31 Dec 2021 11:59) (111/75 - 135/81)  BP(mean): --  RR: 18 (31 Dec 2021 11:59) (10 - 24)  SpO2: 98% (31 Dec 2021 11:59) (95% - 100%)    PHYSICAL EXAM:    General: No Acute Distress     Neurological: Awake and alert, Following Commands, shows 2 fingers and shows thumbs up, she said"hi", Moving all extremities purposefully and with good strength.     Pulmonary: Clear to Auscultation, No Rales, No Rhonchi, No Wheezes     Cardiovascular: S1, S2, Regular Rate and Rhythm     Gastrointestinal: Soft, Nontender, Nondistended     Incision: few scalp staples (EVD site) c/d/i    LABS:                        10.9   6.11  )-----------( 277      ( 31 Dec 2021 06:15 )             32.9    12-31    137  |  102  |  13  ----------------------------<  90  3.7   |  25  |  0.46<L>    Ca    8.9      31 Dec 2021 06:15  Phos  3.4     12-31  Mg     2.2     12-31    PT/INR - ( 31 Dec 2021 09:52 )   PT: 14.4 sec;   INR: 1.21 ratio         PTT - ( 31 Dec 2021 09:52 )  PTT:58.3 sec      12-30 @ 07:01  -  12-31 @ 07:00  --------------------------------------------------------  IN: 799.3 mL / OUT: 1050 mL / NET: -250.7 mL      DRAINS: none    MEDICATIONS:  Antibiotics:    Neuro:  acetaminophen     Tablet .. 650 milliGRAM(s) Oral every 6 hours PRN Temp greater or equal to 38C (100.4F), Mild Pain (1 - 3)  amantadine Syrup 100 milliGRAM(s) Oral <User Schedule>  oxyCODONE    IR 5 milliGRAM(s) Oral every 4 hours PRN Moderate Pain (4 - 6)    Cardiac:  niMODipine Oral Solution 60 milliGRAM(s) Enteral Tube every 4 hours    Pulm:    GI/:  senna 2 Tablet(s) Oral at bedtime    Other:   argatroban Infusion 1 MICROgram(s)/kG/Min IV Continuous <Continuous>  multivitamin 1 Tablet(s) Oral daily  nystatin Powder 1 Application(s) Topical every 12 hours  potassium chloride   Solution 40 milliEquivalent(s) Oral once  sodium chloride 1 Gram(s) Oral every 6 hours  sodium chloride 0.9% lock flush 10 milliLiter(s) IV Push every 1 hour PRN Pre/post blood products, medications, blood draw, and to maintain line patency    DIET: [] Regular [] CCD [] Renal [x] Puree [] Dysphagia [] Tube Feeds:     IMAGING:   < from: CT Head No Cont (12.30.21 @ 09:35) >  FINDINGS:    Bifrontal kris holesare redemonstrated. Left parasellar embolic coil   mass is redemonstrated.    Similar 3.6 x 2.8 cm acute right frontal parenchymal hemorrhage with   similar extensive surrounding vasogenic edema.    Slightly decreased small hemorrhage layering in theleft occipital horn.    Similar leftward midline shift of 7 mm. Basal cisterns are still   visualized.    Similar mild asymmetric dilatation of the left lateral ventricle.    IMPRESSION:    No significant interval change from 12/28/2021    < end of copied text >  < from: [facility] Duplex Upper Ext Vein Scan, Right (12.29.21 @ 18:08) >  IMPRESSION:    Persistent nonocclusive catheter associated thrombus in the mid right   subclavian vein.    < end of copied text >  < from: [facility] Duplex Lower Ext Vein Scan, Bilat (12.21.21 @ 12:50) >    IMPRESSION:  No evidence of deep venous thrombosis in either lower extremity.    < end of copied text >

## 2021-12-31 NOTE — CONSULT NOTE ADULT - ASSESSMENT
65 yo woman admitted 12/11/21 no PMH, with HA/vomiting since 12/10 found to have SAH c/b hydrocephalus. 12/11 s/p coiling of L Pcomm aneurysm, found to have a partial left m2 thrombus now s/p thrombectomy with TICI 3 reperfusion. 12/14 S/p R EVD for hydro c/b sizable tract hemorrhage with IVH now with L EVD, removed on 12/29. 12/30 transferred to floor.     Currently in bed, awake but non-verbal. Able to follow commands.

## 2021-12-31 NOTE — CONSULT NOTE ADULT - PROBLEM SELECTOR RECOMMENDATION 9
Course above. Currently on Nimodipine.   - CT stable - dissipation of R frontal clot  - amantadine for neuro stimulation  - hyponatremia- Na 137- on salt tabs       - Completed cipro for Klebsiella UTI

## 2021-12-31 NOTE — CONSULT NOTE ADULT - SUBJECTIVE AND OBJECTIVE BOX
NEUROSURGERY - HOSPITAL MEDICINE CO-MANAGEMENT INITIAL VISIT NOTE    CHIEF COMPLAINT: Patient is a 66y old  Female who presents with a chief complaint of Subarachnoid hemorrhage (31 Dec 2021 12:28)      HPI: 67 yo woman admitted 12/11/21 no PMH,  with HA/vomiting since 12/10 found to have SAH c/b hydrocephalus. 12/11 s/p coiling of L Pcomm aneurysm, found to have a partial left m2 thrombus now s/p thrombectomy with TICI 3 reperfusion. 12/14 S/p R EVD for hydro c/b sizable tract hemorrhage with IVH now with L EVD, removed on 12/29. 12/30 transferred to floor.     Currently in bed, awake but non-verbal. Able to follow commands.         Allergies    heparin (Other (Fatal))      Home Medications:      MEDICATIONS  (STANDING):  amantadine Syrup 100 milliGRAM(s) Oral <User Schedule>  argatroban Infusion 1 MICROgram(s)/kG/Min (4.08 mL/Hr) IV Continuous <Continuous>  multivitamin 1 Tablet(s) Oral daily  niMODipine Oral Solution 60 milliGRAM(s) Enteral Tube every 4 hours  nystatin Powder 1 Application(s) Topical every 12 hours  potassium chloride   Solution 40 milliEquivalent(s) Oral once  senna 2 Tablet(s) Oral at bedtime  sodium chloride 1 Gram(s) Oral every 6 hours    MEDICATIONS  (PRN):  acetaminophen     Tablet .. 650 milliGRAM(s) Oral every 6 hours PRN Temp greater or equal to 38C (100.4F), Mild Pain (1 - 3)  oxyCODONE    IR 5 milliGRAM(s) Oral every 4 hours PRN Moderate Pain (4 - 6)  sodium chloride 0.9% lock flush 10 milliLiter(s) IV Push every 1 hour PRN Pre/post blood products, medications, blood draw, and to maintain line patency      PAST MEDICAL & SURGICAL HISTORY:  No pertinent past medical history    No significant past surgical history       Functional Assessment: [ ] Independent  [x ] Assistance  [ ] Total care  [ ] Non-ambulatory    SOCIAL HISTORY:  Residence: [ ] [facility]  [ ] SNF  [x ] Community  [ x] Substance abuse: None      FAMILY HISTORY:  [x ] No pertinent family history in first degree relatives of SAH    REVIEW OF SYSTEMS:      [  ] All other ROS negative  [ x ] Unable to obtain due to poor mental status    PHYSICAL EXAM:    Vital Signs Last 24 Hrs  T(C): 36.8 (31 Dec 2021 17:50), Max: 37 (31 Dec 2021 11:59)  T(F): 98.3 (31 Dec 2021 17:50), Max: 98.6 (31 Dec 2021 11:59)  HR: 86 (31 Dec 2021 17:50) (61 - 86)  BP: 117/83 (31 Dec 2021 17:50) (111/75 - 135/81)  BP(mean): --  RR: 18 (31 Dec 2021 17:50) (10 - 24)  SpO2: 95% (31 Dec 2021 17:50) (95% - 100%)    CONSTITUTIONAL: Well-groomed, in no apparent distress  EYES: No conjunctival or scleral injection, non-icteric; PERRLA and symmetric  ENMT: No external nasal lesions; nasal mucosa not inflamed; normal dentition; no pharyngeal injection or exudates, oral mucosa with moist membranes  NECK: Trachea midline without palpable neck mass; thyroid not enlarged and non-tender  RESPIRATORY: Breathing comfortably; no dullness to percussion; lungs CTA without wheeze/rhonchi/rales  CARDIOVASCULAR: +S1S2, RRR, no M/G/R; no carotid bruits; pedal pulses full and symmetric; no lower extremity edema  CHEST/BREAST: Breasts are symmetric in appearance; no palpable masses or lumps  GASTROINTESTINAL: No palpable masses or tenderness, +BS throughout, no rebound/guarding; no hepatosplenomegaly; no hernia palpated  LYMPHATIC: No cervical LAD or tenderness; no axillary LAD or tenderness; no inguinal LAD or tenderness  MUSCULOSKELETAL: No digital clubbing or cyanosis; no paraspinal tenderness; examination of the  (head/neck, spine/ribs/pelvis, RUE, LUE, RLE, LLE) without misalignment,  SKIN: No rashes or ulcers noted; no subcutaneous nodules or induration palpable  NEUROLOGIC: CN II-XII intact; normal reflexes in upper and lower extremities; sensation intact in LEs b/l to light touch  PSYCHIATRIC: Calm, non-verbal    LABS:                        10.9   6.11  )-----------( 277      ( 31 Dec 2021 06:15 )             32.9     Hemoglobin: 10.9 g/dL (12-31 @ 06:15)  Hemoglobin: 10.6 g/dL (12-30 @ 23:13)  Hemoglobin: 11.8 g/dL (12-27 @ 21:56)  Hemoglobin: 11.2 g/dL (12-26 @ 20:51)    12-31    137  |  102  |  13  ----------------------------<  90  3.7   |  25  |  0.46<L>    Ca    8.9      31 Dec 2021 06:15  Phos  3.4     12-31  Mg     2.2     12-31      PT/INR - ( 31 Dec 2021 17:47 )   PT: 13.2 sec;   INR: 1.11 ratio         PTT - ( 31 Dec 2021 17:47 )  PTT:50.3 sec    CAPILLARY BLOOD GLUCOSE        RADIOLOGY & ADDITIONAL STUDIES:    EKG:   Personally Reviewed:  [ ] YES     Imaging:   Personally Reviewed:  [ ] YES               [ ] Consultant(s) Notes Reviewed  [x] Care Discussed with Consultants/Other Providers: Neurosurgery Team    [x ] Fall risks identified:      [x ] Increased delirium risk    [x ] Delirium and other risks can be reduced by:          -early ambulation          -minimizing "tethers" - IV, oxygen, catheters, etc          -avoiding hypnotics and sedatives          -maintaining hydration/nutrition          -avoid anticholinergics - diphenhydramine, etc          -pain control          -supportive environment    Advanced Directives: [ ] DNR  [ ] No feeding tube  [ ] MOLST in chart  [ ] MOLST completed today  [x ] Unknown

## 2021-12-31 NOTE — CONSULT NOTE ADULT - PROBLEM SELECTOR RECOMMENDATION 2
- Argatroban gtt for HIT-  central line thrombus - small, PTT goal 40-50  - When stable start DOAC for 3 months after stopping argatroban per Heme recs

## 2021-12-31 NOTE — PROGRESS NOTE ADULT - ASSESSMENT
67 yo woman admitted 12/11/21 no PMH,  with HA/vomiting since 12/10 found to have SAH c/b hydrocephalus. 12/11 s/p coiling of L Pcomm aneurysm, found to have a partial left m2 thrombus now s/p thrombectomy with TICI 3 reperfusion. 12/14 S/p R EVD for hydro c/b sizable tract hemorrhage with IVH now with L EVD, removed on 12/29.       PLAN:  Neuro:   Respiratory:   CV:  Endocrine:   Heme/Onc:      - SCDs  - Argatroban gtt for HIT-  pos  donald-  central line thrombus - small  When stable start DOAC for 3 months after stopping argatroban   - Trend PTT Q6 hrs     DVT ppx:   Renal:   ID:   GI:    PT/OT:   Will discuss with ____      Spectralink # [id number]   65 yo woman admitted 12/11/21 no PMH,  with HA/vomiting since 12/10 found to have SAH c/b hydrocephalus. 12/11 s/p coiling of L Pcomm aneurysm, found to have a partial left m2 thrombus now s/p thrombectomy with TICI 3 reperfusion. 12/14 S/p R EVD for hydro c/b sizable tract hemorrhage with IVH now with L EVD, removed on 12/29. 12/30 transferred to floor.       PLAN:  Neuro:   - continue nimodipine for SAH  - CT stable - dissipation of R frontal clot  - amantadine for neuro stimulation  - hyponatremia- Na 137- on salt tabs  Respiratory:   - on room air  CV:  - vitals stable  Heme/Onc:      - SCDs  - Argatroban gtt for HIT-  central line thrombus - small, PTT goal 40-50, dose adjusted today  - When stable start DOAC for 3 months after stopping argatroban per Heme recs  - Trend PTT Q6 hrs        GI:    - tolerating pureed diet  ID:  - completed cipro for Klebsiella UTI  PT/OT:   - Acute TBI rehab      [facility] # [id number]   65 yo woman admitted 12/11/21 no PMH,  with HA/vomiting since 12/10 found to have SAH c/b hydrocephalus. 12/11 s/p balloon assisted coil embolization of ruptured L Pcomm aneurysm, after coiling found to have a partial left M3 thrombus which was aspirated out. 12/14 S/p R EVD for hydro c/b sizable tract hemorrhage with IVH now with L EVD, removed on 12/29. 12/30 transferred to floor.       PLAN:  Neuro:   - continue nimodipine for SAH  - CT stable - dissipation of R frontal clot  - amantadine for neuro stimulation  - hyponatremia- Na 137- on salt tabs  Respiratory:   - on room air  CV:  - vitals stable  Heme/Onc:      - SCDs  - Argatroban gtt for HIT-  central line thrombus - small, PTT goal 40-50, dose adjusted today  - When stable start DOAC for 3 months after stopping argatroban per Heme recs  - Trend PTT Q6 hrs        GI:    - tolerating pureed diet  ID:  - completed cipro for Klebsiella UTI  PT/OT:   - Acute TBI rehab      [facility] # [id number]

## 2022-01-01 LAB
APTT BLD: 45.8 SEC — HIGH (ref 27.5–35.5)
APTT BLD: 47.7 SEC — HIGH (ref 27.5–35.5)
HCT VFR BLD CALC: 38.2 % — SIGNIFICANT CHANGE UP (ref 34.5–45)
HGB BLD-MCNC: 12.8 G/DL — SIGNIFICANT CHANGE UP (ref 11.5–15.5)
INR BLD: 1.14 RATIO — SIGNIFICANT CHANGE UP (ref 0.88–1.16)
MCHC RBC-ENTMCNC: 30.9 PG — SIGNIFICANT CHANGE UP (ref 27–34)
MCHC RBC-ENTMCNC: 33.5 GM/DL — SIGNIFICANT CHANGE UP (ref 32–36)
MCV RBC AUTO: 92.3 FL — SIGNIFICANT CHANGE UP (ref 80–100)
NRBC # BLD: 0 /100 WBCS — SIGNIFICANT CHANGE UP (ref 0–0)
PLATELET # BLD AUTO: 286 K/UL — SIGNIFICANT CHANGE UP (ref 150–400)
PROTHROM AB SERPL-ACNC: 13.6 SEC — SIGNIFICANT CHANGE UP (ref 10.6–13.6)
RBC # BLD: 4.14 M/UL — SIGNIFICANT CHANGE UP (ref 3.8–5.2)
RBC # FLD: 13.5 % — SIGNIFICANT CHANGE UP (ref 10.3–14.5)
WBC # BLD: 6.26 K/UL — SIGNIFICANT CHANGE UP (ref 3.8–10.5)
WBC # FLD AUTO: 6.26 K/UL — SIGNIFICANT CHANGE UP (ref 3.8–10.5)

## 2022-01-01 PROCEDURE — 99232 SBSQ HOSP IP/OBS MODERATE 35: CPT

## 2022-01-01 PROCEDURE — 99233 SBSQ HOSP IP/OBS HIGH 50: CPT

## 2022-01-01 RX ORDER — AMLODIPINE BESYLATE 2.5 MG/1
5 TABLET ORAL DAILY
Refills: 0 | Status: DISCONTINUED | OUTPATIENT
Start: 2022-01-01 | End: 2022-01-01

## 2022-01-01 RX ORDER — AMLODIPINE BESYLATE 2.5 MG/1
5 TABLET ORAL DAILY
Refills: 0 | Status: DISCONTINUED | OUTPATIENT
Start: 2022-01-02 | End: 2022-01-05

## 2022-01-01 RX ADMIN — NIMODIPINE 60 MILLIGRAM(S): 60 SOLUTION ORAL at 10:13

## 2022-01-01 RX ADMIN — Medication 100 MILLIGRAM(S): at 05:14

## 2022-01-01 RX ADMIN — SODIUM CHLORIDE 1 GRAM(S): 9 INJECTION INTRAMUSCULAR; INTRAVENOUS; SUBCUTANEOUS at 23:02

## 2022-01-01 RX ADMIN — SODIUM CHLORIDE 1 GRAM(S): 9 INJECTION INTRAMUSCULAR; INTRAVENOUS; SUBCUTANEOUS at 05:14

## 2022-01-01 RX ADMIN — NIMODIPINE 60 MILLIGRAM(S): 60 SOLUTION ORAL at 01:56

## 2022-01-01 RX ADMIN — Medication 1 TABLET(S): at 15:17

## 2022-01-01 RX ADMIN — ARGATROBAN 4.08 MICROGRAM(S)/KG/MIN: 50 INJECTION, SOLUTION INTRAVENOUS at 00:30

## 2022-01-01 RX ADMIN — AMLODIPINE BESYLATE 5 MILLIGRAM(S): 2.5 TABLET ORAL at 15:29

## 2022-01-01 RX ADMIN — SODIUM CHLORIDE 1 GRAM(S): 9 INJECTION INTRAMUSCULAR; INTRAVENOUS; SUBCUTANEOUS at 19:36

## 2022-01-01 RX ADMIN — NYSTATIN CREAM 1 APPLICATION(S): 100000 CREAM TOPICAL at 17:15

## 2022-01-01 RX ADMIN — Medication 100 MILLIGRAM(S): at 17:49

## 2022-01-01 RX ADMIN — SENNA PLUS 2 TABLET(S): 8.6 TABLET ORAL at 23:02

## 2022-01-01 RX ADMIN — SODIUM CHLORIDE 1 GRAM(S): 9 INJECTION INTRAMUSCULAR; INTRAVENOUS; SUBCUTANEOUS at 15:28

## 2022-01-01 RX ADMIN — NIMODIPINE 60 MILLIGRAM(S): 60 SOLUTION ORAL at 05:15

## 2022-01-01 RX ADMIN — NYSTATIN CREAM 1 APPLICATION(S): 100000 CREAM TOPICAL at 05:14

## 2022-01-01 NOTE — PROGRESS NOTE ADULT - ASSESSMENT
65 yo woman admitted 12/11/21 no PMH, with HA/vomiting since 12/10 found to have SAH c/b hydrocephalus.     12/11 s/p coiling of L Pcomm aneurysm, found to have a partial left m2 thrombus now s/p thrombectomy with TICI 3 reperfusion.   12/14 S/p R EVD for hydro c/b sizable tract hemorrhage with IVH now with L EVD, removed on 12/29.   12/30 transferred to floor.     - 12/12 TTE EF 65%, mild MR, normal left vent systolic function, normal right vent systolic fxn  - 12/21 CTA Chest - neg for PE    Currently in bed, awake, talking to daughter on Facetime. Feels well.              65 yo woman admitted 12/11/21 no PMH, with HA/vomiting since 12/10 found to have SAH c/b hydrocephalus.     12/11 s/p coiling of L Pcomm aneurysm, found to have a partial left m2 thrombus now s/p thrombectomy with TICI 3 reperfusion.   12/14 S/p R EVD for hydro c/b sizable tract hemorrhage with IVH now with L EVD, removed on 12/29.   12/30 transferred to floor.     12/12 TTE EF 65%, mild MR, normal left vent systolic function, normal right vent systolic fxn  12/21 CTA Chest - neg for PE    Currently in bed, awake, talking to daughter on Facetime. Feels well.

## 2022-01-01 NOTE — PROGRESS NOTE ADULT - PROBLEM SELECTOR PLAN 1
Course above. Nimodipine transitioned to Norvasc today. Monitor BP- goal <180.  - repeat CT tomorrow  - amantadine for neuro stimulation  - hyponatremia- Na 137- on salt tabs  - On Amantadine for neuro stimulation       - Completed cipro for Klebsiella UTI. Course above. Nimodipine transitioned to Norvasc today. Monitor BP- goal <180.  - repeat CT tomorrow  - hyponatremia- Na 137- on salt tabs  - On Amantadine for neuro stimulation       - Completed cipro for Klebsiella UTI.

## 2022-01-01 NOTE — PROGRESS NOTE ADULT - SUBJECTIVE AND OBJECTIVE BOX
Patient is a 66y old  Female who presents with a chief complaint of Subarachnoid hemorrhage (01 Jan 2022 13:13)      SUBJECTIVE / OVERNIGHT EVENTS: Pt seen this am, awake, in bed, talking to daughter on facetime. Denied complaints.     MEDICATIONS  (STANDING):  amantadine Syrup 100 milliGRAM(s) Oral <User Schedule>  argatroban Infusion 1 MICROgram(s)/kG/Min (4.08 mL/Hr) IV Continuous <Continuous>  multivitamin 1 Tablet(s) Oral daily  nystatin Powder 1 Application(s) Topical every 12 hours  potassium chloride   Solution 40 milliEquivalent(s) Oral once  senna 2 Tablet(s) Oral at bedtime  sodium chloride 1 Gram(s) Oral every 6 hours    MEDICATIONS  (PRN):  acetaminophen     Tablet .. 650 milliGRAM(s) Oral every 6 hours PRN Temp greater or equal to 38C (100.4F), Mild Pain (1 - 3)  oxyCODONE    IR 5 milliGRAM(s) Oral every 4 hours PRN Moderate Pain (4 - 6)  sodium chloride 0.9% lock flush 10 milliLiter(s) IV Push every 1 hour PRN Pre/post blood products, medications, blood draw, and to maintain line patency      CAPILLARY BLOOD GLUCOSE        I&O's Summary    31 Dec 2021 07:01  -  01 Jan 2022 07:00  --------------------------------------------------------  IN: 1320 mL / OUT: 250 mL / NET: 1070 mL    01 Jan 2022 07:01  -  01 Jan 2022 17:45  --------------------------------------------------------  IN: 600 mL / OUT: 0 mL / NET: 600 mL        PHYSICAL EXAM:  T(C): 36.9 (01-01-22 @ 15:08), Max: 37.1 (12-31-21 @ 19:09)  HR: 79 (01-01-22 @ 15:08) (72 - 86)  BP: 122/77 (01-01-22 @ 15:08) (109/79 - 128/76)  RR: 18 (01-01-22 @ 15:08) (17 - 20)  SpO2: 97% (01-01-22 @ 15:08) (95% - 99%)  CONSTITUTIONAL: NAD, well-developed, well-groomed  EYES: PERRLA; conjunctiva and sclera clear  ENMT: Moist oral mucosa, no pharyngeal injection or exudates; normal dentition  NECK: Supple, no palpable masses; no thyromegaly  RESPIRATORY: Normal respiratory effort; lungs are clear to auscultation bilaterally  CARDIOVASCULAR: Regular rate and rhythm, normal S1 and S2, no murmur/rub/gallop; No lower extremity edema; Peripheral pulses are 2+ bilaterally  ABDOMEN: Nontender to palpation, normoactive bowel sounds, no rebound/guarding; No hepatosplenomegaly  MUSCULOSKELETAL:  No clubbing or cyanosis of digits; no joint swelling or tenderness to palpation  PSYCH: A+O to person, place, and time; affect appropriate  NEUROLOGY: CN 2-12 are intact and symmetric; no gross sensory deficits; R sided weakness 4/5   SKIN: No rashes; no palpable lesions    LABS:                        12.8   6.26  )-----------( 286      ( 01 Jan 2022 06:47 )             38.2     12-31    137  |  102  |  13  ----------------------------<  90  3.7   |  25  |  0.46<L>    Ca    8.9      31 Dec 2021 06:15  Phos  3.4     12-31  Mg     2.2     12-31      PT/INR - ( 01 Jan 2022 08:38 )   PT: 13.6 sec;   INR: 1.14 ratio         PTT - ( 01 Jan 2022 08:38 )  PTT:47.7 sec          RADIOLOGY & ADDITIONAL TESTS:    Imaging Personally Reviewed:    Consultant(s) Notes Reviewed:      Care Discussed with Consultants/Other Providers: Myles

## 2022-01-01 NOTE — PROGRESS NOTE ADULT - PROBLEM SELECTOR PLAN 2
- Argatroban gtt for HIT-  central line thrombus - small, PTT goal 40-50  - If repeat CT stable, start DOAC - Argatroban gtt for HIT-  central line thrombus - small, PTT goal 40-50  - If repeat CT stable, start DOAC      Daughter updated on Facetime.

## 2022-01-01 NOTE — PROGRESS NOTE ADULT - SUBJECTIVE AND OBJECTIVE BOX
SUBJECTIVE: HPI:  66F, no sig PMH, txfered from [facility] for HA since 12/10 morning. Had associated nausea/vomiting and posterior neck pain. CT: diffuse SAH, most prominent at Sylvian fissures. Exam: Somnolent, Ox2.5 (said November), Left pupil 6NR; Right pupil 5NR, EOMI, no facial, Right drift, LAWTON 5/5   (11 Dec 2021 11:22)      OVERNIGHT EVENTS: No acute events overnight, patient seen and evaluated at bedside this morning and I am pleasantly surprised to see the patient wide awake, and upon greeting her happy new year she smiled and responded "same to you." Patient appears more interactive this morning and is comfortable.     Vital Signs Last 24 Hrs  T(C): 36.8 (01 Jan 2022 13:05), Max: 37.1 (31 Dec 2021 19:09)  T(F): 98.3 (01 Jan 2022 13:05), Max: 98.7 (31 Dec 2021 19:09)  HR: 83 (01 Jan 2022 13:05) (72 - 86)  BP: 115/79 (01 Jan 2022 13:05) (109/79 - 128/76)  BP(mean): --  RR: 18 (01 Jan 2022 13:05) (17 - 20)  SpO2: 96% (01 Jan 2022 13:05) (95% - 99%)    PHYSICAL EXAM:    General: No Acute Distress     Neurological: Awake, Ox1 (she spelled her name for me), minimally verbal but today appears more interactive - waves, greets and says thank you, EOMI, PERRL, no drifts, upper extremities - effort dependent 4+/5, lowers extremity b/l 4+/5 effort dependent    Pulmonary: Clear to Auscultation, No Rales, No Rhonchi, No Wheezes     Cardiovascular: S1, S2, Regular Rate and Rhythm     Gastrointestinal: Soft, Nontender, Nondistended     Incision: Rt groin puncture C/D/I    LABS:                        12.8   6.26  )-----------( 286      ( 01 Jan 2022 06:47 )             38.2    12-31    137  |  102  |  13  ----------------------------<  90  3.7   |  25  |  0.46<L>    Ca    8.9      31 Dec 2021 06:15  Phos  3.4     12-31  Mg     2.2     12-31    PT/INR - ( 01 Jan 2022 08:38 )   PT: 13.6 sec;   INR: 1.14 ratio         PTT - ( 01 Jan 2022 08:38 )  PTT:47.7 sec      12-31 @ 07:01 - 01-01 @ 07:00  --------------------------------------------------------  IN: 1320 mL / OUT: 250 mL / NET: 1070 mL    01-01 @ 07:01 - 01-01 @ 13:15  --------------------------------------------------------  IN: 600 mL / OUT: 0 mL / NET: 600 mL      DRAINS: None    MEDICATIONS:  Antibiotics:    Neuro:  acetaminophen     Tablet .. 650 milliGRAM(s) Oral every 6 hours PRN Temp greater or equal to 38C (100.4F), Mild Pain (1 - 3)  amantadine Syrup 100 milliGRAM(s) Oral <User Schedule>  oxyCODONE    IR 5 milliGRAM(s) Oral every 4 hours PRN Moderate Pain (4 - 6)    Cardiac:  amLODIPine   Tablet 5 milliGRAM(s) Oral daily    Pulm:    GI/:  senna 2 Tablet(s) Oral at bedtime    Other:   argatroban Infusion 1 MICROgram(s)/kG/Min IV Continuous <Continuous>  multivitamin 1 Tablet(s) Oral daily  nystatin Powder 1 Application(s) Topical every 12 hours  potassium chloride   Solution 40 milliEquivalent(s) Oral once  sodium chloride 1 Gram(s) Oral every 6 hours  sodium chloride 0.9% lock flush 10 milliLiter(s) IV Push every 1 hour PRN Pre/post blood products, medications, blood draw, and to maintain line patency    DIET: [] Regular [] CCD [] Renal [x] Puree [] Dysphagia [] Tube Feeds:     IMAGING:   < from: CT Head No Cont (12.30.21 @ 09:35) >  IMPRESSION:    No significant interval change from 12/28/2021    --- End ofReport ---    DANISH CAVAZOS MD; Attending Radiologist  This document has been electronically signed. Dec 30 2021  9:09AM    < end of copied text >

## 2022-01-01 NOTE — PROGRESS NOTE ADULT - ASSESSMENT
ASSESSMENT AND PLAN: 66F, no sig PMH, txfered from [facility] for HA since 12/10 morning. Had associated nausea/vomiting and posterior neck pain. CT: diffuse SAH, most prominent at Sylvian fissures.    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+ w/ BALJEET, Hematology following, PTT goal 40-50, last PTT today 47.7, CT Brain in am, if stable possibly transition to DOAC for her RT subclavian vein DVT, continue to monitor PTT                DVT ppx: SCDs, Argatroban drip for HIT+, has a right Subclavian vein DVT - Vascular following, 12/29 UE Dopp - shows no propagation of said DVT, 12/21 LE Dopp - negative    RENAL:   - IVL  - On salt tabs, last BMP 12/31 stable, f/u AM BMP    ID:   - Afebrile  - Monitor for fevers  - Nystatin for oral thrush    GI:    - Continue puree diet - S&S following  - Continue senna for bowel regimen, last BM 12/30   - Continue MVT    DISCHARGE PLANNING:   PT/OT/PMR - Acute TBI Rehab    Called and spoke with daughter Mendy Ho [phone] for updates    Plan to be discussed w/ Dr. Lucas  [id number]

## 2022-01-02 LAB
ANION GAP SERPL CALC-SCNC: 11 MMOL/L — SIGNIFICANT CHANGE UP (ref 5–17)
APTT BLD: 48.3 SEC — HIGH (ref 27.5–35.5)
BUN SERPL-MCNC: 16 MG/DL — SIGNIFICANT CHANGE UP (ref 7–23)
CALCIUM SERPL-MCNC: 9 MG/DL — SIGNIFICANT CHANGE UP (ref 8.4–10.5)
CHLORIDE SERPL-SCNC: 102 MMOL/L — SIGNIFICANT CHANGE UP (ref 96–108)
CO2 SERPL-SCNC: 24 MMOL/L — SIGNIFICANT CHANGE UP (ref 22–31)
CREAT SERPL-MCNC: 0.5 MG/DL — SIGNIFICANT CHANGE UP (ref 0.5–1.3)
GLUCOSE SERPL-MCNC: 87 MG/DL — SIGNIFICANT CHANGE UP (ref 70–99)
HCT VFR BLD CALC: 33.3 % — LOW (ref 34.5–45)
HGB BLD-MCNC: 11.2 G/DL — LOW (ref 11.5–15.5)
MCHC RBC-ENTMCNC: 30.9 PG — SIGNIFICANT CHANGE UP (ref 27–34)
MCHC RBC-ENTMCNC: 33.6 GM/DL — SIGNIFICANT CHANGE UP (ref 32–36)
MCV RBC AUTO: 91.7 FL — SIGNIFICANT CHANGE UP (ref 80–100)
NRBC # BLD: 0 /100 WBCS — SIGNIFICANT CHANGE UP (ref 0–0)
PLATELET # BLD AUTO: 268 K/UL — SIGNIFICANT CHANGE UP (ref 150–400)
POTASSIUM SERPL-MCNC: 3.5 MMOL/L — SIGNIFICANT CHANGE UP (ref 3.5–5.3)
POTASSIUM SERPL-SCNC: 3.5 MMOL/L — SIGNIFICANT CHANGE UP (ref 3.5–5.3)
RBC # BLD: 3.63 M/UL — LOW (ref 3.8–5.2)
RBC # FLD: 13.4 % — SIGNIFICANT CHANGE UP (ref 10.3–14.5)
SODIUM SERPL-SCNC: 137 MMOL/L — SIGNIFICANT CHANGE UP (ref 135–145)
WBC # BLD: 4.83 K/UL — SIGNIFICANT CHANGE UP (ref 3.8–10.5)
WBC # FLD AUTO: 4.83 K/UL — SIGNIFICANT CHANGE UP (ref 3.8–10.5)

## 2022-01-02 PROCEDURE — 99232 SBSQ HOSP IP/OBS MODERATE 35: CPT

## 2022-01-02 PROCEDURE — 70450 CT HEAD/BRAIN W/O DYE: CPT | Mod: 26

## 2022-01-02 RX ADMIN — SODIUM CHLORIDE 1 GRAM(S): 9 INJECTION INTRAMUSCULAR; INTRAVENOUS; SUBCUTANEOUS at 23:10

## 2022-01-02 RX ADMIN — Medication 100 MILLIGRAM(S): at 05:37

## 2022-01-02 RX ADMIN — Medication 100 MILLIGRAM(S): at 18:01

## 2022-01-02 RX ADMIN — SODIUM CHLORIDE 1 GRAM(S): 9 INJECTION INTRAMUSCULAR; INTRAVENOUS; SUBCUTANEOUS at 18:01

## 2022-01-02 RX ADMIN — NYSTATIN CREAM 1 APPLICATION(S): 100000 CREAM TOPICAL at 05:36

## 2022-01-02 RX ADMIN — AMLODIPINE BESYLATE 5 MILLIGRAM(S): 2.5 TABLET ORAL at 05:50

## 2022-01-02 RX ADMIN — Medication 1 TABLET(S): at 13:17

## 2022-01-02 RX ADMIN — NYSTATIN CREAM 1 APPLICATION(S): 100000 CREAM TOPICAL at 17:41

## 2022-01-02 RX ADMIN — SODIUM CHLORIDE 1 GRAM(S): 9 INJECTION INTRAMUSCULAR; INTRAVENOUS; SUBCUTANEOUS at 05:37

## 2022-01-02 RX ADMIN — SODIUM CHLORIDE 1 GRAM(S): 9 INJECTION INTRAMUSCULAR; INTRAVENOUS; SUBCUTANEOUS at 13:29

## 2022-01-02 NOTE — PROGRESS NOTE ADULT - ASSESSMENT
65 yo woman admitted 12/11/21 no PMH,  with HA/vomiting since 12/10 found to have SAH c/b hydrocephalus. 12/11 s/p balloon assisted coil embolization of ruptured L Pcomm aneurysm, after coiling found to have a partial left M3 thrombus which was aspirated out. 12/14 S/p R EVD for hydro c/b sizable tract hemorrhage with IVH now with L EVD, removed on 12/29. 12/30 transferred to floor.       PLAN:  Neuro:   - CT head today AT 3:00  -  SAH- completed nimodipine  - amantadine for neuro stimulation  - hyponatremia- Na 137- on salt tabs, 1g q6  Respiratory:   - on room air  - 12/21 CTA Chest - neg for PE  CV:  - vitals stable  - HTN- continue amlodipine  Heme/Onc:      - SCDs  - Argatroban gtt for HIT-  central line thrombus - small, PTT goal 40-50, currently therapeutic  - When stable start DOAC for 3 months after stopping argatroban per Heme recs  - Trend PTT daily since therapeutic   GI:    - tolerating pureed diet  ID:  - completed cipro for Klebsiella UTI  PT/OT:   - Acute TBI rehab      [facility] # [id number]

## 2022-01-02 NOTE — PROGRESS NOTE ADULT - NUTRITIONAL ASSESSMENT
PLAN:  Neuro:   - CT head today AT 3:00  -  SAH- completed nimodipine  - amantadine for neuro stimulation  - hyponatremia- Na 137- on salt tabs, 1g q6     - SCDs  - Argatroban gtt for HIT-  central line thrombus - small, PTT goal 40-50, currently therapeutic  - When stable start DOAC for 3 months after stopping argatroban per Heme recs  - Trend PTT daily since therapeutic   GI:    - tolerating pureed diet  ID:  - completed cipro for Klebsiella UTI  PT/OT:   - Acute TBI rehab      Doing well clinically. Head CT today to ensure stability of vents.

## 2022-01-02 NOTE — PROGRESS NOTE ADULT - PROBLEM SELECTOR PLAN 2
- Argatroban gtt for HIT-  central line thrombus - small, PTT goal 40-50  - Repeat CT stable, start Eliquis if cleared      D/c planning. Daughter updated yesterday.

## 2022-01-02 NOTE — PROGRESS NOTE ADULT - PROBLEM SELECTOR PLAN 1
Course above. Nimodipine d/lorri 1/1, started on Norvasc. BP controlled.   - repeat CT today stable  - hyponatremia- Na 137- on salt tabs  - On Amantadine for neuro stimulation       - Completed cipro for Klebsiella UTI.

## 2022-01-02 NOTE — PROGRESS NOTE ADULT - SUBJECTIVE AND OBJECTIVE BOX
SUBJECTIVE: Pt seen and examined, doing well, awake and alert, following commands, no complaints    OVERNIGHT EVENTS: none    Vital Signs Last 24 Hrs  T(C): 36.9 (02 Jan 2022 07:34), Max: 36.9 (01 Jan 2022 15:08)  T(F): 98.4 (02 Jan 2022 07:34), Max: 98.4 (01 Jan 2022 15:08)  HR: 70 (02 Jan 2022 07:34) (66 - 83)  BP: 121/78 (02 Jan 2022 07:34) (114/65 - 123/73)  BP(mean): --  RR: 18 (02 Jan 2022 07:34) (18 - 18)  SpO2: 97% (02 Jan 2022 07:34) (95% - 97%)    PHYSICAL EXAM:    General: No Acute Distress     Neurological: Awake and alert, oriented to self and hospital, Following Commands, shows 2 fingers and shows thumbs up, no drift,  Moving all extremities purposefully and with good strength at least 4+/5    Pulmonary: Clear to Auscultation, No Rales, No Rhonchi, No Wheezes     Cardiovascular: S1, S2, Regular Rate and Rhythm     Gastrointestinal: Soft, Nontender, Nondistended     Incision:     LABS:                        11.2   4.83  )-----------( 268      ( 02 Jan 2022 06:47 )             33.3    01-02    137  |  102  |  16  ----------------------------<  87  3.5   |  24  |  0.50    Ca    9.0      02 Jan 2022 06:46    PT/INR - ( 01 Jan 2022 08:38 )   PT: 13.6 sec;   INR: 1.14 ratio         PTT - ( 02 Jan 2022 06:47 )  PTT:48.3 sec      01-01 @ 07:01  -  01-02 @ 07:00  --------------------------------------------------------  IN: 600 mL / OUT: 0 mL / NET: 600 mL      DRAINS:     MEDICATIONS:  Antibiotics:    Neuro:  acetaminophen     Tablet .. 650 milliGRAM(s) Oral every 6 hours PRN Temp greater or equal to 38C (100.4F), Mild Pain (1 - 3)  amantadine Syrup 100 milliGRAM(s) Oral <User Schedule>  oxyCODONE    IR 5 milliGRAM(s) Oral every 4 hours PRN Moderate Pain (4 - 6)    Cardiac:  amLODIPine   Tablet 5 milliGRAM(s) Oral daily    Pulm:    GI/:  senna 2 Tablet(s) Oral at bedtime    Other:   argatroban Infusion 1 MICROgram(s)/kG/Min IV Continuous <Continuous>  multivitamin 1 Tablet(s) Oral daily  nystatin Powder 1 Application(s) Topical every 12 hours  potassium chloride   Solution 40 milliEquivalent(s) Oral once  sodium chloride 1 Gram(s) Oral every 6 hours  sodium chloride 0.9% lock flush 10 milliLiter(s) IV Push every 1 hour PRN Pre/post blood products, medications, blood draw, and to maintain line patency    DIET: [] Regular [] CCD [] Renal [x] Puree [] Dysphagia [] Tube Feeds:     IMAGING:    from: CT Head No Cont (12.30.21 @ 09:35) >  FINDINGS:    Bifrontal kris holesare redemonstrated. Left parasellar embolic coil   mass is redemonstrated.    Similar 3.6 x 2.8 cm acute right frontal parenchymal hemorrhage with   similar extensive surrounding vasogenic edema.    Slightly decreased small hemorrhage layering in theleft occipital horn.    Similar leftward midline shift of 7 mm. Basal cisterns are still   visualized.    Similar mild asymmetric dilatation of the left lateral ventricle.    IMPRESSION:    No significant interval change from 12/28/2021    < end of copied text >  < from: [facility] Duplex Upper Ext Vein Scan, Right (12.29.21 @ 18:08) >  IMPRESSION:    Persistent nonocclusive catheter associated thrombus in the mid right   subclavian vein.    < end of copied text >  < from: [facility] Duplex Lower Ext Vein Scan, Bilat (12.21.21 @ 12:50) >    IMPRESSION:  No evidence of deep venous thrombosis in either lower extremity.    < end of copied text >

## 2022-01-02 NOTE — PROGRESS NOTE ADULT - SUBJECTIVE AND OBJECTIVE BOX
Patient is a 66y old  Female who presents with a chief complaint of Subarachnoid hemorrhage (02 Jan 2022 10:23)      SUBJECTIVE / OVERNIGHT EVENTS: Pt in bed, feels well.     MEDICATIONS  (STANDING):  amantadine Syrup 100 milliGRAM(s) Oral <User Schedule>  amLODIPine   Tablet 5 milliGRAM(s) Oral daily  argatroban Infusion 1 MICROgram(s)/kG/Min (4.08 mL/Hr) IV Continuous <Continuous>  multivitamin 1 Tablet(s) Oral daily  nystatin Powder 1 Application(s) Topical every 12 hours  potassium chloride   Solution 40 milliEquivalent(s) Oral once  senna 2 Tablet(s) Oral at bedtime  sodium chloride 1 Gram(s) Oral every 6 hours    MEDICATIONS  (PRN):  acetaminophen     Tablet .. 650 milliGRAM(s) Oral every 6 hours PRN Temp greater or equal to 38C (100.4F), Mild Pain (1 - 3)  oxyCODONE    IR 5 milliGRAM(s) Oral every 4 hours PRN Moderate Pain (4 - 6)  sodium chloride 0.9% lock flush 10 milliLiter(s) IV Push every 1 hour PRN Pre/post blood products, medications, blood draw, and to maintain line patency      CAPILLARY BLOOD GLUCOSE        I&O's Summary    01 Jan 2022 07:01  -  02 Jan 2022 07:00  --------------------------------------------------------  IN: 600 mL / OUT: 0 mL / NET: 600 mL    02 Jan 2022 07:01  -  02 Jan 2022 17:17  --------------------------------------------------------  IN: 480 mL / OUT: 0 mL / NET: 480 mL        PHYSICAL EXAM:  T(C): 36.3 (01-02-22 @ 15:32), Max: 36.9 (01-01-22 @ 19:32)  HR: 98 (01-02-22 @ 15:32) (66 - 102)  BP: 111/78 (01-02-22 @ 15:32) (108/81 - 123/73)  RR: 18 (01-02-22 @ 15:32) (18 - 18)  SpO2: 100% (01-02-22 @ 15:32) (95% - 100%)  CONSTITUTIONAL: NAD, well-developed, well-groomed  EYES: PERRLA; conjunctiva and sclera clear  ENMT: Moist oral mucosa, no pharyngeal injection or exudates; normal dentition  NECK: Supple, no palpable masses; no thyromegaly  RESPIRATORY: Normal respiratory effort; lungs are clear to auscultation bilaterally  CARDIOVASCULAR: Regular rate and rhythm, normal S1 and S2, no murmur/rub/gallop; No lower extremity edema; Peripheral pulses are 2+ bilaterally  ABDOMEN: Nontender to palpation, normoactive bowel sounds, no rebound/guarding; No hepatosplenomegaly  MUSCULOSKELETAL:  Normal gait; no clubbing or cyanosis of digits; no joint swelling or tenderness to palpation  PSYCH: A+O to person, place, and time; affect appropriate  NEUROLOGY: CN 2-12 are intact and symmetric; no gross sensory deficits   SKIN: No rashes; no palpable lesions    LABS:                        11.2   4.83  )-----------( 268      ( 02 Jan 2022 06:47 )             33.3     01-02    137  |  102  |  16  ----------------------------<  87  3.5   |  24  |  0.50    Ca    9.0      02 Jan 2022 06:46      PT/INR - ( 01 Jan 2022 08:38 )   PT: 13.6 sec;   INR: 1.14 ratio         PTT - ( 02 Jan 2022 06:47 )  PTT:48.3 sec          RADIOLOGY & ADDITIONAL TESTS:    Imaging Personally Reviewed:    Consultant(s) Notes Reviewed:      Care Discussed with Consultants/Other Providers: Myles

## 2022-01-02 NOTE — PROGRESS NOTE ADULT - ASSESSMENT
65 yo woman admitted 12/11/21 no PMH, with HA/vomiting since 12/10 found to have SAH c/b hydrocephalus.     12/11 s/p coiling of L Pcomm aneurysm, found to have a partial left m2 thrombus now s/p thrombectomy with TICI 3 reperfusion.   12/14 S/p R EVD for hydro c/b sizable tract hemorrhage with IVH now with L EVD, removed on 12/29.   12/30 transferred to floor.     12/12 TTE EF 65%, mild MR, normal left vent systolic function, normal right vent systolic fxn  12/21 CTA Chest - neg for PE    Neurologically stable. Repeat CT today- unchanged 4 cm hematoma in the RIGHT frontal lobe with surrounding edema and mild mass effect on the anterior horn of the RIGHT lateral ventricle. Coil embolic material seen in the LEFT suprasellar cistern.

## 2022-01-03 DIAGNOSIS — Z29.9 ENCOUNTER FOR PROPHYLACTIC MEASURES, UNSPECIFIED: ICD-10-CM

## 2022-01-03 LAB
APTT BLD: 51.1 SEC — HIGH (ref 27.5–35.5)
INR BLD: 1.21 RATIO — HIGH (ref 0.88–1.16)
PROTHROM AB SERPL-ACNC: 14.4 SEC — HIGH (ref 10.6–13.6)
SARS-COV-2 RNA SPEC QL NAA+PROBE: SIGNIFICANT CHANGE UP

## 2022-01-03 PROCEDURE — 99232 SBSQ HOSP IP/OBS MODERATE 35: CPT

## 2022-01-03 PROCEDURE — 99233 SBSQ HOSP IP/OBS HIGH 50: CPT

## 2022-01-03 RX ORDER — APIXABAN 2.5 MG/1
5 TABLET, FILM COATED ORAL
Refills: 0 | Status: DISCONTINUED | OUTPATIENT
Start: 2022-01-03 | End: 2022-01-05

## 2022-01-03 RX ORDER — CHLORHEXIDINE GLUCONATE 213 G/1000ML
1 SOLUTION TOPICAL DAILY
Refills: 0 | Status: DISCONTINUED | OUTPATIENT
Start: 2022-01-03 | End: 2022-01-05

## 2022-01-03 RX ADMIN — SODIUM CHLORIDE 1 GRAM(S): 9 INJECTION INTRAMUSCULAR; INTRAVENOUS; SUBCUTANEOUS at 05:03

## 2022-01-03 RX ADMIN — SODIUM CHLORIDE 1 GRAM(S): 9 INJECTION INTRAMUSCULAR; INTRAVENOUS; SUBCUTANEOUS at 23:26

## 2022-01-03 RX ADMIN — NYSTATIN CREAM 1 APPLICATION(S): 100000 CREAM TOPICAL at 11:53

## 2022-01-03 RX ADMIN — AMLODIPINE BESYLATE 5 MILLIGRAM(S): 2.5 TABLET ORAL at 05:03

## 2022-01-03 RX ADMIN — SODIUM CHLORIDE 1 GRAM(S): 9 INJECTION INTRAMUSCULAR; INTRAVENOUS; SUBCUTANEOUS at 11:58

## 2022-01-03 RX ADMIN — Medication 100 MILLIGRAM(S): at 05:03

## 2022-01-03 RX ADMIN — APIXABAN 5 MILLIGRAM(S): 2.5 TABLET, FILM COATED ORAL at 17:37

## 2022-01-03 RX ADMIN — SODIUM CHLORIDE 1 GRAM(S): 9 INJECTION INTRAMUSCULAR; INTRAVENOUS; SUBCUTANEOUS at 17:36

## 2022-01-03 RX ADMIN — NYSTATIN CREAM 1 APPLICATION(S): 100000 CREAM TOPICAL at 05:04

## 2022-01-03 RX ADMIN — Medication 1 TABLET(S): at 11:58

## 2022-01-03 RX ADMIN — Medication 100 MILLIGRAM(S): at 17:36

## 2022-01-03 NOTE — PROGRESS NOTE ADULT - PROBLEM SELECTOR PLAN 3
on argatroban gtt with plan to transition to eliquis eventually  patient having multiple BM- may need to hold senna as well    will follow periodically. call with quesitons.

## 2022-01-03 NOTE — PROGRESS NOTE ADULT - ASSESSMENT
Assessment:  1. R subclavian vein PICC line associated thrombus  -  small clot burden  2.  ICH  3.  PCOM Aneurysm  4. L Parietal M2 thrombus s/p thrombectomy  5. Hydrocephalus s/p EVD  6. HIT  - confirmed by BALJEET  7. HTN    Plan:  1. On Argatroban gtt.  2. If the PICC line is functioning, and needed, it can remain in for now.  3. Appreciate Hematology follow-up. Plan to transition to DOAC later when safe from neurosurgical perspective.   4. Repeat venous duplex of the upper extremity to assure stability today on Wednesday.  5. If the PICC line is not needed in the near future, it should be removed.    6. Right upper extremity without edema and distal pulses intact.   7. BP stable on Amlodipine.  8. Continue excellent neurosurgical care.     Thank you  OSMANY Louis, MPAS  Vascular Cardiology Service    Please call with any questions:   DIRECT SERVICE NUMBER:  [phone]  Office [phone] Assessment:  1. R subclavian vein PICC line associated thrombus  -  small clot burden  2.  ICH  3.  PCOM Aneurysm  4. L Parietal M2 thrombus s/p thrombectomy  5. Hydrocephalus s/p EVD  6. HIT  - confirmed by BALJEET  7. HTN    Plan:  1. Transitioned to Eliquis 5 mg BID.  2. PICC line removed.   3. Appreciate Hematology recommendations  4. Right upper extremity without edema and distal pulses intact.   5. BP stable on Amlodipine.  6. Continue excellent neurosurgical care.     Thank you  OSMANY Louis, MPAS  Vascular Cardiology Service    Please call with any questions:   DIRECT SERVICE NUMBER:  [phone]  Office [phone] Assessment:  1. R subclavian vein PICC line associated thrombus  -  small clot burden  -  Last R UE venous duplex on 12/29 showed stability of R subclavian DVT  2.  ICH  3.  PCOM Aneurysm  4. L Parietal M2 thrombus s/p thrombectomy  5. Hydrocephalus s/p EVD  6. HIT  - confirmed by BALJEET  7. HTN    Plan:  1. Transitioned to Eliquis 5 mg BID.  2. PICC line removed.   3. Appreciate Hematology recommendations  4. Right upper extremity without edema and distal pulses intact.   5. BP stable on Amlodipine.  6. Continue excellent neurosurgical care.     Thank you  OSMANY Louis, MPAS  Vascular Cardiology Service    Please call with any questions:   DIRECT SERVICE NUMBER:  [phone]  Office [phone]

## 2022-01-03 NOTE — PROGRESS NOTE ADULT - SUBJECTIVE AND OBJECTIVE BOX
SUBJECTIVE: Pt seen and examined, doing well, awake and alert, interactive with staff, no complaints    OVERNIGHT EVENTS: none    Vital Signs Last 24 Hrs  T(C): 36.3 (03 Jan 2022 11:24), Max: 36.8 (03 Jan 2022 04:05)  T(F): 97.4 (03 Jan 2022 11:24), Max: 98.2 (03 Jan 2022 04:05)  HR: 74 (03 Jan 2022 11:24) (67 - 102)  BP: 105/68 (03 Jan 2022 11:24) (105/68 - 137/88)  BP(mean): 0 (03 Jan 2022 11:24) (0 - 0)  RR: 18 (03 Jan 2022 11:24) (18 - 18)  SpO2: 97% (03 Jan 2022 11:24) (95% - 100%)    PHYSICAL EXAM:    General: No Acute Distress     Neurological: Awake and alert, oriented to self and hospital (with choices), Following Commands, shows 2 fingers and shows thumbs up, no drift,  Moving all extremities purposefully and with good strength at least 4+/5    Pulmonary: Clear to Auscultation, No Rales, No Rhonchi, No Wheezes     Cardiovascular: S1, S2, Regular Rate and Rhythm     Gastrointestinal: Soft, Nontender, Nondistended     Incision: groin site c/d/i    LABS:                        11.2   4.83  )-----------( 268      ( 02 Jan 2022 06:47 )             33.3    01-02    137  |  102  |  16  ----------------------------<  87  3.5   |  24  |  0.50    Ca    9.0      02 Jan 2022 06:46    PT/INR - ( 03 Jan 2022 06:17 )   PT: 14.4 sec;   INR: 1.21 ratio         PTT - ( 03 Jan 2022 06:17 )  PTT:51.1 sec      01-02 @ 07:01  -  01-03 @ 07:00  --------------------------------------------------------  IN: 720 mL / OUT: 0 mL / NET: 720 mL    01-03 @ 07:01  -  01-03 @ 12:04  --------------------------------------------------------  IN: 240 mL / OUT: 0 mL / NET: 240 mL        MEDICATIONS:  Antibiotics:    Neuro:  acetaminophen     Tablet .. 650 milliGRAM(s) Oral every 6 hours PRN Temp greater or equal to 38C (100.4F), Mild Pain (1 - 3)  amantadine Syrup 100 milliGRAM(s) Oral <User Schedule>  oxyCODONE    IR 5 milliGRAM(s) Oral every 4 hours PRN Moderate Pain (4 - 6)    Cardiac:  amLODIPine   Tablet 5 milliGRAM(s) Oral daily    Pulm:    GI/:  senna 2 Tablet(s) Oral at bedtime    Other:   argatroban Infusion 1 MICROgram(s)/kG/Min IV Continuous <Continuous>  multivitamin 1 Tablet(s) Oral daily  nystatin Powder 1 Application(s) Topical every 12 hours  potassium chloride   Solution 40 milliEquivalent(s) Oral once  sodium chloride 1 Gram(s) Oral every 6 hours  sodium chloride 0.9% lock flush 10 milliLiter(s) IV Push every 1 hour PRN Pre/post blood products, medications, blood draw, and to maintain line patency    DIET: [] Regular [] CCD [] Renal [x] Puree [] Dysphagia [] Tube Feeds:     IMAGING:   from: CT Head No Cont (12.30.21 @ 09:35) >  FINDINGS:    Bifrontal kris holesare redemonstrated. Left parasellar embolic coil   mass is redemonstrated.    Similar 3.6 x 2.8 cm acute right frontal parenchymal hemorrhage with   similar extensive surrounding vasogenic edema.    Slightly decreased small hemorrhage layering in theleft occipital horn.    Similar leftward midline shift of 7 mm. Basal cisterns are still   visualized.    Similar mild asymmetric dilatation of the left lateral ventricle.    IMPRESSION:    No significant interval change from 12/28/2021    < end of copied text >  < from: [facility] Duplex Upper Ext Vein Scan, Right (12.29.21 @ 18:08) >  IMPRESSION:    Persistent nonocclusive catheter associated thrombus in the mid right   subclavian vein.    < end of copied text >  < from: [facility] Duplex Lower Ext Vein Scan, Bilat (12.21.21 @ 12:50) >    IMPRESSION:  No evidence of deep venous thrombosis in either lower extremity.    < end of copied text >    < from: CT Head No Cont (01.02.22 @ 15:23) >  IMPRESSION:   unchanged 4 cm hematoma in the RIGHT frontal lobe with   surrounding edema and mild mass effect on the anterior horn of the RIGHT   lateral ventricle. Coil embolic material seen in the LEFT suprasellar   cistern.    < end of copied text >

## 2022-01-03 NOTE — PROGRESS NOTE ADULT - SUBJECTIVE AND OBJECTIVE BOX
patient now on 4 Ferreira, denies pain     REVIEW OF SYSTEMS  Constitutional - No fever,  No fatigue  HEENT - No vertigo, No neck pain  Neurological - No headaches, No memory loss, No loss of strength, No numbness, No tremors  Skin - No rashes, No lesions   Musculoskeletal - No joint pain, No joint swelling, No muscle pain  Psychiatric - No depression, No anxiety    FUNCTIONAL PROGRESS  1/3 SLP   +distractable throughout tx session  puree diet/thin liquids    1/2 PT  bed mobility mod assist  transfers min to mod assist  gait mod assist x 4 steps   minimally verbal, nodding yes/no to ~25% of simple commands.     VITALS  T(C): 36.3 (01-03-22 @ 11:24), Max: 36.8 (01-03-22 @ 04:05)  HR: 74 (01-03-22 @ 11:24) (67 - 102)  BP: 105/68 (01-03-22 @ 11:24) (105/68 - 137/88)  RR: 18 (01-03-22 @ 11:24) (18 - 18)  SpO2: 97% (01-03-22 @ 11:24) (95% - 100%)  Wt(kg): --    MEDICATIONS   acetaminophen     Tablet .. 650 milliGRAM(s) every 6 hours PRN  amantadine Syrup 100 milliGRAM(s) <User Schedule>  amLODIPine   Tablet 5 milliGRAM(s) daily  argatroban Infusion 1 MICROgram(s)/kG/Min <Continuous>  multivitamin 1 Tablet(s) daily  nystatin Powder 1 Application(s) every 12 hours  oxyCODONE    IR 5 milliGRAM(s) every 4 hours PRN  potassium chloride   Solution 40 milliEquivalent(s) once  senna 2 Tablet(s) at bedtime  sodium chloride 1 Gram(s) every 6 hours  sodium chloride 0.9% lock flush 10 milliLiter(s) every 1 hour PRN      RECENT LABS - Reviewed                        11.2   4.83  )-----------( 268      ( 02 Jan 2022 06:47 )             33.3     01-02    137  |  102  |  16  ----------------------------<  87  3.5   |  24  |  0.50    Ca    9.0      02 Jan 2022 06:46      PT/INR - ( 03 Jan 2022 06:17 )   PT: 14.4 sec;   INR: 1.21 ratio         PTT - ( 03 Jan 2022 06:17 )  PTT:51.1 sec    < from: CT Head No Cont (01.02.22 @ 15:23) >    IMPRESSION:   unchanged 4 cm hematoma in the RIGHT frontal lobe with   surrounding edema and mild mass effect on the anterior horn of the RIGHT   lateral ventricle. Coil embolic material seen in the LEFT suprasellar   cistern.    < end of copied text >      ------------------------------------------------------------------------  PHYSICAL EXAM  Constitutional - NAD, Comfortable, in bed  Neck - Supple, No limited ROM  Chest - Breathing comfortably, No wheezing  Cardiovascular - S1S2, RRR   Abdomen - Soft, nontender, nondistended  Extremities - No C/C/E, No calf tenderness   Neurologic Exam -                    Cognitive - Awake, Alert, AAO to self     Communication - +dysarthric, can follow simple commands     Motor - able to move all extremities spontaneously against gravity      Sensory - reacts to LT  Psychiatric - Mood stable, Affect WNL      Assessment and Recommendation:   	  Pt is a 65 y/o F with no sig PMHx, found to have SAH, most prominent at Sylvian fissures complicated by hydrocephalus.  s/p coiling of L pcomm aneurysm and thrombectomy  on amantadine  hyponatremia, salt tabs   Rt subclavian vein PICC line thrombus, HIT, on argatroban gtt, repeat venous duplex pending   pain: Tylenol PRN, oxycodone   Rehab   - Will continue to follow for ongoing rehab needs and recommendations.  - Continue bedside therapy as well as OOB throughout the day with mobilization throughout the day with staff to maintain cardiopulmonary function and prevention of secondary complications related to debility.     recommend Acute inpatient rehabilitation, patient will be able to tolerate three hours of therapy daily

## 2022-01-03 NOTE — PROGRESS NOTE ADULT - SUBJECTIVE AND OBJECTIVE BOX
[facility] Division of Hospital Medicine  Linda Correia MD  spectra [id number]    Patient is a 66y old  Female who presents with a chief complaint of Subarachnoid hemorrhage (03 Jan 2022 11:54)      SUBJECTIVE / OVERNIGHT EVENTS: patient seen and examined earlier this morning. she denies any pain, SOB, cough.   ADDITIONAL REVIEW OF SYSTEMS:    MEDICATIONS  (STANDING):  amantadine Syrup 100 milliGRAM(s) Oral <User Schedule>  amLODIPine   Tablet 5 milliGRAM(s) Oral daily  argatroban Infusion 1 MICROgram(s)/kG/Min (4.08 mL/Hr) IV Continuous <Continuous>  multivitamin 1 Tablet(s) Oral daily  nystatin Powder 1 Application(s) Topical every 12 hours  potassium chloride   Solution 40 milliEquivalent(s) Oral once  senna 2 Tablet(s) Oral at bedtime  sodium chloride 1 Gram(s) Oral every 6 hours    MEDICATIONS  (PRN):  acetaminophen     Tablet .. 650 milliGRAM(s) Oral every 6 hours PRN Temp greater or equal to 38C (100.4F), Mild Pain (1 - 3)  oxyCODONE    IR 5 milliGRAM(s) Oral every 4 hours PRN Moderate Pain (4 - 6)  sodium chloride 0.9% lock flush 10 milliLiter(s) IV Push every 1 hour PRN Pre/post blood products, medications, blood draw, and to maintain line patency      CAPILLARY BLOOD GLUCOSE        I&O's Summary    02 Jan 2022 07:01  -  03 Jan 2022 07:00  --------------------------------------------------------  IN: 720 mL / OUT: 0 mL / NET: 720 mL    03 Jan 2022 07:01  -  03 Jan 2022 12:15  --------------------------------------------------------  IN: 240 mL / OUT: 0 mL / NET: 240 mL        PHYSICAL EXAM:  Vital Signs Last 24 Hrs  T(C): 36.3 (03 Jan 2022 11:24), Max: 36.8 (03 Jan 2022 04:05)  T(F): 97.4 (03 Jan 2022 11:24), Max: 98.2 (03 Jan 2022 04:05)  HR: 74 (03 Jan 2022 11:24) (67 - 102)  BP: 105/68 (03 Jan 2022 11:24) (105/68 - 137/88)  BP(mean): 0 (03 Jan 2022 11:24) (0 - 0)  RR: 18 (03 Jan 2022 11:24) (18 - 18)  SpO2: 97% (03 Jan 2022 11:24) (95% - 100%)    CONSTITUTIONAL: NAD, well-developed, well-groomed  NECK: Supple, no palpable masses; no thyromegaly  RESPIRATORY: Normal respiratory effort; lungs are clear to auscultation bilaterally  CARDIOVASCULAR: normal S1 and S2, no murmur/rub/gallop; No lower extremity edema  ABDOMEN: Nontender to palpation, normoactive bowel sounds, no rebound/guarding; No hepatosplenomegaly  MUSCULOSKELETAL:  no clubbing or cyanosis of digits; no joint swelling or tenderness to palpation  PSYCH: A+O to person, place; flat affect   NEURO: moves all extremities on command, follows simple commands  SKIN: No rashes; no palpable lesions    LABS:                        11.2   4.83  )-----------( 268      ( 02 Jan 2022 06:47 )             33.3     01-02    137  |  102  |  16  ----------------------------<  87  3.5   |  24  |  0.50    Ca    9.0      02 Jan 2022 06:46      PT/INR - ( 03 Jan 2022 06:17 )   PT: 14.4 sec;   INR: 1.21 ratio         PTT - ( 03 Jan 2022 06:17 )  PTT:51.1 sec            RADIOLOGY & ADDITIONAL TESTS:  Results Reviewed:   < from: CT Head No Cont (01.02.22 @ 15:23) >  IMPRESSION:   unchanged 4 cm hematoma in the RIGHT frontal lobe with   surrounding edema and mild mass effect on the anterior horn of the RIGHT   lateral ventricle. Coil embolic material seen in the LEFT suprasellar   cistern.    < end of copied text >    Imaging Personally Reviewed:  Electrocardiogram Personally Reviewed:    COORDINATION OF CARE:  Care Discussed with Consultants/Other Providers [Y/N]: neurosurgery PA(Thi)  Prior or Outpatient Records Reviewed [Y/N]:

## 2022-01-03 NOTE — PROGRESS NOTE ADULT - SUBJECTIVE AND OBJECTIVE BOX
Vascular Cardiology Progress Note    DIRECT SERVICE NUMBER:  [phone]        OFFICE [phone]    CC:  subarachnoid hemorrhage    Interval Events:  Seen resting.  Remains on argatroban gtt.  R arm is stable.    Allergies  No Known Allergies    MEDICATIONS  (STANDING):  amantadine Syrup 100 milliGRAM(s) Oral <User Schedule>  amLODIPine   Tablet 5 milliGRAM(s) Oral daily  argatroban Infusion 1 MICROgram(s)/kG/Min (4.08 mL/Hr) IV Continuous <Continuous>  multivitamin 1 Tablet(s) Oral daily  nystatin Powder 1 Application(s) Topical every 12 hours  potassium chloride   Solution 40 milliEquivalent(s) Oral once  senna 2 Tablet(s) Oral at bedtime  sodium chloride 1 Gram(s) Oral every 6 hours    PAST MEDICAL & SURGICAL HISTORY:  see HPI    FAMILY HISTORY: see HPI      SOCIAL HISTORY:  unchanged    REVIEW OF SYSTEMS:  12 Point ROS negative except as per subjective and HPI    Vital Signs Last 24 Hrs  T(C): 36.7 (03 Jan 2022 07:55), Max: 36.8 (03 Jan 2022 04:05)  T(F): 98 (03 Jan 2022 07:55), Max: 98.2 (03 Jan 2022 04:05)  HR: 67 (03 Jan 2022 07:55) (67 - 102)  BP: 126/86 (03 Jan 2022 07:55) (108/81 - 137/88)  RR: 18 (03 Jan 2022 07:55) (18 - 18)  SpO2: 97% (03 Jan 2022 07:55) (95% - 100%)    Appearance: NAD 	  HEENT: EVD previously removed  Respiratory: CTA B/L  Cardiac: RRR, S1 and S2  Psychiatry:  Alert and Awake  Gastrointestinal:  Soft, Non-tender, + BS	  Skin: No rashes, No ecchymoses, No cyanosis	  Extremities:  R arm without edema.  R PICC line in place.   No LE edema    Labs:                        11.2   4.83  )-----------( 268      ( 02 Jan 2022 06:47 )             33.3     01-02    137  |  102  |  16  ----------------------------<  87  3.5   |  24  |  0.50    Ca    9.0      02 Jan 2022 06:46    PT/INR - ( 03 Jan 2022 06:17 )   PT: 14.4 sec;   INR: 1.21 ratio    PTT - ( 03 Jan 2022 06:17 )  PTT:51.1 sec Vascular Cardiology Progress Note    DIRECT SERVICE NUMBER:  [phone]        OFFICE [phone]    CC:  subarachnoid hemorrhage    Interval Events:  R PICC line removed.  Transitioned to Eliquis 5 mg BID.  R arm is stable.    Allergies  No Known Allergies    MEDICATIONS  (STANDING):  amantadine Syrup 100 milliGRAM(s) Oral <User Schedule>  amLODIPine   Tablet 5 milliGRAM(s) Oral daily  argatroban Infusion 1 MICROgram(s)/kG/Min (4.08 mL/Hr) IV Continuous <Continuous>  multivitamin 1 Tablet(s) Oral daily  nystatin Powder 1 Application(s) Topical every 12 hours  potassium chloride   Solution 40 milliEquivalent(s) Oral once  senna 2 Tablet(s) Oral at bedtime  sodium chloride 1 Gram(s) Oral every 6 hours    PAST MEDICAL & SURGICAL HISTORY:  see HPI    FAMILY HISTORY: see HPI      SOCIAL HISTORY:  unchanged    REVIEW OF SYSTEMS:  12 Point ROS negative except as per subjective and HPI    Vital Signs Last 24 Hrs  T(C): 36.7 (03 Jan 2022 07:55), Max: 36.8 (03 Jan 2022 04:05)  T(F): 98 (03 Jan 2022 07:55), Max: 98.2 (03 Jan 2022 04:05)  HR: 67 (03 Jan 2022 07:55) (67 - 102)  BP: 126/86 (03 Jan 2022 07:55) (108/81 - 137/88)  RR: 18 (03 Jan 2022 07:55) (18 - 18)  SpO2: 97% (03 Jan 2022 07:55) (95% - 100%)    Appearance: NAD 	  HEENT: EVD previously removed  Respiratory: CTA B/L  Cardiac: RRR, S1 and S2  Psychiatry:  Alert and Awake  Gastrointestinal:  Soft, Non-tender, + BS	  Skin: No rashes, No ecchymoses, No cyanosis	  Extremities:  R arm without edema.  R PICC line removed  No LE edema    Labs:                        11.2   4.83  )-----------( 268      ( 02 Jan 2022 06:47 )             33.3     01-02    137  |  102  |  16  ----------------------------<  87  3.5   |  24  |  0.50    Ca    9.0      02 Jan 2022 06:46    PT/INR - ( 03 Jan 2022 06:17 )   PT: 14.4 sec;   INR: 1.21 ratio    PTT - ( 03 Jan 2022 06:17 )  PTT:51.1 sec Vascular Cardiology Progress Note    DIRECT SERVICE NUMBER:  [phone]        OFFICE [phone]    CC:  subarachnoid hemorrhage    Interval Events:  R PICC line removed.  Transitioned to Eliquis 5 mg BID.  R arm is stable.  Last R UE venous duplex on 12/29 showed stability of R subclavian DVT.    Allergies  No Known Allergies    MEDICATIONS  (STANDING):  amantadine Syrup 100 milliGRAM(s) Oral <User Schedule>  amLODIPine   Tablet 5 milliGRAM(s) Oral daily  apixaban 5 milliGRAM(s) Oral two times a day  chlorhexidine 2% Cloths 1 Application(s) Topical daily  multivitamin 1 Tablet(s) Oral daily  nystatin Powder 1 Application(s) Topical every 12 hours  potassium chloride   Solution 40 milliEquivalent(s) Oral once  senna 2 Tablet(s) Oral at bedtime  sodium chloride 1 Gram(s) Oral every 6 hours    PAST MEDICAL & SURGICAL HISTORY:  see HPI    FAMILY HISTORY: see HPI      SOCIAL HISTORY:  unchanged    REVIEW OF SYSTEMS:  12 Point ROS negative except as per subjective and HPI    Vital Signs Last 24 Hrs  T(C): 36.7 (03 Jan 2022 07:55), Max: 36.8 (03 Jan 2022 04:05)  T(F): 98 (03 Jan 2022 07:55), Max: 98.2 (03 Jan 2022 04:05)  HR: 67 (03 Jan 2022 07:55) (67 - 102)  BP: 126/86 (03 Jan 2022 07:55) (108/81 - 137/88)  RR: 18 (03 Jan 2022 07:55) (18 - 18)  SpO2: 97% (03 Jan 2022 07:55) (95% - 100%)    Appearance: NAD 	  HEENT: EVD previously removed  Respiratory: CTA B/L  Cardiac: RRR, S1 and S2  Psychiatry:  Alert and Awake  Gastrointestinal:  Soft, Non-tender, + BS	  Skin: No rashes, No ecchymoses, No cyanosis	  Extremities:  R arm without edema.  R PICC line removed  No LE edema    Labs:                        11.2   4.83  )-----------( 268      ( 02 Jan 2022 06:47 )             33.3     01-02    137  |  102  |  16  ----------------------------<  87  3.5   |  24  |  0.50    Ca    9.0      02 Jan 2022 06:46    PT/INR - ( 03 Jan 2022 06:17 )   PT: 14.4 sec;   INR: 1.21 ratio    PTT - ( 03 Jan 2022 06:17 )  PTT:51.1 sec

## 2022-01-03 NOTE — PROGRESS NOTE ADULT - ASSESSMENT
65 yo woman admitted 12/11/21 no PMH,  with HA/vomiting since 12/10 found to have SAH c/b hydrocephalus. 12/11 s/p balloon assisted coil embolization of ruptured L Pcomm aneurysm, after coiling found to have a partial left M3 thrombus which was aspirated out. 12/14 S/p R EVD for hydro c/b sizable tract hemorrhage with IVH now with L EVD, removed on 12/29. 12/30 transferred to floor.       PLAN:  Neuro:   - CT head 1/2/22- shows unchanged 4 cm hematoma in right frontal lobe w/edema and mild mass effect on the anterior horn of the right lateral ventricle  - amantadine for neuro stimulation  - hyponatremia- Na 137- on salt tabs, 1g q6  Respiratory:   - on room air  - 12/21 CTA Chest - neg for PE  CV:  - vitals stable  - HTN- continue amlodipine  Heme/Onc:      - SCDs  - Argatroban gtt for HIT-  central line thrombus - small, PTT goal 40-50, currently therapeutic  - OK to start DOAC per Dr Lucas, for 3 months, will start eliquis 5 bid today after removal of PICC  - will DC PICC line after stopping argatroban   GI:    - tolerating pureed diet  ID:  - completed cipro for Klebsiella UTI  PT/OT:   - Acute TBI rehab  - PMR- pending      Semmle # [id number]

## 2022-01-03 NOTE — CHART NOTE - NSCHARTNOTESELECT_GEN_ALL_CORE
Hematology
IR/Event Note
Interventional Neuro Radiology/Event Note
Nutrition Services
PICC line removed/Event Note
CSF sample/Event Note
EVD Removal/Event Note
Event Note
Heme/Event Note
IR/Event Note
Interventional Neuro Radiology/Event Note
Neurosurgery/Event Note
Nimodipine Held/Event Note
Nutrition Services
Nutrition Services
R axillary a-line removal/Event Note
TCD
TCD
VTE risk/Event Note

## 2022-01-03 NOTE — PROGRESS NOTE ADULT - PROBLEM SELECTOR PLAN 2
- Argatroban gtt for HIT- PICC associated thrombus in right subclavian vein. PTT goal 40-50  - platelet count normalized  - PICC to be removed today  - plan to start eliquis 5mg BID for 3 months when deemed stable from neurosurgery  - Avoid heparin based medications

## 2022-01-03 NOTE — PROGRESS NOTE ADULT - ASSESSMENT
65 y/o woman with no PMH, presented with HA/vomiting found to have SAH c/b hydrocephalus requiring EVD, s/p L Pcomm aneurysm coiling, thrombectomy for a partial left m2 thrombus on 12/11. 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

## 2022-01-04 ENCOUNTER — TRANSCRIPTION ENCOUNTER (OUTPATIENT)
Age: 67
End: 2022-01-04

## 2022-01-04 PROCEDURE — 99232 SBSQ HOSP IP/OBS MODERATE 35: CPT

## 2022-01-04 PROCEDURE — 99231 SBSQ HOSP IP/OBS SF/LOW 25: CPT

## 2022-01-04 RX ORDER — OXYCODONE HYDROCHLORIDE 5 MG/1
1 TABLET ORAL
Qty: 0 | Refills: 0 | DISCHARGE
Start: 2022-01-04

## 2022-01-04 RX ORDER — APIXABAN 2.5 MG/1
1 TABLET, FILM COATED ORAL
Qty: 0 | Refills: 0 | DISCHARGE
Start: 2022-01-04

## 2022-01-04 RX ORDER — AMLODIPINE BESYLATE 2.5 MG/1
1 TABLET ORAL
Qty: 0 | Refills: 0 | DISCHARGE
Start: 2022-01-04

## 2022-01-04 RX ORDER — SODIUM CHLORIDE 9 MG/ML
1 INJECTION INTRAMUSCULAR; INTRAVENOUS; SUBCUTANEOUS
Qty: 0 | Refills: 0 | DISCHARGE
Start: 2022-01-04

## 2022-01-04 RX ORDER — ACETAMINOPHEN 500 MG
2 TABLET ORAL
Qty: 0 | Refills: 0 | DISCHARGE
Start: 2022-01-04

## 2022-01-04 RX ORDER — AMANTADINE HCL 100 MG
10 CAPSULE ORAL
Qty: 0 | Refills: 0 | DISCHARGE
Start: 2022-01-04

## 2022-01-04 RX ORDER — SENNA PLUS 8.6 MG/1
2 TABLET ORAL
Qty: 0 | Refills: 0 | DISCHARGE
Start: 2022-01-04

## 2022-01-04 RX ADMIN — SODIUM CHLORIDE 1 GRAM(S): 9 INJECTION INTRAMUSCULAR; INTRAVENOUS; SUBCUTANEOUS at 05:15

## 2022-01-04 RX ADMIN — CHLORHEXIDINE GLUCONATE 1 APPLICATION(S): 213 SOLUTION TOPICAL at 10:05

## 2022-01-04 RX ADMIN — APIXABAN 5 MILLIGRAM(S): 2.5 TABLET, FILM COATED ORAL at 05:15

## 2022-01-04 RX ADMIN — Medication 1 TABLET(S): at 11:17

## 2022-01-04 RX ADMIN — NYSTATIN CREAM 1 APPLICATION(S): 100000 CREAM TOPICAL at 05:15

## 2022-01-04 RX ADMIN — NYSTATIN CREAM 1 APPLICATION(S): 100000 CREAM TOPICAL at 10:05

## 2022-01-04 RX ADMIN — AMLODIPINE BESYLATE 5 MILLIGRAM(S): 2.5 TABLET ORAL at 05:15

## 2022-01-04 RX ADMIN — SODIUM CHLORIDE 1 GRAM(S): 9 INJECTION INTRAMUSCULAR; INTRAVENOUS; SUBCUTANEOUS at 11:17

## 2022-01-04 RX ADMIN — Medication 100 MILLIGRAM(S): at 05:14

## 2022-01-04 RX ADMIN — Medication 100 MILLIGRAM(S): at 17:43

## 2022-01-04 RX ADMIN — SODIUM CHLORIDE 1 GRAM(S): 9 INJECTION INTRAMUSCULAR; INTRAVENOUS; SUBCUTANEOUS at 17:43

## 2022-01-04 NOTE — DISCHARGE NOTE PROVIDER - NSDCCPCAREPLAN_GEN_ALL_CORE_FT
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   s/p cerebral angiogram and vasospasm treatment on 12/22  s/p m2 partially occluding thrombectomy done      SECONDARY DISCHARGE DIAGNOSES  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

## 2022-01-04 NOTE — PROGRESS NOTE ADULT - PROBLEM SELECTOR PLAN 2
- s/p argatroban gtt for HIT- PICC associated thrombus in right subclavian vein.   - platelet count normalized  - PICC removed   - started eliquis 5mg BID on 1/3/22 for 3 months   - Avoid heparin based medications

## 2022-01-04 NOTE — DISCHARGE NOTE PROVIDER - NSDCMRMEDTOKEN_GEN_ALL_CORE_FT
acetaminophen 325 mg oral tablet: 2 tab(s) orally every 6 hours, As needed, Temp greater or equal to 38C (100.4F), Mild Pain (1 - 3)  amantadine 50 mg/5 mL oral syrup: 10 milliliter(s) orally 2 times a day 6 am and 4 pm please   amLODIPine 5 mg oral tablet: 1 tab(s) orally once a day  apixaban 5 mg oral tablet: 1 tab(s) orally 2 times a day  Multiple Vitamins oral tablet: 1 tab(s) orally once a day  oxyCODONE 5 mg oral tablet: 1 tab(s) orally every 4 hours, As needed, Moderate Pain (4 - 6)  senna oral tablet: 2 tab(s) orally once a day (at bedtime)  sodium chloride 1 g oral tablet: 1 tab(s) orally every 6 hours

## 2022-01-04 NOTE — PROGRESS NOTE ADULT - PROBLEM SELECTOR PLAN 3
transitioned to eliquis   patient having multiple BM- d/c senna    will follow periodically. call with questions. transitioned to eliquis   patient having multiple BM- d/c senna    will follow periodically. call with questions.   Spoke to patient's daughter(Mendy [phone]) regarding the importance of avoiding heparin based products in the future given her diagnosis of HIT.

## 2022-01-04 NOTE — PROGRESS NOTE ADULT - ASSESSMENT
65 y/o woman with no PMH, presented with HA/vomiting found to have SAH c/b hydrocephalus requiring EVD, s/p L Pcomm aneurysm coiling, thrombectomy for a partial left m2 thrombus on 12/11. Course c/b cerebral vasospasm s/p treatment, UTI, HIT with PICC associated right subclavian vein thrombus s/p argatroban gtt transitioned to eliquis on 1/3/22.

## 2022-01-04 NOTE — PROGRESS NOTE ADULT - SUBJECTIVE AND OBJECTIVE BOX
Vascular Cardiology Progress Note    DIRECT SERVICE NUMBER:  [phone]        OFFICE [phone]    CC:  subarachnoid hemorrhage    Interval Events:  No complaints.  R arm stable.  Remains on Eliquis 5 mg bid.     Allergies  No Known Allergies    MEDICATIONS  (STANDING):  amantadine Syrup 100 milliGRAM(s) Oral <User Schedule>  amLODIPine   Tablet 5 milliGRAM(s) Oral daily  apixaban 5 milliGRAM(s) Oral two times a day  chlorhexidine 2% Cloths 1 Application(s) Topical daily  multivitamin 1 Tablet(s) Oral daily  nystatin Powder 1 Application(s) Topical every 12 hours  potassium chloride   Solution 40 milliEquivalent(s) Oral once  sodium chloride 1 Gram(s) Oral every 6 hours      PAST MEDICAL & SURGICAL HISTORY:  see HPI    FAMILY HISTORY: see HPI      SOCIAL HISTORY:  unchanged    REVIEW OF SYSTEMS:  12 Point ROS negative except as per subjective and HPI    Vital Signs Last 24 Hrs  Vital Signs Last 24 Hrs  T(C): 36.9 (04 Jan 2022 13:51), Max: 37 (03 Jan 2022 23:27)  T(F): 98.4 (04 Jan 2022 13:51), Max: 98.6 (03 Jan 2022 23:27)  HR: 100 (04 Jan 2022 13:51) (63 - 100)  BP: 113/69 (04 Jan 2022 13:51) (106/74 - 143/86)  RR: 18 (04 Jan 2022 13:51) (18 - 19)  SpO2: 96% (04 Jan 2022 13:51) (96% - 98%)    Appearance: NAD 	  HEENT: EVD previously removed  Respiratory: CTA B/L  Cardiac: RRR, S1 and S2  Psychiatry:  Alert and Awake  Gastrointestinal:  Soft, Non-tender, + BS	  Skin: No rashes, No ecchymoses, No cyanosis	  Extremities:  R arm without edema.  R PICC line previously removed  No LE edema    Labs:    PT/INR - ( 03 Jan 2022 06:17 )   PT: 14.4 sec;   INR: 1.21 ratio    PTT - ( 03 Jan 2022 06:17 )  PTT:51.1 sec

## 2022-01-04 NOTE — DISCHARGE NOTE PROVIDER - CARE PROVIDERS DIRECT ADDRESSES
,DirectAddress_Unknown,DirectAddress_Unknown,[email or web address]

## 2022-01-04 NOTE — PROGRESS NOTE ADULT - PROBLEM SELECTOR PLAN 1
Course above. Nimodipine d/lorri and started on Norvasc. BP controlled.   - repeat CT today stable  - hyponatremia resolved on salt tabs  - On Amantadine for neuro stimulation

## 2022-01-04 NOTE — PROGRESS NOTE ADULT - ASSESSMENT
Assessment:  1. R subclavian vein PICC line associated thrombus  -  small clot burden  -  Last R UE venous duplex on 12/29 showed stability of R subclavian DVT  2. ICH  3. PCOM Aneurysm  4. L Parietal M2 thrombus s/p thrombectomy  5. Hydrocephalus s/p EVD  6. HIT  - confirmed by BALJEET  7. HTN    Plan:  1. On Eliquis 5 mg BID.  2. PICC line was previously removed.   3. Appreciate Hematology recommendations  4. Right upper extremity without edema and distal pulses intact.   5. BP stable on Amlodipine.  6. Continue excellent neurosurgical care.     Thank you  OSMANY Louis, MPAS  Vascular Cardiology Service    Please call with any questions:   DIRECT SERVICE NUMBER:  [phone]  Office [phone]

## 2022-01-04 NOTE — DISCHARGE NOTE PROVIDER - NSDCFUADDINST_GEN_ALL_CORE_FT
-ensure enlive BID   probiotic yogurt -2 times a day with diet   -if notice any new weakness, numbness , tingling, severe headache, nausea, vomiting, seizure , fever , double vision or anything out of norm then please contact physician or come back to hospital.  -ensure enlive BID   probiotic yogurt -2 times a day with diet   -if notice any new weakness, numbness , tingling, severe headache, nausea, vomiting, seizure , fever , double vision or anything out of norm then please contact physician or come back to hospital.   -please remove remaining head staples on 1/7/22   -please check sodium on 1/6 and if stable wean off salt tab.  -ensure enlive BID   probiotic yogurt -2 times a day with diet   -if notice any new weakness, numbness , tingling, severe headache, nausea, vomiting, seizure , fever , double vision or anything out of norm then please contact physician or come back to hospital.   -please remove remaining 1 head staples on 1/7/22   -please check sodium on 1/6 and if stable wean off salt tab.

## 2022-01-04 NOTE — PROGRESS NOTE ADULT - PROBLEM SELECTOR PROBLEM 2
Heparin induced thrombocytopenia (HIT)

## 2022-01-04 NOTE — DISCHARGE NOTE PROVIDER - CARE PROVIDER_API CALL
Vladimir Lucas (MD; MS)  Unallocated  [address] [facility]  [place] [address]  Phone: [phone]  Fax: [phone]  Follow Up Time: 2 weeks    HALEY HANKINS  Internal Medicine  [address] [place] [address]  Phone: [phone]  Fax: [phone]  Follow Up Time: 1 month    Jose Hill (DO)  Cardiovascular Disease; Internal Medicine; Nuclear Cardiology  [address] [place] [address]  Phone: [phone]  Fax: [phone]  Follow Up Time: 2 weeks

## 2022-01-04 NOTE — PROGRESS NOTE ADULT - SUBJECTIVE AND OBJECTIVE BOX
[facility] Division of Hospital Medicine  Linda Correia MD  spectra [phone]    Patient is a 66y old  Female who presents with a chief complaint of Subarachnoid hemorrhage (04 Jan 2022 11:47)      SUBJECTIVE / OVERNIGHT EVENTS: patient seen and examined earlier this morning. denies any pain, SOB. per staff, patient ate well this morning.   ADDITIONAL REVIEW OF SYSTEMS:    MEDICATIONS  (STANDING):  amantadine Syrup 100 milliGRAM(s) Oral <User Schedule>  amLODIPine   Tablet 5 milliGRAM(s) Oral daily  apixaban 5 milliGRAM(s) Oral two times a day  chlorhexidine 2% Cloths 1 Application(s) Topical daily  multivitamin 1 Tablet(s) Oral daily  nystatin Powder 1 Application(s) Topical every 12 hours  potassium chloride   Solution 40 milliEquivalent(s) Oral once  senna 2 Tablet(s) Oral at bedtime  sodium chloride 1 Gram(s) Oral every 6 hours    MEDICATIONS  (PRN):  acetaminophen     Tablet .. 650 milliGRAM(s) Oral every 6 hours PRN Temp greater or equal to 38C (100.4F), Mild Pain (1 - 3)  oxyCODONE    IR 5 milliGRAM(s) Oral every 4 hours PRN Moderate Pain (4 - 6)  sodium chloride 0.9% lock flush 10 milliLiter(s) IV Push every 1 hour PRN Pre/post blood products, medications, blood draw, and to maintain line patency      CAPILLARY BLOOD GLUCOSE        I&O's Summary    03 Jan 2022 07:01  -  04 Jan 2022 07:00  --------------------------------------------------------  IN: 240 mL / OUT: 1 mL / NET: 239 mL    04 Jan 2022 07:01  -  04 Jan 2022 13:06  --------------------------------------------------------  IN: 600 mL / OUT: 0 mL / NET: 600 mL        PHYSICAL EXAM:  Vital Signs Last 24 Hrs  T(C): 36.8 (04 Jan 2022 07:51), Max: 37 (03 Jan 2022 23:27)  T(F): 98.2 (04 Jan 2022 07:51), Max: 98.6 (03 Jan 2022 23:27)  HR: 69 (04 Jan 2022 07:51) (63 - 83)  BP: 134/81 (04 Jan 2022 07:51) (106/74 - 143/86)  BP(mean): --  RR: 18 (04 Jan 2022 07:51) (18 - 19)  SpO2: 97% (04 Jan 2022 07:51) (96% - 98%)    CONSTITUTIONAL: NAD, well-developed, well-groomed  NECK: Supple, no palpable masses; no thyromegaly  RESPIRATORY: Normal respiratory effort; lungs are clear to auscultation bilaterally  CARDIOVASCULAR: normal S1 and S2, no murmur/rub/gallop; No lower extremity edema  ABDOMEN: Nontender to palpation, normoactive bowel sounds, no rebound/guarding; No hepatosplenomegaly  MUSCULOSKELETAL:  no clubbing or cyanosis of digits; no joint swelling or tenderness to palpation  PSYCH: A+O to person, place, month; flat affect   NEURO: moves all extremities on command, follows simple commands  SKIN: No rashes; no palpable lesions    LABS:          PT/INR - ( 03 Jan 2022 06:17 )   PT: 14.4 sec;   INR: 1.21 ratio         PTT - ( 03 Jan 2022 06:17 )  PTT:51.1 sec            RADIOLOGY & ADDITIONAL TESTS:  Results Reviewed:   Imaging Personally Reviewed:  Electrocardiogram Personally Reviewed:    COORDINATION OF CARE:  Care Discussed with Consultants/Other Providers [Y/N]: neurosurgery Stephanie)  Prior or Outpatient Records Reviewed [Y/N]:

## 2022-01-04 NOTE — PROGRESS NOTE ADULT - ASSESSMENT
HPI:  66F, no sig PMH, txfered from [facility] for HA since 12/10 morning. Had associated nausea/vomiting and posterior neck pain. CT: diffuse SAH, most prominent at Sylvian fissures. Exam: Somnolent, Ox2.5 (said November), Left pupil 6NR; Right pupil 5NR, EOMI, no facial, Right drift, LAWTON 5/5   (11 Dec 2021 11:22)    PROCEDURE: Insertion, external ventricular drain  s/p cerebral angiogram and coiling of left PCOMM artery aneurysm on 12/11 and M2 distal brach thrombectomy on 12/11          PLAN:  Neuro:   1 Out of bed   2 continue pt/ot   3 prn pain meds     Respiratory:   1 room air     CV:  1 bp stable -goal 100-160 -continue amlodipine for htn hx     Endocrine:   1 stable     Heme/Onc:             DVT ppx: on eliquis 5 bid  and scds   1 HIT +- on treatment dose eliquid 5 bid     Renal:   1 voiding     ID:   1 afebrile      GI:   1 pureed diet   2 last bm 1/4   Social/Family: updated daughter Mendy   Discharge planning: dc to rehab today   -discussed with Dr. Lucas   -spectra [id number]

## 2022-01-04 NOTE — DISCHARGE NOTE PROVIDER - HOSPITAL COURSE
Patient was admitted with HH2 , MF3 subarachnoid hemorrhage. Patient was admitted to icu and was monitored. Patient was taken for angiogram and had a coiling of left Posterior communicating artery was done. Post procedure patient was moved back to icu. Patient was monitored for vasospasm and tcd were done. follow up ct head was done. Patient had received an EVD as well upon admission and It had to be replaced on 12/14. Patient had developed an ich post eved placement and serial ct scan was done that showed stability. Patient went back for repeat angiograms on 12/17 and 12/22. On 12/22 mild spasm was noted and treatment was done. Patient was moved back to icu and was monitored. Patient's EVD was removed on 12/28. Patient was moved to floor. 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 Patient was started on argatroban drip. Patient was transitioned to eliquis . Patient was cleared for acute rehab by physical therapy. Patient had no other complications. Patient was discharged to rehab with follow up instructions. Patient was admitted with HH2 , MF3 subarachnoid hemorrhage. Patient was admitted to icu and was monitored. Patient was taken for angiogram and had a coiling of left Posterior communicating artery was done. Post procedure patient was moved back to icu. Patient was monitored for vasospasm and tcd were done. follow up ct head was done. Patient had received an EVD as well upon admission and It had to be replaced on 12/14. Patient had developed an ich post eved placement and serial ct scan was done that showed stability. Patient went back for repeat angiograms on 12/17 and 12/22. On 12/22 mild spasm was noted and treatment was done. Patient was moved back to icu and was monitored. Patient's EVD was removed on 12/28. Patient was moved to floor. 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 Patient was started on argatroban drip. Patient was transitioned to eliquis . Patient was cleared for acute rehab by physical therapy. Patient had no other complications. Patient was discharged to rehab with follow up instructions.     30 minutes spent preparing paperwork and educating family.. More than 50 percent of time was spent on preparing paperwork and educating family member. Patient was admitted with HH2 , MF3 subarachnoid hemorrhage. Patient was admitted to icu and was monitored. Patient was taken for angiogram and had a coiling of left Posterior communicating artery was done. Patient also had a m2 branch partially occluding thrombectomy done.  Post procedure patient was moved back to icu. Patient was monitored for vasospasm and tcd were done. follow up ct head was done. Patient had received an EVD as well upon admission and It had to be replaced on 12/14. Patient had developed an ich post eved placement and serial ct scan was done that showed stability. Patient went back for repeat angiograms on 12/17 and 12/22. On 12/22 mild spasm was noted and treatment was done. Patient was moved back to icu and was monitored. Patient's EVD was removed on 12/28. Patient was moved to floor. 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 Patient was started on argatroban drip. Patient was transitioned to eliquis . Patient was cleared for acute rehab by physical therapy. Patient had no other complications. Patient was discharged to rehab with follow up instructions.     30 minutes spent preparing paperwork and educating family.. More than 50 percent of time was spent on preparing paperwork and educating family member.

## 2022-01-04 NOTE — DISCHARGE NOTE PROVIDER - PROVIDER TOKENS
PROVIDER:[TOKEN:[[id number]],FOLLOWUP:[2 weeks]],PROVIDER:[TOKEN:[[id number]],FOLLOWUP:[1 month]],PROVIDER:[TOKEN:[[id number]],FOLLOWUP:[2 weeks]]

## 2022-01-05 ENCOUNTER — INPATIENT (INPATIENT)
Facility: HOSPITAL | Age: 67
LOS: 15 days | Discharge: HOME HEALTH | DRG: 950 | End: 2022-01-21
Attending: PSYCHIATRY & NEUROLOGY | Admitting: PSYCHIATRY & NEUROLOGY
Payer: MEDICARE

## 2022-01-05 VITALS
HEART RATE: 82 BPM | RESPIRATION RATE: 18 BRPM | DIASTOLIC BLOOD PRESSURE: 66 MMHG | SYSTOLIC BLOOD PRESSURE: 108 MMHG | OXYGEN SATURATION: 97 % | TEMPERATURE: 98 F

## 2022-01-05 VITALS
DIASTOLIC BLOOD PRESSURE: 69 MMHG | WEIGHT: 103.62 LBS | SYSTOLIC BLOOD PRESSURE: 128 MMHG | HEIGHT: 60 IN | TEMPERATURE: 98 F | RESPIRATION RATE: 16 BRPM | OXYGEN SATURATION: 95 % | HEART RATE: 76 BPM

## 2022-01-05 DIAGNOSIS — S06.6X0A TRAUMATIC SUBARACHNOID HEMORRHAGE WITHOUT LOSS OF CONSCIOUSNESS, INITIAL ENCOUNTER: ICD-10-CM

## 2022-01-05 LAB
ANION GAP SERPL CALC-SCNC: 14 MMOL/L — SIGNIFICANT CHANGE UP (ref 5–17)
BUN SERPL-MCNC: 20 MG/DL — SIGNIFICANT CHANGE UP (ref 7–23)
CALCIUM SERPL-MCNC: 9.1 MG/DL — SIGNIFICANT CHANGE UP (ref 8.4–10.5)
CHLORIDE SERPL-SCNC: 106 MMOL/L — SIGNIFICANT CHANGE UP (ref 96–108)
CO2 SERPL-SCNC: 24 MMOL/L — SIGNIFICANT CHANGE UP (ref 22–31)
CREAT SERPL-MCNC: 0.57 MG/DL — SIGNIFICANT CHANGE UP (ref 0.5–1.3)
CULTURE RESULTS: SIGNIFICANT CHANGE UP
GLUCOSE SERPL-MCNC: 98 MG/DL — SIGNIFICANT CHANGE UP (ref 70–99)
HCT VFR BLD CALC: 33.9 % — LOW (ref 34.5–45)
HGB BLD-MCNC: 10.7 G/DL — LOW (ref 11.5–15.5)
MCHC RBC-ENTMCNC: 30.7 PG — SIGNIFICANT CHANGE UP (ref 27–34)
MCHC RBC-ENTMCNC: 31.6 GM/DL — LOW (ref 32–36)
MCV RBC AUTO: 97.4 FL — SIGNIFICANT CHANGE UP (ref 80–100)
NRBC # BLD: 0 /100 WBCS — SIGNIFICANT CHANGE UP (ref 0–0)
PLATELET # BLD AUTO: 241 K/UL — SIGNIFICANT CHANGE UP (ref 150–400)
POTASSIUM SERPL-MCNC: 3.5 MMOL/L — SIGNIFICANT CHANGE UP (ref 3.5–5.3)
POTASSIUM SERPL-SCNC: 3.5 MMOL/L — SIGNIFICANT CHANGE UP (ref 3.5–5.3)
RBC # BLD: 3.48 M/UL — LOW (ref 3.8–5.2)
RBC # FLD: 13.8 % — SIGNIFICANT CHANGE UP (ref 10.3–14.5)
SARS-COV-2 RNA SPEC QL NAA+PROBE: SIGNIFICANT CHANGE UP
SODIUM SERPL-SCNC: 144 MMOL/L — SIGNIFICANT CHANGE UP (ref 135–145)
SPECIMEN SOURCE: SIGNIFICANT CHANGE UP
WBC # BLD: 5.64 K/UL — SIGNIFICANT CHANGE UP (ref 3.8–10.5)
WBC # FLD AUTO: 5.64 K/UL — SIGNIFICANT CHANGE UP (ref 3.8–10.5)

## 2022-01-05 PROCEDURE — 80061 LIPID PANEL: CPT

## 2022-01-05 PROCEDURE — 36224 PLACE CATH CAROTD ART: CPT | Mod: XS

## 2022-01-05 PROCEDURE — 99231 SBSQ HOSP IP/OBS SF/LOW 25: CPT

## 2022-01-05 PROCEDURE — 93971 EXTREMITY STUDY: CPT

## 2022-01-05 PROCEDURE — 99232 SBSQ HOSP IP/OBS MODERATE 35: CPT

## 2022-01-05 PROCEDURE — 85270 CLOT FACTOR XI PTA: CPT

## 2022-01-05 PROCEDURE — 84100 ASSAY OF PHOSPHORUS: CPT

## 2022-01-05 PROCEDURE — 99292 CRITICAL CARE ADDL 30 MIN: CPT | Mod: 25

## 2022-01-05 PROCEDURE — C1889: CPT

## 2022-01-05 PROCEDURE — 85027 COMPLETE CBC AUTOMATED: CPT

## 2022-01-05 PROCEDURE — 85396 CLOTTING ASSAY WHOLE BLOOD: CPT

## 2022-01-05 PROCEDURE — 84157 ASSAY OF PROTEIN OTHER: CPT

## 2022-01-05 PROCEDURE — 93888 INTRACRANIAL LIMITED STUDY: CPT

## 2022-01-05 PROCEDURE — 95812 EEG 41-60 MINUTES: CPT

## 2022-01-05 PROCEDURE — 36227 PLACE CATH XTRNL CAROTID: CPT | Mod: XS

## 2022-01-05 PROCEDURE — 80048 BASIC METABOLIC PNL TOTAL CA: CPT

## 2022-01-05 PROCEDURE — 93306 TTE W/DOPPLER COMPLETE: CPT

## 2022-01-05 PROCEDURE — 87077 CULTURE AEROBIC IDENTIFY: CPT

## 2022-01-05 PROCEDURE — 75894 X-RAYS TRANSCATH THERAPY: CPT | Mod: XU

## 2022-01-05 PROCEDURE — C1894: CPT

## 2022-01-05 PROCEDURE — P9037: CPT

## 2022-01-05 PROCEDURE — 83036 HEMOGLOBIN GLYCOSYLATED A1C: CPT

## 2022-01-05 PROCEDURE — 85610 PROTHROMBIN TIME: CPT

## 2022-01-05 PROCEDURE — 87449 NOS EACH ORGANISM AG IA: CPT

## 2022-01-05 PROCEDURE — 85670 THROMBIN TIME PLASMA: CPT

## 2022-01-05 PROCEDURE — 76380 CAT SCAN FOLLOW-UP STUDY: CPT | Mod: XU

## 2022-01-05 PROCEDURE — 85379 FIBRIN DEGRADATION QUANT: CPT

## 2022-01-05 PROCEDURE — C1887: CPT

## 2022-01-05 PROCEDURE — 95700 EEG CONT REC W/VID EEG TECH: CPT

## 2022-01-05 PROCEDURE — C2628: CPT

## 2022-01-05 PROCEDURE — 83615 LACTATE (LD) (LDH) ENZYME: CPT

## 2022-01-05 PROCEDURE — 86900 BLOOD TYPING SEROLOGIC ABO: CPT

## 2022-01-05 PROCEDURE — 86901 BLOOD TYPING SEROLOGIC RH(D): CPT

## 2022-01-05 PROCEDURE — 70496 CT ANGIOGRAPHY HEAD: CPT | Mod: MA

## 2022-01-05 PROCEDURE — 83735 ASSAY OF MAGNESIUM: CPT

## 2022-01-05 PROCEDURE — 80053 COMPREHEN METABOLIC PANEL: CPT

## 2022-01-05 PROCEDURE — 99223 1ST HOSP IP/OBS HIGH 75: CPT

## 2022-01-05 PROCEDURE — 81001 URINALYSIS AUTO W/SCOPE: CPT

## 2022-01-05 PROCEDURE — 92526 ORAL FUNCTION THERAPY: CPT

## 2022-01-05 PROCEDURE — 83605 ASSAY OF LACTIC ACID: CPT

## 2022-01-05 PROCEDURE — 85247 CLOT FACTOR VIII MULTIMETRIC: CPT

## 2022-01-05 PROCEDURE — 84145 PROCALCITONIN (PCT): CPT

## 2022-01-05 PROCEDURE — C1769: CPT

## 2022-01-05 PROCEDURE — 71045 X-RAY EXAM CHEST 1 VIEW: CPT

## 2022-01-05 PROCEDURE — 87040 BLOOD CULTURE FOR BACTERIA: CPT

## 2022-01-05 PROCEDURE — 84443 ASSAY THYROID STIM HORMONE: CPT

## 2022-01-05 PROCEDURE — 97161 PT EVAL LOW COMPLEX 20 MIN: CPT

## 2022-01-05 PROCEDURE — 86038 ANTINUCLEAR ANTIBODIES: CPT

## 2022-01-05 PROCEDURE — 36226 PLACE CATH VERTEBRAL ART: CPT | Mod: XS

## 2022-01-05 PROCEDURE — 93886 INTRACRANIAL COMPLETE STUDY: CPT

## 2022-01-05 PROCEDURE — 85246 CLOT FACTOR VIII VW ANTIGEN: CPT

## 2022-01-05 PROCEDURE — 85025 COMPLETE CBC W/AUTO DIFF WBC: CPT

## 2022-01-05 PROCEDURE — 61651 EVASC PRLNG ADMN RX AGNT ADD: CPT

## 2022-01-05 PROCEDURE — 82945 GLUCOSE OTHER FLUID: CPT

## 2022-01-05 PROCEDURE — 87086 URINE CULTURE/COLONY COUNT: CPT

## 2022-01-05 PROCEDURE — 92610 EVALUATE SWALLOWING FUNCTION: CPT

## 2022-01-05 PROCEDURE — 97166 OT EVAL MOD COMPLEX 45 MIN: CPT

## 2022-01-05 PROCEDURE — 86850 RBC ANTIBODY SCREEN: CPT

## 2022-01-05 PROCEDURE — 85290 CLOT FACTOR XIII FIBRIN STAB: CPT

## 2022-01-05 PROCEDURE — 70450 CT HEAD/BRAIN W/O DYE: CPT | Mod: MA

## 2022-01-05 PROCEDURE — 89051 BODY FLUID CELL COUNT: CPT

## 2022-01-05 PROCEDURE — 85730 THROMBOPLASTIN TIME PARTIAL: CPT

## 2022-01-05 PROCEDURE — 85384 FIBRINOGEN ACTIVITY: CPT

## 2022-01-05 PROCEDURE — 85250 CLOT FACTOR IX PTC/CHRSTMAS: CPT

## 2022-01-05 PROCEDURE — C1760: CPT

## 2022-01-05 PROCEDURE — 97116 GAIT TRAINING THERAPY: CPT

## 2022-01-05 PROCEDURE — 85240 CLOT FACTOR VIII AHG 1 STAGE: CPT

## 2022-01-05 PROCEDURE — 97535 SELF CARE MNGMENT TRAINING: CPT

## 2022-01-05 PROCEDURE — 61645 PERQ ART M-THROMBECT &/NFS: CPT

## 2022-01-05 PROCEDURE — 36569 INSJ PICC 5 YR+ W/O IMAGING: CPT

## 2022-01-05 PROCEDURE — 93005 ELECTROCARDIOGRAM TRACING: CPT

## 2022-01-05 PROCEDURE — 87205 SMEAR GRAM STAIN: CPT

## 2022-01-05 PROCEDURE — 99291 CRITICAL CARE FIRST HOUR: CPT

## 2022-01-05 PROCEDURE — 95714 VEEG EA 12-26 HR UNMNTR: CPT

## 2022-01-05 PROCEDURE — 93970 EXTREMITY STUDY: CPT

## 2022-01-05 PROCEDURE — 75898 FOLLOW-UP ANGIOGRAPHY: CPT | Mod: 59

## 2022-01-05 PROCEDURE — C1751: CPT

## 2022-01-05 PROCEDURE — U0003: CPT

## 2022-01-05 PROCEDURE — U0005: CPT

## 2022-01-05 PROCEDURE — 87324 CLOSTRIDIUM AG IA: CPT

## 2022-01-05 PROCEDURE — 97530 THERAPEUTIC ACTIVITIES: CPT

## 2022-01-05 PROCEDURE — 61624 TCAT PERM OCCLS/EMBOLJ CNS: CPT

## 2022-01-05 PROCEDURE — 87070 CULTURE OTHR SPECIMN AEROBIC: CPT

## 2022-01-05 PROCEDURE — 61650 EVASC PRLNG ADMN RX AGNT 1ST: CPT

## 2022-01-05 PROCEDURE — 85576 BLOOD PLATELET AGGREGATION: CPT

## 2022-01-05 PROCEDURE — 36430 TRANSFUSION BLD/BLD COMPNT: CPT

## 2022-01-05 PROCEDURE — 87186 SC STD MICRODIL/AGAR DIL: CPT

## 2022-01-05 PROCEDURE — 70498 CT ANGIOGRAPHY NECK: CPT | Mod: MA

## 2022-01-05 PROCEDURE — 86803 HEPATITIS C AB TEST: CPT

## 2022-01-05 PROCEDURE — 71275 CT ANGIOGRAPHY CHEST: CPT

## 2022-01-05 PROCEDURE — 97110 THERAPEUTIC EXERCISES: CPT

## 2022-01-05 PROCEDURE — 85732 THROMBOPLASTIN TIME PARTIAL: CPT

## 2022-01-05 PROCEDURE — 86022 PLATELET ANTIBODIES: CPT

## 2022-01-05 PROCEDURE — 36415 COLL VENOUS BLD VENIPUNCTURE: CPT

## 2022-01-05 PROCEDURE — 85245 CLOT FACTOR VIII VW RISTOCTN: CPT

## 2022-01-05 PROCEDURE — 84484 ASSAY OF TROPONIN QUANT: CPT

## 2022-01-05 PROCEDURE — 85291 CLOT FACTOR XIII FIBRIN SCRN: CPT

## 2022-01-05 RX ORDER — SODIUM CHLORIDE 9 MG/ML
1 INJECTION INTRAMUSCULAR; INTRAVENOUS; SUBCUTANEOUS EVERY 6 HOURS
Refills: 0 | Status: DISCONTINUED | OUTPATIENT
Start: 2022-01-05 | End: 2022-01-05

## 2022-01-05 RX ORDER — AMLODIPINE BESYLATE 2.5 MG/1
5 TABLET ORAL DAILY
Refills: 0 | Status: DISCONTINUED | OUTPATIENT
Start: 2022-01-05 | End: 2022-01-16

## 2022-01-05 RX ORDER — PANTOPRAZOLE SODIUM 20 MG/1
40 TABLET, DELAYED RELEASE ORAL
Refills: 0 | Status: DISCONTINUED | OUTPATIENT
Start: 2022-01-05 | End: 2022-01-10

## 2022-01-05 RX ORDER — SENNA PLUS 8.6 MG/1
2 TABLET ORAL AT BEDTIME
Refills: 0 | Status: DISCONTINUED | OUTPATIENT
Start: 2022-01-05 | End: 2022-01-21

## 2022-01-05 RX ORDER — AMANTADINE HCL 100 MG
100 CAPSULE ORAL
Refills: 0 | Status: DISCONTINUED | OUTPATIENT
Start: 2022-01-05 | End: 2022-01-06

## 2022-01-05 RX ORDER — POLYETHYLENE GLYCOL 3350 17 G/17G
17 POWDER, FOR SOLUTION ORAL DAILY
Refills: 0 | Status: DISCONTINUED | OUTPATIENT
Start: 2022-01-05 | End: 2022-01-21

## 2022-01-05 RX ORDER — POTASSIUM CHLORIDE 20 MEQ
20 PACKET (EA) ORAL
Refills: 0 | Status: COMPLETED | OUTPATIENT
Start: 2022-01-05 | End: 2022-01-05

## 2022-01-05 RX ORDER — ACETAMINOPHEN 500 MG
650 TABLET ORAL EVERY 6 HOURS
Refills: 0 | Status: DISCONTINUED | OUTPATIENT
Start: 2022-01-05 | End: 2022-01-21

## 2022-01-05 RX ORDER — AMANTADINE HCL 100 MG
100 CAPSULE ORAL DAILY
Refills: 0 | Status: DISCONTINUED | OUTPATIENT
Start: 2022-01-05 | End: 2022-01-05

## 2022-01-05 RX ORDER — APIXABAN 2.5 MG/1
5 TABLET, FILM COATED ORAL
Refills: 0 | Status: DISCONTINUED | OUTPATIENT
Start: 2022-01-05 | End: 2022-01-21

## 2022-01-05 RX ADMIN — SODIUM CHLORIDE 1 GRAM(S): 9 INJECTION INTRAMUSCULAR; INTRAVENOUS; SUBCUTANEOUS at 00:23

## 2022-01-05 RX ADMIN — AMLODIPINE BESYLATE 5 MILLIGRAM(S): 2.5 TABLET ORAL at 05:17

## 2022-01-05 RX ADMIN — Medication 20 MILLIEQUIVALENT(S): at 11:30

## 2022-01-05 RX ADMIN — NYSTATIN CREAM 1 APPLICATION(S): 100000 CREAM TOPICAL at 07:00

## 2022-01-05 RX ADMIN — SODIUM CHLORIDE 1 GRAM(S): 9 INJECTION INTRAMUSCULAR; INTRAVENOUS; SUBCUTANEOUS at 05:18

## 2022-01-05 RX ADMIN — Medication 100 MILLIGRAM(S): at 16:21

## 2022-01-05 RX ADMIN — APIXABAN 5 MILLIGRAM(S): 2.5 TABLET, FILM COATED ORAL at 16:21

## 2022-01-05 RX ADMIN — Medication 20 MILLIEQUIVALENT(S): at 10:25

## 2022-01-05 RX ADMIN — Medication 1 TABLET(S): at 11:31

## 2022-01-05 RX ADMIN — NYSTATIN CREAM 1 APPLICATION(S): 100000 CREAM TOPICAL at 16:26

## 2022-01-05 RX ADMIN — Medication 100 MILLIGRAM(S): at 05:17

## 2022-01-05 RX ADMIN — APIXABAN 5 MILLIGRAM(S): 2.5 TABLET, FILM COATED ORAL at 05:17

## 2022-01-05 RX ADMIN — SODIUM CHLORIDE 1 GRAM(S): 9 INJECTION INTRAMUSCULAR; INTRAVENOUS; SUBCUTANEOUS at 11:32

## 2022-01-05 RX ADMIN — SENNA PLUS 2 TABLET(S): 8.6 TABLET ORAL at 22:21

## 2022-01-05 RX ADMIN — SODIUM CHLORIDE 1 GRAM(S): 9 INJECTION INTRAMUSCULAR; INTRAVENOUS; SUBCUTANEOUS at 16:20

## 2022-01-05 NOTE — PROGRESS NOTE ADULT - REASON FOR ADMISSION
Subarachnoid hemorrhage

## 2022-01-05 NOTE — PROGRESS NOTE ADULT - ATTENDING COMMENTS
Appreciate hematology recs  Argatraban for now, then eventually transition to oral agent    Liz [phone]
Argatroban for now  Once shows stability, and when ok with nsx, then transition to oral a/c      Liz [phone]
Rajinder w eliquis  outpatient followup      Liz [phone]
exam improved this PM, Ox3 with choicesLEIGHANN.  continue CSF diversion, likely clamp EVD on 12/25 per NSG.
CT overall sable, possibly slight increase in ventricular size. Clinically doing very well, will hold off on shunting for now. Recommend repeat CT in 1 week.
Doing well  Continue eliquis 5mg BID as planned      [facility] [phone]
Doing well clinically. Head CT today to ensure stability of vents.
Doing well, neuro intact. Vasospasm watch.
EVD out.  no hematoma s/p line removal.  stable for xfer to floor.
Fluctuating exam over the past few days with large RF EVD tract hemorrhage and surrounding edema causing midline shift. Exam has not been pressure dependent, but took for angio yesterday to make sure there was no significant vasospasm, which there was not. Continue keeping Na up, follow up heme consult for possible underlying coagulopathy.
Next scan one week from prior.
Slight increase in vent size after 48h of clamp trial, exam fluctuating. Will keep clamped for another 24h.
exam unchanged with increased pressures; SBP down to 120-200 goal.  s/p angio today.  continue to monitor in ICU.
65 yo woman admitted 12/11/21 with HA/vomiting since 12/10 found to have HH2mF3 SAH c/b hydro s/p coiling of L Pcomm aneurysm     lethargic, nonverbal, AOx0, intermittently follows simple commands, pupils R 5 mm, L 7 mm non reactive b/l, midline gaze, face symmetric, able to raise b/l arms antigravity, both LL at least 3/5    NC q1h  HCP: EVD at 0 cmH2O, consider drain raise tomorrow  Keppra 500mg BID for seizure ppx  re-trial nimodipine   TCDs  ceft for Kleb UTI
Agree with above.
Plan for repeat scan tomorrow to assess for delayed hydro.
Agree with above.
Neuro stable, will scan tomorrow to assess for delayed hydrocephalus.
Agree with above.
Agree with above.
CT brain with unchanged heme/tract hemorrhage.  still holding DVT prophy per NSG, possibly reinstituted SQH tomorrow.  no plan on challenging EVD @ this time.
Agree with above.
EVD replaced overnight.  significant tract heme/IPH noted.  replaced drain functioning normally.  heme c/s and give plts per NSG.  pending repeat CT brain.
Lines are out  She has now been started on Eliquis  Appreciate Nsx and hematology care    Liz [phone]
continue CSF diversion @ 0.  per NSG, likely clamp EVD tomorrow, monitor ICPs during clamp trial.
continue abx.  EVD remains at 0, no ICP crises, output noted.  clamp per NSG when ready.
s/p angiography this AM.  no evidence of vasospasm noted.  waxing/waning mental status likely secondary to combination of infection, IPH, possible delirium, seizures less likely.
d/w fellow
Agree with above.
waxing/waning mental status,  was febrile today, UA +, culture sent.  EEG on -- on my bedside assessment, no epileptiform abnormalities.  NPO after midnight, plan for angio in AM for spasm watch.  back on 2%.
Doing well, should be ready for dispo within the next few days.
Patient seen and examined by attending on 12/18/2021.    Patient is critically ill due to SAH and at high risk for neurological deterioration or death due to: hydrocephalus requiring an EVD for CSF diversion, at risk for vasospasm, at risk for expansion of tract hemorrhages

## 2022-01-05 NOTE — PROGRESS NOTE ADULT - ATTENDING SUPERVISION STATEMENT
ACP
ACP
Fellow
ACP
Fellow
Resident
Fellow
ACP
Fellow
ACP
Fellow
ACP
Fellow

## 2022-01-05 NOTE — H&P ADULT - NSHPPHYSICALEXAM_GEN_ALL_CORE
PHYSICAL EXAM  VITALS  T(C): 36.7 (01-05-22 @ 17:57), Max: 36.7 (01-05-22 @ 17:57)  HR: 76 (01-05-22 @ 17:57) (76 - 76)  BP: 128/69 (01-05-22 @ 17:57) (128/69 - 128/69)  RR: 16 (01-05-22 @ 17:57) (16 - 16)  SpO2: 95% (01-05-22 @ 17:57) (95% - 95%)    Gen - NAD, Comfortable  HEENT - NCAT, EOMI, MMM  Neck - Supple, No limited ROM  Pulm - CTAB, No wheeze, No rhonchi, No crackles  Cardiovascular - RRR, S1S2, No murmurs  Abdomen - Soft, NT/ND, +BS  Extremities - No Cyanosi, no clubbing, no edema, no calf tenderness  Neuro-     Cognitive - awake, alert, oriented to person, place, time, and situation     Communication - Fluent, No dysarthria     Attention: Intact      Judgement: Good evidence of judgement     Memory: Recall 3 objects immediate and 3 min later         Cranial Nerves - CN 2-12 intact     Motor - No focal deficits. R/L hemiplegia                    LEFT    UE - ShAB 5/5, EF 5/5, EE 5/5,  5/5                    RIGHT UE - ShAB 5/5, EF 5/5, EE 5/5,   5/5                    LEFT    LE - HF 5/5, KE 5/5, DF 5/5, PF 5/5                    RIGHT LE - HF 5/5, KE 5/5, DF 5/5, PF 5/5        Sensory - Intact to LT     Reflexes - DTR Intact, No primitive reflexive     Coordination - FTN intact     Tone - normal  Psychiatric - Mood stable, Affect WNL  Skin:  Anterior cranial incision with 1 staple ROLANDO, Incontinent associated dermatitis  Wounds: None Present PHYSICAL EXAM  VITALS  T(C): 36.7 (01-05-22 @ 17:57), Max: 36.7 (01-05-22 @ 17:57)  HR: 76 (01-05-22 @ 17:57) (76 - 76)  BP: 128/69 (01-05-22 @ 17:57) (128/69 - 128/69)  RR: 16 (01-05-22 @ 17:57) (16 - 16)  SpO2: 95% (01-05-22 @ 17:57) (95% - 95%)    Gen - NAD, Comfortable  HEENT - NCAT, EOMI, MMM  Neck - Supple, No limited ROM  Pulm - CTAB, No wheeze, No rhonchi, No crackles  Cardiovascular - RRR, S1S2, No murmurs  Abdomen - Soft, NT/ND, +BS  Extremities - No Cyanosis, no clubbing, no edema, no calf tenderness  Neuro-     Cognitive - awake, alert, oriented to person, disoriented to place, time, and situation     Communication - + dysarthria, intermittently follows command     Attention: short     Memory: unable to recall 3 objects immediate or 3 min later         Cranial Nerves - CN 2-12 intact     Motor - No focal deficits- moves all extremity against gravity                    LEFT    UE - ShAB 4/5, EF 4/5, EE 4/5,  4/5                    RIGHT UE - ShAB 4/5, EF 4/5, EE 4/5,   4/5                    LEFT    LE - HF 4/5, KE 4/5, DF 4/5, PF 4/5                    RIGHT LE - HF 4/5, KE 4/5, DF 4/5, PF 4/5        Sensory - Intact to LT     Reflexes - DTR Intact, No primitive reflexive     Coordination - FTN intact     Tone - normal  Psychiatric - Mood stable, Affect WNL  Skin:  Anterior cranial incision with 1 staple ROLANDO, Incontinent associated dermatitis  Wounds: None Present PHYSICAL EXAM  VITALS  T(C): 36.7 (01-05-22 @ 17:57), Max: 36.7 (01-05-22 @ 17:57)  HR: 76 (01-05-22 @ 17:57) (76 - 76)  BP: 128/69 (01-05-22 @ 17:57) (128/69 - 128/69)  RR: 16 (01-05-22 @ 17:57) (16 - 16)  SpO2: 95% (01-05-22 @ 17:57) (95% - 95%)    Gen - NAD, Comfortable  HEENT - NCAT, EOMI, MMM  Neck - Supple, No limited ROM  Pulm - CTAB, No wheeze, No rhonchi, No crackles  Cardiovascular - RRR, S1S2, No murmurs  Abdomen - Soft, NT/ND, +BS  Extremities - No Cyanosis, no clubbing, no edema, no calf tenderness  Neuro-     Cognitive - awake, alert, oriented to person, disoriented to place, time, and situation     Communication - + dysarthria, intermittently follows command     Attention: impaired     Memory: unable to recall 3 objects immediate or 3 min later         Cranial Nerves - CN 2-12 intact     Motor - No focal deficits- moves all extremity against gravity                    LEFT    UE - ShAB 4/5, EF 4/5, EE 4/5,  4/5                    RIGHT UE - ShAB 4/5, EF 4/5, EE 4/5,   4/5                    LEFT    LE - HF 4/5, KE 4/5, DF 4/5, PF 4/5                    RIGHT LE - HF 4/5, KE 4/5, DF 4/5, PF 4/5        Sensory - Intact to LT     Reflexes - DTR Intact, No primitive reflexive     Coordination - FTN intact     Tone - normal  Psychiatric - Mood stable, Affect WNL  Skin:  Anterior cranial incision with 1 staple ROLANDO, Incontinent associated dermatitis  Wounds: None Present

## 2022-01-05 NOTE — H&P ADULT - ASSESSMENT
ASSESSMENT/PLAN  This is a 67 YO female with no PMH who was admitted to [facility] on 12/11/21  with c/o HA/vomiting since 12/10 found to have SAH c/b hydrocephalus. On 12/11 s/p coiling of L P comm aneurysm, found to have a partial left m2 thrombus now s/p thrombectomy with TICI 3 reperfusion. On 12/14,  s/p R EVD for hydro c/b sizable tract hemorrhage with IVH now with L EVD, removed on 12/29. 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 Patient now with gait Instability, ADL impairments and Functional impairments.    # SAH  - On 12/11 s/p coiling of L Pcomm aneurysm, found to have a partial left m2 thrombus now s/p thrombectomy with TICI 3 reperfusion  -  s/p R EVD for hydro ON 12/14 c/b sizable tract hemorrhage with IVH now with L EVD, removed on 12/29.  - Start Comprehensive Rehab Program: PT/OT/ST  - PT: Focused on improving strength, endurance, coordination, balance, functional mobility, and transfers  - OT: Focused on improving strength, fine motor skills, coordination, posture and ADLs.    - ST: to diagnose and treat deficits in swallowing, cognition and communication.   - c/w amantadine 100mg daily    #HTN  - Amlodipine 5mg daily    #Right Upper Arm DVT  - Apixaban 5 mg BID    #Pain management  - Tylenol PRN    #DVT ppx  - SCD, TEDs    #GI ppx  - Protonix    #Bowel Regimen  - Senna, miralax PRN    #Bladder management  - BS on admission, and q 8 hours (SC if > 400)  - Monitor UO    #FEN   - Diet: Modified. Pureed    #Precaution  - Fall, Aspiration, Seizure, right upper arm    #Skin:  - No active issues at this time  - Pressure injury/Skin: Turn Q2hrs while in bed, OOB to Chair, PT/OT     #Dysphagia    - SLP: evaluation and treatment      Outpatient Follow-up (Specialty/Name of physician):      MEDICAL PROGNOSIS: GOOD            REHAB POTENTIAL: GOOD             ESTIMATED DISPOSITION: HOME WITH HOME CARE            ELOS: 10-14 Days   EXPECTED THERAPY:     P.T. 1hr/day       O.T. 1hr/day      S.L.P. 1hr/day     P&O Unnecessary     EXP FREQUENCY: 5 days per 7 day period     PRESCREEN COMPARISON:   I have reviewed the prescreen information and I have found no relevant changes between the preadmission screening and my post admission evaluation     RATIONALE FOR INPATIENT ADMISSION - Patient demonstrates the following: (check all that apply)  [X] Medically appropriate for rehabilitation admission  [X] Has attainable rehab goals with an appropriate initial discharge plan  [X] Has rehabilitation potential (expected to make a significant improvement within a reasonable period of time)   [X] Requires close medical management by a rehab physician, rehab nursing care, Hospitalist and comprehensive interdisciplinary team (including PT, OT, & or SLP, Prosthetics and Orthotics)   ASSESSMENT/PLAN  This is a 65 YO female with no PMH who was admitted to [facility] on 12/11/21  with c/o HA/vomiting since 12/10 found to have SAH c/b hydrocephalus. On 12/11 s/p coiling of L P comm aneurysm, found to have a partial left m2 thrombus now s/p thrombectomy with TICI 3 reperfusion. On 12/14,  s/p R EVD for hydro c/b sizable tract hemorrhage with IVH now with L EVD, removed on 12/29. 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 Patient now with gait Instability, ADL impairments and Functional impairments.    # SAH  - On 12/11 s/p coiling of L Pcomm aneurysm, found to have a partial left m2 thrombus now s/p thrombectomy with TICI 3 reperfusion  -  s/p R EVD for hydro ON 12/14 c/b sizable tract hemorrhage with IVH now with L EVD, removed on 12/29.  - Start Comprehensive Rehab Program: PT/OT/ST  - PT: Focused on improving strength, endurance, coordination, balance, functional mobility, and transfers  - OT: Focused on improving strength, fine motor skills, coordination, posture and ADLs.    - ST: to diagnose and treat deficits in swallowing, cognition and communication.   - c/w amantadine 100mg daily    #HTN  - Amlodipine 5mg daily    #Right Upper Arm DVT  - Apixaban 5 mg BID    #Pain management  - Tylenol PRN    #DVT ppx  - SCD, TEDs    #GI ppx  - Protonix    #Bowel Regimen  - Senna, miralax PRN    #Bladder management  - BS on admission, and q 8 hours (SC if > 400)  - Monitor UO    #FEN   - Diet: Pureed    #Precaution  - Fall, Aspiration, Seizure, right upper arm    #Skin:  - No active issues at this time  - Pressure injury/Skin: Turn Q2hrs while in bed, OOB to Chair, PT/OT     #Dysphagia    - SLP: evaluation and treatment    Outpatient Follow-up (Specialty/Name of physician):  Dr. Vladimir Lucas  [address] [id number] [phone]    Farhat Munoz  Internal medicine   [address] [place] [address] [phone]    Liz ADAIR  [address] [place] [id number]  [phone]    MEDICAL PROGNOSIS: GOOD            REHAB POTENTIAL: GOOD             ESTIMATED DISPOSITION: HOME WITH HOME CARE            ELOS: 10-14 Days   EXPECTED THERAPY:     P.T. 1hr/day       O.T. 1hr/day      S.L.P. 1hr/day     P&O Unnecessary     EXP FREQUENCY: 5 days per 7 day period     PRESCREEN COMPARISON:   I have reviewed the prescreen information and I have found no relevant changes between the preadmission screening and my post admission evaluation     RATIONALE FOR INPATIENT ADMISSION - Patient demonstrates the following: (check all that apply)  [X] Medically appropriate for rehabilitation admission  [X] Has attainable rehab goals with an appropriate initial discharge plan  [X] Has rehabilitation potential (expected to make a significant improvement within a reasonable period of time)   [X] Requires close medical management by a rehab physician, rehab nursing care, Hospitalist and comprehensive interdisciplinary team (including PT, OT, & or SLP, Prosthetics and Orthotics)   ASSESSMENT/PLAN  This is a 65 YO female with no PMH who was admitted to [facility] on 12/11/21  with c/o HA/vomiting since 12/10 found to have SAH c/b hydrocephalus. On 12/11 s/p coiling of L P comm aneurysm, found to have a partial left m2 thrombus now s/p thrombectomy with TICI 3 reperfusion. On 12/14,  s/p R EVD for hydro c/b sizable tract hemorrhage with IVH now with L EVD, removed on 12/29. 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 Patient now with gait Instability, ADL impairments and Functional impairments.    # SAH  - On 12/11 s/p coiling of L Pcomm aneurysm, found to have a partial left m2 thrombus now s/p thrombectomy with TICI 3 reperfusion  -  s/p R EVD for hydro ON 12/14 c/b sizable tract hemorrhage with IVH now with L EVD, removed on 12/29.  - Start Comprehensive Rehab Program: PT/OT/ST  - PT: Focused on improving strength, endurance, coordination, balance, functional mobility, and transfers  - OT: Focused on improving strength, fine motor skills, coordination, posture and ADLs.    - ST: to diagnose and treat deficits in swallowing, cognition and communication.   - c/w amantadine 100mg daily    #HTN  - Amlodipine 5mg daily    # HIT and Right Upper Arm DVT  - Apixaban 5 mg BID    #Pain management  - Tylenol PRN    #DVT ppx  - SCD, TEDs    #GI ppx  - Protonix    #Bowel Regimen  - Senna, miralax PRN    #Bladder management  - BS on admission, and q 8 hours (SC if > 400)  - Monitor UO    #FEN   - Diet: Pureed    #Precaution  - Fall, Aspiration, Seizure, right upper arm    #Skin:  - No active issues at this time  - Pressure injury/Skin: Turn Q2hrs while in bed, OOB to Chair, PT/OT     #Dysphagia    - SLP: evaluation and treatment    Outpatient Follow-up (Specialty/Name of physician):  Dr. Vladimir Lucas  [address] [id number] [phone]    Farhat Munoz  Internal medicine   [address] [place] [id number] [phone]    Liz Whitesidew M  [address] [place] [address]  [phone]    MEDICAL PROGNOSIS: GOOD            REHAB POTENTIAL: GOOD             ESTIMATED DISPOSITION: HOME WITH HOME CARE            ELOS: 10-14 Days   EXPECTED THERAPY:     P.T. 1hr/day       O.T. 1hr/day      S.L.P. 1hr/day     P&O Unnecessary     EXP FREQUENCY: 5 days per 7 day period     PRESCREEN COMPARISON:   I have reviewed the prescreen information and I have found no relevant changes between the preadmission screening and my post admission evaluation     RATIONALE FOR INPATIENT ADMISSION - Patient demonstrates the following: (check all that apply)  [X] Medically appropriate for rehabilitation admission  [X] Has attainable rehab goals with an appropriate initial discharge plan  [X] Has rehabilitation potential (expected to make a significant improvement within a reasonable period of time)   [X] Requires close medical management by a rehab physician, rehab nursing care, Hospitalist and comprehensive interdisciplinary team (including PT, OT, & or SLP, Prosthetics and Orthotics)

## 2022-01-05 NOTE — PROGRESS NOTE ADULT - ASSESSMENT
Assessment:  1. R subclavian vein PICC line associated thrombus  -  small clot burden  -  Last R UE venous duplex on 12/29 showed stability of R subclavian DVT  2. ICH  3. PCOM Aneurysm  4. L Parietal M2 thrombus s/p thrombectomy  5. Hydrocephalus s/p EVD  6. HIT  - confirmed by BALJEET  7. HTN    Plan:  1. On Eliquis 5 mg BID.  2. PICC line was previously removed.   3. Appreciate Hematology recommendations  4. Right upper extremity without edema and distal pulses intact.   5. BP stable on Amlodipine.  6. Continue excellent neurosurgical care.     Thank you  OSMANY Louis, MPAS  Vascular Cardiology Service    Please call with any questions:   DIRECT SERVICE NUMBER:  [phone]  Office [phone]

## 2022-01-05 NOTE — H&P ADULT - NSHPSOCIALHISTORY_GEN_ALL_CORE
Smoking - Denied  EtOH - Denied   Drugs - Denied     Employment Status:  Retired   Patient lives with spouse in an apt with 3 MYRTLE, ~10 steps inside.  Spouse retired and is able to assist as needed.  PTA: Independent in ADLs and ambulation     CURRENT FUNCTIONAL STATUS 1/5   Bed Mobility: min A x 1  Transfers: min A x 1 and HHA  Gait: amb 50' with HHA, 2 trials, 1 min seated rest (no assist level noted)

## 2022-01-05 NOTE — PROGRESS NOTE ADULT - SUBJECTIVE AND OBJECTIVE BOX
SUBJECTIVE:   Patient was seen and evaluated at bedside. Patient is resting in bed and is in no new acute distress.   OVERNIGHT EVENTS:   none   Vital Signs Last 24 Hrs  T(C): 36.9 (05 Jan 2022 08:00), Max: 37 (04 Jan 2022 15:22)  T(F): 98.4 (05 Jan 2022 08:00), Max: 98.6 (04 Jan 2022 15:22)  HR: 68 (05 Jan 2022 08:00) (62 - 100)  BP: 120/74 (05 Jan 2022 08:00) (111/70 - 154/96)  BP(mean): --  RR: 18 (05 Jan 2022 08:00) (18 - 18)  SpO2: 98% (05 Jan 2022 08:00) (96% - 98%)    PHYSICAL EXAM:    General: No Acute Distress     Neurological: Awake, alert oriented to person, place, Following Commands, PERRL, EOMI, Face Symmetrical, Speech Fluent, Moving all extremities, Muscle Strength normal in all four extremities, No Drift, Sensation to Light Touch Intact  Pulmonary: Clear to Auscultation, No Rales, No Rhonchi, No Wheezes     Cardiovascular: S1, S2, Regular Rate and Rhythm     Gastrointestinal: Soft, Nontender, Nondistended     Incision:     LABS:                        10.7   5.64  )-----------( 241      ( 05 Jan 2022 06:47 )             33.9    01-05    144  |  106  |  20  ----------------------------<  98  3.5   |  24  |  0.57    Ca    9.1      05 Jan 2022 06:47          01-04 @ 07:01 - 01-05 @ 07:00  --------------------------------------------------------  IN: 1230 mL / OUT: 0 mL / NET: 1230 mL    01-05 @ 07:01 - 01-05 @ 12:32  --------------------------------------------------------  IN: 360 mL / OUT: 0 mL / NET: 360 mL      DRAINS:     MEDICATIONS:  Antibiotics:    Neuro:  acetaminophen     Tablet .. 650 milliGRAM(s) Oral every 6 hours PRN Temp greater or equal to 38C (100.4F), Mild Pain (1 - 3)  amantadine Syrup 100 milliGRAM(s) Oral <User Schedule>  oxyCODONE    IR 5 milliGRAM(s) Oral every 4 hours PRN Moderate Pain (4 - 6)    Cardiac:  amLODIPine   Tablet 5 milliGRAM(s) Oral daily    Pulm:    GI/:    Other:   apixaban 5 milliGRAM(s) Oral two times a day  chlorhexidine 2% Cloths 1 Application(s) Topical daily  multivitamin 1 Tablet(s) Oral daily  nystatin Powder 1 Application(s) Topical every 12 hours  potassium chloride   Solution 40 milliEquivalent(s) Oral once  sodium chloride 1 Gram(s) Oral every 6 hours  sodium chloride 0.9% lock flush 10 milliLiter(s) IV Push every 1 hour PRN Pre/post blood products, medications, blood draw, and to maintain line patency    DIET: [] Regular [] CCD [] Renal [] Puree [] Dysphagia [] Tube Feeds:   pureed diet   IMAGING:     ACC: [id number] EXAM:  CT BRAIN                          PROCEDURE DATE:  01/02/2022          INTERPRETATION:  CT head without IV contrast    CLINICAL INFORMATION:  P-comm aneurysm rupture   Intracranial hemorrhage.    TECHNIQUE: Contiguous axial 5 mm sections were obtained through the head.   Sagittal and coronal 2-D reformatted images were also obtained.   This   scan was performed using automatic exposure control (radiation dose   reduction software) to obtain a diagnostic image quality scan with   patient dose as low as reasonably achievable.    FINDINGS:   CT scan dated 12/30/2021  available for review.    The brain demonstrates unchanged 4 cm hematoma in the RIGHT frontal lobe   with surrounding edema and mild mass effect on the anterior horn of the   RIGHT lateral ventricle. Coil embolic material seen in the LEFT   suprasellar cistern.   No acute cerebral cortical infarct is seen.  The   ventricles, sulci and basal cisterns appear otherwise unremarkable.    The orbits are unremarkable.  The paranasal sinuses are clear.  The nasal   cavity appears intact.  The nasopharynx is symmetric.  The central skull   base, petrous temporal bones and calvarium remain intact.      IMPRESSION:   unchanged 4 cm hematoma in the RIGHT frontal lobe with   surrounding edema and mild mass effect on the anterior horn of the RIGHT   lateral ventricle. Coil embolic material seen in the LEFT suprasellar   cistern.

## 2022-01-05 NOTE — H&P ADULT - HISTORY OF PRESENT ILLNESS
This is a 67 YO female with no PMH who was admitted to [facility] on 12/11/21  with c/o HA/vomiting since 12/10 found to have SAH c/b hydrocephaluns. On  12/11 s/p coiling of L Pcomm aneurysm, found to have a partial left m2 thrombus now s/p thrombectomy with TICI 3 reperfusion. On 12/14,  s/p R EVD for hydro c/b sizable tract hemorrhage with IVH now with L EVD, removed on 12/29. 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     Pfizer x 2 + booster (8/11/21). This is a 65 YO female with no PMH who was admitted to [facility] on 12/11/21  with c/o HA/vomiting since 12/10 found to have SAH c/b hydrocephaluns. On 12/11 s/p coiling of L Posterior communicating  aneurysm, found to have a partial left m2 thrombus now s/p thrombectomy with TICI 3 reperfusion. On 12/14, s/p R EVD for hydro c/b sizable tract hemorrhage with IVH now with L EVD, removed on 12/29. 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 Patient was medically optimized for discharge to [facility] IRU on 1/5/22.    Pfizer x 2 + booster ( last dose 8/11/21)

## 2022-01-05 NOTE — H&P ADULT - NSHPREVIEWOFSYSTEMS_GEN_ALL_CORE
REVIEW OF SYSTEMS  Constitutional: No fever, No Chills, No fatigue  HEENT: No eye pain, No visual disturbances, No difficulty hearing  Pulm: No cough,  No shortness of breath  Cardio: No chest pain, No palpitations  GI:  No abdominal pain, No nausea, No vomiting, No diarrhea, No constipation  : No dysuria, No frequency, No hematuria  Neuro: No headaches, No memory loss, no loss of strength, no numbness, No tremors  Skin: No itching, No rashes, No lesions   Endo: No temperature intolerance  MSK: No joint pain, No joint swelling, No muscle pain, No Neck or back pain  Psych:  No depression, No anxiety

## 2022-01-05 NOTE — PROGRESS NOTE ADULT - SUBJECTIVE AND OBJECTIVE BOX
Vascular Cardiology Progress Note    DIRECT SERVICE NUMBER:  [phone]        OFFICE [phone]    CC:  subarachnoid hemorrhage    Interval Events:  No complaints. Conversing well.   R arm stable.  Remains on Eliquis 5 mg bid.     Allergies  No Known Allergies    MEDICATIONS  (STANDING):  amantadine Syrup 100 milliGRAM(s) Oral <User Schedule>  amLODIPine   Tablet 5 milliGRAM(s) Oral daily  apixaban 5 milliGRAM(s) Oral two times a day  chlorhexidine 2% Cloths 1 Application(s) Topical daily  multivitamin 1 Tablet(s) Oral daily  nystatin Powder 1 Application(s) Topical every 12 hours  potassium chloride   Solution 40 milliEquivalent(s) Oral once  sodium chloride 1 Gram(s) Oral every 6 hours    PAST MEDICAL & SURGICAL HISTORY: see HPI    FAMILY HISTORY: see HPI    SOCIAL HISTORY:  unchanged    REVIEW OF SYSTEMS:  12 Point ROS negative except as per subjective and HPI    Vital Signs Last 24 Hrs  T(C): 36.9 (05 Jan 2022 08:00), Max: 37 (04 Jan 2022 15:22)  T(F): 98.4 (05 Jan 2022 08:00), Max: 98.6 (04 Jan 2022 15:22)  HR: 68 (05 Jan 2022 08:00) (62 - 100)  BP: 120/74 (05 Jan 2022 08:00) (111/70 - 154/96)  RR: 18 (05 Jan 2022 08:00) (18 - 18)  SpO2: 98% (05 Jan 2022 08:00) (96% - 98%)    Appearance: NAD 	  HEENT: EVD previously removed  Respiratory: CTA B/L  Cardiac: RRR, S1 and S2  Psychiatry:  Alert and Awake  Gastrointestinal:  Soft, Non-tender, + BS	  Skin: No rashes, No ecchymoses, No cyanosis	  Extremities:  R arm without edema.  R PICC line previously removed  No LE edema    Labs:                        10.7   5.64  )-----------( 241      ( 05 Jan 2022 06:47 )             33.9     01-05    144  |  106  |  20  ----------------------------<  98  3.5   |  24  |  0.57    Ca    9.1      05 Jan 2022 06:47

## 2022-01-05 NOTE — PROGRESS NOTE ADULT - ASSESSMENT
HPI:  66F, no sig PMH, txfered from [facility] for HA since 12/10 morning. Had associated nausea/vomiting and posterior neck pain. CT: diffuse SAH, most prominent at Sylvian fissures. Exam: Somnolent, Ox2.5 (said November), Left pupil 6NR; Right pupil 5NR, EOMI, no facial, Right drift, LAWTON 5/5   (11 Dec 2021 11:22)    PROCEDURE: Insertion, external ventricular drain  s/p cerebral angiogram and coiling of left PCOMM artery aneurysm on 12/11 and M2 distal brach thrombectomy on 12/11          PLAN:  Neuro:   1 Out of bed   2 continue pt/ot   3 prn pain meds     Respiratory:   1 room air     CV:  1 bp stable -goal 100-160 -continue amlodipine for htn hx     Endocrine:   1 stable     Heme/Onc:             DVT ppx: on eliquis 5 bid  and scds   1 HIT +- on treatment dose eliquid 5 bid     Renal:   1 voiding     ID:   1 afebrile      GI:   1 pureed diet   2 last bm 1/4   Social/Family: updated daughter Mendy   Discharge planning: dc to rehab today   -discussed with Dr. Lucas   -[facility] [id number]          Problem/Plan - 1:  ·  Problem: SAH (subarachnoid hemorrhage).      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     Attestation Statements:    Attestation Statements:  Time-based billing (NON-critical care).     20 minutes spent on total encounter; more than 50% of the visit was spent counseling and / or coordinating care by the attending physician.  The necessity of the time spent during the encounter on this date of service was due to:     More than 50 percent of time was spent on examining patient and discussing care plan with family.

## 2022-01-05 NOTE — PATIENT PROFILE ADULT - NSPRONUTRITIONRISK_GEN_A_NUR
[NL] : no abnormal lymph nodes palpated [de-identified] : L knee with large laceration, + swelling medial and lateral knee, tenderness more laterally, no warmth, mild erythema [de-identified] : + laceration to L knee - deeper wound with significant scabbing on surface - + tenderness, + healed laceration to chin with 9 sutures, minimal surrounding erythema, no warmth, no tenderness No indicators present

## 2022-01-05 NOTE — PATIENT PROFILE ADULT - FALL HARM RISK - HARM RISK INTERVENTIONS

## 2022-01-05 NOTE — H&P ADULT - REASON FOR ADMISSION
This note was copied from a baby's chart. Initial Lactation Consultation - Baby born vaginally yesterday afternoon to a  mom at 38 3/7 weeks gestation. Mom states baby has been latching and nursing but she is having a lot of pain with latching. Moms nipples are everted and long. I worked with mom on latching and showed her how to pull baby close when he opens his mouth so that he gets a deep latch. We were able to get him to latch deeply and mom said that it was much more comfortable. We were able to get the baby latched deeply on both breasts. Feeding Plan: Mother will keep baby skin to skin as often as possible, feed on demand, respond to feeding cues, obtain latch, listen for audible swallowing, be aware of signs of oxytocin release/ cramping, thirst and sleepiness while breastfeeding. Mom will not limit the time the baby is at the breast. She will allow the baby to completely finish one breast and then offer the second breast at each feeding.
SAH s/p coiling of L p comm aneurysm 12/11/21 and partial L M2 thrombus thrombectomy/reperfusion c/b hydrocephalus

## 2022-01-05 NOTE — PROGRESS NOTE ADULT - PROVIDER SPECIALTY LIST ADULT
NSICU
Neurosurgery
Neurosurgery
Vascular Cardiology
Hospitalist
NSICU
Neurosurgery
Neurosurgery
Vascular Cardiology
NSICU
Neurosurgery
Rehab Medicine
Vascular Cardiology
NSICU
Neurosurgery
Vascular Cardiology
NSICU
NSICU
Neurosurgery
Neurosurgery
Internal Medicine
NSICU
NSICU
Hospitalist
Internal Medicine

## 2022-01-05 NOTE — H&P ADULT - NSHPLABSRESULTS_GEN_ALL_CORE
Laboratory-Chemistry:    1/5/22:  Na 144  K 3.5  BUN 20  Creat 0.57  Glucose 98   **A1C 5.4 on 12/11/21**   Hematology:    1/5/22:  WBC 5.64  Hgb 10.7  Hct 33.9  Plt 241    1/3/22:  PTT 51.1  PT 14.4  INR 1.21    COVID Status:  PCR NEG 12/11/21, 12/16/21, 12/21/21, 1/3/22    12/20/21 CT Head:  IMPRESSION:  Right frontal intraparenchymal hemorrhage with ventricular hemorrhage,   unchanged. Subarachnoid hemorrhage, unchanged. 7 mm right to left midline shift, unchanged.    12/28/21 CT Head:  IMPRESSION:  Left frontal EVD has been removed. Ventricles measure   slightly larger compared with the prior. Degree of midline shift remains   the same. Appearance of the right frontal hematoma is relatively stable   with surrounding edema and mass effect on the right lateral ventricle    1/2/22 CT Head:  IMPRESSION:   unchanged 4 cm hematoma in the RIGHT frontal lobe with surrounding edema and mild mass effect on the anterior horn of the RIGHT   lateral ventricle. Coil embolic material seen in the LEFT suprasellar cistern.

## 2022-01-06 DIAGNOSIS — I60.32 NONTRAUMATIC SUBARACHNOID HEMORRHAGE FROM LEFT POSTERIOR COMMUNICATING ARTERY: ICD-10-CM

## 2022-01-06 PROBLEM — Z00.00 ENCOUNTER FOR PREVENTIVE HEALTH EXAMINATION: Noted: 2022-01-06

## 2022-01-06 LAB
ALBUMIN SERPL ELPH-MCNC: 2.8 G/DL — LOW (ref 3.3–5)
ALP SERPL-CCNC: 84 U/L — SIGNIFICANT CHANGE UP (ref 40–120)
ALT FLD-CCNC: 19 U/L — SIGNIFICANT CHANGE UP (ref 10–45)
ANION GAP SERPL CALC-SCNC: 13 MMOL/L — SIGNIFICANT CHANGE UP (ref 5–17)
AST SERPL-CCNC: 29 U/L — SIGNIFICANT CHANGE UP (ref 10–40)
BASOPHILS # BLD AUTO: 0.07 K/UL — SIGNIFICANT CHANGE UP (ref 0–0.2)
BASOPHILS NFR BLD AUTO: 1.3 % — SIGNIFICANT CHANGE UP (ref 0–2)
BILIRUB SERPL-MCNC: 0.3 MG/DL — SIGNIFICANT CHANGE UP (ref 0.2–1.2)
BUN SERPL-MCNC: 17 MG/DL — SIGNIFICANT CHANGE UP (ref 7–23)
CALCIUM SERPL-MCNC: 9 MG/DL — SIGNIFICANT CHANGE UP (ref 8.4–10.5)
CHLORIDE SERPL-SCNC: 107 MMOL/L — SIGNIFICANT CHANGE UP (ref 96–108)
CO2 SERPL-SCNC: 22 MMOL/L — SIGNIFICANT CHANGE UP (ref 22–31)
CREAT SERPL-MCNC: 0.5 MG/DL — SIGNIFICANT CHANGE UP (ref 0.5–1.3)
EOSINOPHIL # BLD AUTO: 0.28 K/UL — SIGNIFICANT CHANGE UP (ref 0–0.5)
EOSINOPHIL NFR BLD AUTO: 5.4 % — SIGNIFICANT CHANGE UP (ref 0–6)
GLUCOSE SERPL-MCNC: 77 MG/DL — SIGNIFICANT CHANGE UP (ref 70–99)
HCT VFR BLD CALC: 34 % — LOW (ref 34.5–45)
HCV AB S/CO SERPL IA: 0.14 S/CO — SIGNIFICANT CHANGE UP (ref 0–0.99)
HCV AB SERPL-IMP: SIGNIFICANT CHANGE UP
HGB BLD-MCNC: 11.3 G/DL — LOW (ref 11.5–15.5)
IMM GRANULOCYTES NFR BLD AUTO: 0.2 % — SIGNIFICANT CHANGE UP (ref 0–1.5)
LYMPHOCYTES # BLD AUTO: 1.77 K/UL — SIGNIFICANT CHANGE UP (ref 1–3.3)
LYMPHOCYTES # BLD AUTO: 34 % — SIGNIFICANT CHANGE UP (ref 13–44)
MCHC RBC-ENTMCNC: 31 PG — SIGNIFICANT CHANGE UP (ref 27–34)
MCHC RBC-ENTMCNC: 33.2 GM/DL — SIGNIFICANT CHANGE UP (ref 32–36)
MCV RBC AUTO: 93.4 FL — SIGNIFICANT CHANGE UP (ref 80–100)
MONOCYTES # BLD AUTO: 0.3 K/UL — SIGNIFICANT CHANGE UP (ref 0–0.9)
MONOCYTES NFR BLD AUTO: 5.8 % — SIGNIFICANT CHANGE UP (ref 2–14)
NEUTROPHILS # BLD AUTO: 2.77 K/UL — SIGNIFICANT CHANGE UP (ref 1.8–7.4)
NEUTROPHILS NFR BLD AUTO: 53.3 % — SIGNIFICANT CHANGE UP (ref 43–77)
NRBC # BLD: 0 /100 WBCS — SIGNIFICANT CHANGE UP (ref 0–0)
PLATELET # BLD AUTO: 217 K/UL — SIGNIFICANT CHANGE UP (ref 150–400)
POTASSIUM SERPL-MCNC: 4.3 MMOL/L — SIGNIFICANT CHANGE UP (ref 3.5–5.3)
POTASSIUM SERPL-SCNC: 4.3 MMOL/L — SIGNIFICANT CHANGE UP (ref 3.5–5.3)
PROT SERPL-MCNC: 6.5 G/DL — SIGNIFICANT CHANGE UP (ref 6–8.3)
RBC # BLD: 3.64 M/UL — LOW (ref 3.8–5.2)
RBC # FLD: 14.2 % — SIGNIFICANT CHANGE UP (ref 10.3–14.5)
SODIUM SERPL-SCNC: 142 MMOL/L — SIGNIFICANT CHANGE UP (ref 135–145)
WBC # BLD: 5.2 K/UL — SIGNIFICANT CHANGE UP (ref 3.8–10.5)
WBC # FLD AUTO: 5.2 K/UL — SIGNIFICANT CHANGE UP (ref 3.8–10.5)

## 2022-01-06 PROCEDURE — 99223 1ST HOSP IP/OBS HIGH 75: CPT

## 2022-01-06 PROCEDURE — 99233 SBSQ HOSP IP/OBS HIGH 50: CPT

## 2022-01-06 PROCEDURE — 70450 CT HEAD/BRAIN W/O DYE: CPT | Mod: 26

## 2022-01-06 RX ADMIN — AMLODIPINE BESYLATE 5 MILLIGRAM(S): 2.5 TABLET ORAL at 05:20

## 2022-01-06 RX ADMIN — APIXABAN 5 MILLIGRAM(S): 2.5 TABLET, FILM COATED ORAL at 05:20

## 2022-01-06 RX ADMIN — Medication 100 MILLIGRAM(S): at 05:20

## 2022-01-06 RX ADMIN — SENNA PLUS 2 TABLET(S): 8.6 TABLET ORAL at 20:29

## 2022-01-06 RX ADMIN — Medication 1 TABLET(S): at 17:24

## 2022-01-06 RX ADMIN — APIXABAN 5 MILLIGRAM(S): 2.5 TABLET, FILM COATED ORAL at 17:23

## 2022-01-06 RX ADMIN — PANTOPRAZOLE SODIUM 40 MILLIGRAM(S): 20 TABLET, DELAYED RELEASE ORAL at 05:20

## 2022-01-06 NOTE — CONSULT NOTE ADULT - REASON FOR ADMISSION
SAH s/p coiling of L p comm aneurysm 12/11/21 and partial L M2 thrombus thrombectomy/reperfusion c/b hydrocephalus

## 2022-01-06 NOTE — PROGRESS NOTE ADULT - SUBJECTIVE AND OBJECTIVE BOX
CHIEF COMPLAINT: comfortable, denies HA, no new complaints      HISTORY OF PRESENT ILLNESS    This is a 65 YO female with no PMH who was admitted to [facility] on 12/11/21  with c/o HA/vomiting since 12/10 found to have SAH c/b hydrocephaluns. On 12/11 s/p coiling of L Posterior communicating  aneurysm, found to have a partial left m2 thrombus now s/p thrombectomy with TICI 3 reperfusion. On 12/14, s/p R EVD for hydro c/b sizable tract hemorrhage with IVH now with L EVD, removed on 12/29. 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 Patient was medically optimized for discharge to [facility] IRU on 1/5/22.    Pfizer x 2 + booster ( last dose 8/11/21) (05 Jan 2022 14:15)        PAST MEDICAL & SURGICAL HISTORY:  No pertinent past medical history    No significant past surgical history        VITALS  Vital Signs Last 24 Hrs  T(C): 36.7 (06 Jan 2022 07:27), Max: 36.8 (05 Jan 2022 20:14)  T(F): 98 (06 Jan 2022 07:27), Max: 98.2 (05 Jan 2022 20:14)  HR: 65 (06 Jan 2022 07:27) (64 - 76)  BP: 129/79 (06 Jan 2022 07:27) (128/69 - 133/83)  BP(mean): --  RR: 16 (06 Jan 2022 07:27) (15 - 16)  SpO2: 97% (06 Jan 2022 07:27) (95% - 97%)      PHYSICAL EXAM  Constitutional - NAD, Comfortable  HEENT - NCAT, EOMI  Neck - Supple, No limited ROM  Chest - CTA bilaterally  Cardiovascular - RRR, S1S2,  Abdomen -  Soft, NTND  Extremities - No C/C/E, No calf tenderness   Neurologic Exam -  cognitively impaired, no new focal CNs, motor or sensory deficits.                   RECENT LABS                        11.3   5.20  )-----------( 217      ( 06 Jan 2022 05:20 )             34.0     01-06    142  |  107  |  17  ----------------------------<  77  4.3   |  22  |  0.50    Ca    9.0      06 Jan 2022 05:20    TPro  6.5  /  Alb  2.8<L>  /  TBili  0.3  /  DBili  x   /  AST  29  /  ALT  19  /  AlkPhos  84  01-06      LIVER FUNCTIONS - ( 06 Jan 2022 05:20 )  Alb: 2.8 g/dL / Pro: 6.5 g/dL / ALK PHOS: 84 U/L / ALT: 19 U/L / AST: 29 U/L / GGT: x                           CURRENT MEDICATIONS  MEDICATIONS  (STANDING):  amantadine 100 milliGRAM(s) Oral <User Schedule>  amLODIPine   Tablet 5 milliGRAM(s) Oral daily  apixaban 5 milliGRAM(s) Oral two times a day  multivitamin 1 Tablet(s) Oral daily  pantoprazole    Tablet 40 milliGRAM(s) Oral before breakfast  senna 2 Tablet(s) Oral at bedtime    MEDICATIONS  (PRN):  acetaminophen     Tablet .. 650 milliGRAM(s) Oral every 6 hours PRN Mild Pain (1 - 3)  polyethylene glycol 3350 17 Gram(s) Oral daily PRN Constipation

## 2022-01-06 NOTE — PROGRESS NOTE ADULT - ASSESSMENT
ASSESSMENT/PLAN  This is a 67 YO female with no PMH who was admitted to [facility] on 12/11/21  with c/o HA/vomiting since 12/10 found to have SAH c/b hydrocephalus. On 12/11 s/p coiling of L P comm aneurysm, found to have a partial left m2 thrombus now s/p thrombectomy with TICI 3 reperfusion. On 12/14,  s/p R EVD for hydro c/b sizable tract hemorrhage with IVH now with L EVD, removed on 12/29. 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 Patient now with gait Instability, ADL impairments and Functional impairments.    # SAH  - On 12/11 s/p coiling of L Pcomm aneurysm, found to have a partial left m2 thrombus now s/p thrombectomy with TICI 3 reperfusion  -  s/p R EVD for hydro ON 12/14 c/b sizable tract hemorrhage with IVH now with L EVD, removed on 12/29.  - Start Comprehensive Rehab Program: PT/OT/ST  - PT: Focused on improving strength, endurance, coordination, balance, functional mobility, and transfers  - OT: Focused on improving strength, fine motor skills, coordination, posture and ADLs.    - ST: to diagnose and treat deficits in swallowing, cognition and communication.   - discontinue amantadine 100mg daily - may contribute to confusion in elderly\  - Neuropsychological evaluation   - obtained baseline head ct - no acute changes     #HTN  - Amlodipine 5mg daily    # HIT and Right Upper Arm DVT  - Apixaban 5 mg BID with GI ppx    #Pain management  - Tylenol PRN    #DVT ppx  - SCD, TEDs    #GI ppx  - Protonix    #Bowel Regimen  - Senna, miralax PRN    #Bladder management  - BS on admission, and q 8 hours (SC if > 400)  - Monitor UO    #FEN   - Diet: Pureed    #Precaution  - Fall, Aspiration, Seizure, right upper arm    #Skin:  - No active issues at this time  - Pressure injury/Skin: Turn Q2hrs while in bed, OOB to Chair, PT/OT     #Dysphagia    - SLP: evaluation and treatment    Outpatient Follow-up (Specialty/Name of physician):  Dr. Vladimir Lucas  [address] [address]  [phone]    Farhat Munoz  Internal medicine   [address] [address] [place] [address] [phone]    Liz Garcia [address] [address] [phone]      Spoke with patient's daughter, detailed review of the program and initial evaluation given, all questions answered to her satisfaction.    > 35 min spent, > 50 % counseling and coordinating care

## 2022-01-06 NOTE — DIETITIAN INITIAL EVALUATION ADULT. - PERTINENT LABORATORY DATA
Date: 06 Jan 2022 05:20  Hemoglobin 11.3   Hematocrit 34.0     Date: 01-06  Sodium 142  Potassium 4.3  Glucose Serum 77  BUN 17      Creatinine 0.50    ACCUCHECK

## 2022-01-06 NOTE — DIETITIAN INITIAL EVALUATION ADULT. - PERTINENT MEDS FT
MEDICATIONS  (STANDING):  amantadine 100 milliGRAM(s) Oral <User Schedule>  amLODIPine   Tablet 5 milliGRAM(s) Oral daily  apixaban 5 milliGRAM(s) Oral two times a day  multivitamin 1 Tablet(s) Oral daily  pantoprazole    Tablet 40 milliGRAM(s) Oral before breakfast  senna 2 Tablet(s) Oral at bedtime    MEDICATIONS  (PRN):  acetaminophen     Tablet .. 650 milliGRAM(s) Oral every 6 hours PRN Mild Pain (1 - 3)  polyethylene glycol 3350 17 Gram(s) Oral daily PRN Constipation

## 2022-01-06 NOTE — DIETITIAN INITIAL EVALUATION ADULT. - ADD RECOMMEND
Soups/hot tea with meals. Encourage increased po intake at meals. Obtain and honor food preferences as able.

## 2022-01-06 NOTE — CONSULT NOTE ADULT - SUBJECTIVE AND OBJECTIVE BOX
HPI:  67 YO female with no PMH who was admitted to [facility] on 12/11/21  with c/o HA/vomiting since 12/10 found to have SAH c/b hydrocephaluns. On 12/11 s/p coiling of L Posterior communicating  aneurysm, found to have a partial left m2 thrombus now s/p thrombectomy with TICI 3 reperfusion. On 12/14, s/p R EVD for hydro c/b sizable tract hemorrhage with IVH now with L EVD, removed on 12/29. 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 Patient was medically optimized for discharge to [facility] IRU on 1/5/22.    Pfizer x 2 + booster ( last dose 8/11/21) (05 Jan 2022 14:15)    Subjective and overnight events:  Patient seen and examined at bedside. pt is answering questions with yes/no, not conversing very much. pt reports no complaints. denies headache, dizziness, blurry vision, numbness/tingling. denies fever, chills, cough, sob, cp, abd pain, n/v/d.    ALLERGIES:  No Known Allergies    MEDICATIONS  (STANDING):  amantadine 100 milliGRAM(s) Oral <User Schedule>  amLODIPine   Tablet 5 milliGRAM(s) Oral daily  apixaban 5 milliGRAM(s) Oral two times a day  multivitamin 1 Tablet(s) Oral daily  pantoprazole    Tablet 40 milliGRAM(s) Oral before breakfast  senna 2 Tablet(s) Oral at bedtime    MEDICATIONS  (PRN):  acetaminophen     Tablet .. 650 milliGRAM(s) Oral every 6 hours PRN Mild Pain (1 - 3)  polyethylene glycol 3350 17 Gram(s) Oral daily PRN Constipation    Vital Signs Last 24 Hrs  T(F): 98 (06 Jan 2022 07:27), Max: 98.2 (05 Jan 2022 20:14)  HR: 65 (06 Jan 2022 07:27) (64 - 76)  BP: 129/79 (06 Jan 2022 07:27) (128/69 - 133/83)  RR: 16 (06 Jan 2022 07:27) (15 - 16)  SpO2: 97% (06 Jan 2022 07:27) (95% - 97%)  I&O's Summary    PHYSICAL EXAM:  General: NAD, A/O x 3  ENT: MMM  Neck: Supple, No JVD  Lungs: Clear to auscultation bilaterally  Cardio: RRR, S1/S2, No murmurs  Abdomen: Soft, Nontender, Nondistended; Bowel sounds present  Extremities: No calf tenderness, No pitting edema    LABS:                        11.3   5.20  )-----------( 217      ( 06 Jan 2022 05:20 )             34.0     01-06    142  |  107  |  17  ----------------------------<  77  4.3   |  22  |  0.50    Ca    9.0      06 Jan 2022 05:20    TPro  6.5  /  Alb  2.8  /  TBili  0.3  /  DBili  x   /  AST  29  /  ALT  19  /  AlkPhos  84  01-06    eGFR if Non African American: 102 mL/min/1.73M2 (01-06-22 @ 05:20)  eGFR if African American: 118 mL/min/1.73M2 (01-06-22 @ 05:20)        COVID-19 PCR: NotDetec (01-05-22 @ 18:40)      RADIOLOGY & ADDITIONAL TESTS:    < from: CT Head No Cont (01.06.22 @ 13:54) >  IMPRESSION: No acute parenchymal hemorrhage involving the right frontal   region is seen surrounding edema.  Embolic material seen involving left parasellar region.    New left frontal kris hole seen with osseous fragments involving the left   frontal cortex with new areas of surrounding edema.    --- End of Report ---      < end of copied text >    Care Discussed with Consultants/Other Providers: rehab team

## 2022-01-06 NOTE — DIETITIAN INITIAL EVALUATION ADULT. - OTHER INFO
This is a 65 YO female with no PMH who was admitted to [facility] on 12/11/21  with c/o HA/vomiting since 12/10 found to have SAH c/b hydrocephalus. On 12/11 s/p coiling of L P comm aneurysm, found to have a partial left m2 thrombus now s/p thrombectomy with TICI 3 reperfusion. On 12/14,  s/p R EVD for hydro c/b sizable tract hemorrhage with IVH now with L EVD, removed on 12/29. 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 Patient now with gait Instability, ADL impairments and Functional impairments.  Pt tolerating puree diet, although only consumed ~25% of lunch this afternoon per RN. Prefers drinking fluids. Pt stating that she likes soups and hot tea. Will trial Ensure supplements. Pending swallow eval in-house. Unable to obtain weight history. No pressure ulcers or edema per nursing flow sheets. Last BM 1/5. Would suggest 1:1 feeding assistance with po encouragement as discussed with RN/PCA.

## 2022-01-07 PROCEDURE — 99232 SBSQ HOSP IP/OBS MODERATE 35: CPT

## 2022-01-07 RX ADMIN — APIXABAN 5 MILLIGRAM(S): 2.5 TABLET, FILM COATED ORAL at 17:22

## 2022-01-07 RX ADMIN — PANTOPRAZOLE SODIUM 40 MILLIGRAM(S): 20 TABLET, DELAYED RELEASE ORAL at 05:13

## 2022-01-07 RX ADMIN — APIXABAN 5 MILLIGRAM(S): 2.5 TABLET, FILM COATED ORAL at 05:13

## 2022-01-07 RX ADMIN — Medication 1 TABLET(S): at 11:33

## 2022-01-07 RX ADMIN — AMLODIPINE BESYLATE 5 MILLIGRAM(S): 2.5 TABLET ORAL at 05:13

## 2022-01-07 NOTE — PROGRESS NOTE ADULT - SUBJECTIVE AND OBJECTIVE BOX
CHIEF COMPLAINT: no complaints, comfortable , tolerates therapy well      HISTORY OF PRESENT ILLNESS    This is a 65 YO female with no PMH who was admitted to [facility] on 12/11/21  with c/o HA/vomiting since 12/10 found to have SAH c/b hydrocephaluns. On 12/11 s/p coiling of L Posterior communicating  aneurysm, found to have a partial left m2 thrombus now s/p thrombectomy with TICI 3 reperfusion. On 12/14, s/p R EVD for hydro c/b sizable tract hemorrhage with IVH now with L EVD, removed on 12/29. 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 Patient was medically optimized for discharge to [facility] IRU on 1/5/22.    Pfizer x 2 + booster ( last dose 8/11/21) (05 Jan 2022 14:15)        PAST MEDICAL & SURGICAL HISTORY:  No pertinent past medical history    No significant past surgical history        VITALS  Vital Signs Last 24 Hrs  T(C): 36.7 (07 Jan 2022 08:15), Max: 36.7 (06 Jan 2022 20:26)  T(F): 98.1 (07 Jan 2022 08:15), Max: 98.1 (06 Jan 2022 20:26)  HR: 71 (07 Jan 2022 08:15) (71 - 98)  BP: 103/67 (07 Jan 2022 08:15) (103/67 - 110/73)  BP(mean): --  RR: 14 (07 Jan 2022 08:15) (14 - 16)  SpO2: 97% (07 Jan 2022 08:15) (93% - 97%)      PHYSICAL EXAM  Constitutional - NAD, Comfortable  HEENT - NCAT, EOMI  Neck - Supple, No limited ROM  Chest - CTA bilaterally  Cardiovascular - RRR, S1S2  Abdomen -Soft, NTND  Extremities - No C/C/E, No calf tenderness   Neurologic Exam -  no new focal deficits                          RECENT LABS                        11.3   5.20  )-----------( 217      ( 06 Jan 2022 05:20 )             34.0     01-06    142  |  107  |  17  ----------------------------<  77  4.3   |  22  |  0.50    Ca    9.0      06 Jan 2022 05:20    TPro  6.5  /  Alb  2.8<L>  /  TBili  0.3  /  DBili  x   /  AST  29  /  ALT  19  /  AlkPhos  84  01-06      LIVER FUNCTIONS - ( 06 Jan 2022 05:20 )  Alb: 2.8 g/dL / Pro: 6.5 g/dL / ALK PHOS: 84 U/L / ALT: 19 U/L / AST: 29 U/L / GGT: x                   CURRENT MEDICATIONS  MEDICATIONS  (STANDING):  amLODIPine   Tablet 5 milliGRAM(s) Oral daily  apixaban 5 milliGRAM(s) Oral two times a day  multivitamin 1 Tablet(s) Oral daily  pantoprazole    Tablet 40 milliGRAM(s) Oral before breakfast  senna 2 Tablet(s) Oral at bedtime    MEDICATIONS  (PRN):  acetaminophen     Tablet .. 650 milliGRAM(s) Oral every 6 hours PRN Mild Pain (1 - 3)  polyethylene glycol 3350 17 Gram(s) Oral daily PRN Constipation

## 2022-01-07 NOTE — PROGRESS NOTE ADULT - ASSESSMENT
ASSESSMENT/PLAN  This is a 67 YO female with no PMH who was admitted to [facility] on 12/11/21  with c/o HA/vomiting since 12/10 found to have SAH c/b hydrocephalus. On 12/11 s/p coiling of L P comm aneurysm, found to have a partial left m2 thrombus now s/p thrombectomy with TICI 3 reperfusion. On 12/14,  s/p R EVD for hydro c/b sizable tract hemorrhage with IVH now with L EVD, removed on 12/29. 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 Patient now with gait Instability, ADL impairments and Functional impairments.    # SAH  - On 12/11 s/p coiling of L Pcomm aneurysm, found to have a partial left m2 thrombus now s/p thrombectomy with TICI 3 reperfusion  -  s/p R EVD for hydro ON 12/14 c/b sizable tract hemorrhage with IVH now with L EVD, removed on 12/29.  - Start Comprehensive Rehab Program: PT/OT/ST  - PT: Focused on improving strength, endurance, coordination, balance, functional mobility, and transfers  - OT: Focused on improving strength, fine motor skills, coordination, posture and ADLs.    - ST: to diagnose and treat deficits in swallowing, cognition and communication.   - discontinue amantadine 100mg daily - may contribute to confusion in elderly\  - Neuropsychological evaluation   - obtained baseline head ct - no acute changes     #HTN  - Amlodipine 5mg daily    # HIT and Right Upper Arm DVT  - Apixaban 5 mg BID with GI ppx    #Pain management  - Tylenol PRN    #DVT ppx  - SCD, TEDs    #GI ppx  - Protonix    #Bowel Regimen  - Senna, miralax PRN    #Bladder management  - BS on admission, and q 8 hours (SC if > 400)  - Monitor UO    #FEN   - Diet: Pureed    #Precaution  - Fall, Aspiration, Seizure, right upper arm    #Skin:  - No active issues at this time  - Pressure injury/Skin: Turn Q2hrs while in bed, OOB to Chair, PT/OT     #Dysphagia    - SLP: evaluation and treatment    Outpatient Follow-up (Specialty/Name of physician):  Dr. Vladimir Lucas  [address] [address]  [phone]    Farhat Munoz  Internal medicine   [id number] [address] [place] [address] [phone]    Liz Garcia [address] [address] [phone]

## 2022-01-08 PROCEDURE — 99232 SBSQ HOSP IP/OBS MODERATE 35: CPT

## 2022-01-08 RX ORDER — ZINC OXIDE 200 MG/G
1 OINTMENT TOPICAL
Refills: 0 | Status: DISCONTINUED | OUTPATIENT
Start: 2022-01-08 | End: 2022-01-21

## 2022-01-08 RX ADMIN — Medication 1 TABLET(S): at 12:19

## 2022-01-08 RX ADMIN — APIXABAN 5 MILLIGRAM(S): 2.5 TABLET, FILM COATED ORAL at 05:43

## 2022-01-08 RX ADMIN — Medication 650 MILLIGRAM(S): at 15:25

## 2022-01-08 RX ADMIN — PANTOPRAZOLE SODIUM 40 MILLIGRAM(S): 20 TABLET, DELAYED RELEASE ORAL at 05:43

## 2022-01-08 RX ADMIN — Medication 650 MILLIGRAM(S): at 16:24

## 2022-01-08 RX ADMIN — APIXABAN 5 MILLIGRAM(S): 2.5 TABLET, FILM COATED ORAL at 17:55

## 2022-01-08 RX ADMIN — AMLODIPINE BESYLATE 5 MILLIGRAM(S): 2.5 TABLET ORAL at 05:43

## 2022-01-08 NOTE — PROGRESS NOTE ADULT - SUBJECTIVE AND OBJECTIVE BOX
No overnight events.  eating breakfast     REVIEW OF SYSTEMS  Constitutional - No fever,  No fatigue  Neurological - No headaches, No loss of strength  Musculoskeletal - No joint pain, No joint swelling, No muscle pain    VITALS  T(C): 36.7 (01-08-22 @ 08:12), Max: 36.7 (01-08-22 @ 08:12)  HR: 66 (01-08-22 @ 08:12) (63 - 66)  BP: 133/88 (01-08-22 @ 08:12) (114/72 - 133/88)  RR: 16 (01-08-22 @ 08:12) (14 - 16)  SpO2: 95% (01-08-22 @ 08:12) (95% - 96%)  Wt(kg): --       MEDICATIONS   acetaminophen     Tablet .. 650 milliGRAM(s) every 6 hours PRN  amLODIPine   Tablet 5 milliGRAM(s) daily  apixaban 5 milliGRAM(s) two times a day  multivitamin 1 Tablet(s) daily  pantoprazole    Tablet 40 milliGRAM(s) before breakfast  polyethylene glycol 3350 17 Gram(s) daily PRN  senna 2 Tablet(s) at bedtime      RECENT LABS/IMAGING                        ---------  PHYSICAL EXAM  Constitutional - NAD, Comfortable, in bed   Pulm - Breathing comfortably  Abd - Soft, NTND  Extremities - No edema, No calf tenderness  Neurologic Exam -                    Cognitive - Awake, Alert     Communication - Fluent      Sensory - Intact to LT  Psychiatric - Mood WNL, Affect WNL    ASSESSMENT/PLAN  66y Female with functional deficits after SAH, hydrocephalus, 12/11 s/p coiling of L P comm aneurysm, found to have a partial left m2 thrombus now s/p thrombectomy with TICI 3 reperfusion, EVD, hemorrhage with IVH  HIT, Rt UE DVT, on apixaban   Continue current medical management  Pain - Tylenol PRN  Continue 3hrs a day of comprehensive rehab program.

## 2022-01-08 NOTE — PROGRESS NOTE ADULT - SUBJECTIVE AND OBJECTIVE BOX
Patient is a 66y old  Female who presents with a chief complaint of SAH s/p coiling of L p comm aneurysm 12/11/21 and partial L M2 thrombus thrombectomy/reperfusion c/b hydrocephalus (07 Jan 2022 16:10)      SUBJECTIVE / OVERNIGHT EVENTS:  Pt seen and examined at bedside. No acute events overnight.  Pt denies cp, palpitations, sob, abd pain, N/V, fever, chills.    ROS:  All other review of systems negative    Allergies    No Known Allergies    Intolerances        MEDICATIONS  (STANDING):  amLODIPine   Tablet 5 milliGRAM(s) Oral daily  apixaban 5 milliGRAM(s) Oral two times a day  multivitamin 1 Tablet(s) Oral daily  pantoprazole    Tablet 40 milliGRAM(s) Oral before breakfast  senna 2 Tablet(s) Oral at bedtime    MEDICATIONS  (PRN):  acetaminophen     Tablet .. 650 milliGRAM(s) Oral every 6 hours PRN Mild Pain (1 - 3)  polyethylene glycol 3350 17 Gram(s) Oral daily PRN Constipation      Vital Signs Last 24 Hrs  T(C): 36.7 (08 Jan 2022 08:12), Max: 36.7 (08 Jan 2022 08:12)  T(F): 98.1 (08 Jan 2022 08:12), Max: 98.1 (08 Jan 2022 08:12)  HR: 66 (08 Jan 2022 08:12) (63 - 66)  BP: 133/88 (08 Jan 2022 08:12) (114/72 - 133/88)  BP(mean): --  RR: 16 (08 Jan 2022 08:12) (14 - 16)  SpO2: 95% (08 Jan 2022 08:12) (95% - 96%)  CAPILLARY BLOOD GLUCOSE        I&O's Summary      PHYSICAL EXAM:  GENERAL: NAD, well-developed  EYES: EOMI, PERRLA, conjunctiva and sclera clear  NECK: Supple, No JVD  CHEST/LUNG: Clear to auscultation bilaterally; No wheeze, nonlabored breathing  HEART: Regular rate and rhythm; No murmurs, rubs, or gallops  ABDOMEN: Soft, Nontender, Nondistended; Bowel sounds present  EXTREMITIES:  2+ Peripheral Pulses, No clubbing, cyanosis, or edema  NEUROLOGY: AAOx3, non-focal  PSYCH: calm, appropriate mood    LABS:                    RADIOLOGY & ADDITIONAL TESTS:  Results Reviewed:   Imaging Personally Reviewed:  Electrocardiogram Personally Reviewed:    COORDINATION OF CARE:  Care Discussed with Consultants/Other Providers [Y/N]:  Prior or Outpatient Records Reviewed [Y/N]:

## 2022-01-08 NOTE — PROGRESS NOTE ADULT - ASSESSMENT
65 YO female with no PMH who was admitted to [facility] on 12/11/21  with c/o HA/vomiting since 12/10 found to have SAH c/b hydrocephalus. On 12/11 s/p coiling of L P comm aneurysm, found to have a partial left m2 thrombus now s/p thrombectomy with TICI 3 reperfusion. On 12/14,  s/p R EVD for hydro c/b sizable tract hemorrhage with IVH now with L EVD, removed on 12/29. 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 Patient now with gait Instability, ADL impairments and Functional impairments.    #SAH  #Partial left m2 thrombus  #Debility  -S/p coiling of L Pcomm aneurysm  -S/p thrombectomy with TICI 3 reperfusion  -S/p R EVD for hydro ON 12/14 c/b sizable tract hemorrhage with IVH now with L EVD, removed on 12/29  -Continue comprehensive rehab program with PT/OT/SLP  -Continue amantadine 100mg daily    #HTN  -Continue Amlodipine    #HIT  #Right Upper Arm DVT  -Eliquis 5mg BID  -Monitor H/H    #DVT ppx  -Eliquis    #GI ppx  -Protonix

## 2022-01-09 PROCEDURE — 99232 SBSQ HOSP IP/OBS MODERATE 35: CPT

## 2022-01-09 PROCEDURE — 99232 SBSQ HOSP IP/OBS MODERATE 35: CPT | Mod: GC

## 2022-01-09 RX ADMIN — Medication 1 TABLET(S): at 11:45

## 2022-01-09 RX ADMIN — PANTOPRAZOLE SODIUM 40 MILLIGRAM(S): 20 TABLET, DELAYED RELEASE ORAL at 05:30

## 2022-01-09 RX ADMIN — APIXABAN 5 MILLIGRAM(S): 2.5 TABLET, FILM COATED ORAL at 17:45

## 2022-01-09 RX ADMIN — APIXABAN 5 MILLIGRAM(S): 2.5 TABLET, FILM COATED ORAL at 05:30

## 2022-01-09 RX ADMIN — AMLODIPINE BESYLATE 5 MILLIGRAM(S): 2.5 TABLET ORAL at 05:30

## 2022-01-09 RX ADMIN — SENNA PLUS 2 TABLET(S): 8.6 TABLET ORAL at 22:12

## 2022-01-09 NOTE — PROGRESS NOTE ADULT - ASSESSMENT
67 YO female with no PMH who was admitted to [facility] on 12/11/21  with c/o HA/vomiting since 12/10 found to have SAH c/b hydrocephalus. On 12/11 s/p coiling of L P comm aneurysm, found to have a partial left m2 thrombus now s/p thrombectomy with TICI 3 reperfusion. On 12/14,  s/p R EVD for hydro c/b sizable tract hemorrhage with IVH now with L EVD, removed on 12/29. 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 Patient now with gait Instability, ADL impairments and Functional impairments.    Problem  1. SAH, Partial left m2 thrombus with Debility  2. HIT  3. HTN    Plan  Neurology  SAH  -S/p coiling of L Pcomm aneurysm  -S/p thrombectomy with TICI 3 reperfusion  -S/p R EVD for hydro ON 12/14 c/b sizable tract hemorrhage with IVH now with L EVD, removed on 12/29  -Continue comprehensive rehab program with PT/OT/SLP  -Continue amantadine 100mg daily    Cardiovascular  Hypertension  -Continue Amlodipine    Hematology  HIT with Right Upper Arm DVT  - Eliquis 5mg BID  - decrease in h/h, repeat ordered for tomorrow    DVT ppx: eliquis  GI ppx: protonix  Diet: per primary

## 2022-01-09 NOTE — PROGRESS NOTE ADULT - SUBJECTIVE AND OBJECTIVE BOX
Patient seen and examined at bedside. 10 point ROS is otherwise    ALLERGIES:  No Known Allergies    MEDICATIONS  (STANDING):  amLODIPine   Tablet 5 milliGRAM(s) Oral daily  apixaban 5 milliGRAM(s) Oral two times a day  multivitamin 1 Tablet(s) Oral daily  pantoprazole    Tablet 40 milliGRAM(s) Oral before breakfast  senna 2 Tablet(s) Oral at bedtime    MEDICATIONS  (PRN):  acetaminophen     Tablet .. 650 milliGRAM(s) Oral every 6 hours PRN Mild Pain (1 - 3)  polyethylene glycol 3350 17 Gram(s) Oral daily PRN Constipation  zinc oxide 40% Ointment 1 Application(s) Topical two times a day PRN rash    Vital Signs Last 24 Hrs  T(F): 98.2 (09 Jan 2022 07:45), Max: 98.2 (09 Jan 2022 07:45)  HR: 63 (09 Jan 2022 07:45) (63 - 76)  BP: 122/66 (09 Jan 2022 07:45) (101/62 - 122/66)  RR: 16 (09 Jan 2022 07:45) (16 - 16)  SpO2: 95% (09 Jan 2022 07:45) (95% - 95%)  I&O's Summary    BMI (kg/m2): 20.2 (01-05-22 @ 17:57)  PHYSICAL EXAM:  Gen: nad, resting in bed  Neuro: aaox3, left sided weakness noted  Heent: eomi b/l, no jvd, no oral exudates  Pulm: cta b/l, no w/r/r  CV: +s1s2, no m/r/g  Ab: soft, nt/nd, normoactive bs x 4  Extrem: no edema, pulses intact and equal      LABS:      COVID-19 PCR: NotDetec (01-05-22 @ 18:40)

## 2022-01-09 NOTE — PROGRESS NOTE ADULT - SUBJECTIVE AND OBJECTIVE BOX
No overnight events.      REVIEW OF SYSTEMS  Constitutional - No fever,  No fatigue  Neurological - No headaches, No loss of strength  Musculoskeletal - No joint pain, No joint swelling, No muscle pain    VITALS  T(C): 36.8 (01-09-22 @ 07:45), Max: 36.8 (01-09-22 @ 07:45)  HR: 63 (01-09-22 @ 07:45) (63 - 76)  BP: 122/66 (01-09-22 @ 07:45) (101/62 - 122/66)  RR: 16 (01-09-22 @ 07:45) (16 - 16)  SpO2: 95% (01-09-22 @ 07:45) (95% - 95%)  Wt(kg): --       MEDICATIONS   acetaminophen     Tablet .. 650 milliGRAM(s) every 6 hours PRN  amLODIPine   Tablet 5 milliGRAM(s) daily  apixaban 5 milliGRAM(s) two times a day  multivitamin 1 Tablet(s) daily  pantoprazole    Tablet 40 milliGRAM(s) before breakfast  polyethylene glycol 3350 17 Gram(s) daily PRN  senna 2 Tablet(s) at bedtime  zinc oxide 40% Ointment 1 Application(s) two times a day PRN      RECENT LABS/IMAGING          ---------  PHYSICAL EXAM  Constitutional - NAD, Comfortable, in bed   Pulm - Breathing comfortably  Abd - Soft, NTND  Extremities - No edema, No calf tenderness  Neurologic Exam -                    Cognitive - Awake, Alert     Communication - Fluent      Sensory - Intact to LT  Psychiatric - Mood WNL, Affect WNL    ASSESSMENT/PLAN  66y Female with functional deficits after SAH, hydrocephalus, 12/11 s/p coiling of L P comm aneurysm, found to have a partial left m2 thrombus now s/p thrombectomy with TICI 3 reperfusion, EVD, hemorrhage with IVH  HIT, Rt UE DVT, on apixaban   Continue current medical management  Pain - Tylenol PRN  Continue 3hrs a day of comprehensive rehab program.

## 2022-01-10 LAB
ALBUMIN SERPL ELPH-MCNC: 2.9 G/DL — LOW (ref 3.3–5)
ALP SERPL-CCNC: 85 U/L — SIGNIFICANT CHANGE UP (ref 40–120)
ALT FLD-CCNC: 15 U/L — SIGNIFICANT CHANGE UP (ref 10–45)
ANION GAP SERPL CALC-SCNC: 7 MMOL/L — SIGNIFICANT CHANGE UP (ref 5–17)
AST SERPL-CCNC: 14 U/L — SIGNIFICANT CHANGE UP (ref 10–40)
BASOPHILS # BLD AUTO: 0.05 K/UL — SIGNIFICANT CHANGE UP (ref 0–0.2)
BASOPHILS NFR BLD AUTO: 1.3 % — SIGNIFICANT CHANGE UP (ref 0–2)
BILIRUB SERPL-MCNC: 0.3 MG/DL — SIGNIFICANT CHANGE UP (ref 0.2–1.2)
BUN SERPL-MCNC: 14 MG/DL — SIGNIFICANT CHANGE UP (ref 7–23)
CALCIUM SERPL-MCNC: 8.8 MG/DL — SIGNIFICANT CHANGE UP (ref 8.4–10.5)
CHLORIDE SERPL-SCNC: 106 MMOL/L — SIGNIFICANT CHANGE UP (ref 96–108)
CO2 SERPL-SCNC: 29 MMOL/L — SIGNIFICANT CHANGE UP (ref 22–31)
CREAT SERPL-MCNC: 0.59 MG/DL — SIGNIFICANT CHANGE UP (ref 0.5–1.3)
EOSINOPHIL # BLD AUTO: 0.18 K/UL — SIGNIFICANT CHANGE UP (ref 0–0.5)
EOSINOPHIL NFR BLD AUTO: 4.5 % — SIGNIFICANT CHANGE UP (ref 0–6)
GLUCOSE SERPL-MCNC: 86 MG/DL — SIGNIFICANT CHANGE UP (ref 70–99)
HCT VFR BLD CALC: 34.7 % — SIGNIFICANT CHANGE UP (ref 34.5–45)
HGB BLD-MCNC: 11.2 G/DL — LOW (ref 11.5–15.5)
IMM GRANULOCYTES NFR BLD AUTO: 0.3 % — SIGNIFICANT CHANGE UP (ref 0–1.5)
LYMPHOCYTES # BLD AUTO: 1.46 K/UL — SIGNIFICANT CHANGE UP (ref 1–3.3)
LYMPHOCYTES # BLD AUTO: 36.6 % — SIGNIFICANT CHANGE UP (ref 13–44)
MCHC RBC-ENTMCNC: 31 PG — SIGNIFICANT CHANGE UP (ref 27–34)
MCHC RBC-ENTMCNC: 32.3 GM/DL — SIGNIFICANT CHANGE UP (ref 32–36)
MCV RBC AUTO: 96.1 FL — SIGNIFICANT CHANGE UP (ref 80–100)
MONOCYTES # BLD AUTO: 0.32 K/UL — SIGNIFICANT CHANGE UP (ref 0–0.9)
MONOCYTES NFR BLD AUTO: 8 % — SIGNIFICANT CHANGE UP (ref 2–14)
NEUTROPHILS # BLD AUTO: 1.97 K/UL — SIGNIFICANT CHANGE UP (ref 1.8–7.4)
NEUTROPHILS NFR BLD AUTO: 49.3 % — SIGNIFICANT CHANGE UP (ref 43–77)
NRBC # BLD: 0 /100 WBCS — SIGNIFICANT CHANGE UP (ref 0–0)
PLATELET # BLD AUTO: 174 K/UL — SIGNIFICANT CHANGE UP (ref 150–400)
POTASSIUM SERPL-MCNC: 3.5 MMOL/L — SIGNIFICANT CHANGE UP (ref 3.5–5.3)
POTASSIUM SERPL-SCNC: 3.5 MMOL/L — SIGNIFICANT CHANGE UP (ref 3.5–5.3)
PROT SERPL-MCNC: 6.1 G/DL — SIGNIFICANT CHANGE UP (ref 6–8.3)
RBC # BLD: 3.61 M/UL — LOW (ref 3.8–5.2)
RBC # FLD: 13.9 % — SIGNIFICANT CHANGE UP (ref 10.3–14.5)
SODIUM SERPL-SCNC: 142 MMOL/L — SIGNIFICANT CHANGE UP (ref 135–145)
WBC # BLD: 3.99 K/UL — SIGNIFICANT CHANGE UP (ref 3.8–10.5)
WBC # FLD AUTO: 3.99 K/UL — SIGNIFICANT CHANGE UP (ref 3.8–10.5)

## 2022-01-10 PROCEDURE — 99233 SBSQ HOSP IP/OBS HIGH 50: CPT

## 2022-01-10 RX ORDER — PANTOPRAZOLE SODIUM 20 MG/1
40 TABLET, DELAYED RELEASE ORAL
Refills: 0 | Status: DISCONTINUED | OUTPATIENT
Start: 2022-01-10 | End: 2022-01-21

## 2022-01-10 RX ADMIN — SENNA PLUS 2 TABLET(S): 8.6 TABLET ORAL at 21:27

## 2022-01-10 RX ADMIN — AMLODIPINE BESYLATE 5 MILLIGRAM(S): 2.5 TABLET ORAL at 06:26

## 2022-01-10 RX ADMIN — APIXABAN 5 MILLIGRAM(S): 2.5 TABLET, FILM COATED ORAL at 17:53

## 2022-01-10 RX ADMIN — Medication 1 TABLET(S): at 12:26

## 2022-01-10 RX ADMIN — PANTOPRAZOLE SODIUM 40 MILLIGRAM(S): 20 TABLET, DELAYED RELEASE ORAL at 08:33

## 2022-01-10 RX ADMIN — APIXABAN 5 MILLIGRAM(S): 2.5 TABLET, FILM COATED ORAL at 06:26

## 2022-01-10 NOTE — PROGRESS NOTE ADULT - SUBJECTIVE AND OBJECTIVE BOX
CHIEF COMPLAINT: She states she is feeling well. No complaints overnight.       HISTORY OF PRESENT ILLNESS    This is a 67 YO female with no PMH who was admitted to [facility] on 12/11/21  with c/o HA/vomiting since 12/10 found to have SAH c/b hydrocephaluns. On 12/11 s/p coiling of L Posterior communicating  aneurysm, found to have a partial left m2 thrombus now s/p thrombectomy with TICI 3 reperfusion. On 12/14, s/p R EVD for hydro c/b sizable tract hemorrhage with IVH now with L EVD, removed on 12/29. 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 Patient was medically optimized for discharge to [facility] IRU on 1/5/22.    Pfizer x 2 + booster ( last dose 8/11/21) (05 Jan 2022 14:15)        PAST MEDICAL & SURGICAL HISTORY:  No pertinent past medical history    No significant past surgical history        Vital Signs Last 24 Hrs  T(C): 36.7 (10 Deniz 2022 08:38), Max: 36.8 (09 Jan 2022 20:24)  T(F): 98.1 (10 Deniz 2022 08:38), Max: 98.2 (09 Jan 2022 20:24)  HR: 61 (10 Deniz 2022 08:38) (61 - 74)  BP: 115/72 (10 Deniz 2022 08:38) (112/71 - 127/78)  BP(mean): --  RR: 16 (10 Deniz 2022 08:38) (16 - 16)  SpO2: 99% (10 Deniz 2022 08:38) (95% - 99%)      PHYSICAL EXAM  Constitutional - NAD, Comfortable  HEENT - NCAT, EOMI  Neck - Supple, No limited ROM  Chest - CTA bilaterally  Cardiovascular - RRR, S1S2  Abdomen -Soft, NTND  Extremities - No C/C/E, No calf tenderness   Neurologic Exam -  aox3, mathias                 LABS:                          11.2   3.99  )-----------( 174      ( 10 Deniz 2022 05:30 )             34.7     01-10    142  |  106  |  14  ----------------------------<  86  3.5   |  29  |  0.59    Ca    8.8      10 Deniz 2022 05:30    TPro  6.1  /  Alb  2.9<L>  /  TBili  0.3  /  DBili  x   /  AST  14  /  ALT  15  /  AlkPhos  85  01-10    LIVER FUNCTIONS - ( 10 Deniz 2022 05:30 )  Alb: 2.9 g/dL / Pro: 6.1 g/dL / ALK PHOS: 85 U/L / ALT: 15 U/L / AST: 14 U/L / GGT: x               MEDICATIONS  (STANDING):  amLODIPine   Tablet 5 milliGRAM(s) Oral daily  apixaban 5 milliGRAM(s) Oral two times a day  multivitamin 1 Tablet(s) Oral daily  pantoprazole   Suspension 40 milliGRAM(s) Oral before breakfast  senna 2 Tablet(s) Oral at bedtime    MEDICATIONS  (PRN):  acetaminophen     Tablet .. 650 milliGRAM(s) Oral every 6 hours PRN Mild Pain (1 - 3)  polyethylene glycol 3350 17 Gram(s) Oral daily PRN Constipation  zinc oxide 40% Ointment 1 Application(s) Topical two times a day PRN rash

## 2022-01-10 NOTE — PROGRESS NOTE ADULT - ASSESSMENT
ASSESSMENT/PLAN  This is a 65 YO female with no PMH who was admitted to [facility] on 12/11/21  with c/o HA/vomiting since 12/10 found to have SAH c/b hydrocephalus. On 12/11 s/p coiling of L P comm aneurysm, found to have a partial left m2 thrombus now s/p thrombectomy with TICI 3 reperfusion. On 12/14,  s/p R EVD for hydro c/b sizable tract hemorrhage with IVH now with L EVD, removed on 12/29. 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 Patient now with gait Instability, ADL impairments and Functional impairments.    # SAH  - On 12/11 s/p coiling of L Pcomm aneurysm, found to have a partial left m2 thrombus now s/p thrombectomy with TICI 3 reperfusion  -  s/p R EVD for hydro ON 12/14 c/b sizable tract hemorrhage with IVH now with L EVD, removed on 12/29. - Sutures from EVD removal to be removed 1/12  - Continue Comprehensive Rehab Program: PT/OT/ST  - PT: Focused on improving strength, endurance, coordination, balance, functional mobility, and transfers  - OT: Focused on improving strength, fine motor skills, coordination, posture and ADLs.    - ST: to diagnose and treat deficits in swallowing, cognition and communication.   - Discontinue amantadine 100mg daily - may contribute to confusion in elderly  - Neuropsychological evaluation   - obtained baseline head ct - no acute changes     #HTN  - Continue Amlodipine 5mg daily    # HIT and Right Upper Arm DVT  - Apixaban 5 mg BID with GI ppx    #Pain management  - Tylenol PRN    #DVT ppx  - SCD, TEDs    #GI ppx  - Protonix    #Bowel Regimen  - Senna at bedtime  - miralax PRN    #Bladder management  - BS on admission, and q 8 hours (SC if > 400)  - Monitor UO    #FEN   - Diet: Regular with thin    #Precaution  - Fall, Aspiration, Seizure, right upper arm    #Skin:  - No active issues at this time  - Pressure injury/Skin: Turn Q2hrs while in bed, OOB to Chair, PT/OT     #Dysphagia    - SLP: evaluation and treatment    Outpatient Follow-up (Specialty/Name of physician):  Dr. Vladimir Lucas  [address] [facility], [address] [place] [address]  [phone]    Farhat Munoz  Internal medicine   [phone] [place] [address]  [phone]    Liz Garcia [address] [id number] [phone]      TEAM MEETING 1/10/22  SW:  2 MYRTLE, 12 stairs inside, spouse and daughter involved   OT: min for tx, sv for eating, min for all other adls  PT: cg-min for transfers, 100' with no device and min assist  SLP: Regular with thins, max for cog  barriers: sever cog impairment, poor carryover, dec safety awareness, LE buckling, motor apraxia, weak, distracted, dec insight  goals: Sv for tranfers and adls, 80' with min  EDOD: Home 1/26

## 2022-01-11 PROCEDURE — 99232 SBSQ HOSP IP/OBS MODERATE 35: CPT

## 2022-01-11 RX ADMIN — APIXABAN 5 MILLIGRAM(S): 2.5 TABLET, FILM COATED ORAL at 05:40

## 2022-01-11 RX ADMIN — AMLODIPINE BESYLATE 5 MILLIGRAM(S): 2.5 TABLET ORAL at 05:40

## 2022-01-11 RX ADMIN — Medication 1 TABLET(S): at 11:11

## 2022-01-11 RX ADMIN — PANTOPRAZOLE SODIUM 40 MILLIGRAM(S): 20 TABLET, DELAYED RELEASE ORAL at 05:40

## 2022-01-11 RX ADMIN — APIXABAN 5 MILLIGRAM(S): 2.5 TABLET, FILM COATED ORAL at 18:31

## 2022-01-11 NOTE — PROGRESS NOTE ADULT - ASSESSMENT
65 YO female with no PMH who was admitted to [facility] on 12/11/21  with c/o HA/vomiting since 12/10 found to have SAH c/b hydrocephalus. On 12/11 s/p coiling of L P comm aneurysm, found to have a partial left m2 thrombus now s/p thrombectomy with TICI 3 reperfusion. On 12/14,  s/p R EVD for hydro c/b sizable tract hemorrhage with IVH now with L EVD, removed on 12/29. 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 Patient now with gait Instability, ADL impairments and Functional impairments.    Subarachnoid hemorrhage.  -S/p coiling of L Pcomm aneurysm  -S/p thrombectomy with TICI 3 reperfusion  -S/p R EVD for hydro ON 12/14 c/b sizable tract hemorrhage with IVH now with L EVD, removed on 12/29  -Continue comprehensive rehab program with PT/OT/SLP  -Continue amantadine 100mg daily    Hypertension  -Continue Amlodipine    HIT with Right Upper Arm DVT  - Eliquis 5mg BID  - decrease in h/h, repeat ordered for tomorrow    DVT ppx: eliquis  GI ppx: protonix  Diet: per primary

## 2022-01-11 NOTE — CHART NOTE - NSCHARTNOTEFT_GEN_A_CORE
Nutrition Follow Up Note  Hospital Course (Per Electronic Medical Record): 65 YO female with no PMH who was admitted to [facility] on 12/11/21  with c/o HA/vomiting since 12/10 found to have SAH c/b hydrocephalus. On 12/11 s/p coiling of L P comm aneurysm, found to have a partial left m2 thrombus now s/p thrombectomy with TICI 3 reperfusion. On 12/14,  s/p R EVD for hydro c/b sizable tract hemorrhage with IVH now with L EVD, removed on 12/29. 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 Patient now with gait Instability, ADL impairments and Functional impairments.    Source: Medical Record [X] Patient [X] Family [ ]         Diet: Regular, DASH/TLC, Ensure Enlive BID  Pt tolerating diet with good PO intake per nursing flow sheets, consuming % of meals, although pt observed with suboptimal PO intake during lunch today. Consumed <25% of meal, 50% of Ensure Enlive. Pt confused, unable to provide food preferences. Pt needs some assistance/redirection during meals. No nausea, vomiting, diarrhea constipation reported.     Enteral/Parenteral Nutrition: N/A    Current Weight: 101.4 lbs (1/8)  100 lbs (1/7)  103.6 lbs (1/5)    Pertinent Medications: MEDICATIONS  (STANDING):  amLODIPine   Tablet 5 milliGRAM(s) Oral daily  apixaban 5 milliGRAM(s) Oral two times a day  multivitamin 1 Tablet(s) Oral daily  pantoprazole   Suspension 40 milliGRAM(s) Oral before breakfast  senna 2 Tablet(s) Oral at bedtime    MEDICATIONS  (PRN):  acetaminophen     Tablet .. 650 milliGRAM(s) Oral every 6 hours PRN Mild Pain (1 - 3)  polyethylene glycol 3350 17 Gram(s) Oral daily PRN Constipation  zinc oxide 40% Ointment 1 Application(s) Topical two times a day PRN rash      Pertinent Labs:  01-10 Na142 mmol/L Glu 86 mg/dL K+ 3.5 mmol/L Cr  0.59 mg/dL BUN 14 mg/dL 01-10 Alb 2.9 g/dL<L>        Skin: surgical incision, incontinence associated dermatitis per nursing flow sheets.     Edema: No edema per nursing flow sheets     Last BM: on 1/9 Per nursing flowsheets     Estimated Needs:   [X] No Change since Previous Assessment  [ ] Recalculated:     Previous Nutrition Diagnosis:   Inadequate oral intake    Nutrition Diagnosis is [X] Ongoing    [ ] Resolved   [ ] Not Applicable      New Nutrition Diagnosis: [X] Not Applicable  [ ] Inadequate Protein Energy Intake   [ ] Inadequate Oral Intake   [ ] Excessive Energy Intake   [ ] Increased Nutrient Needs   [ ] Obesity   [ ] Altered GI Function   [ ] Unintended Weight Loss   [ ] Food & Nutrition Related Knowledge Deficit  [ ] Limited Adherence to nutrition related recommendations   [ ] Malnutrition      Interventions:   1. Recommend continuing with current diet  2. Encourage PO intake during meals  3. Continue w/ Ensure Enlive  4. Recommend 1:1 feeding during meals     Monitoring & Evaluation:   [X] Weights   [X] PO Intake   [X] Follow Up (Per Protocol)  [X] Tolerance to Diet Prescription   [X] Other: Labs     RD Remains Available.  Jazz Gallegos RD

## 2022-01-11 NOTE — PROGRESS NOTE ADULT - SUBJECTIVE AND OBJECTIVE BOX
CHIEF COMPLAINT: No acute events overnight. She is participating well in therapy.       HISTORY OF PRESENT ILLNESS    This is a 65 YO female with no PMH who was admitted to [facility] on 12/11/21  with c/o HA/vomiting since 12/10 found to have SAH c/b hydrocephaluns. On 12/11 s/p coiling of L Posterior communicating  aneurysm, found to have a partial left m2 thrombus now s/p thrombectomy with TICI 3 reperfusion. On 12/14, s/p R EVD for hydro c/b sizable tract hemorrhage with IVH now with L EVD, removed on 12/29. 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 Patient was medically optimized for discharge to [facility] IRU on 1/5/22.    Pfizer x 2 + booster ( last dose 8/11/21) (05 Jan 2022 14:15)        PAST MEDICAL & SURGICAL HISTORY:  No pertinent past medical history    No significant past surgical history      Vital Signs Last 24 Hrs  T(C): 36.6 (11 Jan 2022 08:18), Max: 36.6 (10 Deniz 2022 19:57)  T(F): 97.8 (11 Jan 2022 08:18), Max: 97.8 (10 Deniz 2022 19:57)  HR: 77 (11 Jan 2022 08:18) (59 - 77)  BP: 101/69 (11 Jan 2022 08:18) (101/69 - 123/82)  BP(mean): --  RR: 15 (11 Jan 2022 08:18) (15 - 15)  SpO2: 99% (11 Jan 2022 08:18) (95% - 99%)      PHYSICAL EXAM  Constitutional - NAD, Comfortable  HEENT - NCAT, EOMI  Neck - Supple, No limited ROM  Chest - CTA bilaterally  Cardiovascular - RRR, S1S2  Abdomen -Soft, NTND  Extremities - No C/C/E, No calf tenderness   Neurologic Exam -  aox3, mathias                 LABS:                          11.2   3.99  )-----------( 174      ( 10 Deniz 2022 05:30 )             34.7     01-10    142  |  106  |  14  ----------------------------<  86  3.5   |  29  |  0.59    Ca    8.8      10 Deniz 2022 05:30    TPro  6.1  /  Alb  2.9<L>  /  TBili  0.3  /  DBili  x   /  AST  14  /  ALT  15  /  AlkPhos  85  01-10    LIVER FUNCTIONS - ( 10 Deniz 2022 05:30 )  Alb: 2.9 g/dL / Pro: 6.1 g/dL / ALK PHOS: 85 U/L / ALT: 15 U/L / AST: 14 U/L / GGT: x               MEDICATIONS  (STANDING):  amLODIPine   Tablet 5 milliGRAM(s) Oral daily  apixaban 5 milliGRAM(s) Oral two times a day  multivitamin 1 Tablet(s) Oral daily  pantoprazole   Suspension 40 milliGRAM(s) Oral before breakfast  senna 2 Tablet(s) Oral at bedtime    MEDICATIONS  (PRN):  acetaminophen     Tablet .. 650 milliGRAM(s) Oral every 6 hours PRN Mild Pain (1 - 3)  polyethylene glycol 3350 17 Gram(s) Oral daily PRN Constipation  zinc oxide 40% Ointment 1 Application(s) Topical two times a day PRN rash

## 2022-01-11 NOTE — PROGRESS NOTE ADULT - SUBJECTIVE AND OBJECTIVE BOX
Patient is a 66y old  Female who presents with a chief complaint of SAH s/p coiling of L p comm aneurysm 12/11/21 and partial L M2 thrombus thrombectomy/reperfusion c/b hydrocephalus (11 Jan 2022 12:34)      24 HOUR EVENTS:  No overnight events reported.     SUBJECTIVE:  Patient seen and examined at bedside.   unable to offer complaints d/t aphasia    ALLERGIES:  No Known Allergies    MEDICATIONS  (STANDING):  amLODIPine   Tablet 5 milliGRAM(s) Oral daily  apixaban 5 milliGRAM(s) Oral two times a day  multivitamin 1 Tablet(s) Oral daily  pantoprazole   Suspension 40 milliGRAM(s) Oral before breakfast  senna 2 Tablet(s) Oral at bedtime    MEDICATIONS  (PRN):  acetaminophen     Tablet .. 650 milliGRAM(s) Oral every 6 hours PRN Mild Pain (1 - 3)  polyethylene glycol 3350 17 Gram(s) Oral daily PRN Constipation  zinc oxide 40% Ointment 1 Application(s) Topical two times a day PRN rash    Vital Signs Last 24 Hrs  T(F): 97.8 (11 Jan 2022 08:18), Max: 97.8 (10 Deniz 2022 19:57)  HR: 77 (11 Jan 2022 08:18) (59 - 77)  BP: 101/69 (11 Jan 2022 08:18) (101/69 - 123/82)  RR: 15 (11 Jan 2022 08:18) (15 - 15)  SpO2: 99% (11 Jan 2022 08:18) (95% - 99%)  I&O's Summary    PHYSICAL EXAM:  General: NAD, Awake, aphasic.   ENT: Moist mucous membranes, no thrush  Neck: Supple, No JVD  Lungs: Clear to auscultation bilaterally, good air entry, non-labored breathing  Cardio: RRR, S1/S2, No murmur  Abdomen: Soft, Nontender, Nondistended; Bowel sounds present  Extremities: No calf tenderness, No pitting edema    LABS:                        11.2   3.99  )-----------( 174      ( 10 Deniz 2022 05:30 )             34.7     01-10    142  |  106  |  14  ----------------------------<  86  3.5   |  29  |  0.59    Ca    8.8      10 Deniz 2022 05:30    TPro  6.1  /  Alb  2.9  /  TBili  0.3  /  DBili  x   /  AST  14  /  ALT  15  /  AlkPhos  85  01-10        eGFR if Non African American: 96 mL/min/1.73M2 (01-10-22 @ 05:30)  eGFR if African American: 111 mL/min/1.73M2 (01-10-22 @ 05:30)                                    COVID-19 PCR: NotDetec (01-05-22 @ 18:40)    RADIOLOGY & ADDITIONAL TESTS:    Care Discussed with Consultants/Other Providers:

## 2022-01-12 PROCEDURE — 99232 SBSQ HOSP IP/OBS MODERATE 35: CPT

## 2022-01-12 RX ORDER — ESCITALOPRAM OXALATE 10 MG/1
5 TABLET, FILM COATED ORAL DAILY
Refills: 0 | Status: DISCONTINUED | OUTPATIENT
Start: 2022-01-12 | End: 2022-01-21

## 2022-01-12 RX ADMIN — SENNA PLUS 2 TABLET(S): 8.6 TABLET ORAL at 21:06

## 2022-01-12 RX ADMIN — AMLODIPINE BESYLATE 5 MILLIGRAM(S): 2.5 TABLET ORAL at 06:10

## 2022-01-12 RX ADMIN — APIXABAN 5 MILLIGRAM(S): 2.5 TABLET, FILM COATED ORAL at 06:10

## 2022-01-12 RX ADMIN — ESCITALOPRAM OXALATE 5 MILLIGRAM(S): 10 TABLET, FILM COATED ORAL at 17:39

## 2022-01-12 RX ADMIN — PANTOPRAZOLE SODIUM 40 MILLIGRAM(S): 20 TABLET, DELAYED RELEASE ORAL at 06:10

## 2022-01-12 RX ADMIN — APIXABAN 5 MILLIGRAM(S): 2.5 TABLET, FILM COATED ORAL at 17:39

## 2022-01-12 RX ADMIN — Medication 1 TABLET(S): at 11:37

## 2022-01-12 NOTE — PROGRESS NOTE ADULT - ASSESSMENT
ASSESSMENT/PLAN  This is a 67 YO female with no PMH who was admitted to [facility] on 12/11/21  with c/o HA/vomiting since 12/10 found to have SAH c/b hydrocephalus. On 12/11 s/p coiling of L P comm aneurysm, found to have a partial left m2 thrombus now s/p thrombectomy with TICI 3 reperfusion. On 12/14,  s/p R EVD for hydro c/b sizable tract hemorrhage with IVH now with L EVD, removed on 12/29. 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 Patient now with gait Instability, ADL impairments and Functional impairments.    # SAH  - On 12/11 s/p coiling of L Pcomm aneurysm, found to have a partial left m2 thrombus now s/p thrombectomy with TICI 3 reperfusion  -  s/p R EVD for hydro ON 12/14 c/b sizable tract hemorrhage with IVH now with L EVD, removed on 12/29. - Staples from EVD site removed today and tolerated well  - Continue Comprehensive Rehab Program: PT/OT/ST  - PT: Focused on improving strength, endurance, coordination, balance, functional mobility, and transfers  - OT: Focused on improving strength, fine motor skills, coordination, posture and ADLs.    - ST: to diagnose and treat deficits in swallowing, cognition and communication.   - Discontinue amantadine 100mg daily - may contribute to confusion in elderly  - Neuropsychological evaluation   - obtained baseline head ct - no acute changes     #HTN  - Continue Amlodipine 5mg daily    # HIT and Right Upper Arm DVT  - Apixaban 5 mg BID with GI ppx    #Depression/emotional lability  -Start lexparo 5mg daily (1/12)    #Pain management  - Tylenol PRN    #DVT ppx  - SCD, TEDs    #GI ppx  - Protonix    #Bowel Regimen  - Senna at bedtime  - miralax PRN    #Bladder management  - BS on admission, and q 8 hours (SC if > 400)  - Monitor UO    #FEN   - Diet: Regular with thin    #Precaution  - Fall, Aspiration, Seizure, right upper arm    #Skin:  - No active issues at this time  - Pressure injury/Skin: Turn Q2hrs while in bed, OOB to Chair, PT/OT     #Dysphagia    - SLP: evaluation and treatment    Outpatient Follow-up (Specialty/Name of physician):  Dr. Vladimir Lucas  [address] [address]  [phone]    Farhat Munoz  Internal medicine   [address] [place] [address] [id number] [phone]    Liz Garcia [address] [id number] [phone]      TEAM MEETING 1/10/22  SW:  2 MYRTLE, 12 stairs inside, spouse and daughter involved   OT: min for tx, sv for eating, min for all other adls  PT: cg-min for transfers, 100' with no device and min assist  SLP: Regular with thins, max for cog  barriers: sever cog impairment, poor carryover, dec safety awareness, LE buckling, motor apraxia, weak, distracted, dec insight  goals: Sv for tranfers and adls, 80' with min  EDOD: Home 1/26

## 2022-01-12 NOTE — PROGRESS NOTE ADULT - SUBJECTIVE AND OBJECTIVE BOX
CHIEF COMPLAINT: She appears to be more depressed/sad today. Discussed patient updates with daughter and reviewed her emotional lability.        HISTORY OF PRESENT ILLNESS    This is a 65 YO female with no PMH who was admitted to [facility] on 12/11/21  with c/o HA/vomiting since 12/10 found to have SAH c/b hydrocephaluns. On 12/11 s/p coiling of L Posterior communicating  aneurysm, found to have a partial left m2 thrombus now s/p thrombectomy with TICI 3 reperfusion. On 12/14, s/p R EVD for hydro c/b sizable tract hemorrhage with IVH now with L EVD, removed on 12/29. 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 Patient was medically optimized for discharge to [facility] IRU on 1/5/22.    Pfizer x 2 + booster ( last dose 8/11/21) (05 Jan 2022 14:15)        PAST MEDICAL & SURGICAL HISTORY:  No pertinent past medical history    No significant past surgical history      Vital Signs Last 24 Hrs  T(C): 36.6 (12 Jan 2022 07:57), Max: 36.6 (11 Jan 2022 19:47)  T(F): 97.9 (12 Jan 2022 07:57), Max: 97.9 (11 Jan 2022 19:47)  HR: 67 (12 Jan 2022 07:57) (60 - 74)  BP: 103/68 (12 Jan 2022 07:57) (103/68 - 124/73)  BP(mean): --  RR: 16 (12 Jan 2022 07:57) (16 - 16)  SpO2: 96% (12 Jan 2022 07:57) (94% - 96%)      PHYSICAL EXAM  Constitutional - NAD, Comfortable  HEENT - NCAT, EOMI  Neck - Supple, No limited ROM  Chest - CTA bilaterally  Cardiovascular - RRR, S1S2  Abdomen -Soft, NTND  Extremities - No C/C/E, No calf tenderness   Neurologic Exam -  aox3, mathias                 LABS:                          11.2   3.99  )-----------( 174      ( 10 Deniz 2022 05:30 )             34.7     01-10    142  |  106  |  14  ----------------------------<  86  3.5   |  29  |  0.59    Ca    8.8      10 Deniz 2022 05:30    TPro  6.1  /  Alb  2.9<L>  /  TBili  0.3  /  DBili  x   /  AST  14  /  ALT  15  /  AlkPhos  85  01-10    LIVER FUNCTIONS - ( 10 Deniz 2022 05:30 )  Alb: 2.9 g/dL / Pro: 6.1 g/dL / ALK PHOS: 85 U/L / ALT: 15 U/L / AST: 14 U/L / GGT: x               MEDICATIONS  (STANDING):  amLODIPine   Tablet 5 milliGRAM(s) Oral daily  apixaban 5 milliGRAM(s) Oral two times a day  multivitamin 1 Tablet(s) Oral daily  pantoprazole   Suspension 40 milliGRAM(s) Oral before breakfast  senna 2 Tablet(s) Oral at bedtime    MEDICATIONS  (PRN):  acetaminophen     Tablet .. 650 milliGRAM(s) Oral every 6 hours PRN Mild Pain (1 - 3)  polyethylene glycol 3350 17 Gram(s) Oral daily PRN Constipation  zinc oxide 40% Ointment 1 Application(s) Topical two times a day PRN rash

## 2022-01-13 LAB
ALBUMIN SERPL ELPH-MCNC: 3.1 G/DL — LOW (ref 3.3–5)
ALP SERPL-CCNC: 87 U/L — SIGNIFICANT CHANGE UP (ref 40–120)
ALT FLD-CCNC: 16 U/L — SIGNIFICANT CHANGE UP (ref 10–45)
ANION GAP SERPL CALC-SCNC: 10 MMOL/L — SIGNIFICANT CHANGE UP (ref 5–17)
AST SERPL-CCNC: 14 U/L — SIGNIFICANT CHANGE UP (ref 10–40)
BASOPHILS # BLD AUTO: 0.03 K/UL — SIGNIFICANT CHANGE UP (ref 0–0.2)
BASOPHILS NFR BLD AUTO: 0.7 % — SIGNIFICANT CHANGE UP (ref 0–2)
BILIRUB SERPL-MCNC: 0.3 MG/DL — SIGNIFICANT CHANGE UP (ref 0.2–1.2)
BUN SERPL-MCNC: 15 MG/DL — SIGNIFICANT CHANGE UP (ref 7–23)
CALCIUM SERPL-MCNC: 8.9 MG/DL — SIGNIFICANT CHANGE UP (ref 8.4–10.5)
CHLORIDE SERPL-SCNC: 104 MMOL/L — SIGNIFICANT CHANGE UP (ref 96–108)
CO2 SERPL-SCNC: 26 MMOL/L — SIGNIFICANT CHANGE UP (ref 22–31)
CREAT SERPL-MCNC: 0.55 MG/DL — SIGNIFICANT CHANGE UP (ref 0.5–1.3)
EOSINOPHIL # BLD AUTO: 0.15 K/UL — SIGNIFICANT CHANGE UP (ref 0–0.5)
EOSINOPHIL NFR BLD AUTO: 3.4 % — SIGNIFICANT CHANGE UP (ref 0–6)
GLUCOSE SERPL-MCNC: 87 MG/DL — SIGNIFICANT CHANGE UP (ref 70–99)
HCT VFR BLD CALC: 34.9 % — SIGNIFICANT CHANGE UP (ref 34.5–45)
HGB BLD-MCNC: 11.5 G/DL — SIGNIFICANT CHANGE UP (ref 11.5–15.5)
IMM GRANULOCYTES NFR BLD AUTO: 0.2 % — SIGNIFICANT CHANGE UP (ref 0–1.5)
LYMPHOCYTES # BLD AUTO: 1.68 K/UL — SIGNIFICANT CHANGE UP (ref 1–3.3)
LYMPHOCYTES # BLD AUTO: 37.7 % — SIGNIFICANT CHANGE UP (ref 13–44)
MCHC RBC-ENTMCNC: 31.8 PG — SIGNIFICANT CHANGE UP (ref 27–34)
MCHC RBC-ENTMCNC: 33 GM/DL — SIGNIFICANT CHANGE UP (ref 32–36)
MCV RBC AUTO: 96.4 FL — SIGNIFICANT CHANGE UP (ref 80–100)
MONOCYTES # BLD AUTO: 0.28 K/UL — SIGNIFICANT CHANGE UP (ref 0–0.9)
MONOCYTES NFR BLD AUTO: 6.3 % — SIGNIFICANT CHANGE UP (ref 2–14)
NEUTROPHILS # BLD AUTO: 2.31 K/UL — SIGNIFICANT CHANGE UP (ref 1.8–7.4)
NEUTROPHILS NFR BLD AUTO: 51.7 % — SIGNIFICANT CHANGE UP (ref 43–77)
NRBC # BLD: 0 /100 WBCS — SIGNIFICANT CHANGE UP (ref 0–0)
PLATELET # BLD AUTO: 156 K/UL — SIGNIFICANT CHANGE UP (ref 150–400)
POTASSIUM SERPL-MCNC: 3.6 MMOL/L — SIGNIFICANT CHANGE UP (ref 3.5–5.3)
POTASSIUM SERPL-SCNC: 3.6 MMOL/L — SIGNIFICANT CHANGE UP (ref 3.5–5.3)
PROT SERPL-MCNC: 6.4 G/DL — SIGNIFICANT CHANGE UP (ref 6–8.3)
RBC # BLD: 3.62 M/UL — LOW (ref 3.8–5.2)
RBC # FLD: 13.7 % — SIGNIFICANT CHANGE UP (ref 10.3–14.5)
SODIUM SERPL-SCNC: 140 MMOL/L — SIGNIFICANT CHANGE UP (ref 135–145)
WBC # BLD: 4.46 K/UL — SIGNIFICANT CHANGE UP (ref 3.8–10.5)
WBC # FLD AUTO: 4.46 K/UL — SIGNIFICANT CHANGE UP (ref 3.8–10.5)

## 2022-01-13 PROCEDURE — 99232 SBSQ HOSP IP/OBS MODERATE 35: CPT

## 2022-01-13 RX ADMIN — SENNA PLUS 2 TABLET(S): 8.6 TABLET ORAL at 21:04

## 2022-01-13 RX ADMIN — ESCITALOPRAM OXALATE 5 MILLIGRAM(S): 10 TABLET, FILM COATED ORAL at 11:40

## 2022-01-13 RX ADMIN — PANTOPRAZOLE SODIUM 40 MILLIGRAM(S): 20 TABLET, DELAYED RELEASE ORAL at 06:04

## 2022-01-13 RX ADMIN — APIXABAN 5 MILLIGRAM(S): 2.5 TABLET, FILM COATED ORAL at 17:11

## 2022-01-13 RX ADMIN — APIXABAN 5 MILLIGRAM(S): 2.5 TABLET, FILM COATED ORAL at 06:03

## 2022-01-13 RX ADMIN — Medication 650 MILLIGRAM(S): at 06:53

## 2022-01-13 RX ADMIN — Medication 650 MILLIGRAM(S): at 06:05

## 2022-01-13 RX ADMIN — Medication 1 TABLET(S): at 11:40

## 2022-01-13 RX ADMIN — AMLODIPINE BESYLATE 5 MILLIGRAM(S): 2.5 TABLET ORAL at 06:03

## 2022-01-13 NOTE — PROGRESS NOTE ADULT - ASSESSMENT
ASSESSMENT/PLAN  This is a 65 YO female with no PMH who was admitted to [facility] on 12/11/21  with c/o HA/vomiting since 12/10 found to have SAH c/b hydrocephalus. On 12/11 s/p coiling of L P comm aneurysm, found to have a partial left m2 thrombus now s/p thrombectomy with TICI 3 reperfusion. On 12/14,  s/p R EVD for hydro c/b sizable tract hemorrhage with IVH now with L EVD, removed on 12/29. 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 Patient now with gait Instability, ADL impairments and Functional impairments.    # SAH  - On 12/11 s/p coiling of L Pcomm aneurysm, found to have a partial left m2 thrombus now s/p thrombectomy with TICI 3 reperfusion  -  s/p R EVD for hydro ON 12/14 c/b sizable tract hemorrhage with IVH now with L EVD, removed on 12/29. - Staples from EVD site removed 1/12 and tolerated well  - Continue Comprehensive Rehab Program: PT/OT/ST  - PT: Focused on improving strength, endurance, coordination, balance, functional mobility, and transfers  - OT: Focused on improving strength, fine motor skills, coordination, posture and ADLs.    - ST: to diagnose and treat deficits in swallowing, cognition and communication.   - Discontinue amantadine 100mg daily - may contribute to confusion in elderly  - Neuropsychological evaluation   - obtained baseline head ct - no acute changes     #HTN  - Continue Amlodipine 5mg daily    # HIT and Right Upper Arm DVT  - Apixaban 5 mg BID with GI ppx    #Depression/emotional lability  -Continue lexapro 5mg daily (1/12)    #Pain management  - Tylenol PRN    #DVT ppx  - SCD, TEDs    #GI ppx  - Protonix    #Bowel Regimen  - Senna at bedtime  - miralax PRN    #Bladder management  - BS on admission, and q 8 hours (SC if > 400)  - Monitor UO    #FEN   - Diet: Regular with thin    #Precaution  - Fall, Aspiration, Seizure, right upper arm    #Skin:  - No active issues at this time  - Pressure injury/Skin: Turn Q2hrs while in bed, OOB to Chair, PT/OT     #Dysphagia    - SLP: evaluation and treatment    Outpatient Follow-up (Specialty/Name of physician):  Dr. Vladimir Lucas  [address] [address]  [phone]    Farhat Munoz  Internal medicine   [address] [place] [address]  [phone]    Liz Jose [address] [id number] [phone]      TEAM MEETING 1/10/22  SW:  2 MYRTLE, 12 stairs inside, spouse and daughter involved   OT: min for tx, sv for eating, min for all other adls  PT: cg-min for transfers, 100' with no device and min assist  SLP: Regular with thins, max for cog  barriers: sever cog impairment, poor carryover, dec safety awareness, LE buckling, motor apraxia, weak, distracted, dec insight  goals: Sv for tranfers and adls, 80' with min  EDOD: Home 1/26

## 2022-01-13 NOTE — PROGRESS NOTE ADULT - SUBJECTIVE AND OBJECTIVE BOX
Patient is a 66y old  Female who presents with a chief complaint of SAH s/p coiling of L p comm aneurysm 12/11/21 and partial L M2 thrombus thrombectomy/reperfusion c/b hydrocephalus (13 Jan 2022 12:29)      Patient seen and examined at bedside. NAD. denies acute complaints.     ALLERGIES:  heparin (Other (Fatal))    MEDICATIONS  (STANDING):  amLODIPine   Tablet 5 milliGRAM(s) Oral daily  apixaban 5 milliGRAM(s) Oral two times a day  escitalopram 5 milliGRAM(s) Oral daily  multivitamin 1 Tablet(s) Oral daily  pantoprazole   Suspension 40 milliGRAM(s) Oral before breakfast  senna 2 Tablet(s) Oral at bedtime    MEDICATIONS  (PRN):  acetaminophen     Tablet .. 650 milliGRAM(s) Oral every 6 hours PRN Mild Pain (1 - 3)  polyethylene glycol 3350 17 Gram(s) Oral daily PRN Constipation  zinc oxide 40% Ointment 1 Application(s) Topical two times a day PRN rash    Vital Signs Last 24 Hrs  T(F): 97.9 (13 Jan 2022 07:55), Max: 97.9 (12 Jan 2022 19:42)  HR: 73 (13 Jan 2022 07:55) (73 - 75)  BP: 101/68 (13 Jan 2022 07:55) (101/68 - 119/67)  RR: 16 (13 Jan 2022 07:55) (15 - 16)  SpO2: 94% (13 Jan 2022 07:55) (94% - 95%)  I&O's Summary    12 Jan 2022 07:01  -  13 Jan 2022 07:00  --------------------------------------------------------  IN: 0 mL / OUT: 1 mL / NET: -1 mL          PHYSICAL EXAM:  General: NAD, comfortable  ENT: MMM, no scleral icterus  Neck: Supple  Lungs: Respirations unlabored. Clear to auscultation bilaterally, no wheezes, rales, rhonchi  Cardio: RRR, S1/S2  Abdomen: Soft, Nontender, Nondistended; Bowel sounds present  Extremities: No calf tenderness, No pitting edema LE b/l    LABS:                        11.5   4.46  )-----------( 156      ( 13 Jan 2022 05:30 )             34.9       01-13    140  |  104  |  15  ----------------------------<  87  3.6   |  26  |  0.55    Ca    8.9      13 Jan 2022 05:30    TPro  6.4  /  Alb  3.1  /  TBili  0.3  /  DBili  x   /  AST  14  /  ALT  16  /  AlkPhos  87  01-13     eGFR if Non African American: 98 mL/min/1.73M2 (01-13-22 @ 05:30)  eGFR if African American: 114 mL/min/1.73M2 (01-13-22 @ 05:30)    12-11 Chol 241 mg/dL LDL -- HDL 71 mg/dL Trig 87 mg/dL    COVID-19 PCR: NotDetec (01-05-22 @ 18:40)  COVID-19 PCR: NotDetec (01-03-22 @ 10:48)  COVID-19 PCR: NotDetec (12-21-21 @ 20:24)  COVID-19 PCR: NotDetec (12-16-21 @ 12:03)      RADIOLOGY & ADDITIONAL TESTS: reviewed    Care Discussed with Consultants/Other Providers: yes

## 2022-01-13 NOTE — PROGRESS NOTE ADULT - ASSESSMENT
67 y/o F with no PMH who was admitted to [facility] on 12/11/21 with c/o HA/vomiting since 12/10 found to have SAH c/b hydrocephalus. On 12/11 s/p coiling of L P comm aneurysm, found to have a partial left m2 thrombus now s/p thrombectomy with TICI 3 reperfusion. On 12/14,  s/p R EVD for hydro c/b sizable tract hemorrhage with IVH now with L EVD, removed on 12/29. 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 Patient now with gait Instability, ADL impairments and Functional impairments.    SAH  -12/11 s/p coiling of L Pcomm aneurysm, found to have a partial left m2 thrombus now s/p thrombectomy with TICI 3 reperfusion  -s/p R EVD for hydro ON 12/14 c/b sizable tract hemorrhage with IVH now with L EVD, removed on 12/29. - Staples from EVD site removed 1/12 and tolerated well  -Off amantadine due to confusion   -Lexapro started  -Continue comprehensive rehab program -PT/OT/SLP per rehab team  -Pain management, bowel regimen per rehab     HTN  -Amlodipine 5mg daily    HIT and Right Upper Arm DVT  -Apixaban 5 mg BID with GI ppx    Depression/emotional lability  -Continue lexapro 5mg daily (1/12)    #DVT ppx - SCD, TEDs  #GI ppx - PPI

## 2022-01-14 PROCEDURE — 99232 SBSQ HOSP IP/OBS MODERATE 35: CPT

## 2022-01-14 RX ADMIN — Medication 1 TABLET(S): at 12:10

## 2022-01-14 RX ADMIN — APIXABAN 5 MILLIGRAM(S): 2.5 TABLET, FILM COATED ORAL at 17:16

## 2022-01-14 RX ADMIN — SENNA PLUS 2 TABLET(S): 8.6 TABLET ORAL at 22:00

## 2022-01-14 RX ADMIN — ESCITALOPRAM OXALATE 5 MILLIGRAM(S): 10 TABLET, FILM COATED ORAL at 12:10

## 2022-01-14 RX ADMIN — AMLODIPINE BESYLATE 5 MILLIGRAM(S): 2.5 TABLET ORAL at 05:43

## 2022-01-14 RX ADMIN — PANTOPRAZOLE SODIUM 40 MILLIGRAM(S): 20 TABLET, DELAYED RELEASE ORAL at 05:43

## 2022-01-14 RX ADMIN — APIXABAN 5 MILLIGRAM(S): 2.5 TABLET, FILM COATED ORAL at 05:43

## 2022-01-14 NOTE — PROGRESS NOTE ADULT - ASSESSMENT
65 y/o F with no PMH who was admitted to [facility] on 12/11/21 with c/o HA/vomiting since 12/10 found to have SAH c/b hydrocephalus. On 12/11 s/p coiling of L P comm aneurysm, found to have a partial left m2 thrombus now s/p thrombectomy with TICI 3 reperfusion. On 12/14,  s/p R EVD for hydro c/b sizable tract hemorrhage with IVH now with L EVD, removed on 12/29. 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 Patient now with gait Instability, ADL impairments and Functional impairments.    SAH  -12/11 s/p coiling of L Pcomm aneurysm, found to have a partial left m2 thrombus now s/p thrombectomy with TICI 3 reperfusion  -s/p R EVD for hydro ON 12/14 c/b sizable tract hemorrhage with IVH now with L EVD, removed on 12/29. - Staples from EVD site removed 1/12 and tolerated well  -Off amantadine due to confusion   -Lexapro started  -Continue comprehensive rehab program -PT/OT/SLP per rehab team  -Pain management, bowel regimen per rehab     HTN  -Amlodipine 5mg daily    HIT and Right Upper Arm DVT  -Apixaban 5 mg BID with GI ppx    Depression/emotional lability  -Continue lexapro 5mg daily (1/12)    #DVT ppx - SCD, TEDs  #GI ppx - PPI

## 2022-01-14 NOTE — PROGRESS NOTE ADULT - SUBJECTIVE AND OBJECTIVE BOX
Patient is a 66y old  Female who presents with a chief complaint of SAH s/p coiling of L p comm aneurysm 12/11/21 and partial L M2 thrombus thrombectomy/reperfusion c/b hydrocephalus (13 Jan 2022 14:25)    Patient seen and examined at bedside. denies acute complaints.     ALLERGIES:  heparin (Other (Fatal))    MEDICATIONS  (STANDING):  amLODIPine   Tablet 5 milliGRAM(s) Oral daily  apixaban 5 milliGRAM(s) Oral two times a day  escitalopram 5 milliGRAM(s) Oral daily  multivitamin 1 Tablet(s) Oral daily  pantoprazole   Suspension 40 milliGRAM(s) Oral before breakfast  senna 2 Tablet(s) Oral at bedtime    MEDICATIONS  (PRN):  acetaminophen     Tablet .. 650 milliGRAM(s) Oral every 6 hours PRN Mild Pain (1 - 3)  polyethylene glycol 3350 17 Gram(s) Oral daily PRN Constipation  zinc oxide 40% Ointment 1 Application(s) Topical two times a day PRN rash    Vital Signs Last 24 Hrs  T(F): 98.2 (14 Jan 2022 08:07), Max: 98.2 (14 Jan 2022 08:07)  HR: 98 (14 Jan 2022 08:07) (77 - 98)  BP: 98/73 (14 Jan 2022 08:07) (98/73 - 108/68)  RR: 16 (14 Jan 2022 08:07) (16 - 16)  SpO2: 97% (14 Jan 2022 08:07) (93% - 97%)  I&O's Summary    PHYSICAL EXAM:  General: NAD, comfortable  ENT: MMM, no scleral icterus  Neck: Supple  Lungs: Respirations unlabored. Clear to auscultation bilaterally, no wheezes, rales, rhonchi  Cardio: RRR, S1/S2  Abdomen: Soft, Nontender, Nondistended; Bowel sounds present  Extremities: No calf tenderness, No pitting edema LE b/l    LABS:                        11.5   4.46  )-----------( 156      ( 13 Jan 2022 05:30 )             34.9       01-13    140  |  104  |  15  ----------------------------<  87  3.6   |  26  |  0.55    Ca    8.9      13 Jan 2022 05:30    TPro  6.4  /  Alb  3.1  /  TBili  0.3  /  DBili  x   /  AST  14  /  ALT  16  /  AlkPhos  87  01-13     eGFR if Non African American: 98 mL/min/1.73M2 (01-13-22 @ 05:30)  eGFR if African American: 114 mL/min/1.73M2 (01-13-22 @ 05:30)    12-11 Chol 241 mg/dL LDL -- HDL 71 mg/dL Trig 87 mg/dL    COVID-19 PCR: NotDetec (01-05-22 @ 18:40)  COVID-19 PCR: NotDetec (01-03-22 @ 10:48)  COVID-19 PCR: NotDetec (12-21-21 @ 20:24)  COVID-19 PCR: NotDetec (12-16-21 @ 12:03)    RADIOLOGY & ADDITIONAL TESTS: reviewed    Care Discussed with Consultants/Other Providers: yes

## 2022-01-14 NOTE — PROGRESS NOTE ADULT - ASSESSMENT
ASSESSMENT/PLAN  This is a 67 YO female with no PMH who was admitted to [facility] on 12/11/21  with c/o HA/vomiting since 12/10 found to have SAH c/b hydrocephalus. On 12/11 s/p coiling of L P comm aneurysm, found to have a partial left m2 thrombus now s/p thrombectomy with TICI 3 reperfusion. On 12/14,  s/p R EVD for hydro c/b sizable tract hemorrhage with IVH now with L EVD, removed on 12/29. 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 Patient now with gait Instability, ADL impairments and Functional impairments.    # SAH  - On 12/11 s/p coiling of L Pcomm aneurysm, found to have a partial left m2 thrombus now s/p thrombectomy with TICI 3 reperfusion  -  s/p R EVD for hydro ON 12/14 c/b sizable tract hemorrhage with IVH now with L EVD, removed on 12/29. - Staples from EVD site removed 1/12 and tolerated well  - Continue Comprehensive Rehab Program: PT/OT/ST  - PT: Focused on improving strength, endurance, coordination, balance, functional mobility, and transfers  - OT: Focused on improving strength, fine motor skills, coordination, posture and ADLs.    - ST: to diagnose and treat deficits in swallowing, cognition and communication.   - Discontinue amantadine 100mg daily - may contribute to confusion in elderly  - Neuropsychological evaluation   - obtained baseline head ct - no acute changes     #HTN  - Continue Amlodipine 5mg daily    # HIT and Right Upper Arm DVT  - Apixaban 5 mg BID with GI ppx    #Depression/emotional lability  -Continue lexapro 5mg daily (1/12)    #Pain management  - Tylenol PRN    #DVT ppx  - SCD, TEDs    #GI ppx  - Protonix    #Bowel Regimen  - Senna at bedtime  - miralax PRN    #Bladder management  - BS on admission, and q 8 hours (SC if > 400)  - Monitor UO    #FEN   - Diet: Regular with thin    #Precaution  - Fall, Aspiration, Seizure, right upper arm    #Skin:  - No active issues at this time  - Pressure injury/Skin: Turn Q2hrs while in bed, OOB to Chair, PT/OT     #Dysphagia    - SLP: evaluation and treatment    Outpatient Follow-up (Specialty/Name of physician):  Dr. Vladimir Lucas  [address] [address]  [phone]    Farhat Munoz  Internal medicine   [address] [place] [address]  [phone]    Liz Jose [address] [id number] [phone]      TEAM MEETING 1/10/22  SW:  2 MYRTLE, 12 stairs inside, spouse and daughter involved   OT: min for tx, sv for eating, min for all other adls  PT: cg-min for transfers, 100' with no device and min assist  SLP: Regular with thins, max for cog  barriers: sever cog impairment, poor carryover, dec safety awareness, LE buckling, motor apraxia, weak, distracted, dec insight  goals: Sv for tranfers and adls, 80' with min  EDOD: Home 1/26

## 2022-01-14 NOTE — PROGRESS NOTE ADULT - SUBJECTIVE AND OBJECTIVE BOX
CHIEF COMPLAINT: No acute events overnight. She is participating well in therapy.        HISTORY OF PRESENT ILLNESS    This is a 67 YO female with no PMH who was admitted to [facility] on 12/11/21  with c/o HA/vomiting since 12/10 found to have SAH c/b hydrocephaluns. On 12/11 s/p coiling of L Posterior communicating  aneurysm, found to have a partial left m2 thrombus now s/p thrombectomy with TICI 3 reperfusion. On 12/14, s/p R EVD for hydro c/b sizable tract hemorrhage with IVH now with L EVD, removed on 12/29. 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 Patient was medically optimized for discharge to [facility] IRU on 1/5/22.    Pfizer x 2 + booster ( last dose 8/11/21) (05 Jan 2022 14:15)        PAST MEDICAL & SURGICAL HISTORY:  No pertinent past medical history    No significant past surgical history      Vital Signs Last 24 Hrs  T(C): 36.8 (14 Jan 2022 08:07), Max: 36.8 (14 Jan 2022 08:07)  T(F): 98.2 (14 Jan 2022 08:07), Max: 98.2 (14 Jan 2022 08:07)  HR: 98 (14 Jan 2022 08:07) (77 - 98)  BP: 98/73 (14 Jan 2022 08:07) (98/73 - 108/68)  BP(mean): --  RR: 16 (14 Jan 2022 08:07) (16 - 16)  SpO2: 97% (14 Jan 2022 08:07) (93% - 97%)      PHYSICAL EXAM  Constitutional - NAD, Comfortable  HEENT - NCAT, EOMI  Neck - Supple, No limited ROM  Chest - CTA bilaterally  Cardiovascular - RRR, S1S2  Abdomen -Soft, NTND  Extremities - No C/C/E, No calf tenderness   Neurologic Exam -  aox3, mathias                   LABS:                          11.5   4.46  )-----------( 156      ( 13 Jan 2022 05:30 )             34.9     01-13    140  |  104  |  15  ----------------------------<  87  3.6   |  26  |  0.55    Ca    8.9      13 Jan 2022 05:30    TPro  6.4  /  Alb  3.1<L>  /  TBili  0.3  /  DBili  x   /  AST  14  /  ALT  16  /  AlkPhos  87  01-13    LIVER FUNCTIONS - ( 13 Jan 2022 05:30 )  Alb: 3.1 g/dL / Pro: 6.4 g/dL / ALK PHOS: 87 U/L / ALT: 16 U/L / AST: 14 U/L / GGT: x                   MEDICATIONS  (STANDING):  amLODIPine   Tablet 5 milliGRAM(s) Oral daily  apixaban 5 milliGRAM(s) Oral two times a day  multivitamin 1 Tablet(s) Oral daily  pantoprazole   Suspension 40 milliGRAM(s) Oral before breakfast  senna 2 Tablet(s) Oral at bedtime    MEDICATIONS  (PRN):  acetaminophen     Tablet .. 650 milliGRAM(s) Oral every 6 hours PRN Mild Pain (1 - 3)  polyethylene glycol 3350 17 Gram(s) Oral daily PRN Constipation  zinc oxide 40% Ointment 1 Application(s) Topical two times a day PRN rash

## 2022-01-15 LAB
ALBUMIN SERPL ELPH-MCNC: 3.3 G/DL — SIGNIFICANT CHANGE UP (ref 3.3–5)
ALP SERPL-CCNC: 94 U/L — SIGNIFICANT CHANGE UP (ref 40–120)
ALT FLD-CCNC: 16 U/L — SIGNIFICANT CHANGE UP (ref 10–45)
ANION GAP SERPL CALC-SCNC: 6 MMOL/L — SIGNIFICANT CHANGE UP (ref 5–17)
AST SERPL-CCNC: 12 U/L — SIGNIFICANT CHANGE UP (ref 10–40)
BILIRUB SERPL-MCNC: 0.3 MG/DL — SIGNIFICANT CHANGE UP (ref 0.2–1.2)
BUN SERPL-MCNC: 13 MG/DL — SIGNIFICANT CHANGE UP (ref 7–23)
CALCIUM SERPL-MCNC: 9.1 MG/DL — SIGNIFICANT CHANGE UP (ref 8.4–10.5)
CHLORIDE SERPL-SCNC: 102 MMOL/L — SIGNIFICANT CHANGE UP (ref 96–108)
CO2 SERPL-SCNC: 31 MMOL/L — SIGNIFICANT CHANGE UP (ref 22–31)
CREAT SERPL-MCNC: 0.65 MG/DL — SIGNIFICANT CHANGE UP (ref 0.5–1.3)
GLUCOSE SERPL-MCNC: 98 MG/DL — SIGNIFICANT CHANGE UP (ref 70–99)
POTASSIUM SERPL-MCNC: 3.8 MMOL/L — SIGNIFICANT CHANGE UP (ref 3.5–5.3)
POTASSIUM SERPL-SCNC: 3.8 MMOL/L — SIGNIFICANT CHANGE UP (ref 3.5–5.3)
PROT SERPL-MCNC: 6.5 G/DL — SIGNIFICANT CHANGE UP (ref 6–8.3)
SODIUM SERPL-SCNC: 139 MMOL/L — SIGNIFICANT CHANGE UP (ref 135–145)

## 2022-01-15 PROCEDURE — 99232 SBSQ HOSP IP/OBS MODERATE 35: CPT

## 2022-01-15 RX ADMIN — ESCITALOPRAM OXALATE 5 MILLIGRAM(S): 10 TABLET, FILM COATED ORAL at 11:54

## 2022-01-15 RX ADMIN — APIXABAN 5 MILLIGRAM(S): 2.5 TABLET, FILM COATED ORAL at 05:22

## 2022-01-15 RX ADMIN — APIXABAN 5 MILLIGRAM(S): 2.5 TABLET, FILM COATED ORAL at 17:17

## 2022-01-15 RX ADMIN — PANTOPRAZOLE SODIUM 40 MILLIGRAM(S): 20 TABLET, DELAYED RELEASE ORAL at 06:00

## 2022-01-15 RX ADMIN — Medication 1 TABLET(S): at 11:53

## 2022-01-15 RX ADMIN — AMLODIPINE BESYLATE 5 MILLIGRAM(S): 2.5 TABLET ORAL at 05:22

## 2022-01-15 RX ADMIN — SENNA PLUS 2 TABLET(S): 8.6 TABLET ORAL at 20:42

## 2022-01-15 NOTE — PROGRESS NOTE ADULT - SUBJECTIVE AND OBJECTIVE BOX
Patient is a 66y old  Female who presents with a chief complaint of SAH s/p coiling of L p comm aneurysm 12/11/21 and partial L M2 thrombus thrombectomy/reperfusion c/b hydrocephalus (14 Jan 2022 09:10)      24 HOUR EVENTS:  No overnight events reported.     SUBJECTIVE:  Patient seen and examined at bedside.   eating breakfast. Feels well. Denies having any f/c/chest pain/palpitations/n/v/d.   Stooling.     ALLERGIES:  heparin (Other (Fatal))    MEDICATIONS  (STANDING):  amLODIPine   Tablet 5 milliGRAM(s) Oral daily  apixaban 5 milliGRAM(s) Oral two times a day  escitalopram 5 milliGRAM(s) Oral daily  multivitamin 1 Tablet(s) Oral daily  pantoprazole   Suspension 40 milliGRAM(s) Oral before breakfast  senna 2 Tablet(s) Oral at bedtime    MEDICATIONS  (PRN):  acetaminophen     Tablet .. 650 milliGRAM(s) Oral every 6 hours PRN Mild Pain (1 - 3)  polyethylene glycol 3350 17 Gram(s) Oral daily PRN Constipation  zinc oxide 40% Ointment 1 Application(s) Topical two times a day PRN rash    Vital Signs Last 24 Hrs  T(F): 98.7 (15 Deniz 2022 07:41), Max: 98.7 (15 Deniz 2022 07:41)  HR: 76 (15 Deniz 2022 07:41) (61 - 76)  BP: 116/75 (15 Deniz 2022 07:41) (105/66 - 116/75)  RR: 16 (15 Deniz 2022 07:41) (15 - 16)  SpO2: 93% (15 Deniz 2022 07:41) (93% - 98%)  I&O's Summary    PHYSICAL EXAM:  General: NAD, A/O x 3  ENT: Moist mucous membranes, no thrush  Neck: Supple, No JVD  Lungs: Clear to auscultation bilaterally, good air entry, non-labored breathing  Cardio: RRR, S1/S2, No murmur  Abdomen: Soft, Nontender, Nondistended; Bowel sounds present  Extremities: No calf tenderness, No pitting edema    LABS:                        11.5   4.46  )-----------( 156      ( 13 Jan 2022 05:30 )             34.9     01-15    139  |  102  |  13  ----------------------------<  98  3.8   |  31  |  0.65    Ca    9.1      15 Deniz 2022 05:30    TPro  6.5  /  Alb  3.3  /  TBili  0.3  /  DBili  x   /  AST  12  /  ALT  16  /  AlkPhos  94  01-15        eGFR if Non : 93 mL/min/1.73M2 (01-15-22 @ 05:30)  eGFR if : 108 mL/min/1.73M2 (01-15-22 @ 05:30)              12-11 Chol 241 mg/dL LDL -- HDL 71 mg/dL Trig 87 mg/dL                      COVID-19 PCR: NotDetec (01-05-22 @ 18:40)  COVID-19 PCR: NotDetec (01-03-22 @ 10:48)  COVID-19 PCR: NotDetec (12-21-21 @ 20:24)    RADIOLOGY & ADDITIONAL TESTS:    Care Discussed with Consultants/Other Providers:

## 2022-01-15 NOTE — PROGRESS NOTE ADULT - SUBJECTIVE AND OBJECTIVE BOX
Patient is a 66y old  Female who presents with a chief complaint of SAH s/p coiling of L p comm aneurysm 12/11/21 and partial L M2 thrombus thrombectomy/reperfusion c/b hydrocephalus (15 Deniz 2022 15:22)      No new complaints today       PAST MEDICAL & SURGICAL HISTORY:  No pertinent past medical history    No pertinent past medical history    No significant past surgical history    No significant past surgical history      VITALS  Vital Signs Last 24 Hrs  T(C): 37.1 (15 Deniz 2022 07:41), Max: 37.1 (15 Deniz 2022 07:41)  T(F): 98.7 (15 Deniz 2022 07:41), Max: 98.7 (15 Deniz 2022 07:41)  HR: 76 (15 Deniz 2022 07:41) (61 - 76)  BP: 116/75 (15 Deniz 2022 07:41) (105/66 - 116/75)  BP(mean): --  RR: 16 (15 Deniz 2022 07:41) (15 - 16)  SpO2: 93% (15 Deniz 2022 07:41) (93% - 98%)      PHYSICAL EXAM  Constitutional - NAD, Comfortable  HEENT - NCAT, EOMI  Neck - Supple, No limited ROM  Chest - CTA bilaterally  Cardiovascular - RRR, S1S2  Abdomen -  Soft, NTND  Extremities - No C/C/E  Neurologic Exam -                    Cognitive - Awake, Alert     No new focal deficits           RECENT LABS    01-15    139  |  102  |  13  ----------------------------<  98  3.8   |  31  |  0.65    Ca    9.1      15 Deniz 2022 05:30    TPro  6.5  /  Alb  3.3  /  TBili  0.3  /  DBili  x   /  AST  12  /  ALT  16  /  AlkPhos  94  01-15                CURRENT MEDICATIONS    MEDICATIONS  (STANDING):  amLODIPine   Tablet 5 milliGRAM(s) Oral daily  apixaban 5 milliGRAM(s) Oral two times a day  escitalopram 5 milliGRAM(s) Oral daily  multivitamin 1 Tablet(s) Oral daily  pantoprazole   Suspension 40 milliGRAM(s) Oral before breakfast  senna 2 Tablet(s) Oral at bedtime    MEDICATIONS  (PRN):  acetaminophen     Tablet .. 650 milliGRAM(s) Oral every 6 hours PRN Mild Pain (1 - 3)  polyethylene glycol 3350 17 Gram(s) Oral daily PRN Constipation  zinc oxide 40% Ointment 1 Application(s) Topical two times a day PRN rash    ASSESSMENT & PLAN          GI/Bowel Management - Dulcolax PRN, Fleet PRN   Management - Toilet Q2  Skin - Turn Q2  Pain - Tylenol PRN  DVT PPX - Apixaban      Continue comprehensive acute rehab program consisting of 3hrs/day of OT/PT and SLP.

## 2022-01-16 PROCEDURE — 99232 SBSQ HOSP IP/OBS MODERATE 35: CPT

## 2022-01-16 RX ORDER — AMLODIPINE BESYLATE 2.5 MG/1
2.5 TABLET ORAL DAILY
Refills: 0 | Status: DISCONTINUED | OUTPATIENT
Start: 2022-01-17 | End: 2022-01-21

## 2022-01-16 RX ADMIN — Medication 1 TABLET(S): at 12:14

## 2022-01-16 RX ADMIN — AMLODIPINE BESYLATE 5 MILLIGRAM(S): 2.5 TABLET ORAL at 05:35

## 2022-01-16 RX ADMIN — PANTOPRAZOLE SODIUM 40 MILLIGRAM(S): 20 TABLET, DELAYED RELEASE ORAL at 05:35

## 2022-01-16 RX ADMIN — ESCITALOPRAM OXALATE 5 MILLIGRAM(S): 10 TABLET, FILM COATED ORAL at 12:13

## 2022-01-16 RX ADMIN — APIXABAN 5 MILLIGRAM(S): 2.5 TABLET, FILM COATED ORAL at 17:15

## 2022-01-16 RX ADMIN — APIXABAN 5 MILLIGRAM(S): 2.5 TABLET, FILM COATED ORAL at 05:35

## 2022-01-16 RX ADMIN — SENNA PLUS 2 TABLET(S): 8.6 TABLET ORAL at 22:10

## 2022-01-16 NOTE — PROGRESS NOTE ADULT - SUBJECTIVE AND OBJECTIVE BOX
Patient is a 66y old  Female who presents with a chief complaint of SAH s/p coiling of L p comm aneurysm 12/11/21 and partial L M2 thrombus thrombectomy/reperfusion c/b hydrocephalus (15 Deniz 2022 15:46)      24 HOUR EVENTS:  No overnight events reported.     SUBJECTIVE:  Patient seen and examined at bedside.     ALLERGIES:  heparin (Other (Fatal))    MEDICATIONS  (STANDING):  amLODIPine   Tablet 5 milliGRAM(s) Oral daily  apixaban 5 milliGRAM(s) Oral two times a day  escitalopram 5 milliGRAM(s) Oral daily  multivitamin 1 Tablet(s) Oral daily  pantoprazole   Suspension 40 milliGRAM(s) Oral before breakfast  senna 2 Tablet(s) Oral at bedtime    MEDICATIONS  (PRN):  acetaminophen     Tablet .. 650 milliGRAM(s) Oral every 6 hours PRN Mild Pain (1 - 3)  polyethylene glycol 3350 17 Gram(s) Oral daily PRN Constipation  zinc oxide 40% Ointment 1 Application(s) Topical two times a day PRN rash    Vital Signs Last 24 Hrs  T(F): 98 (16 Jan 2022 08:14), Max: 98.1 (15 Deniz 2022 20:38)  HR: 67 (16 Jan 2022 08:14) (62 - 71)  BP: 96/67 (16 Jan 2022 08:14) (96/67 - 109/69)  RR: 15 (16 Jan 2022 08:14) (15 - 16)  SpO2: 95% (16 Jan 2022 08:14) (93% - 98%)  I&O's Summary    PHYSICAL EXAM:  General: NAD, Awake, alert, oriented to self only.  ENT: Moist mucous membranes, no thrush  Neck: Supple, No JVD  Lungs: Clear to auscultation bilaterally, good air entry, non-labored breathing  Cardio: RRR, S1/S2, No murmur  Abdomen: Soft, Nontender, Nondistended; Bowel sounds present  Extremities: No calf tenderness, No pitting edema    LABS:    01-15    139  |  102  |  13  ----------------------------<  98  3.8   |  31  |  0.65    Ca    9.1      15 Deniz 2022 05:30    TPro  6.5  /  Alb  3.3  /  TBili  0.3  /  DBili  x   /  AST  12  /  ALT  16  /  AlkPhos  94  01-15        eGFR if Non : 93 mL/min/1.73M2 (01-15-22 @ 05:30)  eGFR if : 108 mL/min/1.73M2 (01-15-22 @ 05:30)              12-11 Chol 241 mg/dL LDL -- HDL 71 mg/dL Trig 87 mg/dL                      COVID-19 PCR: NotDetec (01-05-22 @ 18:40)  COVID-19 PCR: NotDetec (01-03-22 @ 10:48)  COVID-19 PCR: NotDetec (12-21-21 @ 20:24)    RADIOLOGY & ADDITIONAL TESTS:    Care Discussed with Consultants/Other Providers:

## 2022-01-16 NOTE — PROGRESS NOTE ADULT - ASSESSMENT
67 y/o F with no PMH who was admitted to [facility] on 12/11/21 with c/o HA/vomiting since 12/10 found to have SAH c/b hydrocephalus. On 12/11 s/p coiling of L P comm aneurysm, found to have a partial left m2 thrombus now s/p thrombectomy with TICI 3 reperfusion. On 12/14,  s/p R EVD for hydro c/b sizable tract hemorrhage with IVH now with L EVD, removed on 12/29. 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 Patient now with gait Instability, ADL impairments and Functional impairments.    SAH  -12/11 s/p coiling of L Pcomm aneurysm, found to have a partial left m2 thrombus now s/p thrombectomy with TICI 3 reperfusion  -s/p R EVD for hydro ON 12/14 c/b sizable tract hemorrhage with IVH now with L EVD, removed on 12/29. - Staples from EVD site removed 1/12 and tolerated well  -Off amantadine due to confusion   -Lexapro started  -Continue comprehensive rehab program -PT/OT/SLP per rehab team  -Pain management, bowel regimen per rehab     HTN  -Amlodipine 5mg daily --> reduce to 2.5 mg qd because of soft pressures. will consider discontinuing.     HIT and Right Upper Arm DVT  -Apixaban 5 mg BID with GI ppx    Depression/emotional lability  -Continue lexapro 5mg daily (1/12)    #DVT ppx - SCD, TEDs  #GI ppx - PPI   Abdominal Pain, N/V/D

## 2022-01-16 NOTE — PROGRESS NOTE ADULT - SUBJECTIVE AND OBJECTIVE BOX
Chief complaint: no new complaints     Patient is a 66y old  Female who presents with a chief complaint of SAH s/p coiling of L p comm aneurysm 12/11/21 and partial L M2 thrombus thrombectomy/reperfusion c/b hydrocephalus (16 Jan 2022 11:44)    PAST MEDICAL & SURGICAL HISTORY:  No pertinent past medical history    No pertinent past medical history    No significant past surgical history    No significant past surgical history        VITALS  Vital Signs Last 24 Hrs  T(C): 36.7 (16 Jan 2022 08:14), Max: 36.7 (15 Deniz 2022 20:38)  T(F): 98 (16 Jan 2022 08:14), Max: 98.1 (15 Deniz 2022 20:38)  HR: 67 (16 Jan 2022 08:14) (62 - 71)  BP: 96/67 (16 Jan 2022 08:14) (96/67 - 109/69)  BP(mean): --  RR: 15 (16 Jan 2022 08:14) (15 - 16)  SpO2: 95% (16 Jan 2022 08:14) (93% - 98%)      PHYSICAL EXAM  Constitutional - NAD, Comfortable  HEENT - NCAT, EOMI  Neck - Supple, No limited ROM  Chest - CTA bilaterally  Cardiovascular - RRR, S1S2  Abdomen - Soft, NTND  Extremities - No C/C/E, No calf tenderness   Neurologic Exam -                    Cognitive - Awake, Alert     No new focal deficits                     RECENT LABS    01-15    139  |  102  |  13  ----------------------------<  98  3.8   |  31  |  0.65    Ca    9.1      15 Deniz 2022 05:30    TPro  6.5  /  Alb  3.3  /  TBili  0.3  /  DBili  x   /  AST  12  /  ALT  16  /  AlkPhos  94  01-15                CURRENT MEDICATIONS    MEDICATIONS  (STANDING):  apixaban 5 milliGRAM(s) Oral two times a day  escitalopram 5 milliGRAM(s) Oral daily  multivitamin 1 Tablet(s) Oral daily  pantoprazole   Suspension 40 milliGRAM(s) Oral before breakfast  senna 2 Tablet(s) Oral at bedtime    MEDICATIONS  (PRN):  acetaminophen     Tablet .. 650 milliGRAM(s) Oral every 6 hours PRN Mild Pain (1 - 3)  polyethylene glycol 3350 17 Gram(s) Oral daily PRN Constipation  zinc oxide 40% Ointment 1 Application(s) Topical two times a day PRN rash    ASSESSMENT & PLAN          GI/Bowel Management - Dulcolax PRN, Fleet PRN   Management - Toilet Q2  Skin - Turn Q2  Pain - Tylenol PRN  DVT PPX - Apixaban       Continue comprehensive acute rehab program consisting of 3hrs/day of OT/PT and SLP.

## 2022-01-17 PROCEDURE — 99232 SBSQ HOSP IP/OBS MODERATE 35: CPT

## 2022-01-17 RX ADMIN — PANTOPRAZOLE SODIUM 40 MILLIGRAM(S): 20 TABLET, DELAYED RELEASE ORAL at 06:30

## 2022-01-17 RX ADMIN — AMLODIPINE BESYLATE 2.5 MILLIGRAM(S): 2.5 TABLET ORAL at 08:55

## 2022-01-17 RX ADMIN — APIXABAN 5 MILLIGRAM(S): 2.5 TABLET, FILM COATED ORAL at 17:17

## 2022-01-17 RX ADMIN — Medication 1 TABLET(S): at 11:05

## 2022-01-17 RX ADMIN — APIXABAN 5 MILLIGRAM(S): 2.5 TABLET, FILM COATED ORAL at 06:30

## 2022-01-17 RX ADMIN — SENNA PLUS 2 TABLET(S): 8.6 TABLET ORAL at 22:19

## 2022-01-17 RX ADMIN — ESCITALOPRAM OXALATE 5 MILLIGRAM(S): 10 TABLET, FILM COATED ORAL at 11:05

## 2022-01-17 NOTE — PROGRESS NOTE ADULT - SUBJECTIVE AND OBJECTIVE BOX
Chief complaint: no new complaints     Patient is a 66y old  Female who presents with a chief complaint of SAH s/p coiling of L p comm aneurysm 12/11/21 and partial L M2 thrombus thrombectomy/reperfusion c/b hydrocephalus (17 Jan 2022 13:00)    PAST MEDICAL & SURGICAL HISTORY:  No pertinent past medical history    No pertinent past medical history    No significant past surgical history    No significant past surgical history          VITALS  Vital Signs Last 24 Hrs  T(C): 36.7 (17 Jan 2022 08:40), Max: 36.8 (17 Jan 2022 07:33)  T(F): 98 (17 Jan 2022 08:40), Max: 98.3 (17 Jan 2022 07:33)  HR: 62 (17 Jan 2022 08:40) (58 - 74)  BP: 118/74 (17 Jan 2022 08:40) (100/60 - 118/74)  BP(mean): --  RR: 16 (17 Jan 2022 08:40) (16 - 16)  SpO2: 97% (17 Jan 2022 08:40) (94% - 97%)      PHYSICAL EXAM  Constitutional - NAD, Comfortable  HEENT - NCAT, EOMI  Neck - Supple, No limited ROM  Chest - CTA bilaterally  Cardiovascular - RRR, S1S2  Abdomen -  Soft, NTND  Extremities - No C/C/E, No calf tenderness   Neurologic Exam -                      No new focal deficits       CURRENT MEDICATIONS    MEDICATIONS  (STANDING):  amLODIPine   Tablet 2.5 milliGRAM(s) Oral daily  apixaban 5 milliGRAM(s) Oral two times a day  escitalopram 5 milliGRAM(s) Oral daily  multivitamin 1 Tablet(s) Oral daily  pantoprazole   Suspension 40 milliGRAM(s) Oral before breakfast  senna 2 Tablet(s) Oral at bedtime    MEDICATIONS  (PRN):  acetaminophen     Tablet .. 650 milliGRAM(s) Oral every 6 hours PRN Mild Pain (1 - 3)  polyethylene glycol 3350 17 Gram(s) Oral daily PRN Constipation  zinc oxide 40% Ointment 1 Application(s) Topical two times a day PRN rash    ASSESSMENT & PLAN          GI/Bowel Management - Dulcolax PRN, Fleet PRN   Management - Toilet Q2  Skin - Turn Q2  Pain - Tylenol PRN  DVT PPX - Apixaban       Continue comprehensive acute rehab program consisting of 3hrs/day of OT/PT and SLP.

## 2022-01-17 NOTE — PROGRESS NOTE ADULT - SUBJECTIVE AND OBJECTIVE BOX
Patient is a 66y old  Female who presents with a chief complaint of SAH s/p coiling of L p comm aneurysm 12/11/21 and partial L M2 thrombus thrombectomy/reperfusion c/b hydrocephalus (16 Jan 2022 12:16)      24 HOUR EVENTS:  No overnight events reported.     SUBJECTIVE:  Patient seen and examined at bedside.   does not offer any complaints.     ALLERGIES:  heparin (Other (Fatal))    MEDICATIONS  (STANDING):  amLODIPine   Tablet 2.5 milliGRAM(s) Oral daily  apixaban 5 milliGRAM(s) Oral two times a day  escitalopram 5 milliGRAM(s) Oral daily  multivitamin 1 Tablet(s) Oral daily  pantoprazole   Suspension 40 milliGRAM(s) Oral before breakfast  senna 2 Tablet(s) Oral at bedtime    MEDICATIONS  (PRN):  acetaminophen     Tablet .. 650 milliGRAM(s) Oral every 6 hours PRN Mild Pain (1 - 3)  polyethylene glycol 3350 17 Gram(s) Oral daily PRN Constipation  zinc oxide 40% Ointment 1 Application(s) Topical two times a day PRN rash    Vital Signs Last 24 Hrs  T(F): 98 (17 Jan 2022 08:40), Max: 98.3 (17 Jan 2022 07:33)  HR: 62 (17 Jan 2022 08:40) (58 - 74)  BP: 118/74 (17 Jan 2022 08:40) (100/60 - 118/74)  RR: 16 (17 Jan 2022 08:40) (16 - 16)  SpO2: 97% (17 Jan 2022 08:40) (94% - 97%)  I&O's Summary    17 Jan 2022 07:01  -  17 Jan 2022 13:00  --------------------------------------------------------  IN: 240 mL / OUT: 0 mL / NET: 240 mL      PHYSICAL EXAM:  General: NAD, Awake, alert  ENT: Moist mucous membranes, no thrush  Neck: Supple, No JVD  Lungs: Clear to auscultation bilaterally, good air entry, non-labored breathing  Cardio: RRR, S1/S2, No murmur  Abdomen: Soft, Nontender, Nondistended; Bowel sounds present  Extremities: No calf tenderness, No pitting edema  Neuro: expressive aphasia.     LABS:    01-15    139  |  102  |  13  ----------------------------<  98  3.8   |  31  |  0.65    Ca    9.1      15 Deniz 2022 05:30    TPro  6.5  /  Alb  3.3  /  TBili  0.3  /  DBili  x   /  AST  12  /  ALT  16  /  AlkPhos  94  01-15        eGFR if Non : 93 mL/min/1.73M2 (01-15-22 @ 05:30)  eGFR if : 108 mL/min/1.73M2 (01-15-22 @ 05:30)              12-11 Chol 241 mg/dL LDL -- HDL 71 mg/dL Trig 87 mg/dL                      COVID-19 PCR: NotDetec (01-05-22 @ 18:40)  COVID-19 PCR: NotDetec (01-03-22 @ 10:48)  COVID-19 PCR: NotDetec (12-21-21 @ 20:24)    RADIOLOGY & ADDITIONAL TESTS:    Care Discussed with Consultants/Other Providers:

## 2022-01-17 NOTE — PROGRESS NOTE ADULT - ASSESSMENT
65 y/o F with no PMH who was admitted to [facility] on 12/11/21 with c/o HA/vomiting since 12/10 found to have SAH c/b hydrocephalus. On 12/11 s/p coiling of L P comm aneurysm, found to have a partial left m2 thrombus now s/p thrombectomy with TICI 3 reperfusion. On 12/14,  s/p R EVD for hydro c/b sizable tract hemorrhage with IVH now with L EVD, removed on 12/29. 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 Patient now with gait Instability, ADL impairments and Functional impairments.    SAH  -12/11 s/p coiling of L Pcomm aneurysm, found to have a partial left m2 thrombus now s/p thrombectomy with TICI 3 reperfusion  -s/p R EVD for hydro ON 12/14 c/b sizable tract hemorrhage with IVH now with L EVD, removed on 12/29. - Staples from EVD site removed 1/12 and tolerated well  -Off amantadine due to confusion   -Lexapro started  -Continue comprehensive rehab program -PT/OT/SLP per rehab team  -Pain management, bowel regimen per rehab     HTN  -Amlodipine 5mg daily --> reduce to 2.5 mg qd because of soft pressures. will consider discontinuing.     HIT and Right Upper Arm DVT  -Apixaban 5 mg BID with GI ppx    Depression/emotional lability  -Continue lexapro 5mg daily (1/12)    #DVT ppx - SCD, TEDs  #GI ppx - PPI

## 2022-01-18 LAB
ALBUMIN SERPL ELPH-MCNC: 3 G/DL — LOW (ref 3.3–5)
ALP SERPL-CCNC: 77 U/L — SIGNIFICANT CHANGE UP (ref 40–120)
ALT FLD-CCNC: 18 U/L — SIGNIFICANT CHANGE UP (ref 10–45)
ANION GAP SERPL CALC-SCNC: 8 MMOL/L — SIGNIFICANT CHANGE UP (ref 5–17)
AST SERPL-CCNC: 19 U/L — SIGNIFICANT CHANGE UP (ref 10–40)
BILIRUB SERPL-MCNC: 0.2 MG/DL — SIGNIFICANT CHANGE UP (ref 0.2–1.2)
BUN SERPL-MCNC: 18 MG/DL — SIGNIFICANT CHANGE UP (ref 7–23)
CALCIUM SERPL-MCNC: 9.1 MG/DL — SIGNIFICANT CHANGE UP (ref 8.4–10.5)
CHLORIDE SERPL-SCNC: 106 MMOL/L — SIGNIFICANT CHANGE UP (ref 96–108)
CO2 SERPL-SCNC: 30 MMOL/L — SIGNIFICANT CHANGE UP (ref 22–31)
CREAT SERPL-MCNC: 0.63 MG/DL — SIGNIFICANT CHANGE UP (ref 0.5–1.3)
GLUCOSE SERPL-MCNC: 94 MG/DL — SIGNIFICANT CHANGE UP (ref 70–99)
HCT VFR BLD CALC: 36.7 % — SIGNIFICANT CHANGE UP (ref 34.5–45)
HGB BLD-MCNC: 11.9 G/DL — SIGNIFICANT CHANGE UP (ref 11.5–15.5)
MCHC RBC-ENTMCNC: 31.3 PG — SIGNIFICANT CHANGE UP (ref 27–34)
MCHC RBC-ENTMCNC: 32.4 GM/DL — SIGNIFICANT CHANGE UP (ref 32–36)
MCV RBC AUTO: 96.6 FL — SIGNIFICANT CHANGE UP (ref 80–100)
NRBC # BLD: 0 /100 WBCS — SIGNIFICANT CHANGE UP (ref 0–0)
PLATELET # BLD AUTO: 184 K/UL — SIGNIFICANT CHANGE UP (ref 150–400)
POTASSIUM SERPL-MCNC: 3.7 MMOL/L — SIGNIFICANT CHANGE UP (ref 3.5–5.3)
POTASSIUM SERPL-SCNC: 3.7 MMOL/L — SIGNIFICANT CHANGE UP (ref 3.5–5.3)
PROT SERPL-MCNC: 6.1 G/DL — SIGNIFICANT CHANGE UP (ref 6–8.3)
RBC # BLD: 3.8 M/UL — SIGNIFICANT CHANGE UP (ref 3.8–5.2)
RBC # FLD: 13.4 % — SIGNIFICANT CHANGE UP (ref 10.3–14.5)
SARS-COV-2 RNA SPEC QL NAA+PROBE: SIGNIFICANT CHANGE UP
SODIUM SERPL-SCNC: 144 MMOL/L — SIGNIFICANT CHANGE UP (ref 135–145)
WBC # BLD: 4.11 K/UL — SIGNIFICANT CHANGE UP (ref 3.8–10.5)
WBC # FLD AUTO: 4.11 K/UL — SIGNIFICANT CHANGE UP (ref 3.8–10.5)

## 2022-01-18 PROCEDURE — 99232 SBSQ HOSP IP/OBS MODERATE 35: CPT

## 2022-01-18 PROCEDURE — 99233 SBSQ HOSP IP/OBS HIGH 50: CPT

## 2022-01-18 RX ADMIN — APIXABAN 5 MILLIGRAM(S): 2.5 TABLET, FILM COATED ORAL at 05:24

## 2022-01-18 RX ADMIN — AMLODIPINE BESYLATE 2.5 MILLIGRAM(S): 2.5 TABLET ORAL at 05:24

## 2022-01-18 RX ADMIN — ESCITALOPRAM OXALATE 5 MILLIGRAM(S): 10 TABLET, FILM COATED ORAL at 11:30

## 2022-01-18 RX ADMIN — APIXABAN 5 MILLIGRAM(S): 2.5 TABLET, FILM COATED ORAL at 17:33

## 2022-01-18 RX ADMIN — SENNA PLUS 2 TABLET(S): 8.6 TABLET ORAL at 21:35

## 2022-01-18 RX ADMIN — Medication 1 TABLET(S): at 11:30

## 2022-01-18 RX ADMIN — PANTOPRAZOLE SODIUM 40 MILLIGRAM(S): 20 TABLET, DELAYED RELEASE ORAL at 05:24

## 2022-01-18 NOTE — CHART NOTE - NSCHARTNOTEFT_GEN_A_CORE
Nutrition Follow Up Note  Hospital Course (Per Electronic Medical Record): 65 YO female with no PMH who was admitted to [facility] on 12/11/21  with c/o HA/vomiting since 12/10 found to have SAH c/b hydrocephalus. On 12/11 s/p coiling of L P comm aneurysm, found to have a partial left m2 thrombus now s/p thrombectomy with TICI 3 reperfusion. On 12/14,  s/p R EVD for hydro c/b sizable tract hemorrhage with IVH now with L EVD, removed on 12/29. 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 Patient now with gait Instability, ADL impairments and Functional impairments.  Source: Medical Record [X] Patient [X] Family [ ]         Diet: Regular, DASH/TLC, Ensure Enlive BID  Pt tolerating diet with fair-good PO intake, consuming 50-75% of meals per nursing flow sheets. Pt ate well at breakfast this AM per nursing, able to feed herself. Pt reports good appetite. Observed with 100% of Ensure Enlive consumed from breakfast. Denies nausea, vomiting, diarrhea, constipation. Pt denies chewing/swallowing difficulties. Pt provided with nutrition education on current diet, although uncertain if pt able to fully comprehend in-depth nutrition education on low sodium diet due to confusion at times.    Enteral/Parenteral Nutrition: N/A    Current Weight: 106.9 lbs (1/16) Weight fluctuations noted.   111.3 lbs (1/13)  102.2 lbs (1/11)  101.4 lbs (1/8)  100 lbs (1/7)  103.6 lbs (1/5)    Pertinent Medications: MEDICATIONS  (STANDING):  amLODIPine   Tablet 2.5 milliGRAM(s) Oral daily  apixaban 5 milliGRAM(s) Oral two times a day  escitalopram 5 milliGRAM(s) Oral daily  multivitamin 1 Tablet(s) Oral daily  pantoprazole   Suspension 40 milliGRAM(s) Oral before breakfast  senna 2 Tablet(s) Oral at bedtime    MEDICATIONS  (PRN):  acetaminophen     Tablet .. 650 milliGRAM(s) Oral every 6 hours PRN Mild Pain (1 - 3)  polyethylene glycol 3350 17 Gram(s) Oral daily PRN Constipation  zinc oxide 40% Ointment 1 Application(s) Topical two times a day PRN rash      Pertinent Labs:  01-18 Na144 mmol/L Glu 94 mg/dL K+ 3.7 mmol/L Cr  0.63 mg/dL BUN 18 mg/dL 01-18 Alb 3.0 g/dL<L>        Skin: incontinence associated dermatitis, surgical incision per nursing flow sheets.     Edema: No edema per nursing flow sheets     Last BM: on 1/17 Per nursing flowsheets     Estimated Needs:   [X] No Change since Previous Assessment  [ ] Recalculated:     Previous Nutrition Diagnosis:   Inadequate oral inake    Nutrition Diagnosis is [X] Ongoing  - improving, addressed with diet, food preferences, oral nutrition supplements  [ ] Resolved   [ ] Not Applicable      New Nutrition Diagnosis: [X] Not Applicable  [ ] Inadequate Protein Energy Intake   [ ] Inadequate Oral Intake   [ ] Excessive Energy Intake   [ ] Increased Nutrient Needs   [ ] Obesity   [ ] Altered GI Function   [ ] Unintended Weight Loss   [ ] Food & Nutrition Related Knowledge Deficit  [ ] Limited Adherence to nutrition related recommendations   [ ] Malnutrition      Interventions:   1. Recommend continuing with current plan of care    Monitoring & Evaluation:   [X] Weights   [X] PO Intake   [X] Follow Up (Per Protocol)  [X] Tolerance to Diet Prescription   [X] Other: Labs    RD Remains Available.  Jazz Gallegos RD

## 2022-01-18 NOTE — PROGRESS NOTE ADULT - ASSESSMENT
ASSESSMENT/PLAN  This is a 65 YO female with no PMH who was admitted to [facility] on 12/11/21  with c/o HA/vomiting since 12/10 found to have SAH c/b hydrocephalus. On 12/11 s/p coiling of L P comm aneurysm, found to have a partial left m2 thrombus now s/p thrombectomy with TICI 3 reperfusion. On 12/14,  s/p R EVD for hydro c/b sizable tract hemorrhage with IVH now with L EVD, removed on 12/29. 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 Patient now with gait Instability, ADL impairments and Functional impairments.    # SAH  - On 12/11 s/p coiling of L Pcomm aneurysm, found to have a partial left m2 thrombus now s/p thrombectomy with TICI 3 reperfusion  -  s/p R EVD for hydro ON 12/14 c/b sizable tract hemorrhage with IVH now with L EVD, removed on 12/29. - Staples from EVD site removed 1/12 and tolerated well  - Continue Comprehensive Rehab Program: PT/OT/ST  - PT: Focused on improving strength, endurance, coordination, balance, functional mobility, and transfers  - OT: Focused on improving strength, fine motor skills, coordination, posture and ADLs.    - ST: to diagnose and treat deficits in swallowing, cognition and communication.   - Discontinue amantadine 100mg daily - may contribute to confusion in elderly  - Neuropsychological evaluation   - obtained baseline head ct - no acute changes     #HTN  - Amlodipine 5mg daily  - dec 2.5 - soft SBP     # HIT and Right Upper Arm DVT  - Apixaban 5 mg BID with GI ppx    #Depression/emotional lability  -Continue lexapro 5mg daily (1/12)    #Pain management  - Tylenol PRN    #DVT ppx  - On Apixaban 5 for RUE DVT   - SCD, TEDs    #GI ppx  - Protonix    #Bowel Regimen  - Senna at bedtime  - miralax PRN    #Bladder management  - BS on admission, and q 8 hours (SC if > 400) - dc  - Monitor UO    #FEN   - Diet: Regular with thin    #Precaution  - Fall, Aspiration, Seizure, right upper arm    #Skin:  - No active issues at this time  - Pressure injury/Skin: Turn Q2hrs while in bed, OOB to Chair, PT/OT     #Dysphagia    - SLP: evaluation and treatment    IDT 1/18  OT: SV eat/Groom/UBD, min-CG with all other ADLs.  Requires a lot of cues  PT: CG transfer, amb 120' CG hand held, no AD, 12 steps min A.  Less buckling, easily distracted causing imbalance  SLP: reg/thin, decreased cog/orientation/attention, Able to follow simple commands, will likely need to train  and daughter  Barriers: easily distracted  DANNY: 1/21 Home with SV 24/7    Outpatient Follow-up (Specialty/Name of physician):  Dr. Vladimir Lucas  [address] [address]  [phone]    Farhat Munoz  Internal medicine   [address] [place] [id number] [phone]    [address] [id number] [phone]      TEAM MEETING 1/10/22  SW:  2 MYRTLE, 12 stairs inside, spouse and daughter involved   OT: min for tx, sv for eating, min for all other adls  PT: cg-min for transfers, 100' with no device and min assist  SLP: Regular with thins, max for cog  barriers: sever cog impairment, poor carryover, dec safety awareness, LE buckling, motor apraxia, weak, distracted, dec insight  goals: Sv for tranfers and adls, 80' with min  EDOD: Home 1/26

## 2022-01-18 NOTE — PROGRESS NOTE ADULT - SUBJECTIVE AND OBJECTIVE BOX
HISTORY OF PRESENT ILLNESS    This is a 65 YO female with no PMH who was admitted to [facility] on 12/11/21  with c/o HA/vomiting since 12/10 found to have SAH c/b hydrocephaluns. On 12/11 s/p coiling of L Posterior communicating  aneurysm, found to have a partial left m2 thrombus now s/p thrombectomy with TICI 3 reperfusion. On 12/14, s/p R EVD for hydro c/b sizable tract hemorrhage with IVH now with L EVD, removed on 12/29. 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 Patient was medically optimized for discharge to [facility] IRU on 1/5/22.    Pfizer x 2 + booster ( last dose 8/11/21)      PAST MEDICAL & SURGICAL HISTORY:  No pertinent past medical history    No significant past surgical history    SUBJECTIVE:  Patient seen upon returning from therapy.  She's feeling well overall with improvement - mostly contact guard, but will need supervision because of her attention which can throw her off balance.    Vital Signs Last 24 Hrs  T(C): 37 (18 Jan 2022 07:50), Max: 37 (18 Jan 2022 07:50)  T(F): 98.6 (18 Jan 2022 07:50), Max: 98.6 (18 Jan 2022 07:50)  HR: 73 (18 Jan 2022 07:50) (65 - 73)  BP: 107/69 (18 Jan 2022 07:50) (107/69 - 123/74)  RR: 16 (18 Jan 2022 07:50) (16 - 16)  SpO2: 96% (18 Jan 2022 07:50) (95% - 96%)    PHYSICAL EXAM  Constitutional - NAD, Comfortable  HEENT - NCAT, EOMI  Neck - Supple, No limited ROM  Chest - CTA bilaterally  Cardiovascular - RRR, S1S2  Abdomen -Soft, NTND  Extremities - No C/C/E, No calf tenderness   Neurologic Exam -  aox3, mathias           LABS                        11.9   4.11  )-----------( 184      ( 18 Jan 2022 06:30 )             36.7     01-18    144  |  106  |  18  ----------------------------<  94  3.7   |  30  |  0.63    Ca    9.1      18 Jan 2022 06:30    TPro  6.1  /  Alb  3.0<L>  /  TBili  0.2  /  DBili  x   /  AST  19  /  ALT  18  /  AlkPhos  77  01-18    LIVER FUNCTIONS - ( 18 Jan 2022 06:30 )  Alb: 3.0 g/dL / Pro: 6.1 g/dL / ALK PHOS: 77 U/L / ALT: 18 U/L / AST: 19 U/L / GGT: x             MEDICATIONS  (STANDING):  amLODIPine   Tablet 2.5 milliGRAM(s) Oral daily  apixaban 5 milliGRAM(s) Oral two times a day  escitalopram 5 milliGRAM(s) Oral daily  multivitamin 1 Tablet(s) Oral daily  pantoprazole   Suspension 40 milliGRAM(s) Oral before breakfast  senna 2 Tablet(s) Oral at bedtime    MEDICATIONS  (PRN):  acetaminophen     Tablet .. 650 milliGRAM(s) Oral every 6 hours PRN Mild Pain (1 - 3)  polyethylene glycol 3350 17 Gram(s) Oral daily PRN Constipation  zinc oxide 40% Ointment 1 Application(s) Topical two times a day PRN rash

## 2022-01-18 NOTE — PROGRESS NOTE ADULT - SUBJECTIVE AND OBJECTIVE BOX
Patient is a 66y old  Female who presents with a chief complaint of SAH s/p coiling of L p comm aneurysm 12/11/21 and partial L M2 thrombus thrombectomy/reperfusion c/b hydrocephalus (18 Jan 2022 12:35)      24 HOUR EVENTS:  No overnight events reported.     SUBJECTIVE:  Patient seen and examined while eating breakfast. No complaints, feels well.     ALLERGIES:  heparin (Other (Fatal))    MEDICATIONS  (STANDING):  amLODIPine   Tablet 2.5 milliGRAM(s) Oral daily  apixaban 5 milliGRAM(s) Oral two times a day  escitalopram 5 milliGRAM(s) Oral daily  multivitamin 1 Tablet(s) Oral daily  pantoprazole   Suspension 40 milliGRAM(s) Oral before breakfast  senna 2 Tablet(s) Oral at bedtime    MEDICATIONS  (PRN):  acetaminophen     Tablet .. 650 milliGRAM(s) Oral every 6 hours PRN Mild Pain (1 - 3)  polyethylene glycol 3350 17 Gram(s) Oral daily PRN Constipation  zinc oxide 40% Ointment 1 Application(s) Topical two times a day PRN rash    Vital Signs Last 24 Hrs  T(F): 98.6 (18 Jan 2022 07:50), Max: 98.6 (18 Jan 2022 07:50)  HR: 73 (18 Jan 2022 07:50) (65 - 73)  BP: 107/69 (18 Jan 2022 07:50) (107/69 - 123/74)  RR: 16 (18 Jan 2022 07:50) (16 - 16)  SpO2: 96% (18 Jan 2022 07:50) (95% - 96%)  I&O's Summary    17 Jan 2022 07:01  -  18 Jan 2022 07:00  --------------------------------------------------------  IN: 480 mL / OUT: 0 mL / NET: 480 mL      PHYSICAL EXAM:  General: NAD, A/O, alert  ENT: Moist mucous membranes, no thrush  Neck: Supple, No JVD  Lungs: Clear to auscultation bilaterally, good air entry, non-labored breathing  Cardio: RRR, S1/S2, No murmur  Abdomen: Soft, Nontender, Nondistended; Bowel sounds present  Extremities: No calf tenderness, No pitting edema    LABS:                        11.9   4.11  )-----------( 184      ( 18 Jan 2022 06:30 )             36.7     01-18    144  |  106  |  18  ----------------------------<  94  3.7   |  30  |  0.63    Ca    9.1      18 Jan 2022 06:30    TPro  6.1  /  Alb  3.0  /  TBili  0.2  /  DBili  x   /  AST  19  /  ALT  18  /  AlkPhos  77  01-18        eGFR if Non African American: 94 mL/min/1.73M2 (01-18-22 @ 06:30)  eGFR if : 109 mL/min/1.73M2 (01-18-22 @ 06:30)              12-11 Chol 241 mg/dL LDL -- HDL 71 mg/dL Trig 87 mg/dL                      COVID-19 PCR: NotDetec (01-18-22 @ 05:00)  COVID-19 PCR: NotDetec (01-05-22 @ 18:40)  COVID-19 PCR: NotDetec (01-03-22 @ 10:48)  COVID-19 PCR: NotDetec (12-21-21 @ 20:24)    RADIOLOGY & ADDITIONAL TESTS:    Care Discussed with Consultants/Other Providers:   Dr. Martinez

## 2022-01-18 NOTE — PROGRESS NOTE ADULT - ASSESSMENT
67 y/o F with no PMH who was admitted to [facility] on 12/11/21 with c/o HA/vomiting since 12/10 found to have SAH c/b hydrocephalus. On 12/11 s/p coiling of L P comm aneurysm, found to have a partial left m2 thrombus now s/p thrombectomy with TICI 3 reperfusion. On 12/14,  s/p R EVD for hydro c/b sizable tract hemorrhage with IVH now with L EVD, removed on 12/29. 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 Patient now with gait Instability, ADL impairments and Functional impairments.    SAH  -12/11 s/p coiling of L Pcomm aneurysm, found to have a partial left m2 thrombus now s/p thrombectomy with TICI 3 reperfusion  -s/p R EVD for hydro ON 12/14 c/b sizable tract hemorrhage with IVH now with L EVD, removed on 12/29. - Staples from EVD site removed 1/12 and tolerated well  -Off amantadine due to confusion   -Lexapro started  -Continue comprehensive rehab program -PT/OT/SLP per rehab team  -Pain management, bowel regimen per rehab     HTN  -Amlodipine 5mg daily --> reduce to 2.5 mg qd because of soft pressures. will consider discontinuing.     HIT and Right Upper Arm DVT  -Apixaban 5 mg BID with GI ppx    Depression/emotional lability  -Continue lexapro 5mg daily (1/12)    #DVT ppx - SCD, TEDs  #GI ppx - PPI

## 2022-01-19 ENCOUNTER — TRANSCRIPTION ENCOUNTER (OUTPATIENT)
Age: 67
End: 2022-01-19

## 2022-01-19 PROCEDURE — 99233 SBSQ HOSP IP/OBS HIGH 50: CPT

## 2022-01-19 RX ORDER — PANTOPRAZOLE SODIUM 20 MG/1
0 TABLET, DELAYED RELEASE ORAL
Qty: 0 | Refills: 0 | DISCHARGE
Start: 2022-01-19

## 2022-01-19 RX ORDER — POLYETHYLENE GLYCOL 3350 17 G/17G
17 POWDER, FOR SOLUTION ORAL
Qty: 10 | Refills: 0
Start: 2022-01-19 | End: 2022-01-28

## 2022-01-19 RX ORDER — SENNA PLUS 8.6 MG/1
2 TABLET ORAL
Qty: 0 | Refills: 0 | DISCHARGE
Start: 2022-01-19

## 2022-01-19 RX ORDER — AMLODIPINE BESYLATE 2.5 MG/1
1 TABLET ORAL
Qty: 30 | Refills: 0
Start: 2022-01-19 | End: 2022-02-17

## 2022-01-19 RX ORDER — ACETAMINOPHEN 500 MG
2 TABLET ORAL
Qty: 0 | Refills: 0 | DISCHARGE
Start: 2022-01-19

## 2022-01-19 RX ORDER — ESCITALOPRAM OXALATE 10 MG/1
1 TABLET, FILM COATED ORAL
Qty: 30 | Refills: 0
Start: 2022-01-19 | End: 2022-02-17

## 2022-01-19 RX ORDER — APIXABAN 2.5 MG/1
1 TABLET, FILM COATED ORAL
Qty: 60 | Refills: 0
Start: 2022-01-19 | End: 2022-02-17

## 2022-01-19 RX ORDER — ZINC OXIDE 200 MG/G
1 OINTMENT TOPICAL
Qty: 0 | Refills: 0 | DISCHARGE
Start: 2022-01-19

## 2022-01-19 RX ADMIN — APIXABAN 5 MILLIGRAM(S): 2.5 TABLET, FILM COATED ORAL at 17:13

## 2022-01-19 RX ADMIN — PANTOPRAZOLE SODIUM 40 MILLIGRAM(S): 20 TABLET, DELAYED RELEASE ORAL at 05:46

## 2022-01-19 RX ADMIN — ESCITALOPRAM OXALATE 5 MILLIGRAM(S): 10 TABLET, FILM COATED ORAL at 11:17

## 2022-01-19 RX ADMIN — APIXABAN 5 MILLIGRAM(S): 2.5 TABLET, FILM COATED ORAL at 05:46

## 2022-01-19 RX ADMIN — SENNA PLUS 2 TABLET(S): 8.6 TABLET ORAL at 21:30

## 2022-01-19 RX ADMIN — AMLODIPINE BESYLATE 2.5 MILLIGRAM(S): 2.5 TABLET ORAL at 05:46

## 2022-01-19 RX ADMIN — Medication 1 TABLET(S): at 11:17

## 2022-01-19 NOTE — DISCHARGE NOTE PROVIDER - PROVIDER TOKENS
PROVIDER:[TOKEN:[[id number]],FOLLOWUP:[1 month]],PROVIDER:[TOKEN:[[id number]],FOLLOWUP:[1 month]],PROVIDER:[TOKEN:[[id number]],FOLLOWUP:[2 weeks]],PROVIDER:[TOKEN:[[id number]],FOLLOWUP:[1 week]]

## 2022-01-19 NOTE — DISCHARGE NOTE PROVIDER - HOSPITAL COURSE
67 YO female with no PMH who was admitted to [facility] on 12/11/21  with c/o HA/vomiting since 12/10 found to have SAH c/b hydrocephaluns. On 12/11 s/p coiling of L Posterior communicating  aneurysm, found to have a partial left m2 thrombus now s/p thrombectomy with TICI 3 reperfusion. On 12/14, s/p R EVD for hydro c/b sizable tract hemorrhage with IVH now with L EVD, removed on 12/29. Course c/b cerebral vasospasm s/p treatment, UTI, HIT with PICC associated right subclavian vein thrombus on argatroban gtt and she transitioned to Eliquis    Pt was stable upon rehab admission to  [facility], 1/5/22. Admitted with gait instabilty, ADL, and functional impairments.     Rehab Course significant for     All other medical co-morbidities were stable.     Pt tolerated course of inpatient PT/OT/SLP rehab with significant functional improvements and met rehab goals prior to discharge.    Pt was medically cleared on 1/21/22 for discharged to Home   67 YO female with no PMH who was admitted to [facility] on 12/11/21  with c/o HA/vomiting since 12/10 found to have SAH c/b hydrocephaluns. On 12/11 s/p coiling of L Posterior communicating  aneurysm, found to have a partial left m2 thrombus now s/p thrombectomy with TICI 3 reperfusion. On 12/14, s/p R EVD for hydro c/b sizable tract hemorrhage with IVH now with L EVD, removed on 12/29. Course c/b cerebral vasospasm s/p treatment, UTI, HIT with PICC associated right subclavian vein thrombus on argatroban gtt and she transitioned to Eliquis    Pt was stable upon rehab admission to  Inpatient Rehabilitation Facility, 1/5/22. Admitted with gait instabilty, ADL, and functional impairments.     Rehab Course was unremarkable.    All other medical co-morbidities were stable.     Pt tolerated course of inpatient PT/OT/SLP rehab with significant functional improvements and met rehab goals prior to discharge.    Pt was medically cleared on 1/21/22 for discharged to Home

## 2022-01-19 NOTE — PROGRESS NOTE ADULT - SUBJECTIVE AND OBJECTIVE BOX
HISTORY OF PRESENT ILLNESS    This is a 67 YO female with no PMH who was admitted to [facility] on 12/11/21  with c/o HA/vomiting since 12/10 found to have SAH c/b hydrocephaluns. On 12/11 s/p coiling of L Posterior communicating  aneurysm, found to have a partial left m2 thrombus now s/p thrombectomy with TICI 3 reperfusion. On 12/14, s/p R EVD for hydro c/b sizable tract hemorrhage with IVH now with L EVD, removed on 12/29. 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 Patient was medically optimized for discharge to [facility] IRU on 1/5/22.    Pfizer x 2 + booster ( last dose 8/11/21)      PAST MEDICAL & SURGICAL HISTORY:  No pertinent past medical history    No significant past surgical history    SUBJECTIVE:  Patient seen while sitting in WC - self feed breakfast.  Doing well - denies HA, CP, SOB, abd pain.  Daughter will be in for family training today - informed of discharge date changed to this Friday.  She feels comfortable about it.  Still feel she can work on B/L LE.    Vital Signs Last 24 Hrs  T(C): 36.9 (19 Jan 2022 07:54), Max: 36.9 (19 Jan 2022 07:54)  T(F): 98.5 (19 Jan 2022 07:54), Max: 98.5 (19 Jan 2022 07:54)  HR: 61 (19 Jan 2022 07:54) (60 - 62)  BP: 111/64 (19 Jan 2022 07:54) (111/62 - 114/68)  RR: 15 (19 Jan 2022 07:54) (14 - 15)  SpO2: 98% (19 Jan 2022 07:54) (95% - 98%)    PHYSICAL EXAM  Constitutional - NAD, Comfortable  HEENT - NCAT, EOMI  Neck - Supple, No limited ROM  Chest - CTA bilaterally  Cardiovascular - RRR, S1S2  Abdomen -Soft, NTND  Extremities - No C/C/E, No calf tenderness   Neurologic Exam -  aox3, mathias           LABS                        11.9   4.11  )-----------( 184      ( 18 Jan 2022 06:30 )             36.7     01-18    144  |  106  |  18  ----------------------------<  94  3.7   |  30  |  0.63    Ca    9.1      18 Jan 2022 06:30    TPro  6.1  /  Alb  3.0<L>  /  TBili  0.2  /  DBili  x   /  AST  19  /  ALT  18  /  AlkPhos  77  01-18    LIVER FUNCTIONS - ( 18 Jan 2022 06:30 )  Alb: 3.0 g/dL / Pro: 6.1 g/dL / ALK PHOS: 77 U/L / ALT: 18 U/L / AST: 19 U/L / GGT: x             MEDICATIONS  (STANDING):  amLODIPine   Tablet 2.5 milliGRAM(s) Oral daily  apixaban 5 milliGRAM(s) Oral two times a day  escitalopram 5 milliGRAM(s) Oral daily  multivitamin 1 Tablet(s) Oral daily  pantoprazole   Suspension 40 milliGRAM(s) Oral before breakfast  senna 2 Tablet(s) Oral at bedtime    MEDICATIONS  (PRN):  acetaminophen     Tablet .. 650 milliGRAM(s) Oral every 6 hours PRN Mild Pain (1 - 3)  polyethylene glycol 3350 17 Gram(s) Oral daily PRN Constipation  zinc oxide 40% Ointment 1 Application(s) Topical two times a day PRN rash

## 2022-01-19 NOTE — DISCHARGE NOTE PROVIDER - NSDCCPCAREPLAN_GEN_ALL_CORE_FT
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   s/p cerebral angiogram and vasospasm treatment on 12/22  s/p m2 partially occluding thrombectomy done  **Follow up with Neurosurgeon, Dr. 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  Medication was causing your platelet level to drop.    Please follow up with Heme/onc, Dr. Ro    Diagnosis: Subclavian vein thrombosis  Assessment and Plan of Treatment: Found to have Right subclavian vein thrombosis - recommended by cardiovascular to be on blood thinner x 90 days.  You're placed on Eliquis 5mg twice daily  Will need to follow up with Dr. Hill.    Diagnosis: Hypertension  Assessment and Plan of Treatment: Your blood pressure medication was soft during your rehab course - amlodipine was reduced to 2.5 mg  Please follow up with Primary Care Provider to monitor lab and blood pressure and the need to adjust medications.     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   s/p cerebral angiogram and vasospasm treatment on 12/22  s/p m2 partially occluding thrombectomy done  **Follow up with Neurosurgeon, Dr. 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  Medication was causing your platelet level to drop.    Please follow up with Heme/onc, Dr. Ro    Diagnosis: Subclavian vein thrombosis  Assessment and Plan of Treatment: Found to have Right subclavian vein thrombosis - recommended by cardiovascular to be on blood thinner x 90 days.  You're placed on Eliquis 5mg twice daily  Will need to follow up with Dr. Hill.    Diagnosis: Hypertension  Assessment and Plan of Treatment: Your blood pressure medication was soft during your rehab course - amlodipine was reduced to 2.5 mg  Please follow up with Primary Care Provider to monitor lab and blood pressure and the need to adjust medications.    Diagnosis: HLD (hyperlipidemia)  Assessment and Plan of Treatment:    Please consult with Primary Care on starting Statin

## 2022-01-19 NOTE — DISCHARGE NOTE PROVIDER - NSDCMRMEDTOKEN_GEN_ALL_CORE_FT
acetaminophen 325 mg oral tablet: 2 tab(s) orally every 6 hours, As needed, Temp greater or equal to 38C (100.4F), Mild Pain (1 - 3)  amantadine 50 mg/5 mL oral syrup: 10 milliliter(s) orally 2 times a day 6 am and 4 pm please   amLODIPine 5 mg oral tablet: 1 tab(s) orally once a day  apixaban 5 mg oral tablet: 1 tab(s) orally 2 times a day  Multiple Vitamins oral tablet: 1 tab(s) orally once a day  oxyCODONE 5 mg oral tablet: 1 tab(s) orally every 4 hours, As needed, Moderate Pain (4 - 6)  senna oral tablet: 2 tab(s) orally once a day (at bedtime)  sodium chloride 1 g oral tablet: 1 tab(s) orally every 6 hours     acetaminophen 325 mg oral tablet: 2 tab(s) orally every 6 hours, As needed, Mild Pain (1 - 3)  amLODIPine 2.5 mg oral tablet: 1 tab(s) orally once a day  apixaban 5 mg oral tablet: 1 tab(s) orally 2 times a day  escitalopram 5 mg oral tablet: 1 tab(s) orally once a day  Multiple Vitamins oral tablet: 1 tab(s) orally once a day  pantoprazole 40 mg oral granule, delayed release: orally once a day before breakfast  polyethylene glycol 3350 oral powder for reconstitution: 17 gram(s) orally once a day, As needed, Constipation  senna oral tablet: 2 tab(s) orally once a day (at bedtime), As Needed  zinc oxide 40% topical ointment: 1 application topically 2 times a day, As needed, rash   acetaminophen 325 mg oral tablet: 2 tab(s) orally every 6 hours, As needed, Mild Pain (1 - 3)  amLODIPine 2.5 mg oral tablet: 1 tab(s) orally once a day  apixaban 5 mg oral tablet: 1 tab(s) orally 2 times a day  escitalopram 5 mg oral tablet: 1 tab(s) orally once a day  Multiple Vitamins oral tablet: 1 tab(s) orally once a day  pantoprazole 40 mg oral granule, delayed release: 1 tab(s) orally once a day  before breakfast   polyethylene glycol 3350 oral powder for reconstitution: 17 gram(s) orally once a day, As needed, Constipation  senna oral tablet: 2 tab(s) orally once a day (at bedtime), As Needed  zinc oxide 40% topical ointment: 1 application topically 2 times a day, As needed, rash

## 2022-01-19 NOTE — DISCHARGE NOTE PROVIDER - NSDCQMSTROKERISK_NEU_ALL_CORE
High blood pressure/History of a stroke or TIA High blood pressure/High cholesterol/History of a stroke or TIA

## 2022-01-19 NOTE — DISCHARGE NOTE PROVIDER - CARE PROVIDER_API CALL
Jose Hill (DO)  Cardiovascular Disease; Internal Medicine; Nuclear Cardiology  [address] [place] [address]  Phone: [phone]  Fax: [phone]  Follow Up Time: 1 month    Serene Ro; PhD)  Hematology; Internal Medicine; Oncology  [address] [place] [address]  Phone: [phone]  Fax: [phone]  Follow Up Time: 1 month    Vladimir Lucas; MS)  Unallocated  [address] [address]  Phone: [phone]  Fax: [phone]  Follow Up Time: 2 weeks    HALEY HANKINS  Internal Medicine  [address] [place] [address]  Phone: [phone]  Fax: [phone]  Follow Up Time: 1 week

## 2022-01-19 NOTE — DISCHARGE NOTE PROVIDER - CARE PROVIDERS DIRECT ADDRESSES
[email or web address],DirectAddress_Unknown,DirectAddress_Unknown,DirectAddress_Unknown

## 2022-01-20 ENCOUNTER — TRANSCRIPTION ENCOUNTER (OUTPATIENT)
Age: 67
End: 2022-01-20

## 2022-01-20 LAB
ALBUMIN SERPL ELPH-MCNC: 3.1 G/DL — LOW (ref 3.3–5)
ALP SERPL-CCNC: 83 U/L — SIGNIFICANT CHANGE UP (ref 40–120)
ALT FLD-CCNC: 16 U/L — SIGNIFICANT CHANGE UP (ref 10–45)
ANION GAP SERPL CALC-SCNC: 8 MMOL/L — SIGNIFICANT CHANGE UP (ref 5–17)
AST SERPL-CCNC: 15 U/L — SIGNIFICANT CHANGE UP (ref 10–40)
BILIRUB SERPL-MCNC: 0.3 MG/DL — SIGNIFICANT CHANGE UP (ref 0.2–1.2)
BUN SERPL-MCNC: 16 MG/DL — SIGNIFICANT CHANGE UP (ref 7–23)
CALCIUM SERPL-MCNC: 9 MG/DL — SIGNIFICANT CHANGE UP (ref 8.4–10.5)
CHLORIDE SERPL-SCNC: 105 MMOL/L — SIGNIFICANT CHANGE UP (ref 96–108)
CO2 SERPL-SCNC: 29 MMOL/L — SIGNIFICANT CHANGE UP (ref 22–31)
CREAT SERPL-MCNC: 0.65 MG/DL — SIGNIFICANT CHANGE UP (ref 0.5–1.3)
GLUCOSE SERPL-MCNC: 90 MG/DL — SIGNIFICANT CHANGE UP (ref 70–99)
HCT VFR BLD CALC: 38.3 % — SIGNIFICANT CHANGE UP (ref 34.5–45)
HGB BLD-MCNC: 12.4 G/DL — SIGNIFICANT CHANGE UP (ref 11.5–15.5)
MCHC RBC-ENTMCNC: 31.3 PG — SIGNIFICANT CHANGE UP (ref 27–34)
MCHC RBC-ENTMCNC: 32.4 GM/DL — SIGNIFICANT CHANGE UP (ref 32–36)
MCV RBC AUTO: 96.7 FL — SIGNIFICANT CHANGE UP (ref 80–100)
NRBC # BLD: 0 /100 WBCS — SIGNIFICANT CHANGE UP (ref 0–0)
PLATELET # BLD AUTO: 189 K/UL — SIGNIFICANT CHANGE UP (ref 150–400)
POTASSIUM SERPL-MCNC: 3.6 MMOL/L — SIGNIFICANT CHANGE UP (ref 3.5–5.3)
POTASSIUM SERPL-SCNC: 3.6 MMOL/L — SIGNIFICANT CHANGE UP (ref 3.5–5.3)
PROT SERPL-MCNC: 6.3 G/DL — SIGNIFICANT CHANGE UP (ref 6–8.3)
RBC # BLD: 3.96 M/UL — SIGNIFICANT CHANGE UP (ref 3.8–5.2)
RBC # FLD: 13.4 % — SIGNIFICANT CHANGE UP (ref 10.3–14.5)
SODIUM SERPL-SCNC: 142 MMOL/L — SIGNIFICANT CHANGE UP (ref 135–145)
WBC # BLD: 4.2 K/UL — SIGNIFICANT CHANGE UP (ref 3.8–10.5)
WBC # FLD AUTO: 4.2 K/UL — SIGNIFICANT CHANGE UP (ref 3.8–10.5)

## 2022-01-20 PROCEDURE — 99232 SBSQ HOSP IP/OBS MODERATE 35: CPT

## 2022-01-20 PROCEDURE — 99233 SBSQ HOSP IP/OBS HIGH 50: CPT

## 2022-01-20 RX ADMIN — AMLODIPINE BESYLATE 2.5 MILLIGRAM(S): 2.5 TABLET ORAL at 06:13

## 2022-01-20 RX ADMIN — SENNA PLUS 2 TABLET(S): 8.6 TABLET ORAL at 21:51

## 2022-01-20 RX ADMIN — PANTOPRAZOLE SODIUM 40 MILLIGRAM(S): 20 TABLET, DELAYED RELEASE ORAL at 06:12

## 2022-01-20 RX ADMIN — Medication 1 TABLET(S): at 12:00

## 2022-01-20 RX ADMIN — ESCITALOPRAM OXALATE 5 MILLIGRAM(S): 10 TABLET, FILM COATED ORAL at 12:00

## 2022-01-20 RX ADMIN — APIXABAN 5 MILLIGRAM(S): 2.5 TABLET, FILM COATED ORAL at 06:13

## 2022-01-20 RX ADMIN — APIXABAN 5 MILLIGRAM(S): 2.5 TABLET, FILM COATED ORAL at 17:27

## 2022-01-20 NOTE — PROGRESS NOTE ADULT - ASSESSMENT
ASSESSMENT/PLAN  This is a 65 YO female with no PMH who was admitted to [facility] on 12/11/21  with c/o HA/vomiting since 12/10 found to have SAH c/b hydrocephalus. On 12/11 s/p coiling of L P comm aneurysm, found to have a partial left m2 thrombus now s/p thrombectomy with TICI 3 reperfusion. On 12/14,  s/p R EVD for hydro c/b sizable tract hemorrhage with IVH now with L EVD, removed on 12/29. 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 Patient now with gait Instability, ADL impairments and Functional impairments.    # SAH  - On 12/11 s/p coiling of L Pcomm aneurysm, found to have a partial left m2 thrombus now s/p thrombectomy with TICI 3 reperfusion  -  s/p R EVD for hydro ON 12/14 c/b sizable tract hemorrhage with IVH now with L EVD, removed on 12/29. - Staples from EVD site removed 1/12 and tolerated well  - Continue Comprehensive Rehab Program: PT/OT/ST  - PT: Focused on improving strength, endurance, coordination, balance, functional mobility, and transfers  - OT: Focused on improving strength, fine motor skills, coordination, posture and ADLs.    - ST: to diagnose and treat deficits in swallowing, cognition and communication.   - Neuropsychology   - obtained baseline head ct - no acute changes     #HTN  - Amlodipine 5mg daily  - dec 2.5 - soft SBP   - Monitor: (111/62 - 114/68) 1/19    # HIT and Right Upper Arm DVT  - Apixaban 5 mg BID with GI ppx    #Depression/emotional lability  -lexapro 5mg daily (1/12)    #Pain management  - Tylenol PRN    #DVT ppx  - On Apixaban 5 for RUE DVT   - SCD, TEDs    #GI ppx  - Protonix    #Bowel Regimen  - Senna at bedtime  - miralax PRN    #Bladder management  - BS on admission, and q 8 hours (SC if > 400) - dc  - Monitor UO    #FEN   - Diet: Regular with thin    #Precaution  - Fall, Aspiration, Seizure, right upper arm    #Skin:  - No active issues at this time  - Pressure injury/Skin: Turn Q2hrs while in bed, OOB to Chair, PT/OT     #Dysphagia    - SLP: evaluation and treatment    IDT 1/18  OT: SV eat/Groom/UBD, min-CG with all other ADLs.  Requires a lot of cues  PT: CG transfer, amb 120' CG hand held, no AD, 12 steps min A.  Less buckling, easily distracted causing imbalance  SLP: reg/thin, decreased cog/orientation/attention, Able to follow simple commands, will likely need to train  and daughter  Barriers: easily distracted  DANNY: 1/21 Home with SV 24/7  Family training today, 1/19    Outpatient Follow-up (Specialty/Name of physician):  Dr. Vladimir Lucas  [address] [address]  [phone]    Farhat Munoz  Internal medicine   [address] [place] [address] [id number] [phone]    Liz [address] [address]  [phone]      TEAM MEETING 1/10/22  SW:  2 MYRTLE, 12 stairs inside, spouse and daughter involved   OT: min for tx, sv for eating, min for all other adls  PT: cg-min for transfers, 100' with no device and min assist  SLP: Regular with thins, max for cog  barriers: sever cog impairment, poor carryover, dec safety awareness, LE buckling, motor apraxia, weak, distracted, dec insight  goals: Sv for tranfers and adls, 80' with min  EDOD: Home 1/26

## 2022-01-20 NOTE — PROGRESS NOTE ADULT - SUBJECTIVE AND OBJECTIVE BOX
CHIEF COMPLAINT: no new complaints       HISTORY OF PRESENT ILLNESS    This is a 67 YO female with no PMH who was admitted to [facility] on 12/11/21  with c/o HA/vomiting since 12/10 found to have SAH c/b hydrocephaluns. On 12/11 s/p coiling of L Posterior communicating  aneurysm, found to have a partial left m2 thrombus now s/p thrombectomy with TICI 3 reperfusion. On 12/14, s/p R EVD for hydro c/b sizable tract hemorrhage with IVH now with L EVD, removed on 12/29. 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 Patient was medically optimized for discharge to [facility] IRU on 1/5/22.    Pfizer x 2 + booster ( last dose 8/11/21) (05 Jan 2022 14:15)        PAST MEDICAL & SURGICAL HISTORY:  No pertinent past medical history    No pertinent past medical history    No significant past surgical history    No significant past surgical history      VITALS  Vital Signs Last 24 Hrs  T(C): 36.7 (20 Jan 2022 07:26), Max: 36.7 (20 Jan 2022 07:26)  T(F): 98 (20 Jan 2022 07:26), Max: 98 (20 Jan 2022 07:26)  HR: 71 (20 Jan 2022 07:26) (60 - 71)  BP: 107/68 (20 Jan 2022 07:26) (104/65 - 128/80)  BP(mean): --  RR: 16 (20 Jan 2022 07:26) (14 - 16)  SpO2: 96% (20 Jan 2022 07:26) (95% - 96%)      PHYSICAL EXAM  Constitutional - NAD, Comfortable  HEENT - NCAT, EOMI  Neck - Supple, No limited ROM  Chest - CTA bilaterally  Cardiovascular - , S1S2, No murmurs  Abdomen -, Soft, NTND  Extremities - No C/C/E, No calf tenderness   Skin-no rash  Neurologic Exam - no new focal  deficits                          RECENT LABS                        12.4   4.20  )-----------( 189      ( 20 Jan 2022 07:00 )             38.3                           CURRENT MEDICATIONS  MEDICATIONS  (STANDING):  amLODIPine   Tablet 2.5 milliGRAM(s) Oral daily  apixaban 5 milliGRAM(s) Oral two times a day  escitalopram 5 milliGRAM(s) Oral daily  multivitamin 1 Tablet(s) Oral daily  pantoprazole   Suspension 40 milliGRAM(s) Oral before breakfast  senna 2 Tablet(s) Oral at bedtime    MEDICATIONS  (PRN):  acetaminophen     Tablet .. 650 milliGRAM(s) Oral every 6 hours PRN Mild Pain (1 - 3)  polyethylene glycol 3350 17 Gram(s) Oral daily PRN Constipation  zinc oxide 40% Ointment 1 Application(s) Topical two times a day PRN rash

## 2022-01-20 NOTE — DISCHARGE NOTE NURSING/CASE MANAGEMENT/SOCIAL WORK - PATIENT PORTAL LINK FT
You can access the FollowMyHealth Patient Portal offered by [facility] by registering at the following website: [email or web address]. By joining [facility]’s FollowMyHealth portal, you will also be able to view your health information using other applications (apps) compatible with our system.

## 2022-01-20 NOTE — DISCHARGE NOTE NURSING/CASE MANAGEMENT/SOCIAL WORK - NSDCPEFALRISK_GEN_ALL_CORE
For information on Fall & Injury Prevention, visit: [email or web address] OR  [email or web address] OR  https://www.cdc.gov/steadi/patient.html

## 2022-01-20 NOTE — PROGRESS NOTE ADULT - ASSESSMENT
67 y/o F with no PMH who was admitted to [facility] on 12/11/21 with c/o HA/vomiting since 12/10 found to have SAH c/b hydrocephalus. On 12/11 s/p coiling of L P comm aneurysm, found to have a partial left m2 thrombus now s/p thrombectomy with TICI 3 reperfusion. On 12/14,  s/p R EVD for hydro c/b sizable tract hemorrhage with IVH now with L EVD, removed on 12/29. 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 Patient now with gait Instability, ADL impairments and Functional impairments.    SAH  -12/11 s/p coiling of L Pcomm aneurysm, found to have a partial left m2 thrombus now s/p thrombectomy with TICI 3 reperfusion  -s/p R EVD for hydro ON 12/14 c/b sizable tract hemorrhage with IVH now with L EVD, removed on 12/29. - Staples from EVD site removed 1/12 and tolerated well  -Off amantadine due to confusion   -Lexapro started  -Continue comprehensive rehab program -PT/OT/SLP per rehab team  -Pain management, bowel regimen per rehab     HTN  -Amlodipine 5mg daily --> reduce to 2.5 mg qd because of soft pressures. will consider discontinuing.     HIT and Right Upper Arm DVT  -Apixaban 5 mg BID with GI ppx    Depression/emotional lability  -Continue lexapro 5mg daily (1/12)    #DVT ppx - SCD, TEDs  #GI ppx - PPI

## 2022-01-20 NOTE — PROGRESS NOTE ADULT - SUBJECTIVE AND OBJECTIVE BOX
Patient is a 66y old  Female who presents with a chief complaint of SAH s/p coiling of L p comm aneurysm 12/11/21 and partial L M2 thrombus thrombectomy/reperfusion c/b hydrocephalus (20 Jan 2022 08:09)    NO events overnight  Denies chest pain, SOB  Patient seen and examined at bedside.    ALLERGIES:  heparin (Other (Fatal))    MEDICATIONS  (STANDING):  amLODIPine   Tablet 2.5 milliGRAM(s) Oral daily  apixaban 5 milliGRAM(s) Oral two times a day  escitalopram 5 milliGRAM(s) Oral daily  multivitamin 1 Tablet(s) Oral daily  pantoprazole   Suspension 40 milliGRAM(s) Oral before breakfast  senna 2 Tablet(s) Oral at bedtime    MEDICATIONS  (PRN):  acetaminophen     Tablet .. 650 milliGRAM(s) Oral every 6 hours PRN Mild Pain (1 - 3)  polyethylene glycol 3350 17 Gram(s) Oral daily PRN Constipation  zinc oxide 40% Ointment 1 Application(s) Topical two times a day PRN rash    Vital Signs Last 24 Hrs  T(F): 98 (20 Jan 2022 07:26), Max: 98 (20 Jan 2022 07:26)  HR: 71 (20 Jan 2022 07:26) (60 - 71)  BP: 107/68 (20 Jan 2022 07:26) (104/65 - 128/80)  RR: 16 (20 Jan 2022 07:26) (14 - 16)  SpO2: 96% (20 Jan 2022 07:26) (95% - 96%)  I&O's Summary    PHYSICAL EXAM:  General: NAD, A/O, alert  ENT: Moist mucous membranes, no thrush  Neck: Supple, No JVD  Lungs: Clear to auscultation bilaterally, good air entry, non-labored breathing  Cardio: RRR, S1/S2, No murmur  Abdomen: Soft, Nontender, Nondistended; Bowel sounds present  Extremities: No calf tenderness, No pitting edema      LABS:                        12.4   4.20  )-----------( 189      ( 20 Jan 2022 07:00 )             38.3       01-20    142  |  105  |  16  ----------------------------<  90  3.6   |  29  |  0.65    Ca    9.0      20 Jan 2022 07:00    TPro  6.3  /  Alb  3.1  /  TBili  0.3  /  DBili  x   /  AST  15  /  ALT  16  /  AlkPhos  83  01-20     eGFR if Non : 93 mL/min/1.73M2 (01-20-22 @ 07:00)  eGFR if : 108 mL/min/1.73M2 (01-20-22 @ 07:00)    12-11 Chol 241 mg/dL LDL -- HDL 71 mg/dL Trig 87 mg/dL    COVID-19 PCR: NotDetec (01-18-22 @ 05:00)  COVID-19 PCR: NotDetec (01-05-22 @ 18:40)  COVID-19 PCR: NotDetec (01-03-22 @ 10:48)  OVID-19 PCR: NotDetec (12-21-21 @ 20:24)  COVID-19 PCR: NotDetec (01-18-22 @ 05:00)  COVID-19 PCR: NotDetec (01-05-22 @ 18:40)  COVID-19 PCR: NotDetec (01-03-22 @ 10:48)  COVID-19 PCR: NotDetec (12-21-21 @ 20:24)  COVID-19 PCR: NotDetec (12-16-21 @ 12:03)  COVID-19 PCR: NotDetec (12-11-21 @ 08:40)        RADIOLOGY & ADDITIONAL TESTS:  Care Discussed with Consultants/Other Providers:

## 2022-01-21 VITALS
OXYGEN SATURATION: 96 % | HEART RATE: 67 BPM | TEMPERATURE: 98 F | RESPIRATION RATE: 14 BRPM | DIASTOLIC BLOOD PRESSURE: 70 MMHG | SYSTOLIC BLOOD PRESSURE: 106 MMHG

## 2022-01-21 PROCEDURE — 86803 HEPATITIS C AB TEST: CPT

## 2022-01-21 PROCEDURE — 97530 THERAPEUTIC ACTIVITIES: CPT

## 2022-01-21 PROCEDURE — U0003: CPT

## 2022-01-21 PROCEDURE — 97129 THER IVNTJ 1ST 15 MIN: CPT

## 2022-01-21 PROCEDURE — 97110 THERAPEUTIC EXERCISES: CPT

## 2022-01-21 PROCEDURE — 85025 COMPLETE CBC W/AUTO DIFF WBC: CPT

## 2022-01-21 PROCEDURE — 85027 COMPLETE CBC AUTOMATED: CPT

## 2022-01-21 PROCEDURE — 97116 GAIT TRAINING THERAPY: CPT

## 2022-01-21 PROCEDURE — 92523 SPEECH SOUND LANG COMPREHEN: CPT

## 2022-01-21 PROCEDURE — 97112 NEUROMUSCULAR REEDUCATION: CPT

## 2022-01-21 PROCEDURE — 70450 CT HEAD/BRAIN W/O DYE: CPT

## 2022-01-21 PROCEDURE — 87635 SARS-COV-2 COVID-19 AMP PRB: CPT

## 2022-01-21 PROCEDURE — U0005: CPT

## 2022-01-21 PROCEDURE — 97163 PT EVAL HIGH COMPLEX 45 MIN: CPT

## 2022-01-21 PROCEDURE — 99238 HOSP IP/OBS DSCHRG MGMT 30/<: CPT

## 2022-01-21 PROCEDURE — 99233 SBSQ HOSP IP/OBS HIGH 50: CPT

## 2022-01-21 PROCEDURE — 36415 COLL VENOUS BLD VENIPUNCTURE: CPT

## 2022-01-21 PROCEDURE — 97535 SELF CARE MNGMENT TRAINING: CPT

## 2022-01-21 PROCEDURE — 97130 THER IVNTJ EA ADDL 15 MIN: CPT

## 2022-01-21 PROCEDURE — 99366 TEAM CONF W/PAT BY HC PROF: CPT

## 2022-01-21 PROCEDURE — 97167 OT EVAL HIGH COMPLEX 60 MIN: CPT

## 2022-01-21 PROCEDURE — 80053 COMPREHEN METABOLIC PANEL: CPT

## 2022-01-21 PROCEDURE — 92610 EVALUATE SWALLOWING FUNCTION: CPT

## 2022-01-21 PROCEDURE — 92507 TX SP LANG VOICE COMM INDIV: CPT

## 2022-01-21 RX ORDER — PANTOPRAZOLE SODIUM 20 MG/1
1 TABLET, DELAYED RELEASE ORAL
Qty: 30 | Refills: 0
Start: 2022-01-21 | End: 2022-02-19

## 2022-01-21 RX ADMIN — ESCITALOPRAM OXALATE 5 MILLIGRAM(S): 10 TABLET, FILM COATED ORAL at 12:02

## 2022-01-21 RX ADMIN — AMLODIPINE BESYLATE 2.5 MILLIGRAM(S): 2.5 TABLET ORAL at 05:25

## 2022-01-21 RX ADMIN — PANTOPRAZOLE SODIUM 40 MILLIGRAM(S): 20 TABLET, DELAYED RELEASE ORAL at 05:25

## 2022-01-21 RX ADMIN — Medication 1 TABLET(S): at 12:02

## 2022-01-21 RX ADMIN — APIXABAN 5 MILLIGRAM(S): 2.5 TABLET, FILM COATED ORAL at 05:25

## 2022-01-21 NOTE — PROGRESS NOTE ADULT - SUBJECTIVE AND OBJECTIVE BOX
Patient is a 67y old  Female who presents with a chief complaint of SAH s/p coiling of L p comm aneurysm 12/11/21 and partial L M2 thrombus thrombectomy/reperfusion c/b hydrocephalus (21 Jan 2022 10:43)      Patient seen and examined at bedside.    ALLERGIES:  heparin (Other (Fatal))    MEDICATIONS  (STANDING):  amLODIPine   Tablet 2.5 milliGRAM(s) Oral daily  apixaban 5 milliGRAM(s) Oral two times a day  escitalopram 5 milliGRAM(s) Oral daily  multivitamin 1 Tablet(s) Oral daily  pantoprazole   Suspension 40 milliGRAM(s) Oral before breakfast  senna 2 Tablet(s) Oral at bedtime    MEDICATIONS  (PRN):  acetaminophen     Tablet .. 650 milliGRAM(s) Oral every 6 hours PRN Mild Pain (1 - 3)  polyethylene glycol 3350 17 Gram(s) Oral daily PRN Constipation  zinc oxide 40% Ointment 1 Application(s) Topical two times a day PRN rash    Vital Signs Last 24 Hrs  T(F): 98.3 (20 Jan 2022 19:58), Max: 98.3 (20 Jan 2022 19:58)  HR: 60 (20 Jan 2022 19:58) (60 - 60)  BP: 111/68 (20 Jan 2022 19:58) (111/68 - 111/68)  RR: 16 (20 Jan 2022 19:58) (16 - 16)  SpO2: 96% (20 Jan 2022 19:58) (96% - 96%)  I&O's Summary    PHYSICAL EXAM:  General: NAD, A/O, alert  ENT: Moist mucous membranes, no thrush  Neck: Supple, No JVD  Lungs: Clear to auscultation bilaterally, good air entry, non-labored breathing  Cardio: RRR, S1/S2, No murmur  Abdomen: Soft, Nontender, Nondistended; Bowel sounds present  Extremities: No calf tenderness, No pitting edema    LABS:                        12.4   4.20  )-----------( 189      ( 20 Jan 2022 07:00 )             38.3       01-20    142  |  105  |  16  ----------------------------<  90  3.6   |  29  |  0.65    Ca    9.0      20 Jan 2022 07:00    TPro  6.3  /  Alb  3.1  /  TBili  0.3  /  DBili  x   /  AST  15  /  ALT  16  /  AlkPhos  83  01-20     eGFR if Non : 93 mL/min/1.73M2 (01-20-22 @ 07:00)  eGFR if : 108 mL/min/1.73M2 (01-20-22 @ 07:00)    12-11 Chol 241 mg/dL LDL -- HDL 71 mg/dL Trig 87 mg/dL    COVID-19 PCR: NotDetec (01-18-22 @ 05:00)  COVID-19 PCR: NotDetec (01-05-22 @ 18:40)  COVID-19 PCR: NotDetec (01-03-22 @ 10:48)  COVID-19 PCR: NotDetec (01-18-22 @ 05:00)  COVID-19 PCR: NotDetec (01-05-22 @ 18:40)  COVID-19 PCR: NotDetec (01-03-22 @ 10:48)  COVID-19 PCR: NotDetec (12-21-21 @ 20:24)  COVID-19 PCR: NotDetec (12-16-21 @ 12:03)  COVID-19 PCR: NotDetec (12-11-21 @ 08:40)        RADIOLOGY & ADDITIONAL TESTS:  Care Discussed with Consultants/Other Providers:

## 2022-01-21 NOTE — PROGRESS NOTE ADULT - PROVIDER SPECIALTY LIST ADULT
Hospitalist
Neurology
Hospitalist
Neurology
Rehab Medicine
Hospitalist
Neurology
Rehab Medicine
Hospitalist
Neurology
Neurology
Physiatry

## 2022-01-21 NOTE — PROGRESS NOTE ADULT - ASSESSMENT
67 y/o F with no PMH who was admitted to [facility] on 12/11/21 with c/o HA/vomiting since 12/10 found to have SAH c/b hydrocephalus. On 12/11 s/p coiling of L P comm aneurysm, found to have a partial left m2 thrombus now s/p thrombectomy with TICI 3 reperfusion. On 12/14,  s/p R EVD for hydro c/b sizable tract hemorrhage with IVH now with L EVD, removed on 12/29. 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 Patient now with gait Instability, ADL impairments and Functional impairments.    SAH  -12/11 s/p coiling of L Pcomm aneurysm, found to have a partial left m2 thrombus now s/p thrombectomy with TICI 3 reperfusion  -s/p R EVD for hydro ON 12/14 c/b sizable tract hemorrhage with IVH now with L EVD, removed on 12/29. - Staples from EVD site removed 1/12 and tolerated well  -Off amantadine due to confusion   -Lexapro started  -Continue comprehensive rehab program -PT/OT/SLP per rehab team  -Pain management, bowel regimen per rehab     HTN  -Amlodipine 5mg daily --> reduce to 2.5 mg qd because of soft pressures. will consider discontinuing.     HIT and Right Upper Arm DVT  -Apixaban 5 mg BID with GI ppx    Depression/emotional lability  -Continue lexapro 5mg daily (1/12)    #DVT ppx - SCD, TEDs  #GI ppx - PPI

## 2022-01-21 NOTE — PROGRESS NOTE ADULT - ASSESSMENT
ASSESSMENT/PLAN  This is a 65 YO female with no PMH who was admitted to [facility] on 12/11/21  with c/o HA/vomiting since 12/10 found to have SAH c/b hydrocephalus. On 12/11 s/p coiling of L P comm aneurysm, found to have a partial left m2 thrombus now s/p thrombectomy with TICI 3 reperfusion. On 12/14,  s/p R EVD for hydro c/b sizable tract hemorrhage with IVH now with L EVD, removed on 12/29. 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 Patient now with gait Instability, ADL impairments and Functional impairments.    # SAH  - On 12/11 s/p coiling of L Pcomm aneurysm, found to have a partial left m2 thrombus now s/p thrombectomy with TICI 3 reperfusion  -  s/p R EVD for hydro ON 12/14 c/b sizable tract hemorrhage with IVH now with L EVD, removed on 12/29. - Staples from EVD site removed 1/12 and tolerated well  - Continue Comprehensive Rehab Program: PT/OT/ST  - PT: Focused on improving strength, endurance, coordination, balance, functional mobility, and transfers  - OT: Focused on improving strength, fine motor skills, coordination, posture and ADLs.    - ST: to diagnose and treat deficits in swallowing, cognition and communication.   - Discontinue amantadine 100mg daily - may contribute to confusion in elderly  - Neuropsychological evaluation   - baseline head ct - no acute changes     #HTN  - Amlodipine 5mg daily  - dec 2.5 - soft SBP   - Monitor: (111/62 - 114/68) 1/19    # HIT and Right Upper Arm DVT  - Apixaban 5 mg BID with GI ppx    #Depression/emotional lability  -lexapro 5mg daily (1/12)    #Pain management  - Tylenol PRN    #DVT ppx  - On Apixaban 5 for RUE DVT   - SCD, TEDs    #GI ppx  - Protonix    #Bowel Regimen  - Senna at bedtime  - miralax PRN    #Bladder management  - BS on admission, and q 8 hours (SC if > 400) - dc  - Monitor UO    #FEN   - Diet: Regular with thin    #Precaution  - Fall, Aspiration, Seizure, right upper arm    #Skin:  - No active issues at this time  - Pressure injury/Skin: Turn Q2hrs while in bed, OOB to Chair, PT/OT     #Dysphagia    - SLP: evaluation and treatment    IDT 1/18  OT: SV eat/Groom/UBD, min-CG with all other ADLs.  Requires a lot of cues  PT: CG transfer, amb 120' CG hand held, no AD, 12 steps min A.  Less buckling, easily distracted causing imbalance  SLP: reg/thin, decreased cog/orientation/attention, Able to follow simple commands, will likely need to train  and daughter  Barriers: easily distracted  DANNY: 1/21 Home with SV 24/7  Family training today, 1/19    Outpatient Follow-up (Specialty/Name of physician):  Dr. Vladimir Lucas  [address] [place] [id number]  [phone]    Farhat Munoz  Internal medicine   [address] [place] [address]  [phone]    [address] [id number] [phone]      TEAM MEETING 1/10/22  SW:  2 MYRTLE, 12 stairs inside, spouse and daughter involved   OT: min for tx, sv for eating, min for all other adls  PT: cg-min for transfers, 100' with no device and min assist  SLP: Regular with thins, max for cog  barriers: sever cog impairment, poor carryover, dec safety awareness, LE buckling, motor apraxia, weak, distracted, dec insight  goals: Sv for tranfers and adls, 80' with min  EDOD: Home 1/26   ASSESSMENT/PLAN  This is a 65 YO female with no PMH who was admitted to [facility] on 12/11/21  with c/o HA/vomiting since 12/10 found to have SAH c/b hydrocephalus. On 12/11 s/p coiling of L P comm aneurysm, found to have a partial left m2 thrombus now s/p thrombectomy with TICI 3 reperfusion. On 12/14,  s/p R EVD for hydro c/b sizable tract hemorrhage with IVH now with L EVD, removed on 12/29. 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 Patient now with gait Instability, ADL impairments and Functional impairments.    # SAH  - On 12/11 s/p coiling of L Pcomm aneurysm, found to have a partial left m2 thrombus now s/p thrombectomy with TICI 3 reperfusion  -  s/p R EVD for hydro ON 12/14 c/b sizable tract hemorrhage with IVH now with L EVD, removed on 12/29. - Staples from EVD site removed 1/12 and tolerated well  - Continue Comprehensive Rehab Program: PT/OT/ST  - PT: Focused on improving strength, endurance, coordination, balance, functional mobility, and transfers  - OT: Focused on improving strength, fine motor skills, coordination, posture and ADLs.    - ST: to diagnose and treat deficits in swallowing, cognition and communication.   - Discontinue amantadine 100mg daily - may contribute to confusion in elderly  - Neuropsychological evaluation   - baseline head ct - no acute changes     #HTN  - Amlodipine 5mg daily  - dec 2.5 - soft SBP   - Monitor: (111/68) 1/21    # HIT and Right Upper Arm DVT  - Apixaban 5 mg BID with GI ppx    #Depression/emotional lability  -lexapro 5mg daily (1/12)    #Pain management  - Tylenol PRN    #DVT ppx  - Apixaban 5 for RUE DVT + HIT  - SCD, TEDs    #GI ppx  - Protonix    #Bowel Regimen  - Senna at bedtime  - miralax PRN    #Bladder management  - BS on admission, and q 8 hours (SC if > 400) - dc  - Monitor UO    #FEN   - Diet: Regular with thin    #Precaution  - Fall, Aspiration, Seizure, right upper arm    #Skin:  - No active issues at this time  - Pressure injury/Skin: Turn Q2hrs while in bed, OOB to Chair, PT/OT     #Dysphagia    - SLP: evaluation and treatment    IDT 1/18  OT: SV eat/Groom/UBD, min-CG with all other ADLs.  Requires a lot of cues  PT: CG transfer, amb 120' CG hand held, no AD, 12 steps min A.  Less buckling, easily distracted causing imbalance  SLP: reg/thin, decreased cog/orientation/attention, Able to follow simple commands, will likely need to train  and daughter  Barriers: easily distracted  DANNY: 1/21 Home with SV 24/7  Family training done 1/19    Outpatient Follow-up (Specialty/Name of physician):  Dr. Vladimir Lucas  [address] [place] [address]  [phone]    Farhat Munoz  Internal medicine   [address] [place] [address] [phone]    Liz Garcia [address] [id number] [phone]   ASSESSMENT/PLAN  This is a 65 YO female with no PMH who was admitted to [facility] on 12/11/21  with c/o HA/vomiting since 12/10 found to have SAH c/b hydrocephalus. On 12/11 s/p coiling of L P comm aneurysm, found to have a partial left m2 thrombus now s/p thrombectomy with TICI 3 reperfusion. On 12/14,  s/p R EVD for hydro c/b sizable tract hemorrhage with IVH now with L EVD, removed on 12/29. 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 Patient now with gait Instability, ADL impairments and Functional impairments.    # SAH  - On 12/11 s/p coiling of L Pcomm aneurysm, found to have a partial left m2 thrombus now s/p thrombectomy with TICI 3 reperfusion  -  s/p R EVD for hydro ON 12/14 c/b sizable tract hemorrhage with IVH now with L EVD, removed on 12/29. - Staples from EVD site removed 1/12 and tolerated well  - Discontinue amantadine 100mg daily - may contribute to confusion in elderly  - Neuropsychological evaluation   - baseline head ct - no acute changes     #HTN  - Amlodipine 5mg daily  - dec 2.5 - soft SBP   - Monitor: (111/68) 1/21    # HIT and Right Upper Arm DVT  - Apixaban 5 mg BID with GI ppx x 90 days    #Depression/emotional lability  -lexapro 5mg daily (1/12)    #Pain management  - Tylenol PRN    #DVT ppx  - Apixaban 5 for RUE DVT + HIT    #GI ppx  - Protonix    #Bowel Regimen  - Senna at bedtime  - miralax PRN    #Bladder management  - BS on admission, and q 8 hours (SC if > 400) - dc  - Voiding     #FEN   - Diet: Regular with thin    #Precaution  - Fall, Aspiration, Seizure, right upper arm    #Skin:  - No active issues at this time    #Dysphagia    - On regular diet     IDT 1/18  OT: SV eat/Groom/UBD, min-CG with all other ADLs.  Requires a lot of cues  PT: CG transfer, amb 120' CG hand held, no AD, 12 steps min A.  Less buckling, easily distracted causing imbalance  SLP: reg/thin, decreased cog/orientation/attention, Able to follow simple commands, will likely need to train  and daughter  Barriers: easily distracted  DANNY: 1/21 Home with  24/7  Family training done 1/19    Outpatient Follow-up (Specialty/Name of physician):  Dr. Vladimir Lucas  [address] [address]  [phone]    Farhat Munoz  Internal medicine   [facility] [place] [address] [id number] [phone]    Liz Garcia [address] [id number] [phone]   ASSESSMENT/PLAN  This is a 67 YO female with no PMH who was admitted to [facility] on 12/11/21  with c/o HA/vomiting since 12/10 found to have SAH c/b hydrocephalus. On 12/11 s/p coiling of L P comm aneurysm, found to have a partial left m2 thrombus now s/p thrombectomy with TICI 3 reperfusion. On 12/14,  s/p R EVD for hydro c/b sizable tract hemorrhage with IVH now with L EVD, removed on 12/29. 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 Patient now with gait Instability, ADL impairments and Functional impairments.    # SAH  - On 12/11 s/p coiling of L Pcomm aneurysm, found to have a partial left m2 thrombus now s/p thrombectomy with TICI 3 reperfusion  -  s/p R EVD for hydro ON 12/14 c/b sizable tract hemorrhage with IVH now with L EVD, removed on 12/29. - Staples from EVD site removed 1/12 and tolerated well  - Discontinue amantadine 100mg daily - may contribute to confusion in elderly  - Neuropsychological evaluation   - baseline head ct - no acute changes     #HTN  - Amlodipine 5mg daily  - dec 2.5 - soft SBP   - Monitor: (111/68) 1/21--BP improved    # HIT and Right Upper Arm DVT  - Apixaban 5 mg BID with GI ppx x 90 days    #Depression/emotional lability  -lexapro 5mg daily (1/12)    #Pain management  - Tylenol PRN    #DVT ppx  - Apixaban 5 for RUE DVT + HIT    #GI ppx  - Protonix    #Bowel Regimen  - Senna at bedtime  - miralax PRN    #Bladder management  - Voiding     #FEN   - Diet: Regular with thin    #Precaution  - Fall, Aspiration, Seizure, right upper arm      #Dysphagia--resolved    - On regular diet     IDT 1/18  OT: SV eat/Groom/UBD, min-CG with all other ADLs.  Requires a lot of cues  PT: CG transfer, amb 120' CG hand held, no AD, 12 steps min A.  Less buckling, easily distracted causing imbalance  SLP: reg/thin, decreased cog/orientation/attention, Able to follow simple commands, will likely need to train  and daughter  Barriers: easily distracted  DANNY: 1/21 Home with SV 24/7  Family training done 1/19    Outpatient Follow-up (Specialty/Name of physician):  Dr. Vladimir Lucas  [address] [address]  [phone]    Farhat Munoz  Internal medicine   [address] [place] [address] [phone]    Liz Garcia [address] [id number] [phone]

## 2022-01-21 NOTE — PROGRESS NOTE ADULT - SUBJECTIVE AND OBJECTIVE BOX
HISTORY OF PRESENT ILLNESS    This is a 67 YO female with no PMH who was admitted to [facility] on 12/11/21  with c/o HA/vomiting since 12/10 found to have SAH c/b hydrocephaluns. On 12/11 s/p coiling of L Posterior communicating  aneurysm, found to have a partial left m2 thrombus now s/p thrombectomy with TICI 3 reperfusion. On 12/14, s/p R EVD for hydro c/b sizable tract hemorrhage with IVH now with L EVD, removed on 12/29. 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 Patient was medically optimized for discharge to [facility] IRU on 1/5/22.    Pfizer x 2 + booster ( last dose 8/11/21)      PAST MEDICAL & SURGICAL HISTORY:  No pertinent past medical history    No significant past surgical history    SUBJECTIVE:  Patient resting in bed.  Doing well.  Feels good about discharge today.  Briefly discussed discharge - medications and follow ups.    Vital Signs Last 24 Hrs  T(C): 36.8 (20 Jan 2022 19:58), Max: 36.8 (20 Jan 2022 19:58)  T(F): 98.3 (20 Jan 2022 19:58), Max: 98.3 (20 Jan 2022 19:58)  HR: 60 (20 Jan 2022 19:58) (60 - 60)  BP: 111/68 (20 Jan 2022 19:58) (111/68 - 111/68)  RR: 16 (20 Jan 2022 19:58) (16 - 16)  SpO2: 96% (20 Jan 2022 19:58) (96% - 96%)    PHYSICAL EXAM  Constitutional - NAD, Comfortable  HEENT - NCAT, EOMI  Neck - Supple, No limited ROM  Chest - CTA bilaterally  Cardiovascular - RRR, S1S2  Abdomen -Soft, NTND  Extremities - No C/C/E, No calf tenderness   Neurologic Exam -  aox3, mathias           LABS                        12.4   4.20  )-----------( 189      ( 20 Jan 2022 07:00 )             38.3     01-20    142  |  105  |  16  ----------------------------<  90  3.6   |  29  |  0.65    Ca    9.0      20 Jan 2022 07:00    TPro  6.3  /  Alb  3.1<L>  /  TBili  0.3  /  DBili  x   /  AST  15  /  ALT  16  /  AlkPhos  83  01-20    LIVER FUNCTIONS - ( 20 Jan 2022 07:00 )  Alb: 3.1 g/dL / Pro: 6.3 g/dL / ALK PHOS: 83 U/L / ALT: 16 U/L / AST: 15 U/L / GGT: x           MEDICATIONS  (STANDING):  amLODIPine   Tablet 2.5 milliGRAM(s) Oral daily  apixaban 5 milliGRAM(s) Oral two times a day  escitalopram 5 milliGRAM(s) Oral daily  multivitamin 1 Tablet(s) Oral daily  pantoprazole   Suspension 40 milliGRAM(s) Oral before breakfast  senna 2 Tablet(s) Oral at bedtime    MEDICATIONS  (PRN):  acetaminophen     Tablet .. 650 milliGRAM(s) Oral every 6 hours PRN Mild Pain (1 - 3)  polyethylene glycol 3350 17 Gram(s) Oral daily PRN Constipation  zinc oxide 40% Ointment 1 Application(s) Topical two times a day PRN rash   HISTORY OF PRESENT ILLNESS    This is a 67 YO female with no PMH who was admitted to [facility] on 12/11/21  with c/o HA/vomiting since 12/10 found to have SAH c/b hydrocephaluns. On 12/11 s/p coiling of L Posterior communicating  aneurysm, found to have a partial left m2 thrombus now s/p thrombectomy with TICI 3 reperfusion. On 12/14, s/p R EVD for hydro c/b sizable tract hemorrhage with IVH now with L EVD, removed on 12/29. 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 Patient was medically optimized for discharge to [facility] IRU on 1/5/22.    Pfizer x 2 + booster ( last dose 8/11/21)      PAST MEDICAL & SURGICAL HISTORY:  No pertinent past medical history    No significant past surgical history    SUBJECTIVE:  Patient resting in bed.  Doing well.  Feels good about discharge today.  Briefly discussed discharge - medications and follow ups.    Vital Signs Last 24 Hrs - 1/21 VS not inputted yet  T(C): 36.8 (20 Jan 2022 19:58), Max: 36.8 (20 Jan 2022 19:58)  T(F): 98.3 (20 Jan 2022 19:58), Max: 98.3 (20 Jan 2022 19:58)  HR: 60 (20 Jan 2022 19:58) (60 - 60)  BP: 111/68 (20 Jan 2022 19:58) (111/68 - 111/68)  RR: 16 (20 Jan 2022 19:58) (16 - 16)  SpO2: 96% (20 Jan 2022 19:58) (96% - 96%)    PHYSICAL EXAM  Constitutional - NAD, Comfortable  HEENT - NCAT, EOMI  Neck - Supple, No limited ROM  Chest - CTA bilaterally  Cardiovascular - RRR, S1S2  Abdomen -Soft, NTND  Extremities - No C/C/E, No calf tenderness   Neurologic Exam -  aox3 self, location, date.  5/5 to all extremities.  Mild dysmetria to LLE           LABS                        12.4   4.20  )-----------( 189      ( 20 Jan 2022 07:00 )             38.3     01-20    142  |  105  |  16  ----------------------------<  90  3.6   |  29  |  0.65    Ca    9.0      20 Jan 2022 07:00    TPro  6.3  /  Alb  3.1<L>  /  TBili  0.3  /  DBili  x   /  AST  15  /  ALT  16  /  AlkPhos  83  01-20    LIVER FUNCTIONS - ( 20 Jan 2022 07:00 )  Alb: 3.1 g/dL / Pro: 6.3 g/dL / ALK PHOS: 83 U/L / ALT: 16 U/L / AST: 15 U/L / GGT: x           MEDICATIONS  (STANDING):  amLODIPine   Tablet 2.5 milliGRAM(s) Oral daily  apixaban 5 milliGRAM(s) Oral two times a day  escitalopram 5 milliGRAM(s) Oral daily  multivitamin 1 Tablet(s) Oral daily  pantoprazole   Suspension 40 milliGRAM(s) Oral before breakfast  senna 2 Tablet(s) Oral at bedtime    MEDICATIONS  (PRN):  acetaminophen     Tablet .. 650 milliGRAM(s) Oral every 6 hours PRN Mild Pain (1 - 3)  polyethylene glycol 3350 17 Gram(s) Oral daily PRN Constipation  zinc oxide 40% Ointment 1 Application(s) Topical two times a day PRN rash   HISTORY OF PRESENT ILLNESS    This is a 67 YO female with no PMH who was admitted to [facility] on 12/11/21  with c/o HA/vomiting since 12/10 found to have SAH c/b hydrocephaluns. On 12/11 s/p coiling of L Posterior communicating  aneurysm, found to have a partial left m2 thrombus now s/p thrombectomy with TICI 3 reperfusion. On 12/14, s/p R EVD for hydro c/b sizable tract hemorrhage with IVH now with L EVD, removed on 12/29. 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 Patient was medically optimized for discharge to [facility] IRU on 1/5/22.    Pfizer x 2 + booster ( last dose 8/11/21)      PAST MEDICAL & SURGICAL HISTORY:  No pertinent past medical history    No significant past surgical history    SUBJECTIVE:  Patient resting in bed.  Doing well.  Feels good about discharge today.  Briefly discussed discharge - medications and follow ups.    Vital Signs Last 24 Hrs - 1/21 VS not inputted yet  T(C): 36.8 (20 Jan 2022 19:58), Max: 36.8 (20 Jan 2022 19:58)  T(F): 98.3 (20 Jan 2022 19:58), Max: 98.3 (20 Jan 2022 19:58)  HR: 60 (20 Jan 2022 19:58) (60 - 60)  BP: 111/68 (20 Jan 2022 19:58) (111/68 - 111/68)  RR: 16 (20 Jan 2022 19:58) (16 - 16)  SpO2: 96% (20 Jan 2022 19:58) (96% - 96%)    PHYSICAL EXAM  Constitutional - NAD, Comfortable  HEENT - NCAT, EOMI  Neck - Supple, No limited ROM  Chest - CTA bilaterally  Cardiovascular - RRR, S1S2  Abdomen -Soft, NTND  Extremities - No C/C/E, No calf tenderness   Neurologic Exam -  aox3 self, location, date.  5/5 to all extremities.  Mild dysmetria to LLE .  Some difficulty with more complex commands        LABS                        12.4   4.20  )-----------( 189      ( 20 Jan 2022 07:00 )             38.3     01-20    142  |  105  |  16  ----------------------------<  90  3.6   |  29  |  0.65    Ca    9.0      20 Jan 2022 07:00    TPro  6.3  /  Alb  3.1<L>  /  TBili  0.3  /  DBili  x   /  AST  15  /  ALT  16  /  AlkPhos  83  01-20    LIVER FUNCTIONS - ( 20 Jan 2022 07:00 )  Alb: 3.1 g/dL / Pro: 6.3 g/dL / ALK PHOS: 83 U/L / ALT: 16 U/L / AST: 15 U/L / GGT: x           MEDICATIONS  (STANDING):  amLODIPine   Tablet 2.5 milliGRAM(s) Oral daily  apixaban 5 milliGRAM(s) Oral two times a day  escitalopram 5 milliGRAM(s) Oral daily  multivitamin 1 Tablet(s) Oral daily  pantoprazole   Suspension 40 milliGRAM(s) Oral before breakfast  senna 2 Tablet(s) Oral at bedtime    MEDICATIONS  (PRN):  acetaminophen     Tablet .. 650 milliGRAM(s) Oral every 6 hours PRN Mild Pain (1 - 3)  polyethylene glycol 3350 17 Gram(s) Oral daily PRN Constipation  zinc oxide 40% Ointment 1 Application(s) Topical two times a day PRN rash

## 2022-01-21 NOTE — PROGRESS NOTE ADULT - ATTENDING COMMENTS
Encourage PO fluids   Neurologically and medically stable   Full program
No acute events over the weekend   Improving neurologically and functionally    Multidisciplinary team meeting today:  patient's functional goals and needs, functional and clinical  progress were discussed, barriers to discharge were identified. Anticipate discharge home with home care, 24/7 supervision for safety.    EDOD  01/26/22    > 35 min spent, > 50% coordinating care
No acute issues overnight  Improving neurologically and functionally.    Apixaban for HIT/DVT UE    Family training today before anticipated discharge.  Spoke with daughter, detailed update given, all questions answered to her satisfaction, discharge plan discussed.     > 35 min spent, > 50 % counseling and coordinating care.
No new complaints , stable exam, improving neurologically and functionally.    Multidisciplinary team meeting today:  patient's functional goals and needs, functional and clinical  progress were discussed, barriers to discharge were identified. Anticipate discharge home with home care, 24/7 supervision for safety. Case reviewed with Medicine     EDOD  01/21/22      > 35 min spent, > 50 % coordinating care
No new complaints  Stable neurological exam  Functionally improving
Patient medically and neurologically  stable. Making progress towards rehab goals.   Continue rehab program. Discharge plan is in progress.
Tolerating program well  Functionally improving   Labs reviewed
Pt. seen with NP.  Agree with documentation above as per NP with amendments made as appropriate. Patient medically stable.     SAH--  Patient medically stable for discharge to home today.  Discharge medications and instructions reviewed with patient's daughter by Dr. Santana earlier in the week.

## 2022-01-26 ENCOUNTER — APPOINTMENT (OUTPATIENT)
Dept: INTERNAL MEDICINE | Facility: CLINIC | Age: 67
End: 2022-01-26
Payer: MEDICARE

## 2022-01-26 VITALS
WEIGHT: 104 LBS | HEART RATE: 75 BPM | BODY MASS INDEX: 20.96 KG/M2 | DIASTOLIC BLOOD PRESSURE: 86 MMHG | SYSTOLIC BLOOD PRESSURE: 127 MMHG | OXYGEN SATURATION: 98 % | HEIGHT: 59 IN

## 2022-01-26 DIAGNOSIS — Z71.84 ENC FOR HEALTH COUNSELING RELATED TO TRAVEL: ICD-10-CM

## 2022-01-26 DIAGNOSIS — Z78.9 OTHER SPECIFIED HEALTH STATUS: ICD-10-CM

## 2022-01-26 DIAGNOSIS — R39.13 SPLITTING OF URINARY STREAM: ICD-10-CM

## 2022-01-26 LAB — VWF CBA/VWF AG PPP IA-RTO: SIGNIFICANT CHANGE UP

## 2022-01-26 PROCEDURE — G0438: CPT

## 2022-01-26 PROCEDURE — 90662 IIV NO PRSV INCREASED AG IM: CPT

## 2022-01-26 PROCEDURE — G0008: CPT

## 2022-01-26 RX ORDER — AZITHROMYCIN 250 MG/1
250 TABLET, FILM COATED ORAL
Qty: 8 | Refills: 0 | Status: DISCONTINUED | COMMUNITY
Start: 2018-01-16 | End: 2022-01-26

## 2022-01-26 RX ORDER — ATOVAQUONE AND PROGUANIL HYDROCHLORIDE 250; 100 MG/1; MG/1
250-100 TABLET, FILM COATED ORAL DAILY
Qty: 23 | Refills: 0 | Status: DISCONTINUED | COMMUNITY
Start: 2018-01-16 | End: 2022-01-26

## 2022-01-26 RX ORDER — DIAPER,BRIEF,ADULT, DISPOSABLE
EACH MISCELLANEOUS
Qty: 1 | Refills: 1 | Status: ACTIVE | OUTPATIENT
Start: 2022-01-26

## 2022-01-26 NOTE — REVIEW OF SYSTEMS
[Incontinence] : incontinence [Fever] : no fever [Chest Pain] : no chest pain [Shortness Of Breath] : no shortness of breath [Dysuria] : no dysuria [Skin Rash] : no skin rash [Headache] : no headache

## 2022-01-26 NOTE — HEALTH RISK ASSESSMENT
[Former] : Former [15-19] : 15-19 [Yes] : Yes [2 - 4 times a month (2 pts)] : 2-4 times a month (2 points) [1 or 2 (0 pts)] : 1 or 2 (0 points) [Never (0 pts)] : Never (0 points) [No] : In the past 12 months have you used drugs other than those required for medical reasons? No [Medical reason not done] : Medical reason not done [With Family] : lives with family [] :  [Some assistance needed] : using telephone [Full assistance needed] : managing finances [Reports changes in dental health] : Reports changes in dental health [With Patient/Caregiver] : , with patient/caregiver [YearQuit] : 2021 [Audit-CScore] : 2 [de-identified] : Used to be 1 hour walking, now generally none [de-identified] : Generally pretty healthy, snacks a lot [de-identified] : Under treatment for depression [Change in mental status noted] : No change in mental status noted [Reports changes in hearing] : Reports no changes in hearing [Reports changes in vision] : Reports no changes in vision [HepatitisCDate] : 12/21 [HepatitisCComments] : Negative [FreeTextEntry2] : Retired, used to run a [employer]   [FreeTextEntry4] : Discussed with daughter and patient that I recommend a family member to designated HCPOA. Patient was unsure if she wanted her daughter or her . Pamphlet provided.

## 2022-01-26 NOTE — ASSESSMENT
[FreeTextEntry1] : Other:\par - a1c 5.4% on 12/11/21\par - Hep C negative 1/6/22\par - Cancer screening in 3 months\par - COVID vaccinated\par - FLu shot today\par \par RTC 6 weeks for follow-up of lexapro\par

## 2022-01-26 NOTE — PHYSICAL EXAM
[No Acute Distress] : no acute distress [EOMI] : extraocular movements intact [Normal Oropharynx] : the oropharynx was normal [Normal TMs] : both tympanic membranes were normal [No Respiratory Distress] : no respiratory distress  [No Accessory Muscle Use] : no accessory muscle use [Clear to Auscultation] : lungs were clear to auscultation bilaterally [Normal Rate] : normal rate  [Regular Rhythm] : with a regular rhythm [Normal S1, S2] : normal S1 and S2 [No Edema] : there was no peripheral edema [Soft] : abdomen soft [Non Tender] : non-tender [de-identified] : Thin and frail appearing [de-identified] : Left pupil larger than right, both quite dilated [de-identified] : Alert and cooperative. Oriented to self, , location, and president. Does not always answer questions appropriately. Follows commands verbally. Some noted imbalance when transition from sitting to standing. 5/5  strength, arm flexion, and extension. Bilateral lower extremities antigravity.

## 2022-01-26 NOTE — HISTORY OF PRESENT ILLNESS
[Family Member] : family member [Ad Hoc ] : provided by an ad hoc  [Interpreters_Relationshiptopatient] : daughter [de-identified] : Letitia Ho is a 68yo F with PMhx of HTN, HLD, and recent hospitalization for SAH s/p coiling of L p comm aneurysm 12/11/21 c/b DVT 2/2 HIT who presents to [facility] care.\par \par Patient was previously considered to be completely healthy until she was admitted to [facility] on 12/11/21 with c/o HA/vomiting since 12/10 found to have SAH c/b hydrocephalus. On 12/11 underwent coiling of L Posterior communicating aneurysm, found to have a partial left m2 thrombus requiring thrombectomy with TICI 3 reperfusion. Course complicated by tract hemorrhage and IVH require R and L EVD, removed 12/29. Also developed UTI, HIT with PICC associated right subclavian DVT. [facility] and [facility]. Discharged to [facility] on 1/5/22 (was started on lexapro during this time), and then to home on 1/21/22. \par \par Since coming home, patient has been doing generally well. Daughter reports she is intermittently confused but is usually oriented. She is ambulating and strong, but is very inattentive so daughter does have some safety concerns. Eating well. Report episodes of urinary and fecal incontinence--sometimes patient notes she suddenly needs to urinate, sometimes patient doesn't notice.

## 2022-01-30 ENCOUNTER — OUTPATIENT (OUTPATIENT)
Dept: OUTPATIENT SERVICES | Facility: HOSPITAL | Age: 67
LOS: 1 days | Discharge: HOME | End: 2022-01-30
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
## 2022-02-02 ENCOUNTER — NON-APPOINTMENT (OUTPATIENT)
Age: 67
End: 2022-02-02

## 2022-02-02 ENCOUNTER — RESULT REVIEW (OUTPATIENT)
Age: 67
End: 2022-02-02

## 2022-02-02 ENCOUNTER — APPOINTMENT (OUTPATIENT)
Dept: HEMATOLOGY ONCOLOGY | Facility: CLINIC | Age: 67
End: 2022-02-02
Payer: MEDICARE

## 2022-02-02 VITALS
WEIGHT: 105.82 LBS | DIASTOLIC BLOOD PRESSURE: 71 MMHG | HEIGHT: 59.49 IN | OXYGEN SATURATION: 96 % | BODY MASS INDEX: 21.05 KG/M2 | HEART RATE: 68 BPM | SYSTOLIC BLOOD PRESSURE: 111 MMHG | RESPIRATION RATE: 15 BRPM | TEMPERATURE: 98.2 F

## 2022-02-02 LAB
BASOPHILS # BLD AUTO: 0.05 K/UL — SIGNIFICANT CHANGE UP (ref 0–0.2)
BASOPHILS NFR BLD AUTO: 1.2 % — SIGNIFICANT CHANGE UP (ref 0–2)
EOSINOPHIL # BLD AUTO: 0.19 K/UL — SIGNIFICANT CHANGE UP (ref 0–0.5)
EOSINOPHIL NFR BLD AUTO: 4.4 % — SIGNIFICANT CHANGE UP (ref 0–6)
HCT VFR BLD CALC: 37.9 % — SIGNIFICANT CHANGE UP (ref 34.5–45)
HGB BLD-MCNC: 11.8 G/DL — SIGNIFICANT CHANGE UP (ref 11.5–15.5)
IMM GRANULOCYTES NFR BLD AUTO: 0 % — SIGNIFICANT CHANGE UP (ref 0–1.5)
LYMPHOCYTES # BLD AUTO: 2.19 K/UL — SIGNIFICANT CHANGE UP (ref 1–3.3)
LYMPHOCYTES # BLD AUTO: 50.5 % — HIGH (ref 13–44)
MCHC RBC-ENTMCNC: 30.3 PG — SIGNIFICANT CHANGE UP (ref 27–34)
MCHC RBC-ENTMCNC: 31.1 G/DL — LOW (ref 32–36)
MCV RBC AUTO: 97.2 FL — SIGNIFICANT CHANGE UP (ref 80–100)
MONOCYTES # BLD AUTO: 0.32 K/UL — SIGNIFICANT CHANGE UP (ref 0–0.9)
MONOCYTES NFR BLD AUTO: 7.4 % — SIGNIFICANT CHANGE UP (ref 2–14)
NEUTROPHILS # BLD AUTO: 1.59 K/UL — LOW (ref 1.8–7.4)
NEUTROPHILS NFR BLD AUTO: 36.5 % — LOW (ref 43–77)
NRBC # BLD: 0 /100 WBCS — SIGNIFICANT CHANGE UP (ref 0–0)
PLATELET # BLD AUTO: 205 K/UL — SIGNIFICANT CHANGE UP (ref 150–400)
RBC # BLD: 3.9 M/UL — SIGNIFICANT CHANGE UP (ref 3.8–5.2)
RBC # FLD: 12.7 % — SIGNIFICANT CHANGE UP (ref 10.3–14.5)
WBC # BLD: 4.34 K/UL — SIGNIFICANT CHANGE UP (ref 3.8–10.5)
WBC # FLD AUTO: 4.34 K/UL — SIGNIFICANT CHANGE UP (ref 3.8–10.5)

## 2022-02-02 PROCEDURE — 99215 OFFICE O/P EST HI 40 MIN: CPT

## 2022-02-02 PROCEDURE — 99205 OFFICE O/P NEW HI 60 MIN: CPT

## 2022-02-02 NOTE — ASSESSMENT
[FreeTextEntry1] : 67 year-old Ms. DAVIS is seen in consult for HIT. that she developed when she was admitted with SAH and cerebral vessel thrombosis (s/p thrombectomy). She also has a PICC related R subclavian kwasi clot. She has been on AC with Eliquis without having any bleeding complication. She needs 3-6 months of anticoagulation.  \par \par \par # GILLIAN vs HIT and PICC related R subclavian vein thrombosis\par - Patient is on Eliquis 5 mg BID \par - Continue Eliquis for 3-6 months \par - Watch for overt/occult bleeding \par - Thrombophilia w/u not indicated at this time \par - RTC in 3 months                                            \par \par

## 2022-02-02 NOTE — HISTORY OF PRESENT ILLNESS
[de-identified] : 67 year-old Ms. DAVIS is seen in consult for h/o HIT while was admitted with SAH and cerebral vessel thrombosis (s/p thrombectomy). She subsequently developed HIT with new R subclavian vein thrombosis secondary to PICC. She has been on Eliquis for the last one month. She is compliant and has not developed any bleeding complication. She denies any previous h/o clot and does not have any family h/o thrombosis. \par \par \par HOSPITAL CHART REVIEW\par 66 year old woman admitted 12/11/21 with HA/vomiting since 12/10 found to have\par SAH c/b hydrocephalus s/p coiling of L Pcomm aneurysm, found to have a partial\par L M2 thrombus now s/p thrombectomy s/p R EVD for hydrocephalus c/b hemorrhage\par with IVH now with L EVD. c/b thrombocytopenia found to be HIT positive.\par hematology consulted for HIT management.\par \par #HIT\par -BALJEET positive\par -Pt should not receive heparin products\par -Plts have improved drastically\par -c/w Argatroban gtt. Can hold 4 hours prior to removal of drain\par -Monitor on argatroban gtt until certain pt not going for further procedures\par and no longer bleeding\par -She can be switched to any DOAC once cleared by neurosurgery team\par -DOAC can include Apixaban 5mg BID for 90 days versus Rivaroxaban 20mg daily\par for 90 days given platelets have recovered\par \par 66-yr-old woman consulted anticoagulation in the setting of intracranial bleed\par (ruptured aneurysm) and GILLIAN (PICC related thrombosis?). Patient is currently\par on Argatroban and there is no evidence of active bleeding. Patient will need 3\par months of anticoagulation with non-heparin product prior to discharge; may be\par switched to a DOAC starting with a loading dose. Monitor closely for bleeding.\par

## 2022-02-02 NOTE — RESULTS/DATA
[FreeTextEntry1] : 2/2/2022\par UE DUPLEX (12/23/2021)\par IMPRESSION:\par \par Positive deep venous thrombosis involving the right subclavian vein. \par Thrombus is nonocclusive and related to the PICC line.\par \par No evidence of deep venous thrombosis in the left upper extremity.\par \par Superficial thrombophlebitis left basilic vein.

## 2022-02-03 ENCOUNTER — APPOINTMENT (OUTPATIENT)
Dept: CT IMAGING | Facility: IMAGING CENTER | Age: 67
End: 2022-02-03
Payer: MEDICARE

## 2022-02-03 ENCOUNTER — APPOINTMENT (OUTPATIENT)
Dept: NEUROSURGERY | Facility: CLINIC | Age: 67
End: 2022-02-03
Payer: MEDICARE

## 2022-02-03 ENCOUNTER — OUTPATIENT (OUTPATIENT)
Dept: OUTPATIENT SERVICES | Facility: HOSPITAL | Age: 67
LOS: 1 days | End: 2022-02-03
Payer: MEDICARE

## 2022-02-03 DIAGNOSIS — I60.9 NONTRAUMATIC SUBARACHNOID HEMORRHAGE, UNSPECIFIED: ICD-10-CM

## 2022-02-03 LAB
ALBUMIN SERPL ELPH-MCNC: 3.9 G/DL
ALP BLD-CCNC: 82 U/L
ALT SERPL-CCNC: 13 U/L
ANION GAP SERPL CALC-SCNC: 12 MMOL/L
AST SERPL-CCNC: 18 U/L
BILIRUB SERPL-MCNC: <0.2 MG/DL
BUN SERPL-MCNC: 12 MG/DL
CALCIUM SERPL-MCNC: 9.1 MG/DL
CHLORIDE SERPL-SCNC: 107 MMOL/L
CO2 SERPL-SCNC: 24 MMOL/L
CREAT SERPL-MCNC: 0.61 MG/DL
GLUCOSE SERPL-MCNC: 107 MG/DL
LDH SERPL-CCNC: 181 U/L
POTASSIUM SERPL-SCNC: 3.8 MMOL/L
PROT SERPL-MCNC: 6.4 G/DL
SODIUM SERPL-SCNC: 144 MMOL/L

## 2022-02-03 PROCEDURE — 70450 CT HEAD/BRAIN W/O DYE: CPT | Mod: 26

## 2022-02-03 PROCEDURE — 99213 OFFICE O/P EST LOW 20 MIN: CPT

## 2022-02-03 PROCEDURE — 70450 CT HEAD/BRAIN W/O DYE: CPT

## 2022-02-03 NOTE — PHYSICAL EXAM
[Person] : oriented to person [Place] : oriented to place [Time] : oriented to time [Motor Tone] : muscle tone was normal in all four extremities [Motor Strength] : muscle strength was normal in all four extremities [Abnormal Walk] : normal gait [Balance] : balance was intact

## 2022-02-03 NOTE — REVIEW OF SYSTEMS
[As Noted in HPI] : as noted in HPI [Memory Lapses or Loss] : memory loss [Negative] : Heme/Lymph [de-identified] : short term memory issues

## 2022-02-03 NOTE — ASSESSMENT
[FreeTextEntry1] : Impression: 67F with no PMH p/w HA, N+V, posterior neck pain, CT showed SAH s/p angio and coiling of ruptured left PCOMM aneurysm on 12/11/21, s/p EVD placement which was complicated by tract hemorrhage, eventually weaned and removed, S/p repeat angios 12/17, 12/22 with vasospasm treatment. HIT positive s/p subclavian DVT, argatroban drip started, discharged on Eliquis.\par \par Patient clinically doing well, daughter states she has short term memory issues. CT scan 2/3 shows improvement, evolving parenchymal hemorrhage involving the right frontal region.\par Assured patient and daughter that these cognitive symptoms are relatively common couple months following SAH and will improve over time.\par \par Daughter also brought up concerns about urinary incontinence since discharge from rehab. But has noticeably improved since first started. May be related to her prior UTI from hospital admission. If worsens or persists, may repeat scan due to possibility of worsening hydrocephalus/may refer to urologist.\par Also, patient reports numbness/tingling of left ankle, and during outpatient PT session, her right knee buckled. Bilateral leg, ankle strength normal with normal reflexes.\par Educated that it is not common for these symptoms to be related to her condition or manifest this far out from the procedure.\par \par \par Plan:\par Ok to continue Eliquis as per Dr. Ro\par MRI brain non con, MRA brain non con in 3 months (March 2022)\par Repeat angiogram in 6 months \par \par Repeat angiogram in 6 months (June 2022)

## 2022-02-03 NOTE — HISTORY OF PRESENT ILLNESS
[FreeTextEntry1] : Letitia Ho is a 67 year old female with no significant PMH, transferred from [facility] for headache with associated nausea/vomiting and posterior neck pain, CT showed diffuse SAH s/p cerebral angiogram and coil embolization of left PCOMM artery aneurysm on 12/11/21. A thrombus was noted in a distal M3 branch at the end of the procedure which was removed completely with aspiration. Hospital course complicated by EVD tract hemorrhage. Repeat angiogram on 12/17, and 12/22 with treatment of mild to moderate vasospasm with intra-arterial milrinone. 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 Discharged to [facility]. Currently at home, discharged from rehab 2 weeks ago.\par \par Patient states she is doing overall well since discharge from rehab. Daughter reports she has been having cognitive issues including short term memory loss. She finished outpatient OT, PT. Repeat CT scan on 2/3/2022 is stable with the evolving parenchymal hemorrhage involving the right frontal region. No new areas of acute hemorrhaging.\par

## 2022-02-23 ENCOUNTER — APPOINTMENT (OUTPATIENT)
Dept: CARDIOLOGY | Facility: CLINIC | Age: 67
End: 2022-02-23

## 2022-03-09 ENCOUNTER — APPOINTMENT (OUTPATIENT)
Dept: INTERNAL MEDICINE | Facility: CLINIC | Age: 67
End: 2022-03-09

## 2022-03-11 ENCOUNTER — TRANSCRIPTION ENCOUNTER (OUTPATIENT)
Age: 67
End: 2022-03-11

## 2022-03-15 ENCOUNTER — APPOINTMENT (OUTPATIENT)
Dept: INTERNAL MEDICINE | Facility: CLINIC | Age: 67
End: 2022-03-15
Payer: MEDICARE

## 2022-03-15 DIAGNOSIS — R32 UNSPECIFIED URINARY INCONTINENCE: ICD-10-CM

## 2022-03-15 PROCEDURE — 99213 OFFICE O/P EST LOW 20 MIN: CPT | Mod: 95

## 2022-03-15 NOTE — PHYSICAL EXAM
[No Acute Distress] : no acute distress [No Respiratory Distress] : no respiratory distress  [de-identified] : Answering questions appropriately, does appear to be mildly inattentive

## 2022-03-15 NOTE — HISTORY OF PRESENT ILLNESS
[Home] : at home, [unfilled] , at the time of the visit. [Medical Office: ([facility])___] : at the medical office located in  [Family Member] : family member [Verbal consent obtained from patient] : the patient, [unfilled] [Interpreters_Relationshiptopatient] : daughter [de-identified] : Letitia Ho is a 68yo F with PMhx of HTN, HLD, SAH s/p coiling of L p comm aneurysm 12/11/21 c/b DVT 2/2 HIT, and depression who presents to follow-up for escitalopram initiation.\par \par Concerns:\par Fecal/urinary incontinence:\par Reporting fecal incontinence over past 1-2 weeks, occurring 2-3 times/day. Taking senna. She reports her urinary incontinence is improving. Having mid-lower back pain with movement but this is chronic and not worse since onset of fecal incontinence. She cannot say whether the pain is midline or not. No numbness/tingling in legs/groin, no new leg weakness. \par \par Adjustment disorder:\par Using escitalopram 5mg, tolerating well. Reports her mood is greatly improved, but daughter suspects it is more likely related to patient being out of the hospital rather than effect of medication.\par \par Hx of SAH:\par Reviewed medication list, patient would like to decrease. Asked about diet changes and whether patient can travel to wedding in [place] in the summer.

## 2022-03-15 NOTE — ASSESSMENT
[FreeTextEntry1] : Daughter will call back with sx in 1 week\par \par RTC 4-6 months for follow-up of mood and possible escitalopram discontinuation

## 2022-03-25 ENCOUNTER — APPOINTMENT (OUTPATIENT)
Dept: CARDIOLOGY | Facility: CLINIC | Age: 67
End: 2022-03-25
Payer: MEDICARE

## 2022-03-25 VITALS
WEIGHT: 107 LBS | SYSTOLIC BLOOD PRESSURE: 124 MMHG | HEIGHT: 59 IN | BODY MASS INDEX: 21.57 KG/M2 | HEART RATE: 69 BPM | DIASTOLIC BLOOD PRESSURE: 77 MMHG | OXYGEN SATURATION: 97 %

## 2022-03-25 PROCEDURE — 99214 OFFICE O/P EST MOD 30 MIN: CPT

## 2022-03-25 PROCEDURE — 99204 OFFICE O/P NEW MOD 45 MIN: CPT

## 2022-03-28 ENCOUNTER — APPOINTMENT (OUTPATIENT)
Dept: MRI IMAGING | Facility: CLINIC | Age: 67
End: 2022-03-28
Payer: MEDICARE

## 2022-03-28 PROCEDURE — 70551 MRI BRAIN STEM W/O DYE: CPT

## 2022-03-28 PROCEDURE — 70544 MR ANGIOGRAPHY HEAD W/O DYE: CPT | Mod: 59

## 2022-03-28 NOTE — HISTORY OF PRESENT ILLNESS
[FreeTextEntry1] : 67 year-old Ms. DAVIS is seen in consult for h/o HIT and right subclavian DVT in setting of PICC linewhile was admitted with SAH and cerebral vessel thrombosis (s/p thrombectomy).\par \par Hx of right subclavian DVT, provoked in setting of PICC line 12/23/2021\par GILLIAN subsequently, agatroban -> eliquis\par Compliant with Eliquis\par No repeat doppler since\par No prior clots / bleeding\par No prior cancer screenings\par No upper or lower extremity edema\par No progression of SAH\par Seeing Hematology, no plan for further workup\par \par \par

## 2022-03-28 NOTE — ASSESSMENT
[FreeTextEntry1] : Assessment\par \par 1. provoked (PICC line) R Subclavian DVT 12/23/21\par 2. GILLIAN\par 3. SAH 2/2 aneurysm s/p coiling and EVD 12/2021\par 4. HTN\par 5. HLD\par \par Plan\par \par 1. Eliquis 5mg PO BID, plan 6 months therapy (started therapy 12/2021)\par 2. will repeat upper extremity duplex 4/2022: if DVT neg, will d/c Eliquis in 6/2022\par 3. Can hold Eliquis 1 day prior to planned angiogram in 6/2022\par 4. If flying this fall, take 1 dose Eliquis 2.5mg prior to each flight\par \par \par

## 2022-03-28 NOTE — PHYSICAL EXAM
[General Appearance - Well Developed] : well developed [Normal Appearance] : normal appearance [Well Groomed] : well groomed [General Appearance - Well Nourished] : well nourished [No Deformities] : no deformities [General Appearance - In No Acute Distress] : no acute distress [Normal Conjunctiva] : the conjunctiva exhibited no abnormalities [Eyelids - No Xanthelasma] : the eyelids demonstrated no xanthelasmas [Normal Oral Mucosa] : normal oral mucosa [No Oral Pallor] : no oral pallor [No Oral Cyanosis] : no oral cyanosis [Normal Jugular Venous A Waves Present] : normal jugular venous A waves present [Normal Jugular Venous V Waves Present] : normal jugular venous V waves present [No Jugular Venous Evangelista A Waves] : no jugular venous evangelista A waves [Heart Rate And Rhythm] : heart rate and rhythm were normal [Heart Sounds] : normal S1 and S2 [Murmurs] : no murmurs present [Respiration, Rhythm And Depth] : normal respiratory rhythm and effort [Exaggerated Use Of Accessory Muscles For Inspiration] : no accessory muscle use [Auscultation Breath Sounds / Voice Sounds] : lungs were clear to auscultation bilaterally [Abdomen Soft] : soft [Abdomen Tenderness] : non-tender [Abdomen Mass (___ Cm)] : no abdominal mass palpated [Abnormal Walk] : normal gait [Gait - Sufficient For Exercise Testing] : the gait was sufficient for exercise testing [Nail Clubbing] : no clubbing of the fingernails [Cyanosis, Localized] : no localized cyanosis [Petechial Hemorrhages (___cm)] : no petechial hemorrhages [Skin Color & Pigmentation] : normal skin color and pigmentation [] : no rash [No Venous Stasis] : no venous stasis [Skin Lesions] : no skin lesions [No Skin Ulcers] : no skin ulcer [No Xanthoma] : no  xanthoma was observed [Oriented To Time, Place, And Person] : oriented to person, place, and time [Affect] : the affect was normal [Mood] : the mood was normal [No Anxiety] : not feeling anxious

## 2022-03-30 ENCOUNTER — APPOINTMENT (OUTPATIENT)
Dept: NEUROSURGERY | Facility: CLINIC | Age: 67
End: 2022-03-30
Payer: MEDICARE

## 2022-03-30 VITALS
TEMPERATURE: 98.2 F | HEART RATE: 62 BPM | BODY MASS INDEX: 21.57 KG/M2 | OXYGEN SATURATION: 95 % | SYSTOLIC BLOOD PRESSURE: 133 MMHG | WEIGHT: 107 LBS | DIASTOLIC BLOOD PRESSURE: 78 MMHG | HEIGHT: 59 IN

## 2022-03-30 PROCEDURE — 99212 OFFICE O/P EST SF 10 MIN: CPT

## 2022-03-30 NOTE — HISTORY OF PRESENT ILLNESS
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 To review, she has no significant PMH, transferred from [facility] for headache with associated nausea/vomiting and posterior neck pain, CT showed diffuse SAH s/p cerebral angiogram and coil embolization of left PCOMM artery aneurysm on 12/11/21. A thrombus was noted in a distal M3 branch at the end of the procedure which was removed completely with aspiration. Hospital course complicated by EVD tract hemorrhage. Repeat angiogram on 12/17, and 12/22 with treatment of mild to moderate vasospasm with intra-arterial milrinone. 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 Discharged to [facility]. Currently at home, discharged from rehab.\par \par Today she is feeling well overall, denies symptoms of motor, sensory, speech, or visual abnormalities. States improvement in mobility. Daughter states patient has had issues with short term memory. MRI brain non con and MRA brain non con 3/28 shows no recurrence of aneurysm or new aneurysms, no acute infarct or new hemorrhaging, chronic decreased parenchymal hemorrhage in the bilateral frontal lobes.

## 2022-03-30 NOTE — ASSESSMENT
[FreeTextEntry1] : Impression: 67F with no PMH p/w HA, N+V, posterior neck pain, CT showed SAH s/p angio and coiling of ruptured left PCOMM aneurysm on 12/11/21, s/p EVD placement which was complicated by tract hemorrhage, eventually weaned and removed, S/p repeat angios 12/17, 12/22 with vasospasm treatment. 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 She is able to perform activities of daily living. Daughter feels as if she has some short term memory loss, which we assured that this may be unrelated. If worsens or persists, may consider referring to neurologist memory specialist.\par On Eliquis, following up with cardiologist regarding DVT.\par \par 3 month follow up MRI/MRA looks good, with no acute infarcts, stable mild ventriculomegaly, no recurrence of the treated aneurysm or new aneurysms. \par \par \par Plan:\par Repeat angiogram in 3 months. Patient and daughter would like to proceed June 9, 2022.\par

## 2022-03-30 NOTE — PHYSICAL EXAM
[Person] : oriented to person [Place] : oriented to place [Time] : oriented to time [Cranial Nerves Optic (II)] : visual acuity intact bilaterally,  pupils equal round and reactive to light [Cranial Nerves Oculomotor (III)] : extraocular motion intact [Cranial Nerves Trigeminal (V)] : facial sensation intact symmetrically [Cranial Nerves Facial (VII)] : face symmetrical [Cranial Nerves Vestibulocochlear (VIII)] : hearing was intact bilaterally [Cranial Nerves Glossopharyngeal (IX)] : tongue and palate midline [Cranial Nerves Accessory (XI - Cranial And Spinal)] : head turning and shoulder shrug symmetric [Cranial Nerves Hypoglossal (XII)] : there was no tongue deviation with protrusion [Motor Strength] : muscle strength was normal in all four extremities [Motor Tone] : muscle tone was normal in all four extremities [Abnormal Walk] : normal gait [Balance] : balance was intact

## 2022-04-12 ENCOUNTER — APPOINTMENT (OUTPATIENT)
Dept: ULTRASOUND IMAGING | Facility: CLINIC | Age: 67
End: 2022-04-12
Payer: MEDICARE

## 2022-04-12 ENCOUNTER — OUTPATIENT (OUTPATIENT)
Dept: OUTPATIENT SERVICES | Facility: HOSPITAL | Age: 67
LOS: 1 days | End: 2022-04-12
Payer: MEDICARE

## 2022-04-12 DIAGNOSIS — I82.409 ACUTE EMBOLISM AND THROMBOSIS OF UNSPECIFIED DEEP VEINS OF UNSPECIFIED LOWER EXTREMITY: ICD-10-CM

## 2022-04-12 PROCEDURE — 93970 EXTREMITY STUDY: CPT

## 2022-04-12 PROCEDURE — 93970 EXTREMITY STUDY: CPT | Mod: 26

## 2022-04-18 ENCOUNTER — TRANSCRIPTION ENCOUNTER (OUTPATIENT)
Age: 67
End: 2022-04-18

## 2022-04-26 ENCOUNTER — APPOINTMENT (OUTPATIENT)
Dept: INTERNAL MEDICINE | Facility: CLINIC | Age: 67
End: 2022-04-26
Payer: MEDICARE

## 2022-04-26 VITALS
OXYGEN SATURATION: 97 % | TEMPERATURE: 98.6 F | DIASTOLIC BLOOD PRESSURE: 82 MMHG | WEIGHT: 110 LBS | HEART RATE: 61 BPM | SYSTOLIC BLOOD PRESSURE: 100 MMHG | HEIGHT: 59 IN | BODY MASS INDEX: 22.18 KG/M2

## 2022-04-26 PROCEDURE — 99214 OFFICE O/P EST MOD 30 MIN: CPT | Mod: 25

## 2022-04-26 PROCEDURE — 90471 IMMUNIZATION ADMIN: CPT

## 2022-04-26 PROCEDURE — G0009: CPT

## 2022-04-26 PROCEDURE — 90677 PCV20 VACCINE IM: CPT

## 2022-04-26 NOTE — PHYSICAL EXAM
[No Acute Distress] : no acute distress [Well Nourished] : well nourished [Well Developed] : well developed [No Respiratory Distress] : no respiratory distress  [No Accessory Muscle Use] : no accessory muscle use [Clear to Auscultation] : lungs were clear to auscultation bilaterally [Normal Rate] : normal rate  [Regular Rhythm] : with a regular rhythm [Normal S1, S2] : normal S1 and S2 [Soft] : abdomen soft [Non Tender] : non-tender [Non-distended] : non-distended [Normal Sphincter Tone] : normal sphincter tone [No CVA Tenderness] : no CVA  tenderness [No Spinal Tenderness] : no spinal tenderness [Kyphosis] : no kyphosis [de-identified] : Lower extremities 5/5 with leg extension, plantar/dorsiflexion.

## 2022-04-26 NOTE — ASSESSMENT
[FreeTextEntry1] : HCM:\par - COVID vaccinated, unable to confirm dates\par - PCV20 today\par - Discussed shingrix, advised to obtain at pharmacy\par \par RTC 3 months for HLD follow-up\par \par

## 2022-04-26 NOTE — HISTORY OF PRESENT ILLNESS
[de-identified] : Letitia Ho is a 66yo F with PMhx of HTN, HLD, SAH s/p coiling of L p comm aneurysm 12/11/21 c/b DVT 2/2 HIT, and depression who presents to follow-up fecal/urinary incontinence.\par \par Fecal/urinary incontinence:\par Improved since stopping senna, still having issues with fecal incontinence approx 3-4 times/week. No numbness/tingling or lower extremity weakness. Has not trialed pelvic floor therapy yet.\par \par SAH:\par Daughter reports mother has improved a lot but still has some safety concerns due to patient's impulsivity--patient will sometimes burn herself on the stove by putting her hand on a hot pot to see if the stove is on, forget the stove is on, or exhibit poor situational awareness e.g. including climbing onto unsteady objects. Daughter is currently supervising patient constantly but this is not feasible long-term, patient's  lives at home as will be there most of the time but not all the time.  Patient has also refused to see outpatient SLP, daughter notes some aphasia.

## 2022-05-17 ENCOUNTER — OUTPATIENT (OUTPATIENT)
Dept: OUTPATIENT SERVICES | Facility: HOSPITAL | Age: 67
LOS: 1 days | Discharge: HOME | End: 2022-05-17
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
## 2022-05-20 ENCOUNTER — RESULT REVIEW (OUTPATIENT)
Age: 67
End: 2022-05-20

## 2022-05-20 ENCOUNTER — APPOINTMENT (OUTPATIENT)
Dept: HEMATOLOGY ONCOLOGY | Facility: CLINIC | Age: 67
End: 2022-05-20
Payer: MEDICARE

## 2022-05-20 VITALS
SYSTOLIC BLOOD PRESSURE: 136 MMHG | WEIGHT: 116.16 LBS | HEART RATE: 52 BPM | BODY MASS INDEX: 23.47 KG/M2 | OXYGEN SATURATION: 99 % | DIASTOLIC BLOOD PRESSURE: 78 MMHG | TEMPERATURE: 98.2 F | RESPIRATION RATE: 16 BRPM

## 2022-05-20 LAB
BASOPHILS # BLD AUTO: 0.07 K/UL — SIGNIFICANT CHANGE UP (ref 0–0.2)
BASOPHILS NFR BLD AUTO: 1.1 % — SIGNIFICANT CHANGE UP (ref 0–2)
EOSINOPHIL # BLD AUTO: 0.2 K/UL — SIGNIFICANT CHANGE UP (ref 0–0.5)
EOSINOPHIL NFR BLD AUTO: 3.1 % — SIGNIFICANT CHANGE UP (ref 0–6)
HCT VFR BLD CALC: 42.6 % — SIGNIFICANT CHANGE UP (ref 34.5–45)
HGB BLD-MCNC: 13.6 G/DL — SIGNIFICANT CHANGE UP (ref 11.5–15.5)
IMM GRANULOCYTES NFR BLD AUTO: 0.2 % — SIGNIFICANT CHANGE UP (ref 0–1.5)
LYMPHOCYTES # BLD AUTO: 2.98 K/UL — SIGNIFICANT CHANGE UP (ref 1–3.3)
LYMPHOCYTES # BLD AUTO: 45.8 % — HIGH (ref 13–44)
MCHC RBC-ENTMCNC: 28.8 PG — SIGNIFICANT CHANGE UP (ref 27–34)
MCHC RBC-ENTMCNC: 31.9 G/DL — LOW (ref 32–36)
MCV RBC AUTO: 90.1 FL — SIGNIFICANT CHANGE UP (ref 80–100)
MONOCYTES # BLD AUTO: 0.34 K/UL — SIGNIFICANT CHANGE UP (ref 0–0.9)
MONOCYTES NFR BLD AUTO: 5.2 % — SIGNIFICANT CHANGE UP (ref 2–14)
NEUTROPHILS # BLD AUTO: 2.9 K/UL — SIGNIFICANT CHANGE UP (ref 1.8–7.4)
NEUTROPHILS NFR BLD AUTO: 44.6 % — SIGNIFICANT CHANGE UP (ref 43–77)
NRBC # BLD: 0 /100 WBCS — SIGNIFICANT CHANGE UP (ref 0–0)
PLATELET # BLD AUTO: 195 K/UL — SIGNIFICANT CHANGE UP (ref 150–400)
RBC # BLD: 4.73 M/UL — SIGNIFICANT CHANGE UP (ref 3.8–5.2)
RBC # FLD: 14.4 % — SIGNIFICANT CHANGE UP (ref 10.3–14.5)
WBC # BLD: 6.5 K/UL — SIGNIFICANT CHANGE UP (ref 3.8–10.5)
WBC # FLD AUTO: 6.5 K/UL — SIGNIFICANT CHANGE UP (ref 3.8–10.5)

## 2022-05-20 PROCEDURE — 99213 OFFICE O/P EST LOW 20 MIN: CPT

## 2022-05-20 NOTE — HISTORY OF PRESENT ILLNESS
[de-identified] : 67 year-old Ms. DAVIS is seen in consult for h/o HIT while was admitted with SAH and cerebral vessel thrombosis (s/p thrombectomy). She subsequently developed HIT with new R subclavian vein thrombosis secondary to PICC. She has been on Eliquis for the last one month. She is compliant and has not developed any bleeding complication. She denies any previous h/o clot and does not have any family h/o thrombosis. \par \par \par HOSPITAL CHART REVIEW\par 66 year old woman admitted 12/11/21 with HA/vomiting since 12/10 found to have\par SAH c/b hydrocephalus s/p coiling of L Pcomm aneurysm, found to have a partial\par L M2 thrombus now s/p thrombectomy s/p R EVD for hydrocephalus c/b hemorrhage\par with IVH now with L EVD. c/b thrombocytopenia found to be HIT positive.\par hematology consulted for HIT management.\par \par #HIT\par -BALJEET positive\par -Pt should not receive heparin products\par -Plts have improved drastically\par -c/w Argatroban gtt. Can hold 4 hours prior to removal of drain\par -Monitor on argatroban gtt until certain pt not going for further procedures\par and no longer bleeding\par -She can be switched to any DOAC once cleared by neurosurgery team\par -DOAC can include Apixaban 5mg BID for 90 days versus Rivaroxaban 20mg daily\par for 90 days given platelets have recovered\par \par 66-yr-old woman consulted anticoagulation in the setting of intracranial bleed\par (ruptured aneurysm) and GILLIAN (PICC related thrombosis?). Patient is currently\par on Argatroban and there is no evidence of active bleeding. Patient will need 3\par months of anticoagulation with non-heparin product prior to discharge; may be\par switched to a DOAC starting with a loading dose. Monitor closely for bleeding.\par  [de-identified] : 5/20/2022\par Patient presents for a follow up for GILLIAN vs HIT as the subclavian thrombosis may be considered to from PICC line. She has been on AC with Eliquis for the past 5 months. Her neurologist wants her to continue for a total of six months. Patient is compliant to the AC medication. She has been doing well and has no complaints.

## 2022-05-20 NOTE — ASSESSMENT
[FreeTextEntry1] : 67 year-old Ms. DAVIS is seen in consult for HIT. that she developed when she was admitted with SAH and cerebral vessel thrombosis (s/p thrombectomy). She also has a PICC related R subclavian vein clot. She has been on AC with Eliquis without having any bleeding complication. She needs 3-6 months of anticoagulation.  \par \par \par # GILLIAN vs HIT and PICC related R subclavian vein thrombosis\par - Patient is on Eliquis 5 mg BID \par - Hs been on AC for the past 5 months\par - Neurology suggested to complete 6 months \par - Watch for overt/occult bleeding \par - Thrombophilia w/u not indicated at this time \par - RTC in 3 months, earlier if needed                                            \par \par

## 2022-05-20 NOTE — RESULTS/DATA
[FreeTextEntry1] : 5/20/2022\par Available labs reviewed. Today's CBC WNL \par \par \par 2/2/2022\par UE DUPLEX (12/23/2021)\par IMPRESSION:\par \par Positive deep venous thrombosis involving the right subclavian vein. \par Thrombus is nonocclusive and related to the PICC line.\par \par No evidence of deep venous thrombosis in the left upper extremity.\par \par Superficial thrombophlebitis left basilic vein.

## 2022-05-23 LAB
APTT BLD: 35.5 SEC
DEPRECATED D DIMER PPP IA-ACNC: <150 NG/ML DDU
INR PPP: 1.12 RATIO
PT BLD: 13.2 SEC

## 2022-06-06 ENCOUNTER — OUTPATIENT (OUTPATIENT)
Dept: OUTPATIENT SERVICES | Facility: HOSPITAL | Age: 67
LOS: 1 days | End: 2022-06-06
Payer: MEDICARE

## 2022-06-06 ENCOUNTER — APPOINTMENT (OUTPATIENT)
Dept: NEUROSURGERY | Facility: HOSPITAL | Age: 67
End: 2022-06-06

## 2022-06-06 DIAGNOSIS — Z11.52 ENCOUNTER FOR SCREENING FOR COVID-19: ICD-10-CM

## 2022-06-06 LAB — SARS-COV-2 RNA SPEC QL NAA+PROBE: SIGNIFICANT CHANGE UP

## 2022-06-06 PROCEDURE — U0005: CPT

## 2022-06-06 PROCEDURE — U0003: CPT

## 2022-06-06 PROCEDURE — C9803: CPT

## 2022-06-07 ENCOUNTER — APPOINTMENT (OUTPATIENT)
Dept: CARDIOLOGY | Facility: CLINIC | Age: 67
End: 2022-06-07
Payer: MEDICARE

## 2022-06-07 VITALS
HEIGHT: 59 IN | WEIGHT: 115 LBS | BODY MASS INDEX: 23.18 KG/M2 | DIASTOLIC BLOOD PRESSURE: 75 MMHG | HEART RATE: 64 BPM | OXYGEN SATURATION: 97 % | SYSTOLIC BLOOD PRESSURE: 121 MMHG

## 2022-06-07 DIAGNOSIS — Z80.0 FAMILY HISTORY OF MALIGNANT NEOPLASM OF DIGESTIVE ORGANS: ICD-10-CM

## 2022-06-07 DIAGNOSIS — D75.82 HEPARIN INDUCED THROMBOCYTOPENIA (HIT): ICD-10-CM

## 2022-06-07 DIAGNOSIS — N81.9 FEMALE GENITAL PROLAPSE, UNSPECIFIED: ICD-10-CM

## 2022-06-07 PROCEDURE — 99214 OFFICE O/P EST MOD 30 MIN: CPT

## 2022-06-07 NOTE — ASSESSMENT
[FreeTextEntry1] : Assessment:\par \par 1. Provoked (PICC line) R Subclavian DVT 12/23/21\par     DVT resolved \par 2. HIT\par 3. SAH 2/2 aneurysm s/p coiling and EVD 12/2021\par 4. HTN\par 5. HLD\par \par Plan:\par \par 1. Completed 6 monts of Eliquis 5mg PO BID, (started therapy 12/2021)\par     Discontinue Eliquis.\par 2. Repeat upper extremity venous duplex negative for DVT.\par 3. Continue excellent care with Dr. Lucas.\par 4. Warning and red flag signs of DVT noted. Informed patient will require rapid assessment and duplex if occurs.\par 5. Informed take 1 tab of Eliquis 1 hour prior to flight.\par 6. Informed to notify us if any surgeries are planned. \par 7. Follow-up in 1 year. Call if any questions or new symptoms. \par \par

## 2022-06-07 NOTE — PHYSICAL EXAM
[General Appearance - Well Developed] : well developed [Normal Appearance] : normal appearance [Well Groomed] : well groomed [General Appearance - Well Nourished] : well nourished [No Deformities] : no deformities [General Appearance - In No Acute Distress] : no acute distress [Normal Jugular Venous A Waves Present] : normal jugular venous A waves present [Normal Jugular Venous V Waves Present] : normal jugular venous V waves present [No Jugular Venous Evangelista A Waves] : no jugular venous evangelista A waves [Respiration, Rhythm And Depth] : normal respiratory rhythm and effort [Exaggerated Use Of Accessory Muscles For Inspiration] : no accessory muscle use [Auscultation Breath Sounds / Voice Sounds] : lungs were clear to auscultation bilaterally [Heart Rate And Rhythm] : heart rate and rhythm were normal [Heart Sounds] : normal S1 and S2 [Murmurs] : no murmurs present [Abdomen Soft] : soft [Abdomen Tenderness] : non-tender [Abnormal Walk] : normal gait [Gait - Sufficient For Exercise Testing] : the gait was sufficient for exercise testing [Cyanosis, Localized] : no localized cyanosis [] : no rash [No Venous Stasis] : no venous stasis [No Skin Ulcers] : no skin ulcer [Oriented To Time, Place, And Person] : oriented to person, place, and time [Affect] : the affect was normal [Mood] : the mood was normal [No Anxiety] : not feeling anxious [Bowel Sounds] : normal bowel sounds [FreeTextEntry1] : NC/AT

## 2022-06-07 NOTE — HISTORY OF PRESENT ILLNESS
[FreeTextEntry1] : 6/7/2022\par \par No C/P or SOB.\par No UE or LE edema.\par Upper extremity venous duplex in 4/2022 showed no DVT, prior DVT resolved.\par Planned for cerebral angiogram on 6/9 with Dr. Lucas. \par D-dimer negative in 5/2022.\par \par Medications:\par Eliquis 5 mg BID\par Norvasc 2.5 mg daily\par Lexapro\par \par 3/2022\par \par 67 year-old Ms. DAVIS is seen in consult for h/o HIT and right subclavian DVT in setting of PICC linewhile was admitted with SAH and cerebral vessel thrombosis (s/p thrombectomy).\par \par Hx of right subclavian DVT, provoked in setting of PICC line 12/23/2021\par GILLIAN subsequently, agatroban -> eliquis\par Compliant with Eliquis\par No repeat doppler since\par No prior clots / bleeding\par No prior cancer screenings\par No upper or lower extremity edema\par No progression of SAH\par Seeing Hematology, no plan for further workup

## 2022-06-08 ENCOUNTER — OUTPATIENT (OUTPATIENT)
Dept: OUTPATIENT SERVICES | Facility: HOSPITAL | Age: 67
LOS: 1 days | End: 2022-06-08
Payer: MEDICARE

## 2022-06-08 VITALS
OXYGEN SATURATION: 97 % | DIASTOLIC BLOOD PRESSURE: 84 MMHG | TEMPERATURE: 98 F | RESPIRATION RATE: 18 BRPM | SYSTOLIC BLOOD PRESSURE: 142 MMHG | HEIGHT: 61.02 IN | HEART RATE: 66 BPM | WEIGHT: 113.98 LBS

## 2022-06-08 DIAGNOSIS — Z98.890 OTHER SPECIFIED POSTPROCEDURAL STATES: Chronic | ICD-10-CM

## 2022-06-08 DIAGNOSIS — I60.9 NONTRAUMATIC SUBARACHNOID HEMORRHAGE, UNSPECIFIED: ICD-10-CM

## 2022-06-08 DIAGNOSIS — Z98.41 CATARACT EXTRACTION STATUS, RIGHT EYE: Chronic | ICD-10-CM

## 2022-06-08 DIAGNOSIS — Z01.818 ENCOUNTER FOR OTHER PREPROCEDURAL EXAMINATION: ICD-10-CM

## 2022-06-08 LAB
ANION GAP SERPL CALC-SCNC: 14 MMOL/L — SIGNIFICANT CHANGE UP (ref 5–17)
APTT BLD: 31.9 SEC — SIGNIFICANT CHANGE UP (ref 27.5–35.5)
BUN SERPL-MCNC: 16 MG/DL — SIGNIFICANT CHANGE UP (ref 7–23)
CALCIUM SERPL-MCNC: 9.5 MG/DL — SIGNIFICANT CHANGE UP (ref 8.4–10.5)
CHLORIDE SERPL-SCNC: 105 MMOL/L — SIGNIFICANT CHANGE UP (ref 96–108)
CO2 SERPL-SCNC: 24 MMOL/L — SIGNIFICANT CHANGE UP (ref 22–31)
CREAT SERPL-MCNC: 0.59 MG/DL — SIGNIFICANT CHANGE UP (ref 0.5–1.3)
EGFR: 99 ML/MIN/1.73M2 — SIGNIFICANT CHANGE UP
GLUCOSE SERPL-MCNC: 94 MG/DL — SIGNIFICANT CHANGE UP (ref 70–99)
HCT VFR BLD CALC: 42.5 % — SIGNIFICANT CHANGE UP (ref 34.5–45)
HGB BLD-MCNC: 13.6 G/DL — SIGNIFICANT CHANGE UP (ref 11.5–15.5)
INR BLD: 0.97 RATIO — SIGNIFICANT CHANGE UP (ref 0.88–1.16)
MCHC RBC-ENTMCNC: 28.9 PG — SIGNIFICANT CHANGE UP (ref 27–34)
MCHC RBC-ENTMCNC: 32 GM/DL — SIGNIFICANT CHANGE UP (ref 32–36)
MCV RBC AUTO: 90.4 FL — SIGNIFICANT CHANGE UP (ref 80–100)
NRBC # BLD: 0 /100 WBCS — SIGNIFICANT CHANGE UP (ref 0–0)
PLATELET # BLD AUTO: 205 K/UL — SIGNIFICANT CHANGE UP (ref 150–400)
POTASSIUM SERPL-MCNC: 4.1 MMOL/L — SIGNIFICANT CHANGE UP (ref 3.5–5.3)
POTASSIUM SERPL-SCNC: 4.1 MMOL/L — SIGNIFICANT CHANGE UP (ref 3.5–5.3)
PROTHROM AB SERPL-ACNC: 11.2 SEC — SIGNIFICANT CHANGE UP (ref 10.5–13.4)
RBC # BLD: 4.7 M/UL — SIGNIFICANT CHANGE UP (ref 3.8–5.2)
RBC # FLD: 14.3 % — SIGNIFICANT CHANGE UP (ref 10.3–14.5)
SODIUM SERPL-SCNC: 143 MMOL/L — SIGNIFICANT CHANGE UP (ref 135–145)
WBC # BLD: 6.6 K/UL — SIGNIFICANT CHANGE UP (ref 3.8–10.5)
WBC # FLD AUTO: 6.6 K/UL — SIGNIFICANT CHANGE UP (ref 3.8–10.5)

## 2022-06-08 PROCEDURE — 86850 RBC ANTIBODY SCREEN: CPT

## 2022-06-08 PROCEDURE — 86901 BLOOD TYPING SEROLOGIC RH(D): CPT

## 2022-06-08 PROCEDURE — 86900 BLOOD TYPING SEROLOGIC ABO: CPT

## 2022-06-08 PROCEDURE — G0463: CPT

## 2022-06-08 NOTE — H&P PST ADULT - NSICDXPASTSURGICALHX_GEN_ALL_CORE_FT
PAST SURGICAL HISTORY:  H/O cataract extraction, right     S/P angiogram of extremity cerebral angiogram w/ coiling 12/11/2021'

## 2022-06-08 NOTE — H&P PST ADULT - NSICDXPASTMEDICALHX_GEN_ALL_CORE_FT
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

## 2022-06-08 NOTE — H&P PST ADULT - NSANTHOSAYNRD_GEN_A_CORE
No. PARMJIT screening performed.  STOP BANG Legend: 0-2 = LOW Risk; 3-4 = INTERMEDIATE Risk; 5-8 = HIGH Risk

## 2022-06-08 NOTE — H&P PST ADULT - HISTORY OF PRESENT ILLNESS
68 YO female predominately Mohawk speaking (communicates well in English) with no PMH who was admitted to [facility] on 12/11/21 with c/o HA/vomiting since 12/10 found to have SAH c/b hydrocephalus- s/p coiling of L Posterior communicating aneurysm on 12/11, found to have a partial left m2 thrombus and underwent a thrombectomy with TICI 3 reperfusion. On 12/14, s/p R EVD for hydro c/b sizable tract hemorrhage with IVH with L EVD removed on 12/29. Course c/b cerebral vasospasm s/p treatment, UTI, HIT with PICC associated right subclavian vein thrombus on argatroban gtt and she transitioned to Eliquis (Taken off as per Dr. Hill- stopped yesterday 6/7/22). Patient had functional deficits, gait/ADL impairments and went to acute rehabilitation at [facility] on 1/5/22; now almost fully resolved as per spouse. Pt w/ periods of forgetfulness and incontinence (which she is still receiving therapy for). Denies hx of COVID or recent fevers, chills, cough, chest pain or SOB and feels well otherwise. COVID testing negative- completed at [facility] on 6/7/22.    Pfizer x 2 + booster (last dose 10/29/21)

## 2022-06-08 NOTE — H&P PST ADULT - NEGATIVE NEUROLOGICAL SYMPTOMS
Denies/no weakness/no paresthesias/no vertigo/no loss of sensation/no difficulty walking/no headache/no loss of consciousness/no hemiparesis/no confusion

## 2022-06-08 NOTE — H&P PST ADULT - PROBLEM SELECTOR PLAN 1
Procedure scheduled for tomorrow 6/9/2022. Surgical and chlorhexidine instructions reviewed w/ pt and spouse. COVID testing negative from [facility] on 6/7/22. Pt no longer on Eliquis- stopped on 6/7/22 as per Dr. Hill.

## 2022-06-08 NOTE — H&P PST ADULT - RS GEN PE MLT RESP DETAILS PC
Patient called stating thinks she is having an allergic reaction to penicillin that was given to her by her dentist. She stated she feels she is \"shedding the lining of her intestines.\"    normal/breath sounds equal/respirations non-labored/clear to auscultation bilaterally

## 2022-06-09 ENCOUNTER — APPOINTMENT (OUTPATIENT)
Dept: NEUROSURGERY | Facility: HOSPITAL | Age: 67
End: 2022-06-09

## 2022-06-09 ENCOUNTER — TRANSCRIPTION ENCOUNTER (OUTPATIENT)
Age: 67
End: 2022-06-09

## 2022-06-09 ENCOUNTER — RESULT REVIEW (OUTPATIENT)
Age: 67
End: 2022-06-09

## 2022-06-09 ENCOUNTER — OUTPATIENT (OUTPATIENT)
Dept: OUTPATIENT SERVICES | Facility: HOSPITAL | Age: 67
LOS: 1 days | End: 2022-06-09
Payer: MEDICARE

## 2022-06-09 VITALS
SYSTOLIC BLOOD PRESSURE: 127 MMHG | OXYGEN SATURATION: 97 % | HEART RATE: 62 BPM | RESPIRATION RATE: 16 BRPM | DIASTOLIC BLOOD PRESSURE: 82 MMHG

## 2022-06-09 VITALS
DIASTOLIC BLOOD PRESSURE: 80 MMHG | HEIGHT: 61 IN | SYSTOLIC BLOOD PRESSURE: 130 MMHG | TEMPERATURE: 98 F | HEART RATE: 61 BPM | WEIGHT: 115.96 LBS | RESPIRATION RATE: 20 BRPM

## 2022-06-09 DIAGNOSIS — Z98.890 OTHER SPECIFIED POSTPROCEDURAL STATES: Chronic | ICD-10-CM

## 2022-06-09 DIAGNOSIS — Z01.818 ENCOUNTER FOR OTHER PREPROCEDURAL EXAMINATION: ICD-10-CM

## 2022-06-09 DIAGNOSIS — Z98.41 CATARACT EXTRACTION STATUS, RIGHT EYE: Chronic | ICD-10-CM

## 2022-06-09 DIAGNOSIS — I60.9 NONTRAUMATIC SUBARACHNOID HEMORRHAGE, UNSPECIFIED: ICD-10-CM

## 2022-06-09 PROCEDURE — C1887: CPT

## 2022-06-09 PROCEDURE — 36226 PLACE CATH VERTEBRAL ART: CPT | Mod: LT

## 2022-06-09 PROCEDURE — C1894: CPT

## 2022-06-09 PROCEDURE — C1760: CPT

## 2022-06-09 PROCEDURE — C1769: CPT

## 2022-06-09 PROCEDURE — 36224 PLACE CATH CAROTD ART: CPT | Mod: 50

## 2022-06-09 PROCEDURE — 76377 3D RENDER W/INTRP POSTPROCES: CPT | Mod: 26

## 2022-06-09 PROCEDURE — 36224 PLACE CATH CAROTD ART: CPT

## 2022-06-09 PROCEDURE — 36226 PLACE CATH VERTEBRAL ART: CPT

## 2022-06-09 RX ORDER — SODIUM CHLORIDE 9 MG/ML
1000 INJECTION INTRAMUSCULAR; INTRAVENOUS; SUBCUTANEOUS
Refills: 0 | Status: DISCONTINUED | OUTPATIENT
Start: 2022-06-09 | End: 2022-06-23

## 2022-06-09 RX ORDER — SODIUM CHLORIDE 9 MG/ML
1000 INJECTION, SOLUTION INTRAVENOUS
Refills: 0 | Status: DISCONTINUED | OUTPATIENT
Start: 2022-06-09 | End: 2022-06-23

## 2022-06-09 NOTE — ASU PATIENT PROFILE, ADULT - FALL HARM RISK - UNIVERSAL INTERVENTIONS
Bed in lowest position, wheels locked, appropriate side rails in place/Call bell, personal items and telephone in reach/Instruct patient to call for assistance before getting out of bed or chair/Non-slip footwear when patient is out of bed/[place] to call system/Physically safe environment - no spills, clutter or unnecessary equipment/Purposeful Proactive Rounding/Room/bathroom lighting operational, light cord in reach

## 2022-06-09 NOTE — ASU DISCHARGE PLAN (ADULT/PEDIATRIC) - NS MD DC FALL RISK RISK
For information on Fall & Injury Prevention, visit: [email or web address] OR  [email or web address] OR  https://www.cdc.gov/steadi/patient.html

## 2022-06-09 NOTE — ASU DISCHARGE PLAN (ADULT/PEDIATRIC) - CARE PROVIDER_API CALL
Vladimir Lucas (MD; MS)  Unallocated  [address] [facility], [address] [place] [id number]  Phone: [phone]  Fax: [phone]  Follow Up Time:

## 2022-06-09 NOTE — CHART NOTE - NSCHARTNOTEFT_GEN_A_CORE
Interventional Neuro- Radiology   Procedure Note CHELSEA    Procedure: Catheter Cerebral Angiogram   Pre- Procedure Diagnosis: Ruptured left PCOM previously treated   Post- Procedure Diagnosis:    : Dr Vladimir Lucas   Physician Assistant: Charline Sun PA-C    Nurse:  Radiologic Tech:  Anesthesiologist:  Sheath:      I/Os: EBL less than 10cc  IV fluids:     cc     Urine output     cc    Contrast Omnipaque 240      cc             Vitals: BP         HR      Spo2 100%          Preliminary Report:  Using a 5 Tajik short sheath to the right groin under MAC sedation via left vertebral artery,  left internal carotid artery, left external carotid artery, right vertebral artery, right internal carotid artery, right external carotid artery a selective cerebral angiography was performed and demonstrated                       Official note to follow.  Patient tolerated procedure well, hemodynamically stable, no change in neurological status compared to baseline. Results discussed with patient and patient's family. 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  Patient tolerated procedure well, hemodynamically stable, no change in neurological status compared to baseline. Results discussed with patient and patient's family. 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  Patient tolerated procedure well, hemodynamically stable, no change in neurological status compared to baseline. Results discussed with patient and patient's family. 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 Ruiz Taylor   Sheath:                     5Fr Short      I/Os: EBL less than 10cc  IV fluids: 200cc Urine due to void  Contrast 240 76cc             Vitals: /70   HR 72      Spo2 100%          Preliminary Report:  Using a 5 Macanese short sheath to the right groin under MAC sedation via left internal carotid artery a selective cerebral angiography was performed and demonstrated occlusion of left PCOM aneurysm. Official note to follow.  Patient tolerated procedure well, hemodynamically stable, no change in neurological status compared to baseline. Results discussed with patient and patient's family. Right groin sheath was removed, a Vascde device and manual compression held to hemostasis for 20 minutes, no active bleeding, no hematoma, quick clot and safeguard balloon dressing applied at 1315 hours. 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 Ruiz Taylor   Sheath:                     5Fr Short      I/Os: EBL less than 10cc  IV fluids: 200cc Urine due to void  Contrast 240 76cc             Vitals: /70   HR 72      Spo2 100%          Preliminary Report:  Using a 5 Panamanian short sheath to the right groin under MAC sedation via left internal carotid artery a selective cerebral angiography was performed and demonstrated occlusion of left PCOM aneurysm. Official note to follow.  Patient tolerated procedure well, hemodynamically stable, no change in neurological status compared to baseline. Results discussed with patient and patient's family. Right groin sheath was removed, a Vascde device and manual compression held to hemostasis for 20 minutes, no active bleeding, no hematoma, quick clot and safeguard balloon dressing applied at 1315 hours. 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 Ruiz Taylor   Sheath:                     5Fr Short      I/Os: EBL less than 10cc  IV fluids: 200cc Urine due to void  Contrast 240 76cc             Vitals: /70   HR 72      Spo2 100%          Preliminary Report:  Using a 5 Maori short sheath to the right groin under MAC sedation via left internal carotid artery a selective cerebral angiography was performed and demonstrated occlusion of left PCOM aneurysm. Official note to follow.  Patient tolerated procedure well, hemodynamically stable, no change in neurological status compared to baseline. Results discussed with patient and patient's family. Right groin sheath was removed, a Vascde device and manual compression held to hemostasis for 20 minutes, no active bleeding, no hematoma, quick clot and safeguard balloon dressing applied at 1315 hours. 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 Ruiz Hensley   Sheath:                      5Fr Short      I/Os: EBL less than 10cc  IV fluids: 200cc Urine due to void  Contrast 240 76cc             Vitals: /70   HR 72      Spo2 100%          Preliminary Report:  Using a 5 Bulgarian short sheath to the right groin under MAC sedation via left internal carotid artery a selective cerebral angiography was performed and demonstrated occlusion of left PCOM aneurysm. Official note to follow.  Patient tolerated procedure well, hemodynamically stable, no change in neurological status compared to baseline. Results discussed with patient and patient's family. Right groin sheath was removed, a Vascde device and manual compression held to hemostasis for 20 minutes, no active bleeding, no hematoma, quick clot and safeguard balloon dressing applied at 1315 hours. Disposition IR recovery room for 4 hours and then home,

## 2022-06-09 NOTE — CHART NOTE - NSCHARTNOTEFT_GEN_A_CORE
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   Procedure, goals, risks, benefits and alternatives were discussed with patient and patient's . All questions were answered.  Risks include but are not limited to stroke, vessel injury, hemorrhage, and or right groin hematoma. Patient demonstrates understanding of all risks involved with this procedure and wishes to continue. Appropriate consent was obtained from patient and consent is in the patient's chart. Interventional Neuro Radiology  Pre-Procedure Note PA-C    This is a 67 year old right hand dominant female with a past medical history significant for a ruptured left PCOM artery aneurysm, previously treated, via endovascular treatment. Patient returns to Neuro IR for a follow up selective cerebral angiogram.      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), DVT (deep venous thrombosis)  PSHX: cerebral angiogram w/ coiling 12/11/2021 and 12/27/21, cataract extraction, right  Social History:    FAMILY HISTORY: non-contributory   Current Medications:     Labs:                         13.6   6.60  )-----------( 205      ( 08 Jun 2022 12:45 )             42.5       06-08    143  |  105  |  16  ----------------------------<  94  4.1   |  24  |  0.59    Ca    9.5      08 Jun 2022 12:45    Blood Bank: AB Positive      Assessment/Plan:   This is a 67 year old right hand dominant female with a past medical history significant for a ruptured left PCOM artery aneurysm, who returns to Neuro-IR for selective cerebral angiography. Procedure, goals, risks, benefits and alternatives were discussed with patient and patient's . All questions were answered. Risks include but are not limited to stroke, vessel injury, hemorrhage, and or right groin hematoma. Patient demonstrates understanding of all risks involved with this procedure and wishes to continue. Appropriate consent was obtained from patient and consent is in the patient's chart.

## 2022-06-09 NOTE — ASU PATIENT PROFILE, ADULT - PATIENT'S GENDER IDENTITY
Add 46921 Cpt? (Important Note: In 2017 The Use Of 39396 Is Being Tracked By Cms To Determine Future Global Period Reimbursement For Global Periods): yes
Detail Level: Detailed
Female

## 2022-06-17 DIAGNOSIS — I60.32 NONTRAUMATIC SUBARACHNOID HEMORRHAGE FROM LEFT POSTERIOR COMMUNICATING ARTERY: ICD-10-CM

## 2022-07-19 ENCOUNTER — APPOINTMENT (OUTPATIENT)
Dept: INTERNAL MEDICINE | Facility: CLINIC | Age: 67
End: 2022-07-19

## 2022-07-19 VITALS
WEIGHT: 117 LBS | HEIGHT: 59 IN | TEMPERATURE: 96.3 F | HEART RATE: 65 BPM | SYSTOLIC BLOOD PRESSURE: 120 MMHG | DIASTOLIC BLOOD PRESSURE: 73 MMHG | OXYGEN SATURATION: 95 % | BODY MASS INDEX: 23.59 KG/M2

## 2022-07-19 PROBLEM — D75.82 HEPARIN INDUCED THROMBOCYTOPENIA (HIT): Chronic | Status: ACTIVE | Noted: 2022-06-08

## 2022-07-19 PROBLEM — I60.9 NONTRAUMATIC SUBARACHNOID HEMORRHAGE, UNSPECIFIED: Chronic | Status: ACTIVE | Noted: 2022-06-08

## 2022-07-19 PROBLEM — I82.409 ACUTE EMBOLISM AND THROMBOSIS OF UNSPECIFIED DEEP VEINS OF UNSPECIFIED LOWER EXTREMITY: Chronic | Status: ACTIVE | Noted: 2022-06-08

## 2022-07-19 PROBLEM — I10 ESSENTIAL (PRIMARY) HYPERTENSION: Chronic | Status: ACTIVE | Noted: 2022-06-08

## 2022-07-19 PROBLEM — Z86.79 PERSONAL HISTORY OF OTHER DISEASES OF THE CIRCULATORY SYSTEM: Chronic | Status: ACTIVE | Noted: 2022-06-08

## 2022-07-19 PROCEDURE — 99214 OFFICE O/P EST MOD 30 MIN: CPT

## 2022-07-19 NOTE — ASSESSMENT
[FreeTextEntry1] : HCM:\par - Lipids, vitamin D\par - Mammogram\par - DEXA\par - Discussed colonoscopy v. FIT test. Colonoscopy benefits include immediate polyp removal and not having to re-test for 10yrs v. convenience of yearly stool test. Discussed w/ pt if stool test is positive that they will have to get a colonoscopy anyways. Patient opted for FIT test.\par \par RTC in 3 months for mood follow-up\par

## 2022-07-19 NOTE — PHYSICAL EXAM
[No Acute Distress] : no acute distress [Well Nourished] : well nourished [Well Developed] : well developed [No Respiratory Distress] : no respiratory distress  [No Accessory Muscle Use] : no accessory muscle use [Clear to Auscultation] : lungs were clear to auscultation bilaterally [Normal Rate] : normal rate  [Regular Rhythm] : with a regular rhythm [Normal S1, S2] : normal S1 and S2 [No Murmur] : no murmur heard [No Edema] : there was no peripheral edema [de-identified] : Answering questions appropriately. Gets on exam table and ambulates without assistance

## 2022-07-19 NOTE — HISTORY OF PRESENT ILLNESS
[de-identified] : Letitia Ho is a 68yo F with PMhx of HTN, HLD, SAH s/p coiling of L p comm aneurysm, HIT c/b DVT, and depression who presents to follow-up multiple medical complaints.\par \par Feels she is doing well.\par  who accompanies her reports improvement with situational awareness and safety. Daughter is planning on moving out soon given her improvement.\par Got the Shingrix first dose, plans for second dose.\par Plans to travel to [place] in Sept. Will plan to taper escitalopram once she returns.

## 2022-07-20 LAB
25(OH)D3 SERPL-MCNC: 89.6 NG/ML
CHOLEST SERPL-MCNC: 279 MG/DL
HDLC SERPL-MCNC: 57 MG/DL
LDLC SERPL CALC-MCNC: 154 MG/DL
NONHDLC SERPL-MCNC: 222 MG/DL
TRIGL SERPL-MCNC: 339 MG/DL

## 2022-07-21 ENCOUNTER — APPOINTMENT (OUTPATIENT)
Dept: MAMMOGRAPHY | Facility: CLINIC | Age: 67
End: 2022-07-21

## 2022-07-26 ENCOUNTER — APPOINTMENT (OUTPATIENT)
Dept: MAMMOGRAPHY | Facility: IMAGING CENTER | Age: 67
End: 2022-07-26

## 2022-07-26 ENCOUNTER — RESULT REVIEW (OUTPATIENT)
Age: 67
End: 2022-07-26

## 2022-07-26 ENCOUNTER — LABORATORY RESULT (OUTPATIENT)
Age: 67
End: 2022-07-26

## 2022-07-26 ENCOUNTER — OUTPATIENT (OUTPATIENT)
Dept: OUTPATIENT SERVICES | Facility: HOSPITAL | Age: 67
LOS: 1 days | End: 2022-07-26
Payer: MEDICARE

## 2022-07-26 ENCOUNTER — APPOINTMENT (OUTPATIENT)
Dept: RADIOLOGY | Facility: IMAGING CENTER | Age: 67
End: 2022-07-26

## 2022-07-26 DIAGNOSIS — Z98.41 CATARACT EXTRACTION STATUS, RIGHT EYE: Chronic | ICD-10-CM

## 2022-07-26 DIAGNOSIS — Z98.890 OTHER SPECIFIED POSTPROCEDURAL STATES: Chronic | ICD-10-CM

## 2022-07-26 DIAGNOSIS — Z00.00 ENCOUNTER FOR GENERAL ADULT MEDICAL EXAMINATION WITHOUT ABNORMAL FINDINGS: ICD-10-CM

## 2022-07-26 PROCEDURE — 77080 DXA BONE DENSITY AXIAL: CPT | Mod: 26

## 2022-07-26 PROCEDURE — 77063 BREAST TOMOSYNTHESIS BI: CPT

## 2022-07-26 PROCEDURE — 77080 DXA BONE DENSITY AXIAL: CPT

## 2022-07-26 PROCEDURE — 77063 BREAST TOMOSYNTHESIS BI: CPT | Mod: 26

## 2022-07-26 PROCEDURE — 77067 SCR MAMMO BI INCL CAD: CPT

## 2022-07-26 PROCEDURE — 77067 SCR MAMMO BI INCL CAD: CPT | Mod: 26

## 2022-07-27 NOTE — ASU DISCHARGE PLAN (ADULT/PEDIATRIC) - DISCHARGE PLAN IS COMPLETE AND GIVEN TO PATIENT
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 In NAD.    Cardiovascular:  Normal rate. Normal rhythm. No murmurs, gallops, or rubs.    Respiratory:  No respiratory distress. Normal breath sounds. No rales. No wheezing.    Neck with muscle tightness in suboccipital muscle group     Dry Needling Informed Consent verbal yes      Treatment   Education about indications, contraindications and potential side effects completed with patient.  Screen Completed   Precautions Contraindications   Local skin lesions  Lyme disease  Local lymphedema  Severe hyperalgesia/allodynia  Metal allergies: nickel and chromium  Abnormal bleeding tendency  Immunodeficiency and/or compromised immune system  Second or third trimester of pregnancy  Vascular Disease  History of spontaneous pneumothorax Local or systemic infections; including the flu  Over implants  Active cancer  Area of lymphatic compromise  Area of lumpectomy/mastectomy  First trimester of pregnancy     The following potential risks and adverse effects were reviewed with the patient:  bleeding, broken/retained needle, infection, internal organ injury, nerve injury, pain during and after treatment, pneumothorax resulting potentially in death, sweating    Patient has verbally agreed to proceed with the intervention.    Dry Needling:   Needles inserted 8     Needles removed 8   Locations and Needle Size Suboccipital muscle both side of the c spine    Treatment duration 15 mins    Patient response Improvement in neck stiffness          ASSESSMENT AND PLAN:  1)neck pain with headaches   Will suggest PT with dry needling   acupuncture   Muscle relaxant   magnesium glycinate                      : Yes

## 2022-07-28 ENCOUNTER — INPATIENT (INPATIENT)
Facility: HOSPITAL | Age: 67
LOS: 2 days | Discharge: HOME | DRG: 101 | End: 2022-07-31
Attending: HOSPITALIST | Admitting: HOSPITALIST
Payer: MEDICARE

## 2022-07-28 VITALS
SYSTOLIC BLOOD PRESSURE: 162 MMHG | RESPIRATION RATE: 16 BRPM | OXYGEN SATURATION: 93 % | DIASTOLIC BLOOD PRESSURE: 83 MMHG | HEIGHT: 61 IN | TEMPERATURE: 99 F | HEART RATE: 78 BPM

## 2022-07-28 DIAGNOSIS — R50.9 FEVER, UNSPECIFIED: ICD-10-CM

## 2022-07-28 DIAGNOSIS — Z98.41 CATARACT EXTRACTION STATUS, RIGHT EYE: Chronic | ICD-10-CM

## 2022-07-28 DIAGNOSIS — Z98.890 OTHER SPECIFIED POSTPROCEDURAL STATES: Chronic | ICD-10-CM

## 2022-07-28 LAB
ALBUMIN SERPL ELPH-MCNC: 4.3 G/DL — SIGNIFICANT CHANGE UP (ref 3.3–5)
ALP SERPL-CCNC: 110 U/L — SIGNIFICANT CHANGE UP (ref 40–120)
ALT FLD-CCNC: 35 U/L — SIGNIFICANT CHANGE UP (ref 10–45)
ANION GAP SERPL CALC-SCNC: 10 MMOL/L — SIGNIFICANT CHANGE UP (ref 5–17)
APPEARANCE UR: CLEAR — SIGNIFICANT CHANGE UP
APTT BLD: 29 SEC — SIGNIFICANT CHANGE UP (ref 27.5–35.5)
AST SERPL-CCNC: 78 U/L — HIGH (ref 10–40)
BACTERIA # UR AUTO: NEGATIVE — SIGNIFICANT CHANGE UP
BASOPHILS # BLD AUTO: 0.04 K/UL — SIGNIFICANT CHANGE UP (ref 0–0.2)
BASOPHILS NFR BLD AUTO: 0.6 % — SIGNIFICANT CHANGE UP (ref 0–2)
BILIRUB SERPL-MCNC: 0.2 MG/DL — SIGNIFICANT CHANGE UP (ref 0.2–1.2)
BILIRUB UR-MCNC: NEGATIVE — SIGNIFICANT CHANGE UP
BUN SERPL-MCNC: 18 MG/DL — SIGNIFICANT CHANGE UP (ref 7–23)
CALCIUM SERPL-MCNC: 9.3 MG/DL — SIGNIFICANT CHANGE UP (ref 8.4–10.5)
CHLORIDE SERPL-SCNC: 101 MMOL/L — SIGNIFICANT CHANGE UP (ref 96–108)
CO2 SERPL-SCNC: 27 MMOL/L — SIGNIFICANT CHANGE UP (ref 22–31)
COLOR SPEC: SIGNIFICANT CHANGE UP
CREAT SERPL-MCNC: 0.61 MG/DL — SIGNIFICANT CHANGE UP (ref 0.5–1.3)
DIFF PNL FLD: ABNORMAL
EGFR: 99 ML/MIN/1.73M2 — SIGNIFICANT CHANGE UP
EOSINOPHIL # BLD AUTO: 0.1 K/UL — SIGNIFICANT CHANGE UP (ref 0–0.5)
EOSINOPHIL NFR BLD AUTO: 1.5 % — SIGNIFICANT CHANGE UP (ref 0–6)
EPI CELLS # UR: 0 /HPF — SIGNIFICANT CHANGE UP
GLUCOSE SERPL-MCNC: 104 MG/DL — HIGH (ref 70–99)
GLUCOSE UR QL: NEGATIVE — SIGNIFICANT CHANGE UP
HCT VFR BLD CALC: 40.5 % — SIGNIFICANT CHANGE UP (ref 34.5–45)
HGB BLD-MCNC: 13.7 G/DL — SIGNIFICANT CHANGE UP (ref 11.5–15.5)
HYALINE CASTS # UR AUTO: 1 /LPF — SIGNIFICANT CHANGE UP (ref 0–2)
IMM GRANULOCYTES NFR BLD AUTO: 0.6 % — SIGNIFICANT CHANGE UP (ref 0–1.5)
INR BLD: 0.96 RATIO — SIGNIFICANT CHANGE UP (ref 0.88–1.16)
KETONES UR-MCNC: NEGATIVE — SIGNIFICANT CHANGE UP
LEUKOCYTE ESTERASE UR-ACNC: ABNORMAL
LYMPHOCYTES # BLD AUTO: 1.77 K/UL — SIGNIFICANT CHANGE UP (ref 1–3.3)
LYMPHOCYTES # BLD AUTO: 25.7 % — SIGNIFICANT CHANGE UP (ref 13–44)
MCHC RBC-ENTMCNC: 30 PG — SIGNIFICANT CHANGE UP (ref 27–34)
MCHC RBC-ENTMCNC: 33.8 GM/DL — SIGNIFICANT CHANGE UP (ref 32–36)
MCV RBC AUTO: 88.6 FL — SIGNIFICANT CHANGE UP (ref 80–100)
MONOCYTES # BLD AUTO: 0.51 K/UL — SIGNIFICANT CHANGE UP (ref 0–0.9)
MONOCYTES NFR BLD AUTO: 7.4 % — SIGNIFICANT CHANGE UP (ref 2–14)
NEUTROPHILS # BLD AUTO: 4.42 K/UL — SIGNIFICANT CHANGE UP (ref 1.8–7.4)
NEUTROPHILS NFR BLD AUTO: 64.2 % — SIGNIFICANT CHANGE UP (ref 43–77)
NITRITE UR-MCNC: NEGATIVE — SIGNIFICANT CHANGE UP
NRBC # BLD: 0 /100 WBCS — SIGNIFICANT CHANGE UP (ref 0–0)
PH UR: 8 — SIGNIFICANT CHANGE UP (ref 5–8)
PLATELET # BLD AUTO: 163 K/UL — SIGNIFICANT CHANGE UP (ref 150–400)
POTASSIUM SERPL-MCNC: 5.5 MMOL/L — HIGH (ref 3.5–5.3)
POTASSIUM SERPL-SCNC: 5.5 MMOL/L — HIGH (ref 3.5–5.3)
PROT SERPL-MCNC: 7.5 G/DL — SIGNIFICANT CHANGE UP (ref 6–8.3)
PROT UR-MCNC: ABNORMAL
PROTHROM AB SERPL-ACNC: 11 SEC — SIGNIFICANT CHANGE UP (ref 10.5–13.4)
RBC # BLD: 4.57 M/UL — SIGNIFICANT CHANGE UP (ref 3.8–5.2)
RBC # FLD: 12.6 % — SIGNIFICANT CHANGE UP (ref 10.3–14.5)
RBC CASTS # UR COMP ASSIST: 6 /HPF — HIGH (ref 0–4)
SODIUM SERPL-SCNC: 138 MMOL/L — SIGNIFICANT CHANGE UP (ref 135–145)
SP GR SPEC: >1.05 (ref 1.01–1.02)
UROBILINOGEN FLD QL: NEGATIVE — SIGNIFICANT CHANGE UP
WBC # BLD: 6.88 K/UL — SIGNIFICANT CHANGE UP (ref 3.8–10.5)
WBC # FLD AUTO: 6.88 K/UL — SIGNIFICANT CHANGE UP (ref 3.8–10.5)
WBC UR QL: 23 /HPF — HIGH (ref 0–5)

## 2022-07-28 PROCEDURE — 99285 EMERGENCY DEPT VISIT HI MDM: CPT

## 2022-07-28 PROCEDURE — 71045 X-RAY EXAM CHEST 1 VIEW: CPT | Mod: 26

## 2022-07-28 PROCEDURE — 70498 CT ANGIOGRAPHY NECK: CPT | Mod: 26,MA

## 2022-07-28 PROCEDURE — 70496 CT ANGIOGRAPHY HEAD: CPT | Mod: 26,MA

## 2022-07-28 PROCEDURE — 73030 X-RAY EXAM OF SHOULDER: CPT | Mod: 26,RT

## 2022-07-28 RX ORDER — LEVETIRACETAM 250 MG/1
1000 TABLET, FILM COATED ORAL ONCE
Refills: 0 | Status: COMPLETED | OUTPATIENT
Start: 2022-07-28 | End: 2022-07-28

## 2022-07-28 RX ORDER — ACETAMINOPHEN 500 MG
975 TABLET ORAL ONCE
Refills: 0 | Status: COMPLETED | OUTPATIENT
Start: 2022-07-28 | End: 2022-07-28

## 2022-07-28 RX ORDER — CEFTRIAXONE 500 MG/1
1000 INJECTION, POWDER, FOR SOLUTION INTRAMUSCULAR; INTRAVENOUS ONCE
Refills: 0 | Status: COMPLETED | OUTPATIENT
Start: 2022-07-28 | End: 2022-07-28

## 2022-07-28 RX ORDER — SODIUM CHLORIDE 9 MG/ML
1000 INJECTION INTRAMUSCULAR; INTRAVENOUS; SUBCUTANEOUS ONCE
Refills: 0 | Status: COMPLETED | OUTPATIENT
Start: 2022-07-28 | End: 2022-07-28

## 2022-07-28 RX ADMIN — LEVETIRACETAM 400 MILLIGRAM(S): 250 TABLET, FILM COATED ORAL at 23:26

## 2022-07-28 RX ADMIN — Medication 975 MILLIGRAM(S): at 22:31

## 2022-07-28 RX ADMIN — SODIUM CHLORIDE 1000 MILLILITER(S): 9 INJECTION INTRAMUSCULAR; INTRAVENOUS; SUBCUTANEOUS at 22:32

## 2022-07-28 RX ADMIN — CEFTRIAXONE 100 MILLIGRAM(S): 500 INJECTION, POWDER, FOR SOLUTION INTRAMUSCULAR; INTRAVENOUS at 22:31

## 2022-07-28 NOTE — ED PROVIDER NOTE - ATTENDING APP SHARED VISIT CONTRIBUTION OF CARE
Pt with sudden seizure, approx 10min, arrived via EMS post-ictal, airway intact, nonfocal motor, clear lungs, RRR, ab soft, nt.

## 2022-07-28 NOTE — ED ADULT NURSE REASSESSMENT NOTE - NS ED NURSE REASSESS COMMENT FT1
pt placed on bed pan for comfort. Pt readjusted in bed. Resting comfortably on stretcher in no distress.

## 2022-07-28 NOTE — ED PROVIDER NOTE - NS ED ATTENDING STATEMENT MOD
This was a shared visit with the [facility]. I reviewed and verified the documentation and independently performed the documented:

## 2022-07-28 NOTE — ED PROVIDER NOTE - OBJECTIVE STATEMENT
67y F w/ PMHx of Cerebral Aneurysm, HTN presents to the ED for evaluation s/p fall at around 5:30 PM. As per , pt was shaking, grinding teeth, drooling on the ground. Endorses head trauma. Denies AC use. Pt states no similar incident of seizure in the past. Has not fell since previous hospitalization. Discharged Jan 21st w/ rehab afterwards for aneurysm w/ minimal deficits. Denies urinary/bowel incontinence. Allergic to Heparin. 67y F w/ PMHx of Cerebral Aneurysm s/p rupture, HTN presents to the ED for evaluation s/p fall at around 5:30 PM. As per , he heard her fall in another room, pt was shaking, grinding teeth, drooling on the ground and called EMS. seizure lasted aprpox 10 min per .  Endorses head trauma. Denies AC use. Pt states no similar incident of seizure in the past. Has not fell since previous hospitalization. Discharged Jan 21st w/ rehab afterwards for aneurysm w/ minimal deficits. Denies urinary/bowel incontinence. Allergic to Heparin.

## 2022-07-28 NOTE — ED PROVIDER NOTE - CLINICAL SUMMARY MEDICAL DECISION MAKING FREE TEXT BOX
Dr. Carpenter Note: new onset seizure in setting of fever and head injury, r/o ICH, r/o infectious process, suspect UTI given urinary frequency, neuro and nsg consult (given recent clip for aneursym), antibx, serial exam

## 2022-07-28 NOTE — ED ADULT NURSE REASSESSMENT NOTE - NS ED NURSE REASSESS COMMENT FT1
neuro assessment completed and pt noted to have 5mm pupils non reactive to light. Neuro MD at bedside aware of findings. Pt hx of cataract surgery in the left eye and hx of burst aneurysm. Pt denies blurry vision, dizziness. Remains A&Ox3 with equal strength and sensation in all extremities.

## 2022-07-28 NOTE — ED ADULT NURSE NOTE - NSIMPLEMENTINTERV_GEN_ALL_ED
Implemented All Fall Risk Interventions:  [place] to call system. Call bell, personal items and telephone within reach. Instruct patient to call for assistance. Room bathroom lighting operational. Non-slip footwear when patient is off stretcher. Physically safe environment: no spills, clutter or unnecessary equipment. Stretcher in lowest position, wheels locked, appropriate side rails in place. Provide visual cue, wrist band, yellow gown, etc. Monitor gait and stability. Monitor for mental status changes and reorient to person, place, and time. Review medications for side effects contributing to fall risk. Reinforce activity limits and safety measures with patient and family.

## 2022-07-28 NOTE — ED ADULT NURSE NOTE - OBJECTIVE STATEMENT
67 y.o. F A&Ox4 PMHx of Aneurysm with clip in 2021 presenting to ED via EMS for s/p seizure activity. Pt  at bedside states that pt was cooking dinner and he heard her fall. States that he went to check on her and she was on the ground, shaking and foaming at the mouth. States that he called EMS and she was having this activity for 10 minutes, that it broke when EMS arrived. Upon arrival to ED, pt is postictal, responding to questions and following commands but lethargic. At time of full interview, pt awake, alert, cooperative. Pt states that she has a mild headache and that nothing else is bothering her. Pt denies fever/chills, H/A, lightheadedness/dizziness, vision changes, SOB, chest pain, abd pain, N/V/D, constipation, dysuria, hematuria, hematochezia, weakness, numbness, and tingling. Upon assessment, pt alert, grossly neurologically intact, and well appearing. Pupils PERRLA and no drainage noted. Airway patent, chest rise symmetrical with no advantageous L/S, and pt is eupneic. Skin is warm, dry, and normal for race. No cyanosis noted to lips or fingernails. Mucous membranes moist. Negative clubbed fingernails. Radial pulses equal and +2 bilaterally. Abd soft, nontender, nondistended. ROM intact and strength +5 in all four extremities. No edema noted to bilateral legs or arms. ED OSMANY Cota at bedside for eval. IV inserted, Labs drawn and sent, pt on cardiac monitor with NSR in the 70s. Pt resting in stretcher in position of comfort. Stretcher locked and lowered and call bell within reach.

## 2022-07-28 NOTE — ED ADULT NURSE NOTE - NSICDXPASTSURGICALHX_GEN_ALL_CORE_FT
Name band; PAST SURGICAL HISTORY:  H/O cataract extraction, right     S/P angiogram of extremity cerebral angiogram w/ coiling 12/11/2021'

## 2022-07-28 NOTE — ED PROVIDER NOTE - PROGRESS NOTE DETAILS
spoke with neurosurgery who believes no neurosurgical intervention needed, advising neuro evaluation of new onset seizures. spoke with neuro who will come see pt -Mercedez Cota PA-C Dr. Carpenter Note: spoke to neuro who d/w epilepsy fellow, will update note but hold further AED for now, treat infection, pt clinically with UTI, will continue antibx and admit to medicine, daughter updated, pt doing well, back to baseline.

## 2022-07-28 NOTE — ED PROVIDER NOTE - PHYSICAL EXAMINATION
A&Ox2, NAD, following commands  NCAT. PERRL, EOMI.  Neck supple, no LAD.   Lungs CTAB. No w/r/r  Cardiac +S1S2, RRR, No m/r/g.   Abd soft, NT/ND, +BS, no rebound or guarding.   Extremities: + ttp R distal clavicle/AC joint cap refill <2, pulses in distal extremities 4+, no edema.   Skin without rash.   No focal Defecits, Strength 5/5 UE/LE. no pronator drift A&Ox2, NAD, following commands  NCAT. PERRL, EOMI. no nuchal rigidity, no cervical spine ttp.   Neck supple, no LAD.   Lungs CTAB. No w/r/r  Cardiac +S1S2, RRR, No m/r/g.   Abd soft, NT/ND, +BS, no rebound or guarding.   Extremities: + ttp R distal clavicle/AC joint cap refill <2, pulses in distal extremities 4+, no edema.   Skin without rash.   No focal Defecits, Strength 5/5 UE/LE. no pronator drift

## 2022-07-29 DIAGNOSIS — Z29.9 ENCOUNTER FOR PROPHYLACTIC MEASURES, UNSPECIFIED: ICD-10-CM

## 2022-07-29 DIAGNOSIS — R56.9 UNSPECIFIED CONVULSIONS: ICD-10-CM

## 2022-07-29 DIAGNOSIS — E04.1 NONTOXIC SINGLE THYROID NODULE: ICD-10-CM

## 2022-07-29 DIAGNOSIS — S42.009A FRACTURE OF UNSPECIFIED PART OF UNSPECIFIED CLAVICLE, INITIAL ENCOUNTER FOR CLOSED FRACTURE: ICD-10-CM

## 2022-07-29 DIAGNOSIS — F32.9 MAJOR DEPRESSIVE DISORDER, SINGLE EPISODE, UNSPECIFIED: ICD-10-CM

## 2022-07-29 DIAGNOSIS — N39.0 URINARY TRACT INFECTION, SITE NOT SPECIFIED: ICD-10-CM

## 2022-07-29 DIAGNOSIS — J06.9 ACUTE UPPER RESPIRATORY INFECTION, UNSPECIFIED: ICD-10-CM

## 2022-07-29 LAB
ANION GAP SERPL CALC-SCNC: 11 MMOL/L — SIGNIFICANT CHANGE UP (ref 5–17)
BASE EXCESS BLDV CALC-SCNC: 3.3 MMOL/L — HIGH (ref -2–2)
BASOPHILS # BLD AUTO: 0.02 K/UL — SIGNIFICANT CHANGE UP (ref 0–0.2)
BASOPHILS NFR BLD AUTO: 0.4 % — SIGNIFICANT CHANGE UP (ref 0–2)
BUN SERPL-MCNC: 11 MG/DL — SIGNIFICANT CHANGE UP (ref 7–23)
CA-I SERPL-SCNC: 1.2 MMOL/L — SIGNIFICANT CHANGE UP (ref 1.15–1.33)
CALCIUM SERPL-MCNC: 8.9 MG/DL — SIGNIFICANT CHANGE UP (ref 8.4–10.5)
CHLORIDE BLDV-SCNC: 107 MMOL/L — SIGNIFICANT CHANGE UP (ref 96–108)
CHLORIDE SERPL-SCNC: 104 MMOL/L — SIGNIFICANT CHANGE UP (ref 96–108)
CK SERPL-CCNC: 244 U/L — HIGH (ref 25–170)
CO2 BLDV-SCNC: 31 MMOL/L — HIGH (ref 22–26)
CO2 SERPL-SCNC: 26 MMOL/L — SIGNIFICANT CHANGE UP (ref 22–31)
CREAT SERPL-MCNC: 0.6 MG/DL — SIGNIFICANT CHANGE UP (ref 0.5–1.3)
EGFR: 99 ML/MIN/1.73M2 — SIGNIFICANT CHANGE UP
EOSINOPHIL # BLD AUTO: 0.06 K/UL — SIGNIFICANT CHANGE UP (ref 0–0.5)
EOSINOPHIL NFR BLD AUTO: 1.1 % — SIGNIFICANT CHANGE UP (ref 0–6)
GAS PNL BLDV: 139 MMOL/L — SIGNIFICANT CHANGE UP (ref 136–145)
GAS PNL BLDV: SIGNIFICANT CHANGE UP
GAS PNL BLDV: SIGNIFICANT CHANGE UP
GLUCOSE BLDV-MCNC: 88 MG/DL — SIGNIFICANT CHANGE UP (ref 70–99)
GLUCOSE SERPL-MCNC: 94 MG/DL — SIGNIFICANT CHANGE UP (ref 70–99)
HCO3 BLDV-SCNC: 29 MMOL/L — SIGNIFICANT CHANGE UP (ref 22–29)
HCT VFR BLD CALC: 37.7 % — SIGNIFICANT CHANGE UP (ref 34.5–45)
HCT VFR BLDA CALC: 38 % — SIGNIFICANT CHANGE UP (ref 34.5–46.5)
HGB BLD CALC-MCNC: 12.8 G/DL — SIGNIFICANT CHANGE UP (ref 11.7–16.1)
HGB BLD-MCNC: 12.4 G/DL — SIGNIFICANT CHANGE UP (ref 11.5–15.5)
IMM GRANULOCYTES NFR BLD AUTO: 0.4 % — SIGNIFICANT CHANGE UP (ref 0–1.5)
LACTATE BLDV-MCNC: 2 MMOL/L — SIGNIFICANT CHANGE UP (ref 0.7–2)
LACTATE SERPL-SCNC: 1.5 MMOL/L — SIGNIFICANT CHANGE UP (ref 0.7–2)
LYMPHOCYTES # BLD AUTO: 1.77 K/UL — SIGNIFICANT CHANGE UP (ref 1–3.3)
LYMPHOCYTES # BLD AUTO: 31.8 % — SIGNIFICANT CHANGE UP (ref 13–44)
MAGNESIUM SERPL-MCNC: 2.1 MG/DL — SIGNIFICANT CHANGE UP (ref 1.6–2.6)
MCHC RBC-ENTMCNC: 29.8 PG — SIGNIFICANT CHANGE UP (ref 27–34)
MCHC RBC-ENTMCNC: 32.9 GM/DL — SIGNIFICANT CHANGE UP (ref 32–36)
MCV RBC AUTO: 90.6 FL — SIGNIFICANT CHANGE UP (ref 80–100)
MONOCYTES # BLD AUTO: 0.45 K/UL — SIGNIFICANT CHANGE UP (ref 0–0.9)
MONOCYTES NFR BLD AUTO: 8.1 % — SIGNIFICANT CHANGE UP (ref 2–14)
NEUTROPHILS # BLD AUTO: 3.25 K/UL — SIGNIFICANT CHANGE UP (ref 1.8–7.4)
NEUTROPHILS NFR BLD AUTO: 58.2 % — SIGNIFICANT CHANGE UP (ref 43–77)
NRBC # BLD: 0 /100 WBCS — SIGNIFICANT CHANGE UP (ref 0–0)
PCO2 BLDV: 48 MMHG — HIGH (ref 39–42)
PH BLDV: 7.39 — SIGNIFICANT CHANGE UP (ref 7.32–7.43)
PHOSPHATE SERPL-MCNC: 3.5 MG/DL — SIGNIFICANT CHANGE UP (ref 2.5–4.5)
PLATELET # BLD AUTO: 153 K/UL — SIGNIFICANT CHANGE UP (ref 150–400)
PO2 BLDV: 65 MMHG — HIGH (ref 25–45)
POTASSIUM BLDV-SCNC: 3.4 MMOL/L — LOW (ref 3.5–5.1)
POTASSIUM SERPL-MCNC: 3.5 MMOL/L — SIGNIFICANT CHANGE UP (ref 3.5–5.3)
POTASSIUM SERPL-SCNC: 3.5 MMOL/L — SIGNIFICANT CHANGE UP (ref 3.5–5.3)
RAPID RVP RESULT: DETECTED
RBC # BLD: 4.16 M/UL — SIGNIFICANT CHANGE UP (ref 3.8–5.2)
RBC # FLD: 12.8 % — SIGNIFICANT CHANGE UP (ref 10.3–14.5)
RV+EV RNA SPEC QL NAA+PROBE: DETECTED
SAO2 % BLDV: 94.1 % — HIGH (ref 67–88)
SARS-COV-2 RNA SPEC QL NAA+PROBE: SIGNIFICANT CHANGE UP
SODIUM SERPL-SCNC: 141 MMOL/L — SIGNIFICANT CHANGE UP (ref 135–145)
WBC # BLD: 5.57 K/UL — SIGNIFICANT CHANGE UP (ref 3.8–10.5)
WBC # FLD AUTO: 5.57 K/UL — SIGNIFICANT CHANGE UP (ref 3.8–10.5)

## 2022-07-29 PROCEDURE — 95718 EEG PHYS/QHP 2-12 HR W/VEEG: CPT

## 2022-07-29 PROCEDURE — 95720 EEG PHY/QHP EA INCR W/VEEG: CPT

## 2022-07-29 PROCEDURE — 99223 1ST HOSP IP/OBS HIGH 75: CPT | Mod: GC

## 2022-07-29 PROCEDURE — 12345: CPT | Mod: NC

## 2022-07-29 PROCEDURE — 99223 1ST HOSP IP/OBS HIGH 75: CPT

## 2022-07-29 RX ORDER — ESCITALOPRAM OXALATE 10 MG/1
5 TABLET, FILM COATED ORAL DAILY
Refills: 0 | Status: DISCONTINUED | OUTPATIENT
Start: 2022-07-29 | End: 2022-07-31

## 2022-07-29 RX ORDER — ESCITALOPRAM OXALATE 10 MG/1
1 TABLET, FILM COATED ORAL
Qty: 0 | Refills: 0 | DISCHARGE

## 2022-07-29 RX ORDER — ACETAMINOPHEN 500 MG
650 TABLET ORAL EVERY 6 HOURS
Refills: 0 | Status: DISCONTINUED | OUTPATIENT
Start: 2022-07-29 | End: 2022-07-31

## 2022-07-29 RX ORDER — AMLODIPINE BESYLATE 2.5 MG/1
2.5 TABLET ORAL DAILY
Refills: 0 | Status: DISCONTINUED | OUTPATIENT
Start: 2022-07-29 | End: 2022-07-29

## 2022-07-29 RX ORDER — ACETAMINOPHEN 500 MG
650 TABLET ORAL EVERY 6 HOURS
Refills: 0 | Status: DISCONTINUED | OUTPATIENT
Start: 2022-07-29 | End: 2022-07-29

## 2022-07-29 RX ORDER — CEFTRIAXONE 500 MG/1
1000 INJECTION, POWDER, FOR SOLUTION INTRAMUSCULAR; INTRAVENOUS EVERY 24 HOURS
Refills: 0 | Status: DISCONTINUED | OUTPATIENT
Start: 2022-07-29 | End: 2022-07-29

## 2022-07-29 RX ORDER — MIDAZOLAM HYDROCHLORIDE 1 MG/ML
5 INJECTION, SOLUTION INTRAMUSCULAR; INTRAVENOUS ONCE
Refills: 0 | Status: DISCONTINUED | OUTPATIENT
Start: 2022-07-29 | End: 2022-07-31

## 2022-07-29 RX ORDER — LIDOCAINE 4 G/100G
1 CREAM TOPICAL EVERY 24 HOURS
Refills: 0 | Status: DISCONTINUED | OUTPATIENT
Start: 2022-07-29 | End: 2022-07-31

## 2022-07-29 RX ORDER — LEVETIRACETAM 250 MG/1
500 TABLET, FILM COATED ORAL
Refills: 0 | Status: DISCONTINUED | OUTPATIENT
Start: 2022-07-29 | End: 2022-07-31

## 2022-07-29 RX ADMIN — LIDOCAINE 1 PATCH: 4 CREAM TOPICAL at 19:15

## 2022-07-29 RX ADMIN — Medication 650 MILLIGRAM(S): at 11:44

## 2022-07-29 RX ADMIN — LIDOCAINE 1 PATCH: 4 CREAM TOPICAL at 23:00

## 2022-07-29 RX ADMIN — ESCITALOPRAM OXALATE 5 MILLIGRAM(S): 10 TABLET, FILM COATED ORAL at 11:44

## 2022-07-29 RX ADMIN — Medication 650 MILLIGRAM(S): at 12:35

## 2022-07-29 RX ADMIN — LEVETIRACETAM 500 MILLIGRAM(S): 250 TABLET, FILM COATED ORAL at 18:13

## 2022-07-29 RX ADMIN — Medication 650 MILLIGRAM(S): at 18:12

## 2022-07-29 RX ADMIN — LIDOCAINE 1 PATCH: 4 CREAM TOPICAL at 11:45

## 2022-07-29 RX ADMIN — Medication 650 MILLIGRAM(S): at 18:45

## 2022-07-29 NOTE — H&P ADULT - PROBLEM SELECTOR PLAN 6
- CTA neck shows 1cm right thyroid nodule, which was present in 2021  - f/u TSH  - outpatient thyroid US

## 2022-07-29 NOTE — H&P ADULT - NSICDXPASTSURGICALHX_GEN_ALL_CORE_FT
[FreeTextEntry1] : In place with the patient's progress. His examination reveals no audible murmur.I am encouraging the patient to try to increase his program of regular exercise. Current medications will be continued. Followup in 6 months.
PAST SURGICAL HISTORY:  H/O cataract extraction, right     S/P angiogram of extremity cerebral angiogram w/ coiling 12/11/2021'

## 2022-07-29 NOTE — CONSULT NOTE ADULT - ATTENDING COMMENTS
HPI as per resident note, personally verified by me. Never had seizures prior to this. Reports pain over R shoulder/clavicle area. No further seizures reported and no new focal neurologic deficits.    MS: AAO x 2.5 (knew it was 7/2022 but not rest of date). Attention/concentration wnl. Fund of knowledge dec as able to describe the current president but has difficulty recalling his name. Recent and remote memory dec. Normal affect. Follows all commands.    Speech/Language: Clear, fluent with good repetition. Naming intact.    CNs: Pupils 5mm OU, nonreactive. VFF. EOMI, no nystagmus. V1-3 intact to LT, well developed masseter muscles b/l. No facial asymmetry b/l, full eye closure strength b/l. Hearing normal (rubbing fingers) b/l. Symmetric palate elevation in midline, no uvula deviation. Head turning & shoulder shrug intact b/l. Tongue midline, normal movements.    Motor: Muscle bulk and tone appear normal.   RUE: in sling due to possible clavicular fracture, however distal movements intact.   LUE: 5/5 shoulder abductors, elbow flexors/extensors, wrist flexors/extensors, finger .  RLE/LLE: 5/5 hip flexors, knee flexors/extensors, ankle dorsiflexors and plantarflexors.     Sensation: Intact to light touch b/l throughout.     Reflexes: 2+ left bicep, brachioradialis, tricep. 2+ patellar b/l. Neutral b/l plantar response    Coordination: No dysmetria to left FTN.    Gait and station: Normal casual gait. 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 No ongoing seizures or new focal neurologic deficits to suggest new intracranial event and CT head unrevealing for this  - Start AED's with Keppra 500mg PO BID. She will need to be on this for a minimum of two months, at which point she can be reassess by outpatient neurologist if this should continue or not  - rEEG to evaluate for focal slowing, epileptiform discharges, or seizures  - Check labs for additional causes with B12, TSH, free T4, Vit D 25 OH, B6  - Continue to address above medical problems, as you are doing  - Will continue to follow patient with you

## 2022-07-29 NOTE — H&P ADULT - ASSESSMENT
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    History obtained from daughter at bedside. Patient's  heard her fall in the next room and found her on the ground shaking. She was also grinding her teeth and drooling, and the episode lasted around 10 minutes. The patient did hit her head on the floor. She is no longer on AC, which was stopped in June. Denies fever, chills, chest pain, shortness of breath, abdominal pain, nausea, vomiting, changes in bowel habits, or urinary symptoms. Patient always "urinates a lot" but nothing abnormal recently.    In the ED, Tmax 100.7, HR 75, /72, SpO2 94% on RA. Labs significant for WBC 6.8, Hgb 13.7, plt 163, BUN 18, Cr 0.6. UA with elevated WBC and large LKE, no bacteria. RVP positive for Entero/Rhinovirus. CXR notable for possible impacted fracture of the right lateral clavicle. CTH w/o and CTA head/neck w/ show sequela of prior hemorrhage and coiling procedure, but do not demonstrate acute pathology Patient is a 67y Female with PMH of cerebral aneurysm rupture s/p coil (12/2021), HTN, HIT c/b DVT now off eliquis, depression, presenting to the ED after a seizure at home, admitted for new onset seizure likely i/s/o infection.

## 2022-07-29 NOTE — PATIENT PROFILE ADULT - FALL HARM RISK - HARM RISK INTERVENTIONS

## 2022-07-29 NOTE — H&P ADULT - PROBLEM SELECTOR PLAN 4
- CXR notable for possible impacted fracture of the right lateral clavicle  - sling in place  - tylenol PRN for pain control  - consider ortho consult  - PT consult - CXR notable for possible impacted fracture of the right lateral clavicle  - sling in place  - tylenol PRN for pain control  - PT consult

## 2022-07-29 NOTE — H&P ADULT - PROBLEM SELECTOR PLAN 1
- Likely new onset focal motor impaired awareness seizure. Possibly provoked i/s/o acute infection and recent neurosx procedure  - s/p keppra x1 in the ED  - Video EEG ordered  - Seizure rescue medications for a generalized tonic clonic episode lasting >3 min or significant derangement of vital signs: Midazolam 5mg IV (due to ativan shortage).  --> For GTC > 3 min and refractory to Midazolam, call [phone].   - treatment of infections as below  - Telemetry monitoring; Neurochecks/VS Q4H  - Seizure, fall and aspiration precautions

## 2022-07-29 NOTE — H&P ADULT - NSHPPHYSICALEXAM_GEN_ALL_CORE
T(C): 36.8 (07-29-22 @ 01:47), Max: 38.2 (07-28-22 @ 22:10)  HR: 75 (07-29-22 @ 01:47) (75 - 87)  BP: 114/72 (07-29-22 @ 01:47) (114/72 - 162/83)  RR: 18 (07-29-22 @ 01:47) (16 - 18)  SpO2: 94% (07-29-22 @ 01:47) (93% - 96%)    Constitutional: no acute distress, lethargic, easily arousable  ENMT: atraumatic, normocephalic, no lymphadenopathy  Eyes: pupils equally round and reactive to light, extraocular muscles intact, no conjunctival injection  Cardio: regular rate and rhythm, normal S1/S2, no murmurs, rubs, or gallops  Respiratory: lungs clear to auscultation bilaterally, no rales, rhonchi, or wheezes  GI: soft, nontender, nondistended, BSx4  MSK: extremities atraumatic, no cyanosis or clubbing  Skin: warm, dry, no rashes or lesions  Neuro: no focal deficits, sensation grossly intact  Psych: alert and oriented x3, normal behavior, appropriate mood and affect

## 2022-07-29 NOTE — H&P ADULT - PROBLEM SELECTOR PLAN 3
- UA w/ elevated WBC and large LKE, no bacteria. Unclear if this represents a true UTI as patient asymptomatic and has other source for fever. Although unclear if abx were given prior to UA/Ucx being sent.  - patient w/ Urine cx growing klebsiella/raoutella resistant to ceftriaxone and pansensitive ecoli in 12/2021  - s/p ceftriaxone x1 in the ED  - f/u urine culture  - will need ID consult in the AM, no abx ordered in the mean time - UA w/ elevated WBC and large LKE, no bacteria. Unclear if this represents a true UTI as patient asymptomatic and has other source for fever. Although unclear if abx were given prior to UA/Ucx being sent.  - patient w/ Urine cx growing klebsiella/raoutella resistant to ceftriaxone in 12/2021  - s/p ceftriaxone x1 in the ED  - f/u urine culture  - will need ID consult in the AM, no abx ordered in the mean time

## 2022-07-29 NOTE — H&P ADULT - HISTORY OF PRESENT ILLNESS
Patient is a 67y Female with PMH of cerebral aneurysm rupture s/p coil (12/2021), HTN, HIT c/b DVT now off eliquis, depression, presenting to the ED after a seizure. History obtained from daughter at bedside. Patient's  heard her fall in the next room and found her on the ground shaking. She was also grinding her teeth and drooling, and the episode lasted around 10 minutes. The patient did hit her head on the floor. She is no longer on AC, which was stopped in June. Denies fever, chills, chest pain, shortness of breath, abdominal pain, nausea, vomiting, changes in bowel habits, or urinary symptoms. Patient always "urinates a lot" but nothing abnormal recently.    In the ED, Tmax 100.7, HR 75, /72, SpO2 94% on RA. Labs significant for WBC 6.8, Hgb 13.7, plt 163, BUN 18, Cr 0.6. UA with elevated WBC and large LKE, no bacteria. RVP positive for Entero/Rhinovirus. CXR notable for possible impacted fracture of the right lateral clavicle. CTH w/o and CTA head/neck w/ show sequela of prior hemorrhage and coiling procedure, but do not demonstrate acute pathology

## 2022-07-29 NOTE — CONSULT NOTE ADULT - SUBJECTIVE AND OBJECTIVE BOX
Neurology Consult    OZ DAVIS  67y Female  MRN-[id number]      HPI:  67 year-old right-handed woman with a PMH of HTN, left PCOM aneurysm rupture (s/p coil 12/11/21) and partial left M2 thrombus (s/p thrombectomy w/TICI 3 reperfusion), cerebral angiogram 6/9/22, BIBEMS to the ED on 7/28/22 following a witnessed seizure-like episode, for which Neurology was consulted. Per , at approximately 17:30, he was with the patient in the kitchen, who was seated down at a table, when he suddenly heard a sound and turned around to find she had fallen out of the chair and appeared to be having a seizure. He describes the event as consisting of shaking of the upper extremities b/l, followed primarily by the right arm hitting her chest. He states her eyes remained open, teeth were noted to be grinding, and she was foaming at the mouth. The episode lasted about 10 minutes, after which she slowly returned to baseline. There was no associated tongue bite, or urinary or bowel incontinence and patient does not recall the event. She reports she had been feeling slightly unwell over the past couple of days. Earlier in the day, she'd complained of dizziness and had an episode of emesis while riding in the car with her . She reports slight right temporal headache since the event but otherwise feels she is back to her baseline. Per family she has never had a seizure before, and had been recovering well since her procedures, as she was nearly back to her prior baseline.       A 10-system ROS was performed and is negative except as noted above and/or in the HPI.      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)      DVT (deep venous thrombosis)  Right Subclavian (now resolved)      Nontraumatic subarachnoid hemorrhage, unspecified      S/P angiogram of extremity  cerebral angiogram w/ coiling 12/11/2021&#x27;      H/O cataract extraction, right        FAMILY HISTORY:  FH: liver cancer (Father)      SOCIAL HISTORY:   Tobacco: former smoker x 20 years, but not since surgical procedures  , lives with       MEDICATIONS    Home Medications:  Multiple Vitamins oral tablet: 1 tab(s) orally once a day (09 Jun 2022 07:17)    MEDICATIONS  (STANDING):    MEDICATIONS  (PRN):      Allergies  heparin (Other (Fatal))        VITALS & EXAMINATION    Height (cm): 154.9 (07-28 @ 18:36)    Vital Signs Last 24 Hrs  T(C): 36.8 (29 Jul 2022 01:47), Max: 38.2 (28 Jul 2022 22:10)  T(F): 98.2 (29 Jul 2022 01:47), Max: 100.7 (28 Jul 2022 22:10)  HR: 75 (29 Jul 2022 01:47) (75 - 87)  BP: 114/72 (29 Jul 2022 01:47) (114/72 - 162/83)  BP(mean): 110 (28 Jul 2022 19:15) (110 - 110)  RR: 18 (29 Jul 2022 01:47) (16 - 18)  SpO2: 94% (29 Jul 2022 01:47) (93% - 96%)    Parameters below as of 29 Jul 2022 01:47  Patient On (Oxygen Delivery Method): room air    General:  Constitutional: appears stated age, NAD.  Respiratory: Breathing comfortably on room air.  Cardiovascular: No edema, pulses palpable.     Neurological (>12):  MS: Eyes open, alert, oriented to person, place and time. Attention/concentration wnl. Able to describe the current president but has difficulty recalling his name. Normal affect. Follows all commands.    Speech/Language: Clear, fluent with good repetition. Naming intact.    CNs: Pupils 5mm OU, nonreactive. Blink to threat intact b/l. EOMI, no nystagmus. V1-3 intact to LT, well developed masseter muscles b/l. No facial asymmetry b/l, full eye closure strength b/l. Hearing grossly normal (rubbing fingers) b/l. Symmetric palate elevation in midline, no uvula deviation. Gag reflex deferred. Head turning & shoulder shrug intact b/l. Tongue midline, normal movements.    Motor: Muscle bulk and tone appear normal.   RUE: in sling due to possible clavicular fracture, however distal movements intact.   LUE: 5/5 shoulder abductors, elbow flexors/extensors, wrist flexors/extensors, finger .  RLE/LLE: 5/5 hip flexors, knee flexors/extensors, ankle dorsiflexors and plantarflexors.     Sensation: Intact to light touch b/l throughout.     Reflexes: 2+ left bicep, brachioradialis, tricep. 2+ patellar b/l.    Coordination: No dysmetria to left FTN.    Gait: Not assessed due to safety concerns.       LABS:    CBC                       13.7   6.88  )-----------( 163      ( 28 Jul 2022 19:24 )             40.5     Chem 07-28    138  |  101  |  18  ----------------------------<  104<H>  5.5<H>   |  27  |  0.61    Ca    9.3      28 Jul 2022 19:24    TPro  7.5  /  Alb  4.3  /  TBili  0.2  /  DBili  x   /  AST  78<H>  /  ALT  35  /  AlkPhos  110  07-28    Coags PT/INR - ( 28 Jul 2022 19:24 )   PT: 11.0 sec;   INR: 0.96 ratio     PTT - ( 28 Jul 2022 19:24 )  PTT:29.0 sec    LFTs LIVER FUNCTIONS - ( 28 Jul 2022 19:24 )  Alb: 4.3 g/dL / Pro: 7.5 g/dL / ALK PHOS: 110 U/L / ALT: 35 U/L / AST: 78 U/L / GGT: x             U/A Urinalysis Basic - ( 28 Jul 2022 23:09 )  Color: Light Yellow / Appearance: Clear / SG: >1.050 / pH: x  Gluc: x / Ketone: Negative  / Bili: Negative / Urobili: Negative   Blood: x / Protein: Trace / Nitrite: Negative   Leuk Esterase: Large / RBC: 6 /hpf / WBC 23 /HPF   Sq Epi: x / Non Sq Epi: 0 /hpf / Bacteria: Negative        STUDIES & IMAGING    CXR (7/28): There is a questionable impacted fracture involving the lateral clavicle with overlying soft tissue swelling about the acromial clavicular joint. If indicated CT can be performed to confirm.    CT Head No Cont:  (28 Jul 2022 20:12): Bifrontal encephalomalacia representing sequela of prior hemorrhage and bifrontal shunt catheters. No acute intracranial hemorrhage or mass effect.   Right-sided parietal scalp subgaleal soft tissue swelling.    CTA Neck: No flow-limiting stenosis in the cervical segments of the carotid or vertebral arteries.  1 cm right-sided thyroid nodule. Recommend ultrasound correlation.    CTA Head: Embolic coil mass in the region of a previous left posterior communicating artery aneurysm. No large vessel occlusion or extravasation of contrast.   Neurology Consult    OZ DAVIS  67y Female  MRN-[id number]      HPI:  67 year-old right-handed woman with a PMH of HTN, left PCOM aneurysm rupture (s/p coil 12/11/21) and partial left M2 thrombus (s/p thrombectomy w/TICI 3 reperfusion), cerebral angiogram 6/9/22, BIBEMS to the ED on 7/28/22 following a witnessed seizure-like episode, for which Neurology was consulted. Per , at approximately 17:30, he was with the patient in the kitchen, who was seated down at a table, when he suddenly heard a sound and turned around to find she had fallen out of the chair and appeared to be having a seizure. He describes the event as consisting of shaking of the upper extremities b/l, followed primarily by the right arm hitting her chest. He states her eyes remained open, teeth were noted to be grinding, and she was foaming at the mouth. The episode lasted about 10 minutes, after which she slowly returned to baseline. There was no associated tongue bite, or urinary or bowel incontinence and patient does not recall the event. She reports she had been feeling slightly unwell over the past couple of days. Earlier in the day, she'd complained of dizziness and had an episode of emesis while riding in the car with her . She reports slight right temporal headache since the event but otherwise feels she is back to her baseline. Per family she has never had a seizure before, and had been recovering well since her procedures, as she was nearly back to her prior baseline.       A 10-system ROS was performed and is negative except as noted above and/or in the HPI.      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)      DVT (deep venous thrombosis)  Right Subclavian (now resolved)      Nontraumatic subarachnoid hemorrhage, unspecified      S/P angiogram of extremity  cerebral angiogram w/ coiling 12/11/2021&#x27;      H/O cataract extraction, right        FAMILY HISTORY:  FH: liver cancer (Father)      SOCIAL HISTORY:   Tobacco: former smoker x 20 years, but not since surgical procedures. No alcohol or illicit drug use  , lives with       MEDICATIONS    Home Medications:  Multiple Vitamins oral tablet: 1 tab(s) orally once a day (09 Jun 2022 07:17)    MEDICATIONS  (STANDING):    MEDICATIONS  (PRN):      Allergies  heparin (Other (Fatal))        VITALS & EXAMINATION    Height (cm): 154.9 (07-28 @ 18:36)    Vital Signs Last 24 Hrs  T(C): 36.8 (29 Jul 2022 01:47), Max: 38.2 (28 Jul 2022 22:10)  T(F): 98.2 (29 Jul 2022 01:47), Max: 100.7 (28 Jul 2022 22:10)  HR: 75 (29 Jul 2022 01:47) (75 - 87)  BP: 114/72 (29 Jul 2022 01:47) (114/72 - 162/83)  BP(mean): 110 (28 Jul 2022 19:15) (110 - 110)  RR: 18 (29 Jul 2022 01:47) (16 - 18)  SpO2: 94% (29 Jul 2022 01:47) (93% - 96%)    Parameters below as of 29 Jul 2022 01:47  Patient On (Oxygen Delivery Method): room air    General:  Constitutional: appears stated age, NAD.  Respiratory: Breathing comfortably on room air.  Cardiovascular: No edema, pulses palpable.   Eyes: Fundoscopy not well visualized    Neurological (>12):  MS: Eyes open, alert, oriented to person, place and time. Attention/concentration wnl. Able to describe the current president but has difficulty recalling his name. Normal affect. Follows all commands.    Speech/Language: Clear, fluent with good repetition. Naming intact.    CNs: Pupils 5mm OU, nonreactive. Blink to threat intact b/l. EOMI, no nystagmus. V1-3 intact to LT, well developed masseter muscles b/l. No facial asymmetry b/l, full eye closure strength b/l. Hearing grossly normal (rubbing fingers) b/l. Symmetric palate elevation in midline, no uvula deviation. Gag reflex deferred. Head turning & shoulder shrug intact b/l. Tongue midline, normal movements.    Motor: Muscle bulk and tone appear normal.   RUE: in sling due to possible clavicular fracture, however distal movements intact.   LUE: 5/5 shoulder abductors, elbow flexors/extensors, wrist flexors/extensors, finger .  RLE/LLE: 5/5 hip flexors, knee flexors/extensors, ankle dorsiflexors and plantarflexors.     Sensation: Intact to light touch b/l throughout.     Reflexes: 2+ left bicep, brachioradialis, tricep. 2+ patellar b/l.    Coordination: No dysmetria to left FTN.    Gait: Not assessed due to safety concerns.       LABS:    CBC                       13.7   6.88  )-----------( 163      ( 28 Jul 2022 19:24 )             40.5     Chem 07-28    138  |  101  |  18  ----------------------------<  104<H>  5.5<H>   |  27  |  0.61    Ca    9.3      28 Jul 2022 19:24    TPro  7.5  /  Alb  4.3  /  TBili  0.2  /  DBili  x   /  AST  78<H>  /  ALT  35  /  AlkPhos  110  07-28    Coags PT/INR - ( 28 Jul 2022 19:24 )   PT: 11.0 sec;   INR: 0.96 ratio     PTT - ( 28 Jul 2022 19:24 )  PTT:29.0 sec    LFTs LIVER FUNCTIONS - ( 28 Jul 2022 19:24 )  Alb: 4.3 g/dL / Pro: 7.5 g/dL / ALK PHOS: 110 U/L / ALT: 35 U/L / AST: 78 U/L / GGT: x             U/A Urinalysis Basic - ( 28 Jul 2022 23:09 )  Color: Light Yellow / Appearance: Clear / SG: >1.050 / pH: x  Gluc: x / Ketone: Negative  / Bili: Negative / Urobili: Negative   Blood: x / Protein: Trace / Nitrite: Negative   Leuk Esterase: Large / RBC: 6 /hpf / WBC 23 /HPF   Sq Epi: x / Non Sq Epi: 0 /hpf / Bacteria: Negative        STUDIES & IMAGING    CXR (7/28): There is a questionable impacted fracture involving the lateral clavicle with overlying soft tissue swelling about the acromial clavicular joint. If indicated CT can be performed to confirm.    CT Head No Cont:  (28 Jul 2022 20:12): Bifrontal encephalomalacia representing sequela of prior hemorrhage and bifrontal shunt catheters. No acute intracranial hemorrhage or mass effect.   Right-sided parietal scalp subgaleal soft tissue swelling.    CTA Neck: No flow-limiting stenosis in the cervical segments of the carotid or vertebral arteries.  1 cm right-sided thyroid nodule. Recommend ultrasound correlation.    CTA Head: Embolic coil mass in the region of a previous left posterior communicating artery aneurysm. No large vessel occlusion or extravasation of contrast.

## 2022-07-29 NOTE — H&P ADULT - PROBLEM SELECTOR PLAN 7
Diet: regular  DVT Prophylaxis: SCD's given hx of HIT  Full code    Shade Malin MD  Hospitalist, Department of Internal Medicine  Pager [phone]

## 2022-07-29 NOTE — CONSULT NOTE ADULT - ASSESSMENT
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 VS on presentation significant for /83, with Tmax 100.7 in the ED. On evaluation patient is back to her baseline mental status. UA with elevated WBC and large LKE. RVP positive for Entero/Rhinovirus. CXR notable for possible impacted fracture of the right lateral clavicle. CTH w/o and CTA head/neck w/ show sequela of prior hemorrhage and coiling procedure, but do not demonstrate acute pathology. She was evaluated by Neurosurgery with report of no intervention indicated at this time.     Impression: Likely new onset focal motor impaired awareness seizure. Possibly provoked in the setting of acute infection, in a patient with recent neurosurgical procedures.     Recommendations:   [] Routine EEG or Video EEG   [] Ucx, Bcx, lactate, CK  [] Continue empiric antibiotics as per primary team  [] Seizure rescue medications for a generalized tonic clonic episode lasting >3 min or significant derangement of vital signs: Midazolam 5mg IV (due to ativan shortage).   --> For GTC > 3 min and refractory to Midazolam, call [phone].     Other:   [] Telemetry monitoring; Neurochecks/VS Q4H  [] Seizure, fall and aspiration precautions  [] Please note: if patient has a convulsion, please document accurately the time of onset, any derangement of vital signs, length of episode, and duration of postictal period.   --> Please describe what occurred and  specifically what the patient was doing, paying attention to progression of limb involvement (upper/lower; R/L) if any, eye opening vs closure, head turn, gaze deviation, shaking of extremities, tongue bite, urinary/bowel incontinence.      Discussed with Epilepsy fellow, Dr. Peacock. To be seen by Neurology attending, Dr. Hayes. 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 VS on presentation significant for /83, with Tmax 100.7 in the ED. On evaluation patient is back to her baseline mental status. UA with elevated WBC and large LKE. RVP positive for Entero/Rhinovirus. CXR notable for possible impacted fracture of the right lateral clavicle. CTH w/o and CTA head/neck w/ show sequela of prior hemorrhage and coiling procedure, but do not demonstrate acute pathology. She was evaluated by Neurosurgery with report of no intervention indicated at this time.     Impression: Likely new onset focal motor impaired awareness seizure. Possibly provoked in the setting of acute infection, in a patient with recent neurosurgical procedures.     Recommendations:   [] Routine EEG ([facility] order name: EEG awake and asleep)   [] Ucx, Bcx, lactate, CK  [] Start Keppra 500mg BID for now  [] Continue empiric antibiotics as per primary team  [] Seizure rescue medications for a generalized tonic clonic episode lasting >3 min or significant derangement of vital signs: Midazolam 5mg IV (due to ativan shortage).   --> For GTC > 3 min and refractory to Midazolam, call [phone].     Other:   [] Telemetry monitoring; Neurochecks/VS Q4H  [] Seizure, fall and aspiration precautions  [] Please note: if patient has a convulsion, please document accurately the time of onset, any derangement of vital signs, length of episode, and duration of postictal period.   --> Please describe what occurred and  specifically what the patient was doing, paying attention to progression of limb involvement (upper/lower; R/L) if any, eye opening vs closure, head turn, gaze deviation, shaking of extremities, tongue bite, urinary/bowel incontinence.      Discussed with Epilepsy fellow, Dr. Peacock. To be seen by Neurology attending, Dr. Hayes. 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 VS on presentation significant for /83, with Tmax 100.7 in the ED. On evaluation patient is back to her baseline mental status. UA with elevated WBC and large LKE. RVP positive for Entero/Rhinovirus. CXR notable for possible impacted fracture of the right lateral clavicle. CTH w/o and CTA head/neck w/ show sequela of prior hemorrhage and coiling procedure, but do not demonstrate acute pathology. She was evaluated by Neurosurgery with report of no intervention indicated at this time.     Impression: Likely new onset focal motor impaired awareness seizure. Possibly provoked in the setting of acute infection, in a patient with recent neurosurgical procedures.     Recommendations:   [] Routine EEG ([facility] order name: EEG awake and asleep)   [] Ucx, Bcx, lactate, CK  [] Start Keppra 500mg BID for now  [] Continue empiric antibiotics as per primary team  [] Seizure rescue medications for a generalized tonic clonic episode lasting >3 min or significant derangement of vital signs: Midazolam 5mg IV (due to ativan shortage).   --> For GTC > 3 min and refractory to Midazolam, call [phone].     Other:   [] Telemetry monitoring; Neurochecks/VS Q4H  [] Seizure, fall and aspiration precautions  [] Please note: if patient has a convulsion, please document accurately the time of onset, any derangement of vital signs, length of episode, and duration of postictal period.   --> Please describe what occurred and  specifically what the patient was doing, paying attention to progression of limb involvement (upper/lower; R/L) if any, eye opening vs closure, head turn, gaze deviation, shaking of extremities, tongue bite, urinary/bowel incontinence.      Discussed with Epilepsy fellow, Dr. Peacock and Neurology attending, Dr. Hayes.

## 2022-07-29 NOTE — PHYSICAL THERAPY INITIAL EVALUATION ADULT - PERTINENT HX OF CURRENT PROBLEM, REHAB EVAL
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 found to have R clavicle Fx

## 2022-07-30 LAB
24R-OH-CALCIDIOL SERPL-MCNC: 81.4 NG/ML — HIGH (ref 30–80)
T4 FREE SERPL-MCNC: 0.9 NG/DL — SIGNIFICANT CHANGE UP (ref 0.9–1.8)
TSH SERPL-MCNC: 2.41 UIU/ML — SIGNIFICANT CHANGE UP (ref 0.27–4.2)
VIT B12 SERPL-MCNC: 717 PG/ML — SIGNIFICANT CHANGE UP (ref 232–1245)

## 2022-07-30 PROCEDURE — 99232 SBSQ HOSP IP/OBS MODERATE 35: CPT

## 2022-07-30 PROCEDURE — 95718 EEG PHYS/QHP 2-12 HR W/VEEG: CPT

## 2022-07-30 RX ORDER — CHLORHEXIDINE GLUCONATE 213 G/1000ML
1 SOLUTION TOPICAL DAILY
Refills: 0 | Status: DISCONTINUED | OUTPATIENT
Start: 2022-07-30 | End: 2022-07-31

## 2022-07-30 RX ADMIN — Medication 650 MILLIGRAM(S): at 12:58

## 2022-07-30 RX ADMIN — LIDOCAINE 1 PATCH: 4 CREAM TOPICAL at 19:19

## 2022-07-30 RX ADMIN — CHLORHEXIDINE GLUCONATE 1 APPLICATION(S): 213 SOLUTION TOPICAL at 11:18

## 2022-07-30 RX ADMIN — LEVETIRACETAM 500 MILLIGRAM(S): 250 TABLET, FILM COATED ORAL at 05:18

## 2022-07-30 RX ADMIN — LIDOCAINE 1 PATCH: 4 CREAM TOPICAL at 23:00

## 2022-07-30 RX ADMIN — Medication 650 MILLIGRAM(S): at 06:02

## 2022-07-30 RX ADMIN — LIDOCAINE 1 PATCH: 4 CREAM TOPICAL at 11:56

## 2022-07-30 RX ADMIN — Medication 650 MILLIGRAM(S): at 17:57

## 2022-07-30 RX ADMIN — Medication 650 MILLIGRAM(S): at 05:18

## 2022-07-30 RX ADMIN — ESCITALOPRAM OXALATE 5 MILLIGRAM(S): 10 TABLET, FILM COATED ORAL at 11:18

## 2022-07-30 RX ADMIN — LEVETIRACETAM 500 MILLIGRAM(S): 250 TABLET, FILM COATED ORAL at 17:57

## 2022-07-30 RX ADMIN — Medication 650 MILLIGRAM(S): at 11:18

## 2022-07-30 RX ADMIN — Medication 650 MILLIGRAM(S): at 18:58

## 2022-07-30 NOTE — PROGRESS NOTE ADULT - NSPROGADDITIONALINFOA_GEN_ALL_CORE
Spironolactone Pregnancy And Lactation Text: This medication can cause feminization of the male fetus and should be avoided during pregnancy. The active metabolite is also found in breast milk. Birth Control Pills Counseling: Birth Control Pill Counseling: I discussed with the patient the potential side effects of OCPs including but not limited to increased risk of stroke, heart attack, thrombophlebitis, deep venous thrombosis, hepatic adenomas, breast changes, GI upset, headaches, and depression.  The patient verbalized understanding of the proper use and possible adverse effects of OCPs. All of the patient's questions and concerns were addressed. Benzoyl Peroxide Pregnancy And Lactation Text: This medication is Pregnancy Category C. It is unknown if benzoyl peroxide is excreted in breast milk. Doxycycline Counseling:  Patient counseled regarding possible photosensitivity and increased risk for sunburn.  Patient instructed to avoid sunlight, if possible.  When exposed to sunlight, patients should wear protective clothing, sunglasses, and sunscreen.  The patient was instructed to call the office immediately if the following severe adverse effects occur:  hearing changes, easy bruising/bleeding, severe headache, or vision changes.  The patient verbalized understanding of the proper use and possible adverse effects of doxycycline.  All of the patient's questions and concerns were addressed. Minocycline Pregnancy And Lactation Text: This medication is Pregnancy Category D and not consider safe during pregnancy. It is also excreted in breast milk. Topical Clindamycin Counseling: Patient counseled that this medication may cause skin irritation or allergic reactions.  In the event of skin irritation, the patient was advised to reduce the amount of the drug applied or use it less frequently.   The patient verbalized understanding of the proper use and possible adverse effects of clindamycin.  All of the patient's questions and concerns were addressed. Azithromycin Counseling:  I discussed with the patient the risks of azithromycin including but not limited to GI upset, allergic reaction, drug rash, diarrhea, and yeast infections. Tetracycline Counseling: Patient counseled regarding possible photosensitivity and increased risk for sunburn.  Patient instructed to avoid sunlight, if possible.  When exposed to sunlight, patients should wear protective clothing, sunglasses, and sunscreen.  The patient was instructed to call the office immediately if the following severe adverse effects occur:  hearing changes, easy bruising/bleeding, severe headache, or vision changes.  The patient verbalized understanding of the proper use and possible adverse effects of tetracycline.  All of the patient's questions and concerns were addressed. Patient understands to avoid pregnancy while on therapy due to potential birth defects. Birth Control Pills Pregnancy And Lactation Text: This medication should be avoided if pregnant and for the first 30 days post-partum. Doxycycline Pregnancy And Lactation Text: This medication is Pregnancy Category D and not consider safe during pregnancy. It is also excreted in breast milk but is considered safe for shorter treatment courses. Plan discussed with NADINE Romero Topical Retinoid counseling:  Patient advised to apply a pea-sized amount only at bedtime and wait 30 minutes after washing their face before applying.  If too drying, patient may add a non-comedogenic moisturizer. The patient verbalized understanding of the proper use and possible adverse effects of retinoids.  All of the patient's questions and concerns were addressed. Isotretinoin Pregnancy And Lactation Text: This medication is Pregnancy Category X and is considered extremely dangerous during pregnancy. It is unknown if it is excreted in breast milk. High Dose Vitamin A Counseling: Side effects reviewed, pt to contact office should one occur. Topical Retinoid Pregnancy And Lactation Text: This medication is Pregnancy Category C. It is unknown if this medication is excreted in breast milk. Sarecycline Counseling: Patient advised regarding possible photosensitivity and discoloration of the teeth, skin, lips, tongue and gums.  Patient instructed to avoid sunlight, if possible.  When exposed to sunlight, patients should wear protective clothing, sunglasses, and sunscreen.  The patient was instructed to call the office immediately if the following severe adverse effects occur:  hearing changes, easy bruising/bleeding, severe headache, or vision changes.  The patient verbalized understanding of the proper use and possible adverse effects of sarecycline.  All of the patient's questions and concerns were addressed. Azithromycin Pregnancy And Lactation Text: This medication is considered safe during pregnancy and is also secreted in breast milk. Topical Clindamycin Pregnancy And Lactation Text: This medication is Pregnancy Category B and is considered safe during pregnancy. It is unknown if it is excreted in breast milk. Dapsone Counseling: I discussed with the patient the risks of dapsone including but not limited to hemolytic anemia, agranulocytosis, rashes, methemoglobinemia, kidney failure, peripheral neuropathy, headaches, GI upset, and liver toxicity.  Patients who start dapsone require monitoring including baseline LFTs and weekly CBCs for the first month, then every month thereafter.  The patient verbalized understanding of the proper use and possible adverse effects of dapsone.  All of the patient's questions and concerns were addressed. Erythromycin Counseling:  I discussed with the patient the risks of erythromycin including but not limited to GI upset, allergic reaction, drug rash, diarrhea, increase in liver enzymes, and yeast infections. Use Enhanced Medication Counseling?: No Benzoyl Peroxide Counseling: Patient counseled that medicine may cause skin irritation and bleach clothing.  In the event of skin irritation, the patient was advised to reduce the amount of the drug applied or use it less frequently.   The patient verbalized understanding of the proper use and possible adverse effects of benzoyl peroxide.  All of the patient's questions and concerns were addressed. Bactrim Counseling:  I discussed with the patient the risks of sulfa antibiotics including but not limited to GI upset, allergic reaction, drug rash, diarrhea, dizziness, photosensitivity, and yeast infections.  Rarely, more serious reactions can occur including but not limited to aplastic anemia, agranulocytosis, methemoglobinemia, blood dyscrasias, liver or kidney failure, lung infiltrates or desquamative/blistering drug rashes. Topical Sulfur Applications Counseling: Topical Sulfur Counseling: Patient counseled that this medication may cause skin irritation or allergic reactions.  In the event of skin irritation, the patient was advised to reduce the amount of the drug applied or use it less frequently.   The patient verbalized understanding of the proper use and possible adverse effects of topical sulfur application.  All of the patient's questions and concerns were addressed. High Dose Vitamin A Pregnancy And Lactation Text: High dose vitamin A therapy is contraindicated during pregnancy and breast feeding. Tazorac Counseling:  Patient advised that medication is irritating and drying.  Patient may need to apply sparingly and wash off after an hour before eventually leaving it on overnight.  The patient verbalized understanding of the proper use and possible adverse effects of tazorac.  All of the patient's questions and concerns were addressed. Spironolactone Counseling: Patient advised regarding risks of diarrhea, abdominal pain, hyperkalemia, birth defects (for female patients), liver toxicity and renal toxicity. The patient may need blood work to monitor liver and kidney function and potassium levels while on therapy. The patient verbalized understanding of the proper use and possible adverse effects of spironolactone.  All of the patient's questions and concerns were addressed. Dapsone Pregnancy And Lactation Text: This medication is Pregnancy Category C and is not considered safe during pregnancy or breast feeding. Erythromycin Pregnancy And Lactation Text: This medication is Pregnancy Category B and is considered safe during pregnancy. It is also excreted in breast milk. Detail Level: Zone Tazorac Pregnancy And Lactation Text: This medication is not safe during pregnancy. It is unknown if this medication is excreted in breast milk. Isotretinoin Counseling: Patient should get monthly blood tests, not donate blood, not drive at night if vision affected, not share medication, and not undergo elective surgery for 6 months after tx completed. Side effects reviewed, pt to contact office should one occur. Minocycline Counseling: Patient advised regarding possible photosensitivity and discoloration of the teeth, skin, lips, tongue and gums.  Patient instructed to avoid sunlight, if possible.  When exposed to sunlight, patients should wear protective clothing, sunglasses, and sunscreen.  The patient was instructed to call the office immediately if the following severe adverse effects occur:  hearing changes, easy bruising/bleeding, severe headache, or vision changes.  The patient verbalized understanding of the proper use and possible adverse effects of minocycline.  All of the patient's questions and concerns were addressed. Topical Sulfur Applications Pregnancy And Lactation Text: This medication is Pregnancy Category C and has an unknown safety profile during pregnancy. It is unknown if this topical medication is excreted in breast milk. Bactrim Pregnancy And Lactation Text: This medication is Pregnancy Category D and is known to cause fetal risk.  It is also excreted in breast milk.

## 2022-07-30 NOTE — EEG REPORT - NS EEG TEXT BOX
OZ DAVIS [id number]     Study Date: 		07-29-22 16:11 to 07/30/22 08:00  Duration x Hours: 15:27 hrs   --------------------------------------------------------------------------------------------------  History:  CC/ HPI Patient is a 67y old  Female who presents with a chief complaint of seizure (29 Jul 2022 04:15)    MEDICATIONS  (STANDING):  acetaminophen     Tablet .. 650 milliGRAM(s) Oral every 6 hours  chlorhexidine 2% Cloths 1 Application(s) Topical daily  escitalopram 5 milliGRAM(s) Oral daily  levETIRAcetam 500 milliGRAM(s) Oral two times a day  lidocaine   4% Patch 1 Patch Transdermal every 24 hours    --------------------------------------------------------------------------------------------------  Study Interpretation:    [[[Abbreviation Key:  PDR=alpha rhythm/posterior dominant rhythm. A-P=anterior posterior.  Amplitude: ‘very low’:<20; ‘low’:20-49; ‘medium’:; ‘high’:>150uV.  Persistence for periodic/rhythmic patterns (% of epoch) ‘rare’:<1%; ‘occasional’:1-10%; ‘frequent’:10-50%; ‘abundant’:50-90%; ‘continuous’:>90%.  Persistence for sporadic discharges: ‘rare’:<1/hr; ‘occasional’:1/min-1/hr; ‘frequent’:>1/min; ‘abundant’:>1/10 sec.  RPP=rhythmic and periodic patterns; GRDA=generalized rhythmic delta activity; FIRDA=frontal intermittent GRDA; LRDA=lateralized rhythmic delta activity; TIRDA=temporal intermittent rhythmic delta activity;  LPD=PLED=lateralized periodic discharges; GPD=generalized periodic discharges; BIPDs =bilateral independent periodic discharges; Mf=multifocal; SIRPDs=stimulus induced rhythmic, periodic, or ictal appearing discharges; BIRDs=brief potentially ictal rhythmic discharges >4 Hz, lasting .5-10s; PFA (paroxysmal bursts >13 Hz or =8 Hz <10s).  Modifiers: +F=with fast component; +S=with spike component; +R=with rhythmic component.  S-B=burst suppression pattern.  Max=maximal. N1-drowsy; N2-stage II sleep; N3-slow wave sleep. SSS/BETS=small sharp spikes/benign epileptiform transients of sleep. HV=hyperventilation; PS=photic stimulation]]]    Daily EEG Visual Analysis    FINDINGS:      Background:  Continuous: continuous  Symmetry: symmetric  PDR: 9-10 Hz activity, with amplitude to 40 uV, that attenuated to eye opening.  Low amplitude frontal beta noted in wakefulness.  Reactivity: present  Voltage: normal, mostly 20-150uV  Anterior Posterior Gradient: present  Other background findings: none  Breach: absent    Background Slowing:  Generalized slowing: Intermittent increased theta even during wakefulness   Focal slowing: Continuous diffuse polymorphic delta and theta right hemisphere     State Changes:   -Drowsiness noted with increased slowing, attenuation of fast activity, vertex transients.  -Present with N2 sleep transients with symmetric spindles and K-complexes.    Sporadic Epileptiform Discharges:    None    Rhythmic and Periodic Patterns (RPPs):  None     Electrographic and Electroclinical seizures:  None    Other Clinical Events:  None    Activation Procedures:   -Hyperventilation was not performed.    -Photic stimulation was not performed.     Artifacts:  Intermittent myogenic and movement artifacts were noted.    ECG:  The heart rate on single channel ECG was predominantly between 70-80 BPM.    EEG Classification / Summary:  Abnormal  EEG in the awake / drowsy / asleep state(s).  Continuous polymorphic slowing, focal, right hemisphere  Background slowing, generalized, mild    Clinical Impression:  Evidence for right hemispheric cortical/subcortical structural lesion  Mild diffuse/multifocal cerebral dysfunction, not specific as to etiology  There were no epileptiform abnormalities recorded.      This is a prelim report only, pending review with attending prior to finalization.    -------------------------------------------------------------------------------------------------------  [facility] EEG Reading Room Ph#: [phone]  Epilepsy Answering Service after 5PM and before 8:30AM: Ph#: [phone]    Raad Peacock M.D.   Epilepsy fellow OZ DAVIS [id number]     Study Date: 		07-29-22 16:11 to 07/30/22 08:00  Duration x Hours: 15:27 hrs   --------------------------------------------------------------------------------------------------  History:  CC/ HPI Patient is a 67y old  Female who presents with a chief complaint of seizure (29 Jul 2022 04:15)    MEDICATIONS  (STANDING):  acetaminophen     Tablet .. 650 milliGRAM(s) Oral every 6 hours  chlorhexidine 2% Cloths 1 Application(s) Topical daily  escitalopram 5 milliGRAM(s) Oral daily  levETIRAcetam 500 milliGRAM(s) Oral two times a day  lidocaine   4% Patch 1 Patch Transdermal every 24 hours    --------------------------------------------------------------------------------------------------  Study Interpretation:    [[[Abbreviation Key:  PDR=alpha rhythm/posterior dominant rhythm. A-P=anterior posterior.  Amplitude: ‘very low’:<20; ‘low’:20-49; ‘medium’:; ‘high’:>150uV.  Persistence for periodic/rhythmic patterns (% of epoch) ‘rare’:<1%; ‘occasional’:1-10%; ‘frequent’:10-50%; ‘abundant’:50-90%; ‘continuous’:>90%.  Persistence for sporadic discharges: ‘rare’:<1/hr; ‘occasional’:1/min-1/hr; ‘frequent’:>1/min; ‘abundant’:>1/10 sec.  RPP=rhythmic and periodic patterns; GRDA=generalized rhythmic delta activity; FIRDA=frontal intermittent GRDA; LRDA=lateralized rhythmic delta activity; TIRDA=temporal intermittent rhythmic delta activity;  LPD=PLED=lateralized periodic discharges; GPD=generalized periodic discharges; BIPDs =bilateral independent periodic discharges; Mf=multifocal; SIRPDs=stimulus induced rhythmic, periodic, or ictal appearing discharges; BIRDs=brief potentially ictal rhythmic discharges >4 Hz, lasting .5-10s; PFA (paroxysmal bursts >13 Hz or =8 Hz <10s).  Modifiers: +F=with fast component; +S=with spike component; +R=with rhythmic component.  S-B=burst suppression pattern.  Max=maximal. N1-drowsy; N2-stage II sleep; N3-slow wave sleep. SSS/BETS=small sharp spikes/benign epileptiform transients of sleep. HV=hyperventilation; PS=photic stimulation]]]    Daily EEG Visual Analysis    FINDINGS:      Background:  Continuous: continuous  Symmetry: symmetric  PDR: 9-10 Hz activity, with amplitude to 40 uV, that attenuated to eye opening.  Low amplitude frontal beta noted in wakefulness.  Reactivity: present  Voltage: normal, mostly 20-150uV  Anterior Posterior Gradient: present  Other background findings: none  Breach: absent    Background Slowing:  Generalized slowing: none  Focal slowing: none    State Changes:   -Drowsiness noted with increased slowing, attenuation of fast activity, vertex transients.  -Present with N2 sleep transients with symmetric spindles and K-complexes.    Sporadic Epileptiform Discharges:    None    Rhythmic and Periodic Patterns (RPPs):  None     Electrographic and Electroclinical seizures:  None    Other Clinical Events:  None    Activation Procedures:   -Hyperventilation was not performed.    -Photic stimulation was not performed.     Artifacts:  Intermittent myogenic and movement artifacts were noted.    ECG:  The heart rate on single channel ECG was predominantly between 70-80 BPM.    EEG Classification / Summary:  Normal  EEG in the awake / drowsy / asleep state(s).      Clinical Impression:  Normal EEG study.  No epileptiform pattern or seizure seen.      -------------------------------------------------------------------------------------------------------  [facility] EEG Reading Room Ph#: [phone]  Epilepsy Answering Service after 5PM and before 8:30AM: Ph#: [phone]    Raad Peacock M.D.   Epilepsy fellow

## 2022-07-31 ENCOUNTER — TRANSCRIPTION ENCOUNTER (OUTPATIENT)
Age: 67
End: 2022-07-31

## 2022-07-31 VITALS
SYSTOLIC BLOOD PRESSURE: 143 MMHG | TEMPERATURE: 98 F | RESPIRATION RATE: 18 BRPM | HEART RATE: 64 BPM | OXYGEN SATURATION: 94 %

## 2022-07-31 LAB
-  AMIKACIN: SIGNIFICANT CHANGE UP
-  AMOXICILLIN/CLAVULANIC ACID: SIGNIFICANT CHANGE UP
-  AMPICILLIN/SULBACTAM: SIGNIFICANT CHANGE UP
-  AMPICILLIN: SIGNIFICANT CHANGE UP
-  AMPICILLIN: SIGNIFICANT CHANGE UP
-  AZTREONAM: SIGNIFICANT CHANGE UP
-  CEFAZOLIN: SIGNIFICANT CHANGE UP
-  CEFEPIME: SIGNIFICANT CHANGE UP
-  CEFOXITIN: SIGNIFICANT CHANGE UP
-  CEFTRIAXONE: SIGNIFICANT CHANGE UP
-  CIPROFLOXACIN: SIGNIFICANT CHANGE UP
-  CIPROFLOXACIN: SIGNIFICANT CHANGE UP
-  ERTAPENEM: SIGNIFICANT CHANGE UP
-  GENTAMICIN: SIGNIFICANT CHANGE UP
-  IMIPENEM: SIGNIFICANT CHANGE UP
-  LEVOFLOXACIN: SIGNIFICANT CHANGE UP
-  LEVOFLOXACIN: SIGNIFICANT CHANGE UP
-  MEROPENEM: SIGNIFICANT CHANGE UP
-  NITROFURANTOIN: SIGNIFICANT CHANGE UP
-  NITROFURANTOIN: SIGNIFICANT CHANGE UP
-  PIPERACILLIN/TAZOBACTAM: SIGNIFICANT CHANGE UP
-  TETRACYCLINE: SIGNIFICANT CHANGE UP
-  TIGECYCLINE: SIGNIFICANT CHANGE UP
-  TOBRAMYCIN: SIGNIFICANT CHANGE UP
-  TRIMETHOPRIM/SULFAMETHOXAZOLE: SIGNIFICANT CHANGE UP
-  VANCOMYCIN: SIGNIFICANT CHANGE UP
CULTURE RESULTS: SIGNIFICANT CHANGE UP
METHOD TYPE: SIGNIFICANT CHANGE UP
METHOD TYPE: SIGNIFICANT CHANGE UP
MRSA PCR RESULT.: SIGNIFICANT CHANGE UP
ORGANISM # SPEC MICROSCOPIC CNT: SIGNIFICANT CHANGE UP
S AUREUS DNA NOSE QL NAA+PROBE: SIGNIFICANT CHANGE UP
SPECIMEN SOURCE: SIGNIFICANT CHANGE UP

## 2022-07-31 PROCEDURE — 84443 ASSAY THYROID STIM HORMONE: CPT

## 2022-07-31 PROCEDURE — 99239 HOSP IP/OBS DSCHRG MGMT >30: CPT

## 2022-07-31 PROCEDURE — 70450 CT HEAD/BRAIN W/O DYE: CPT | Mod: MA

## 2022-07-31 PROCEDURE — 95700 EEG CONT REC W/VID EEG TECH: CPT

## 2022-07-31 PROCEDURE — 85025 COMPLETE CBC W/AUTO DIFF WBC: CPT

## 2022-07-31 PROCEDURE — 85018 HEMOGLOBIN: CPT

## 2022-07-31 PROCEDURE — 87086 URINE CULTURE/COLONY COUNT: CPT

## 2022-07-31 PROCEDURE — 87040 BLOOD CULTURE FOR BACTERIA: CPT

## 2022-07-31 PROCEDURE — 70496 CT ANGIOGRAPHY HEAD: CPT | Mod: MA

## 2022-07-31 PROCEDURE — 83605 ASSAY OF LACTIC ACID: CPT

## 2022-07-31 PROCEDURE — 99285 EMERGENCY DEPT VISIT HI MDM: CPT | Mod: 25

## 2022-07-31 PROCEDURE — 36415 COLL VENOUS BLD VENIPUNCTURE: CPT

## 2022-07-31 PROCEDURE — 70498 CT ANGIOGRAPHY NECK: CPT | Mod: MA

## 2022-07-31 PROCEDURE — 97161 PT EVAL LOW COMPLEX 20 MIN: CPT

## 2022-07-31 PROCEDURE — 82803 BLOOD GASES ANY COMBINATION: CPT

## 2022-07-31 PROCEDURE — 80048 BASIC METABOLIC PNL TOTAL CA: CPT

## 2022-07-31 PROCEDURE — 71045 X-RAY EXAM CHEST 1 VIEW: CPT

## 2022-07-31 PROCEDURE — 80053 COMPREHEN METABOLIC PANEL: CPT

## 2022-07-31 PROCEDURE — 0225U NFCT DS DNA&RNA 21 SARSCOV2: CPT

## 2022-07-31 PROCEDURE — 84132 ASSAY OF SERUM POTASSIUM: CPT

## 2022-07-31 PROCEDURE — 82962 GLUCOSE BLOOD TEST: CPT

## 2022-07-31 PROCEDURE — 83735 ASSAY OF MAGNESIUM: CPT

## 2022-07-31 PROCEDURE — 87641 MR-STAPH DNA AMP PROBE: CPT

## 2022-07-31 PROCEDURE — 84100 ASSAY OF PHOSPHORUS: CPT

## 2022-07-31 PROCEDURE — 84439 ASSAY OF FREE THYROXINE: CPT

## 2022-07-31 PROCEDURE — 87186 SC STD MICRODIL/AGAR DIL: CPT

## 2022-07-31 PROCEDURE — 82947 ASSAY GLUCOSE BLOOD QUANT: CPT

## 2022-07-31 PROCEDURE — 95711 VEEG 2-12 HR UNMONITORED: CPT

## 2022-07-31 PROCEDURE — 85610 PROTHROMBIN TIME: CPT

## 2022-07-31 PROCEDURE — 85730 THROMBOPLASTIN TIME PARTIAL: CPT

## 2022-07-31 PROCEDURE — 87640 STAPH A DNA AMP PROBE: CPT

## 2022-07-31 PROCEDURE — 85014 HEMATOCRIT: CPT

## 2022-07-31 PROCEDURE — 84207 ASSAY OF VITAMIN B-6: CPT

## 2022-07-31 PROCEDURE — 82607 VITAMIN B-12: CPT

## 2022-07-31 PROCEDURE — 73030 X-RAY EXAM OF SHOULDER: CPT

## 2022-07-31 PROCEDURE — 84295 ASSAY OF SERUM SODIUM: CPT

## 2022-07-31 PROCEDURE — 81001 URINALYSIS AUTO W/SCOPE: CPT

## 2022-07-31 PROCEDURE — 96374 THER/PROPH/DIAG INJ IV PUSH: CPT

## 2022-07-31 PROCEDURE — 82435 ASSAY OF BLOOD CHLORIDE: CPT

## 2022-07-31 PROCEDURE — 82565 ASSAY OF CREATININE: CPT

## 2022-07-31 PROCEDURE — 82550 ASSAY OF CK (CPK): CPT

## 2022-07-31 PROCEDURE — 82306 VITAMIN D 25 HYDROXY: CPT

## 2022-07-31 PROCEDURE — 82330 ASSAY OF CALCIUM: CPT

## 2022-07-31 PROCEDURE — 95714 VEEG EA 12-26 HR UNMNTR: CPT

## 2022-07-31 RX ORDER — LEVETIRACETAM 250 MG/1
1 TABLET, FILM COATED ORAL
Qty: 120 | Refills: 0
Start: 2022-07-31 | End: 2022-09-28

## 2022-07-31 RX ORDER — ACETAMINOPHEN 500 MG
2 TABLET ORAL
Qty: 0 | Refills: 0 | DISCHARGE
Start: 2022-07-31

## 2022-07-31 RX ADMIN — Medication 650 MILLIGRAM(S): at 11:21

## 2022-07-31 RX ADMIN — Medication 650 MILLIGRAM(S): at 05:30

## 2022-07-31 RX ADMIN — CHLORHEXIDINE GLUCONATE 1 APPLICATION(S): 213 SOLUTION TOPICAL at 11:21

## 2022-07-31 RX ADMIN — ESCITALOPRAM OXALATE 5 MILLIGRAM(S): 10 TABLET, FILM COATED ORAL at 11:21

## 2022-07-31 RX ADMIN — LEVETIRACETAM 500 MILLIGRAM(S): 250 TABLET, FILM COATED ORAL at 05:30

## 2022-07-31 RX ADMIN — LIDOCAINE 1 PATCH: 4 CREAM TOPICAL at 11:22

## 2022-07-31 NOTE — PROGRESS NOTE ADULT - PROBLEM/PLAN-2
[facility] Pulmon[facility]    [facility] [place] [address]    Phone:  [phone]    Fax:  [phone]       Thank You for choosing us for your health care visit. We are glad to serve you and happy to provide you with this summary of your visit. Please help us to ensure we have accurate records. If you find anything that needs to be changed, please let our staff know as soon as possible.          Your Demographic Information     Patient Name Sex     Martin High Jr. Male 1940       Ethnic Group Patient Race    Not of  or  Origin White      Your Visit Details     Date & Time Provider Department    2017 1:00 PM Maverick Gardner MD [facility] Pul[facility]      Your Upcoming Appointment*(Max 10)     2017  9:00 AM CST   Generic Rad Onc Workflow with [facility] RAD ONC GENERIC RESOURCE   [facility] ([facility])    [address] [facility] [address]   [phone]            2017 10:00 AM CST   SN - OASIS DISCHARGE with Piedad Cm RN   [facility]    [address] [facility] [id number] [phone]            2017  9:45 AM CST   Follow-up Visit with Eric Gonsalez MD   [facility] ([facility])    [address] [facility] [address]   [phone]            2017 10:45 AM CST   Nurse Visit with S[facility] IM COUMADIN NURSE   [facility] [place] ([facility])    [facility] [place] [address]   [phone]            2017  5:45 AM CST   Generic Rad Onc Workflow with [facility] RAD ONC GENERIC RESOURCE   [facility] 
[facility]    [address] [facility] [address]   [phone]            Wednesday March 01, 2017  5:45 AM CST   Generic Rad Onc Workflow with [facility] RAD ONC GENERIC RESOURCE   [facility] ([facility])    [address] [facility] [address]   [phone]            Thursday March 30, 2017 10:00 AM CDT   Lab Visit with [facility] LAB   [facility] ([facility])    [address] [place], [place] [address] [facility] [address] [place] [address]   [phone]            Monday April 03, 2017  5:45 AM CDT   Generic Rad Onc Workflow with [facility] RAD ONC GENERIC RESOURCE   [facility] ([facility])    [address] [facility] [address]   [phone]            Monday April 03, 2017  9:45 AM CDT   Follow-up Visit with Yoli Pitts MD   [facility] [place] ([facility])    [address] [place] [address] [address] [place] [address]   [phone]              Your To Do List     Future Orders Please Complete On or Around Expires    CT CHEST PE IMAGING  Apr 17, 2017 May 16, 2017    Follow-Up    Return in about 3 months (around 4/20/2017).      Conditions Discussed Today or Order-Related Diagnoses        Comments    History of pulmonary embolism    -  Primary     COPD, moderate           Your Vitals Were     BP Pulse Temp Resp Height Weight    136/52 66 97.3 °F (36.3 °C) (Tympanic) 20 6' (1.829 m) 257 lb 15 oz (117 kg)    SpO2 BMI Smoking Status             91% 34.98 kg/m2 Former Smoker         Medications Prescribed or Re-Ordered Today     None      Your Current Medications Are        Disp Refills Start End    warfarin (COUMADIN) 2.5 MG tablet 22 tablet 0 1/20/2017     Sig: Take 5 mg on Tues, Thurs and Sat and 3.75 mg the other days    Class: Eprescribe    Notes to Pharmacy: Can fill for 30 days at 
this time. Please put prescription on file. Thanks    glipiZIDE (GLUCOTROL) 5 MG tablet 135 tablet 3 1/18/2017     Sig: Take One tablet in the morning and 1.5 tablets in the evening.    Class: Historical Med    Notes to Pharmacy: Increased on 1-18-17    metformin (GLUCOPHAGE) 1000 MG tablet 180 tablet 3 1/17/2017     Sig: Take 1 tablet by mouth two  times daily    Class: Eprescribe    omeprazole (PRILOSEC) 40 MG capsule 30 capsule 6 12/15/2016     Sig - Route: Take 1 capsule by mouth daily. - Oral    Class: Eprescribe    DISPENSE 1 each 0 11/28/2016     Sig: Single point cane. Dx: Unsteady gait R26.81    tamsulosin (FLOMAX) 0.4 MG Cap 180 capsule 1 11/11/2016     Sig: Take 1 capsule by mouth two times daily    Class: Eprescribe    allopurinol (ZYLOPRIM) 300 MG tablet 90 tablet 1 11/11/2016     Sig: Take 1 tablet by mouth  daily    Class: Eprescribe    ALPRAZolam (XANAX) 0.25 MG tablet 30 tablet 0 11/9/2016     Sig - Route: Take 1 tablet by mouth 3 times daily as needed for Anxiety. - Oral    Cosign for Ordering: Accepted by Yoli Pitts MD on 11/11/2016  4:12 PM    HYDROcodone-acetaminophen (NORCO) 5-325 MG per tablet 50 tablet 0 11/9/2016     Sig - Route: Take 1 tablet by mouth every 6 hours as needed for Pain. - Oral    Cosign for Ordering: Accepted by Yoli Pitts MD on 11/11/2016  4:12 PM    acetaminophen (TYLENOL) 325 MG tablet 30 tablet 0 11/7/2016     Sig - Route: Take 650 mg by mouth every 4 hours as needed for Pain. Takes 2 tablets at bedtime as needed - Oral    Class: Historical Med    atenolol (TENORMIN) 50 MG tablet   11/6/2016     Sig - Route: Take 1 tablet by mouth daily. - Oral    Class: Add to AVS    ONETOUCH DELICA LANCETS 33G Misc 200 each 3 8/30/2016     Sig: Test two times daily    Class: Eprescribe    amLODIPine (NORVASC) 5 MG tablet 90 tablet 3 7/25/2016     Sig: Take 1 tablet by mouth  daily    Class: Eprescribe    Glucose Blood (ONE TOUCH ULTRA TEST) strip 200 strip 10 12/11/2015     
Si times daily.    Class: Eprescribe    albuterol 108 (90 BASE) MCG/ACT inhaler 1 Inhaler 5 2015     Sig - Route: Inhale 2 puffs into the lungs 2 times daily as needed for Shortness of Breath or Wheezing. Replacing Combivent Respimat inhaler - Inhalation    Class: Eprescribe    furosemide (LASIX) 40 MG tablet 135 tablet 3 2015     Sig - Route: Take 1.5 tablets by mouth daily. - Oral    Class: Eprescribe    Notes to Pharmacy: Increase in dosage, keep on file    simvastatin (ZOCOR) 40 MG tablet 90 tablet 3 2015     Sig - Route: Take 1 tablet by mouth Every evening. - Oral    Class: Eprescribe    DISPENSE 1 each 0 3/3/2015     Sig: One touch glucose meter, new meter needed due to medicare changes    Class: Eprescribe    Notes to Pharmacy: Dx 250.00, not insulin dependent    Spacer/Aero-Holding Chambers (AEROCHAMBER) 1 Inhaler 0 2015     Sig: Use with Albuterol inhaler      Allergies     Lisinopril Cough    cough    Losartan PRURITUS    Tylenol With Codeine SWELLING      Immunizations History as of 2017     Name Date    INFLUENZA QUADRIVALENT 2014 10:34 AM    Influenza 10/13/2016, 1/10/2014    Influenza High Dose Pres Free 10/5/2016, 2015    Pneumococcal Conjugate 13 Valent 2016 10:21 AM    Pneumococcal Polysaccharide Adult 3/25/2013      Problem List as of 2017     Non-small cell lung cancer, right    Adenoma of prostate    COPD (chronic obstructive pulmonary disease)    Diabetes mellitus    GERD (gastroesophageal reflux disease)    Gout    Hyperlipidemia    Hypertension    Other dyspnea and respiratory abnormality    Ingrowing nail    Tear film insufficiency, unspecified    CAP (community acquired pneumonia)    Type 2 diabetes mellitus without complication    Acute on chronic diastolic congestive heart failure    S/P lobectomy of lung    Hilar mass    S/P thoracotomy    Hypoxia    Acute saddle pulmonary embolism without acute cor pulmonale    Anticoagulation 
management encounter            Patient Instructions     None      
DISPLAY PLAN FREE TEXT
DISPLAY PLAN FREE TEXT

## 2022-07-31 NOTE — PROGRESS NOTE ADULT - PROBLEM SELECTOR PLAN 7
Diet: regular  DVT Prophylaxis: SCD's given hx of HIT  Full code  Dispo: d/c home today pending neuro   f/u with PCP, neurology and possibly ortho if complications of clavicular fx
Diet: regular  DVT Prophylaxis: SCD's given hx of HIT  Full code  Dispo: d/c home tomorrow after cleared by neuro

## 2022-07-31 NOTE — DISCHARGE NOTE PROVIDER - NSDCMRMEDTOKEN_GEN_ALL_CORE_FT
amLODIPine 2.5 mg oral tablet: 1 tab(s) orally once a day  escitalopram 5 mg oral tablet: 1 tab(s) orally once a day  Multiple Vitamins oral tablet: 1 tab(s) orally once a day   acetaminophen 325 mg oral tablet: 2 tab(s) orally every 6 hours  amLODIPine 2.5 mg oral tablet: 1 tab(s) orally once a day  escitalopram 5 mg oral tablet: 1 tab(s) orally once a day  Multiple Vitamins oral tablet: 1 tab(s) orally once a day   acetaminophen 325 mg oral tablet: 2 tab(s) orally every 6 hours, As Needed for pain  amLODIPine 2.5 mg oral tablet: 1 tab(s) orally once a day  escitalopram 5 mg oral tablet: 1 tab(s) orally once a day  levETIRAcetam 500 mg oral tablet: 1 tab(s) orally 2 times a day  Multiple Vitamins oral tablet: 1 tab(s) orally once a day

## 2022-07-31 NOTE — DISCHARGE NOTE PROVIDER - NSFOLLOWUPCLINICS_GEN_ALL_ED_FT
[facility] Orthopedic Surgery  Orthopedic Surgery  [address], 3rd & 4th [place] [address]  Phone: [phone]  Fax:

## 2022-07-31 NOTE — PROGRESS NOTE ADULT - PROBLEM SELECTOR PLAN 1
- Likely new onset focal motor impaired awareness seizure. Possibly provoked i/s/o acute infection and recent neurosx procedure  - s/p keppra x1 in the ED, c/w keppra 500 BID for at least two months  - final neuro dc recs pending  - no seizures on EEG  - fever likely 2/2 entero/rhinovirus, supportive measures as below  - Telemetry monitoring; Neurochecks/VS Q4H  - Seizure, fall and aspiration precautions
- Likely new onset focal motor impaired awareness seizure. Possibly provoked i/s/o acute infection and recent neurosx procedure  - s/p keppra x1 in the ED, c/w keppra 500 BID  - Video EEG ordered, pending final read  - Seizure rescue medications for a generalized tonic clonic episode lasting >3 min or significant derangement of vital signs: Midazolam 5mg IV (due to ativan shortage).  --> For GTC > 3 min and refractory to Midazolam, call [phone].   - fever likely 2/2 entero/rhinovirus, supportive measures as below  - Telemetry monitoring; Neurochecks/VS Q4H  - Seizure, fall and aspiration precautions

## 2022-07-31 NOTE — PROGRESS NOTE ADULT - PROBLEM SELECTOR PLAN 6
- CTA neck shows 1cm right thyroid nodule, which was present in 2021  - TSH 2.41  - outpatient thyroid US
- CTA neck shows 1cm right thyroid nodule, which was present in 2021  - TSH 2.41  - outpatient thyroid US

## 2022-07-31 NOTE — DISCHARGE NOTE PROVIDER - NSDCFUSCHEDAPPT_GEN_ALL_CORE_FT
[facility]  [place]  Scheduled Appointment: 08/19/2022     Nela Frank  [facility]  NEUROLOGY [address] [facility]  Scheduled Appointment: 08/02/2022    [facility] Ro  [facility]  [facility]  Scheduled Appointment: 08/19/2022    [facility]  ORTHOSURG [address]  Scheduled Appointment: 08/31/2022    Luisana Danielson  [facility]  INTMED [address]  Scheduled Appointment: 09/02/2022

## 2022-07-31 NOTE — PROGRESS NOTE ADULT - PROBLEM SELECTOR PLAN 4
- CXR notable for possible impacted fracture of the right lateral clavicle  - sling in place  - tylenol PRN for pain control  - PT assessment, home no needs
- CXR notable for possible impacted fracture of the right lateral clavicle  - sling in place  - tylenol PRN for pain control  - PT assessment, home no needs

## 2022-07-31 NOTE — CHART NOTE - NSCHARTNOTEFT_GEN_A_CORE
EEG REPORT 7/30/22 (15+ hours):  EEG Classification / Summary:  Normal  EEG in the awake / drowsy / asleep state(s).    Clinical Impression:  Normal EEG study.  No epileptiform pattern or seizure seen.  --  Impression: First time seizure may be provoked in the setting of current infections versus indicative of underlying epilepsy. 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 No ongoing seizures or new focal neurologic deficits to suggest new intracranial event and CT head unrevealing for this. EEG 15+ hours read as normal.    Recommendations:   - Started Keppra 500mg PO BID. Patient should be discharged on Keppra 500mg PO BID. She will need to be on this for a minimum of two months, at which point she can be reassess by outpatient neurologist if this should continue or not.  - Follow-up with outpatient Epilepsy     Neurology  [address] [place] [address], [address]  [place] [address]  Phone: [phone]  Fax: [phone]  Follow Up Time: within 2 months    -  Plan discussed with family at the bedside.  Case and plan discussed with Neurology Attending Dr. Rojas.    -  Jaylan Juarez MD   PGY-3 Neurology
EEG reviewed until time of this note.     No seizures recorded.     Full report will be available tomorrow.
Patient seen and examined at bedside with attending and neurology team. Please refer to attending attestation of neurology consult note for final recommendations.    Will follow up rEEG
Pt seen at bedside with family. Only complaint is pain over clavicle fracture when she moves.   Vitals labs and imaging reviewed. Neuro following  - Will continue Keppra BID, vEEG pending  - Febrile in ED, suspect 2/2 Enterovirus  - UA with 23 WBC but no bacteria and is asymptomatic. Will hold additional abx at this time as doubt UTI and several abx classes can lower seizure threshold  - f/u Ucx. Prior Ucx in 12/21 grew Klebsiella and E coli  - Start Lidocaine patch and standing Tylenol for pain  - PT consult

## 2022-07-31 NOTE — PROGRESS NOTE ADULT - ASSESSMENT
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

## 2022-07-31 NOTE — DISCHARGE NOTE NURSING/CASE MANAGEMENT/SOCIAL WORK - NSDCPEFALRISK_GEN_ALL_CORE
For information on Fall & Injury Prevention, visit: [email or web address] OR  [email or web address] OR  https://www.cdc.gov/steadi/patient.html

## 2022-07-31 NOTE — DISCHARGE NOTE PROVIDER - NSDCFUADDAPPT_GEN_ALL_CORE_FT
Please follow up with your primary care provider, neurology, and orthopedic surgery. Please follow up with your primary care provider, neurology, and orthopedic surgery.     Follow-up with outpatient Epilepsy   Neurology  [address] [facility] [facility] [address]  [place] [address]  Phone: [phone]  Fax: [phone]  Follow Up Time: within 2 months    APPTS ARE READY TO BE MADE: [ x] YES    Best Family or Patient Contact (if needed):daughter    Additional Information about above appointments (if needed):    1:   2:   3:     Other comments or requests:    Please follow up with your primary care provider, neurology, and orthopedic surgery.     Follow-up with outpatient Epilepsy   Neurology  [address] [facility], [address]  [place] [address]  Phone: [phone]  Fax: [phone]  Follow Up Time: within 2 months    APPTS ARE READY TO BE MADE: [ x] YES    Best Family or Patient Contact (if needed):daughter    Additional Information about above appointments (if needed):    1:   2:   3:     Other comments or requests:   Patient refused to provide their date-of-birth; unable to proceed.

## 2022-07-31 NOTE — PROGRESS NOTE ADULT - PROBLEM SELECTOR PLAN 3
- UA w/ elevated WBC and large LKE, no bacteria. Unclear if this represents a true UTI as patient asymptomatic and has other source for fever. Although unclear if abx were given prior to UA/Ucx being sent.  - patient w/ Urine cx growing klebsiella/raoutella resistant to ceftriaxone in 12/2021  - s/p ceftriaxone x1 in the ED  - low suspicion for UTI given lack of symptoms, will hold off abx given risk of lowering seizure threshold with many classes
- UA w/ elevated WBC and large LKE, no bacteria. Unclear if this represents a true UTI as patient asymptomatic and has other source for fever. Although unclear if abx were given prior to UA/Ucx being sent.  - patient w/ Urine cx growing klebsiella/raoutella resistant to ceftriaxone in 12/2021  - s/p ceftriaxone x1 in the ED  - low suspicion for UTI given lack of symptoms, will hold off abx given risk of lowering seizure threshold with many classes

## 2022-07-31 NOTE — DISCHARGE NOTE PROVIDER - HOSPITAL COURSE
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       Problem/Plan - 1:  ·  Problem: Seizure.   ·  Plan: - Likely new onset focal motor impaired awareness seizure. Possibly provoked i/s/o acute infection and recent neurosx procedure  - s/p keppra x1 in the ED, c/w keppra 500 BID  - Video EEG ordered, pending final read  - Seizure rescue medications for a generalized tonic clonic episode lasting >3 min or significant derangement of vital signs: Midazolam 5mg IV (due to ativan shortage).  --> For GTC > 3 min and refractory to Midazolam, call [phone].   - fever likely 2/2 entero/rhinovirus, supportive measures as below  - Telemetry monitoring; Neurochecks/VS Q4H  - Seizure, fall and aspiration precautions.     Problem/Plan - 2:  ·  Problem: Upper respiratory virus.   ·  Plan: - RVP positive for entero/rhinovirus  - supportive care  - PRN tylenol for fever.     Problem/Plan - 3:  ·  Problem: Acute UTI.   ·  Plan: - UA w/ elevated WBC and large LKE, no bacteria. Unclear if this represents a true UTI as patient asymptomatic and has other source for fever. Although unclear if abx were given prior to UA/Ucx being sent.  - patient w/ Urine cx growing klebsiella/raoutella resistant to ceftriaxone in 12/2021  - s/p ceftriaxone x1 in the ED  - low suspicion for UTI given lack of symptoms, will hold off abx given risk of lowering seizure threshold with many classes.     Problem/Plan - 4:  ·  Problem: Fracture, clavicle.   ·  Plan: - CXR notable for possible impacted fracture of the right lateral clavicle  - sling in place  - tylenol PRN for pain control  - PT assessment, home no needs.     Problem/Plan - 5:  ·  Problem: Major depression.   ·  Plan: - c/w escitalopram, cleared by neuro.     Problem/Plan - 6:  ·  Problem: Thyroid nodule.   ·  Plan: - CTA neck shows 1cm right thyroid nodule, which was present in 2021  - TSH 2.41  - outpatient thyroid US.     Problem/Plan - 7:  ·  Problem: Prophylactic measure.   ·  Plan: Diet: regular  DVT Prophylaxis: SCD's given hx of HIT  Full code  Dispo: d/c home tomorrow after cleared by neuro.   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      Seizure.   ·  Plan: - Likely new onset focal motor impaired awareness seizure. Possibly provoked i/s/o acute infection and recent neurosx procedure  - s/p keppra x1 in the ED, c/w keppra 500 BID  - Video EEG completed, no seizure activity noted  - Seizure rescue medications for a generalized tonic clonic episode lasting >3 min or significant derangement of vital signs: Midazolam 5mg IV (due to ativan shortage).  --> For GTC > 3 min and refractory to Midazolam,   - fever likely 2/2 entero/rhinovirus, supportive measures as below  - Telemetry monitoring; Neurochecks/VS Q4H  - Seizure, fall and aspiration precautions.    Upper respiratory virus.   ·  Plan: - RVP positive for entero/rhinovirus  - supportive care  - PRN tylenol for fever.    Acute UTI.   ·  Plan: - UA w/ elevated WBC and large LKE, no bacteria. Unclear if this represents a true UTI as patient asymptomatic and has other source for fever. Although unclear if abx were given prior to UA/Ucx being sent.  - patient w/ Urine cx growing klebsiella/raoutella resistant to ceftriaxone in 12/2021  - s/p ceftriaxone x1 in the ED  - low suspicion for UTI given lack of symptoms, will hold off abx given risk of lowering seizure threshold with many classes.    Fracture, clavicle.   ·  Plan: - CXR notable for possible impacted fracture of the right lateral clavicle  - sling in place  - tylenol PRN for pain control  - PT assessment, home no needs.    Major depression.   ·  Plan: - c/w escitalopram, cleared by neuro.     Thyroid nodule.   ·  Plan: - CTA neck shows 1cm right thyroid nodule, which was present in 2021  - TSH 2.41  - outpatient thyroid US.    .

## 2022-07-31 NOTE — EEG REPORT - NS EEG TEXT BOX
OZ DAVIS [id number]     Study Date: 		07-30-22 08:00 to 07/30/22 18:52  Duration x Hours: 10:52 hrs  --------------------------------------------------------------------------------------------------  History:  CC/ HPI Patient is a 67y old  Female who presents with a chief complaint of seizure (31 Jul 2022 14:50)    MEDICATIONS  (STANDING):  acetaminophen     Tablet .. 650 milliGRAM(s) Oral every 6 hours  chlorhexidine 2% Cloths 1 Application(s) Topical daily  escitalopram 5 milliGRAM(s) Oral daily  levETIRAcetam 500 milliGRAM(s) Oral two times a day  lidocaine   4% Patch 1 Patch Transdermal every 24 hours    --------------------------------------------------------------------------------------------------  Study Interpretation:    [[[Abbreviation Key:  PDR=alpha rhythm/posterior dominant rhythm. A-P=anterior posterior.  Amplitude: ‘very low’:<20; ‘low’:20-49; ‘medium’:; ‘high’:>150uV.  Persistence for periodic/rhythmic patterns (% of epoch) ‘rare’:<1%; ‘occasional’:1-10%; ‘frequent’:10-50%; ‘abundant’:50-90%; ‘continuous’:>90%.  Persistence for sporadic discharges: ‘rare’:<1/hr; ‘occasional’:1/min-1/hr; ‘frequent’:>1/min; ‘abundant’:>1/10 sec.  RPP=rhythmic and periodic patterns; GRDA=generalized rhythmic delta activity; FIRDA=frontal intermittent GRDA; LRDA=lateralized rhythmic delta activity; TIRDA=temporal intermittent rhythmic delta activity;  LPD=PLED=lateralized periodic discharges; GPD=generalized periodic discharges; BIPDs =bilateral independent periodic discharges; Mf=multifocal; SIRPDs=stimulus induced rhythmic, periodic, or ictal appearing discharges; BIRDs=brief potentially ictal rhythmic discharges >4 Hz, lasting .5-10s; PFA (paroxysmal bursts >13 Hz or =8 Hz <10s).  Modifiers: +F=with fast component; +S=with spike component; +R=with rhythmic component.  S-B=burst suppression pattern.  Max=maximal. N1-drowsy; N2-stage II sleep; N3-slow wave sleep. SSS/BETS=small sharp spikes/benign epileptiform transients of sleep. HV=hyperventilation; PS=photic stimulation]]]    Daily EEG Visual Analysis    FINDINGS:      Background:  Continuous: continuous  Symmetry: symmetric  PDR: 9 Hz activity, with amplitude to 40 uV, that attenuated to eye opening.  Low amplitude frontal beta noted in wakefulness.  Reactivity: present  Voltage: normal, mostly 20-150uV  Anterior Posterior Gradient: present  Other background findings: none  Breach: absent    Background Slowing:  Generalized slowing: none was present.  Focal slowing: none was present.    State Changes:   -Drowsiness noted with increased slowing, attenuation of fast activity, vertex transients.  -Present with N2 sleep transients with symmetric spindles and K-complexes.    Sporadic Epileptiform Discharges:    None    Rhythmic and Periodic Patterns (RPPs):  None     Electrographic and Electroclinical seizures:  None    Other Clinical Events:  None    Activation Procedures:   -Hyperventilation was not performed.    -Photic stimulation was not performed.    Artifacts:  Intermittent myogenic and movement artifacts were noted.    ECG:  The heart rate on single channel ECG was predominantly between 60-70 BPM.    EEG Classification / Summary:  Normal EEG in the awake / drowsy / asleep state(s).    Clinical Impression:  Normal Routine EEG  There were no epileptiform abnormalities recorded.      This is a prelim report only, pending review with attending prior to finalization.    -------------------------------------------------------------------------------------------------------  [facility] EEG Reading Room Ph#: [phone]  Epilepsy Answering Service after 5PM and before 8:30AM: Ph#: [phone]    Raad Peacock M.D.   Epilepsy fellow OZ DAVIS [id number]     Study Date: 		07-30-22 08:00 to 07/30/22 18:52  Duration x Hours: 10:52 hrs  --------------------------------------------------------------------------------------------------  History:  CC/ HPI Patient is a 67y old  Female who presents with a chief complaint of seizure (31 Jul 2022 14:50)    MEDICATIONS  (STANDING):  acetaminophen     Tablet .. 650 milliGRAM(s) Oral every 6 hours  chlorhexidine 2% Cloths 1 Application(s) Topical daily  escitalopram 5 milliGRAM(s) Oral daily  levETIRAcetam 500 milliGRAM(s) Oral two times a day  lidocaine   4% Patch 1 Patch Transdermal every 24 hours    --------------------------------------------------------------------------------------------------  Study Interpretation:    [[[Abbreviation Key:  PDR=alpha rhythm/posterior dominant rhythm. A-P=anterior posterior.  Amplitude: ‘very low’:<20; ‘low’:20-49; ‘medium’:; ‘high’:>150uV.  Persistence for periodic/rhythmic patterns (% of epoch) ‘rare’:<1%; ‘occasional’:1-10%; ‘frequent’:10-50%; ‘abundant’:50-90%; ‘continuous’:>90%.  Persistence for sporadic discharges: ‘rare’:<1/hr; ‘occasional’:1/min-1/hr; ‘frequent’:>1/min; ‘abundant’:>1/10 sec.  RPP=rhythmic and periodic patterns; GRDA=generalized rhythmic delta activity; FIRDA=frontal intermittent GRDA; LRDA=lateralized rhythmic delta activity; TIRDA=temporal intermittent rhythmic delta activity;  LPD=PLED=lateralized periodic discharges; GPD=generalized periodic discharges; BIPDs =bilateral independent periodic discharges; Mf=multifocal; SIRPDs=stimulus induced rhythmic, periodic, or ictal appearing discharges; BIRDs=brief potentially ictal rhythmic discharges >4 Hz, lasting .5-10s; PFA (paroxysmal bursts >13 Hz or =8 Hz <10s).  Modifiers: +F=with fast component; +S=with spike component; +R=with rhythmic component.  S-B=burst suppression pattern.  Max=maximal. N1-drowsy; N2-stage II sleep; N3-slow wave sleep. SSS/BETS=small sharp spikes/benign epileptiform transients of sleep. HV=hyperventilation; PS=photic stimulation]]]    Daily EEG Visual Analysis    FINDINGS:      Background:  Continuous: continuous  Symmetry: symmetric  PDR: 9 Hz activity, with amplitude to 40 uV, that attenuated to eye opening.  Low amplitude frontal beta noted in wakefulness.  Reactivity: present  Voltage: normal, mostly 20-150uV  Anterior Posterior Gradient: present  Other background findings: none  Breach: absent    Background Slowing:  Generalized slowing: none was present.  Focal slowing: none was present.    State Changes:   -Drowsiness noted with increased slowing, attenuation of fast activity, vertex transients.  -Present with N2 sleep transients with symmetric spindles and K-complexes.    Sporadic Epileptiform Discharges:    None    Rhythmic and Periodic Patterns (RPPs):  None     Electrographic and Electroclinical seizures:  None    Other Clinical Events:  None    Activation Procedures:   -Hyperventilation was not performed.    -Photic stimulation was not performed.    Artifacts:  Intermittent myogenic and movement artifacts were noted.    ECG:  The heart rate on single channel ECG was predominantly between 60-70 BPM.    EEG Classification / Summary:  Normal EEG in the awake / drowsy / asleep state(s).    Clinical Impression:  Normal EEG  There were no epileptiform abnormalities recorded.        -------------------------------------------------------------------------------------------------------  [facility] EEG Reading Room Ph#: [phone]  Epilepsy Answering Service after 5PM and before 8:30AM: Ph#: [phone]    Raad Peacock M.D.   Epilepsy fellow

## 2022-07-31 NOTE — DISCHARGE NOTE NURSING/CASE MANAGEMENT/SOCIAL WORK - PATIENT PORTAL LINK FT
You can access the FollowMyHealth Patient Portal offered by [facility] by registering at the following website: [email or web address]. By joining [facility]’s FollowMyHealth portal, you will also be able to view your health information using other applications (apps) compatible with our system.

## 2022-07-31 NOTE — PROGRESS NOTE ADULT - SUBJECTIVE AND OBJECTIVE BOX
Latisha Newby MD  Division of Hospital Medicine  Pager: [phone] or reachable on MS Teams  After hours please page [phone]    PROGRESS NOTE:     Patient is a 67y old  Female who presents with a chief complaint of seizure (29 Jul 2022 04:15)      SUBJECTIVE / OVERNIGHT EVENTS: NO acute events overnight, patient hemodynamically stable. Seen this AM, no complaints except for R clavicular pain when she moves around, is okay with tylenol for analgesic. Wishes to go home, however pending final EEG report. No chest pain, shortness of breath, dizziness, blurry vision, headache.     ADDITIONAL REVIEW OF SYSTEMS: as above    MEDICATIONS  (STANDING):  acetaminophen     Tablet .. 650 milliGRAM(s) Oral every 6 hours  chlorhexidine 2% Cloths 1 Application(s) Topical daily  escitalopram 5 milliGRAM(s) Oral daily  levETIRAcetam 500 milliGRAM(s) Oral two times a day  lidocaine   4% Patch 1 Patch Transdermal every 24 hours    MEDICATIONS  (PRN):  midazolam Injectable 5 milliGRAM(s) IV Push once PRN seizure >3 minutes      CAPILLARY BLOOD GLUCOSE        I&O's Summary    29 Jul 2022 07:01  -  30 Jul 2022 07:00  --------------------------------------------------------  IN: 220 mL / OUT: 0 mL / NET: 220 mL        PHYSICAL EXAM:  Vital Signs Last 24 Hrs  T(C): 36.6 (30 Jul 2022 11:45), Max: 36.8 (29 Jul 2022 20:08)  T(F): 97.9 (30 Jul 2022 11:45), Max: 98.2 (29 Jul 2022 20:08)  HR: 60 (30 Jul 2022 11:45) (53 - 70)  BP: 128/80 (30 Jul 2022 11:45) (119/72 - 134/85)  BP(mean): --  RR: 18 (30 Jul 2022 11:45) (17 - 18)  SpO2: 95% (30 Jul 2022 11:45) (92% - 99%)    Parameters below as of 30 Jul 2022 11:45  Patient On (Oxygen Delivery Method): room air        CONSTITUTIONAL: NAD, well-developed, EEG leads in place  RESPIRATORY: Normal respiratory effort; lungs are clear to auscultation bilaterally  CARDIOVASCULAR: Regular rate and rhythm, normal S1 and S2, no murmur/rub/gallop; No lower extremity edema; Peripheral pulses are 2+ bilaterally  ABDOMEN: Nontender to palpation, normoactive bowel sounds, no rebound/guarding; No hepatosplenomegaly  MUSCLOSKELETAL: no clubbing or cyanosis of digits; no joint swelling or tenderness to palpation; R arm in sling for clavicular fx  PSYCH: A+O to person, place, and time; affect appropriate    LABS:                        12.4   5.57  )-----------( 153      ( 29 Jul 2022 06:56 )             37.7     07-29    141  |  104  |  11  ----------------------------<  94  3.5   |  26  |  0.60    Ca    8.9      29 Jul 2022 06:56  Phos  3.5     07-29  Mg     2.1     07-29    TPro  7.5  /  Alb  4.3  /  TBili  0.2  /  DBili  x   /  AST  78<H>  /  ALT  35  /  AlkPhos  110  07-28    PT/INR - ( 28 Jul 2022 19:24 )   PT: 11.0 sec;   INR: 0.96 ratio         PTT - ( 28 Jul 2022 19:24 )  PTT:29.0 sec  CARDIAC MARKERS ( 29 Jul 2022 06:56 )  x     / x     / 244 U/L / x     / x          Urinalysis Basic - ( 28 Jul 2022 23:09 )    Color: Light Yellow / Appearance: Clear / SG: >1.050 / pH: x  Gluc: x / Ketone: Negative  / Bili: Negative / Urobili: Negative   Blood: x / Protein: Trace / Nitrite: Negative   Leuk Esterase: Large / RBC: 6 /hpf / WBC 23 /HPF   Sq Epi: x / Non Sq Epi: 0 /hpf / Bacteria: Negative        Culture - Urine (collected 28 Jul 2022 23:10)  Source: Clean Catch Clean Catch (Midstream)  Preliminary Report (30 Jul 2022 13:34):    10,000 - 49,000 CFU/mL Escherichia coli    10,000 - 49,000 CFU/mL Enterococcus species    <10,000 CFU/ml Normal Urogenital isabel present    Culture - Blood (collected 28 Jul 2022 22:36)  Source: .Blood Blood-Peripheral  Preliminary Report (30 Jul 2022 03:01):    No growth to date.    Culture - Blood (collected 28 Jul 2022 22:00)  Source: .Blood Blood-Peripheral  Preliminary Report (30 Jul 2022 03:01):    No growth to date.        RADIOLOGY & ADDITIONAL TESTS:  Results Reviewed:   Imaging Personally Reviewed:  Electrocardiogram Personally Reviewed:  Tele reviewed, sinus 50s-70s    COORDINATION OF CARE:  Care Discussed with Consultants/Other Providers [Y/N]:  Prior or Outpatient Records Reviewed [Y/N]:  
Latisha Newby MD  Division of Hospital Medicine  Pager: [phone] or reachable on MS Teams  After hours please page [phone]    PROGRESS NOTE:     Patient is a 67y old  Female who presents with a chief complaint of seizure (31 Jul 2022 13:33)      SUBJECTIVE / OVERNIGHT EVENTS: No issues overnight, patient seen and examined this AM. EEG report reviewed with patient and family, no seizure activity noted. Patient reports no pain except for R clavicular pain, otherwise denies chest pain, shortness of breath, abdominal pain, numbness/tingling of extremities, or weakness. Denies blurry vision, HA, dizziness.     ADDITIONAL REVIEW OF SYSTEMS: see above    MEDICATIONS  (STANDING):  acetaminophen     Tablet .. 650 milliGRAM(s) Oral every 6 hours  chlorhexidine 2% Cloths 1 Application(s) Topical daily  escitalopram 5 milliGRAM(s) Oral daily  levETIRAcetam 500 milliGRAM(s) Oral two times a day  lidocaine   4% Patch 1 Patch Transdermal every 24 hours    MEDICATIONS  (PRN):  midazolam Injectable 5 milliGRAM(s) IV Push once PRN seizure >3 minutes      CAPILLARY BLOOD GLUCOSE        I&O's Summary    31 Jul 2022 07:01  -  31 Jul 2022 14:50  --------------------------------------------------------  IN: 400 mL / OUT: 0 mL / NET: 400 mL        PHYSICAL EXAM:  Vital Signs Last 24 Hrs  T(C): 36.4 (31 Jul 2022 11:24), Max: 36.8 (30 Jul 2022 20:00)  T(F): 97.6 (31 Jul 2022 11:24), Max: 98.3 (30 Jul 2022 20:00)  HR: 57 (31 Jul 2022 11:24) (52 - 72)  BP: 126/83 (31 Jul 2022 11:24) (119/61 - 147/87)  BP(mean): --  RR: 18 (31 Jul 2022 11:24) (18 - 18)  SpO2: 93% (31 Jul 2022 11:24) (93% - 96%)    Parameters below as of 31 Jul 2022 11:24  Patient On (Oxygen Delivery Method): room air        CONSTITUTIONAL: NAD, well-developed  RESPIRATORY: Normal respiratory effort; lungs are clear to auscultation bilaterally  CARDIOVASCULAR: Regular rate and rhythm, normal S1 and S2, no murmur/rub/gallop; No lower extremity edema; Peripheral pulses are 2+ bilaterally  ABDOMEN: Nontender to palpation, normoactive bowel sounds, no rebound/guarding; No hepatosplenomegaly  MUSCLOSKELETAL: no clubbing or cyanosis of digits; no joint swelling or tenderness to palpation; R arm in sling for clavicular fx  Neuro: 5/5 strength upper extremities b/l, pupils 5mm b/l unreactive (known)  PSYCH: A+O to person, place, and time; affect appropriate    LABS:    Culture - Urine (collected 28 Jul 2022 23:10)  Source: Clean Catch Clean Catch (Midstream)  Preliminary Report (30 Jul 2022 13:34):    10,000 - 49,000 CFU/mL Escherichia coli    10,000 - 49,000 CFU/mL Enterococcus species    <10,000 CFU/ml Normal Urogenital isabel present    Culture - Blood (collected 28 Jul 2022 22:36)  Source: .Blood Blood-Peripheral  Preliminary Report (30 Jul 2022 03:01):    No growth to date.    Culture - Blood (collected 28 Jul 2022 22:00)  Source: .Blood Blood-Peripheral  Preliminary Report (30 Jul 2022 03:01):    No growth to date.        RADIOLOGY & ADDITIONAL TESTS:  Results Reviewed:   Imaging Personally Reviewed:  Electrocardiogram Personally Reviewed:    COORDINATION OF CARE:  Care Discussed with Consultants/Other Providers [Y/N]:  Prior or Outpatient Records Reviewed [Y/N]:

## 2022-07-31 NOTE — DISCHARGE NOTE PROVIDER - NSDCCPCAREPLAN_GEN_ALL_CORE_FT
PRINCIPAL DISCHARGE DIAGNOSIS  Diagnosis: Seizure  Assessment and Plan of Treatment: Continue Keppra as prescribed  No seizure activity noted on EEG  Follow up with neurologist      SECONDARY DISCHARGE DIAGNOSES  Diagnosis: Upper respiratory virus  Assessment and Plan of Treatment: Supportive management for enter/rhino virus  Tylenol as needed  Cough medication as needed    Diagnosis: Acute UTI  Assessment and Plan of Treatment: Pt asymptomatic, received IV antibiotics x1  Monitor off antibiotics  Drink enough water and fluids to keep your urine clear or pale yellow.  Avoid caffeine, tea, and carbonated beverages. They tend to irritate your bladder.    Diagnosis: Fracture, clavicle  Assessment and Plan of Treatment: Maintain sling  Pain management  Follow up with orthopedic     PRINCIPAL DISCHARGE DIAGNOSIS  Diagnosis: Seizure  Assessment and Plan of Treatment: Continue Keppra as prescribed  No seizure activity noted on EEG  Follow up with neurologist      SECONDARY DISCHARGE DIAGNOSES  Diagnosis: Upper respiratory virus  Assessment and Plan of Treatment: Supportive management for enter/rhino virus  Tylenol as needed  Cough medication as needed    Diagnosis: Acute UTI  Assessment and Plan of Treatment: Pt asymptomatic, received IV antibiotics x1  Monitor off antibiotics  Drink enough water and fluids to keep your urine clear or pale yellow.  Avoid caffeine, tea, and carbonated beverages. They tend to irritate your bladder.    Diagnosis: Fracture, clavicle  Assessment and Plan of Treatment: Maintain sling  Pain management  Follow up with orthopedic surgery if any complications

## 2022-08-01 ENCOUNTER — NON-APPOINTMENT (OUTPATIENT)
Age: 67
End: 2022-08-01

## 2022-08-02 ENCOUNTER — APPOINTMENT (OUTPATIENT)
Dept: NEUROLOGY | Facility: CLINIC | Age: 67
End: 2022-08-02

## 2022-08-02 VITALS
BODY MASS INDEX: 23.59 KG/M2 | SYSTOLIC BLOOD PRESSURE: 124 MMHG | HEART RATE: 64 BPM | WEIGHT: 117 LBS | DIASTOLIC BLOOD PRESSURE: 85 MMHG | HEIGHT: 59 IN

## 2022-08-02 PROCEDURE — 99214 OFFICE O/P EST MOD 30 MIN: CPT

## 2022-08-03 LAB
CULTURE RESULTS: SIGNIFICANT CHANGE UP
CULTURE RESULTS: SIGNIFICANT CHANGE UP
PYRIDOXAL PHOS SERPL-MCNC: 26.7 UG/L — SIGNIFICANT CHANGE UP (ref 3.4–65.2)
SPECIMEN SOURCE: SIGNIFICANT CHANGE UP
SPECIMEN SOURCE: SIGNIFICANT CHANGE UP

## 2022-08-31 ENCOUNTER — APPOINTMENT (OUTPATIENT)
Dept: ORTHOPEDIC SURGERY | Facility: HOSPITAL | Age: 67
End: 2022-08-31

## 2022-08-31 ENCOUNTER — RESULT REVIEW (OUTPATIENT)
Age: 67
End: 2022-08-31

## 2022-08-31 ENCOUNTER — OUTPATIENT (OUTPATIENT)
Dept: OUTPATIENT SERVICES | Facility: HOSPITAL | Age: 67
LOS: 1 days | End: 2022-08-31
Payer: MEDICARE

## 2022-08-31 VITALS
TEMPERATURE: 97.3 F | HEIGHT: 59 IN | WEIGHT: 119 LBS | SYSTOLIC BLOOD PRESSURE: 119 MMHG | DIASTOLIC BLOOD PRESSURE: 81 MMHG | HEART RATE: 69 BPM | RESPIRATION RATE: 14 BRPM | BODY MASS INDEX: 23.99 KG/M2

## 2022-08-31 DIAGNOSIS — M79.609 PAIN IN UNSPECIFIED LIMB: ICD-10-CM

## 2022-08-31 DIAGNOSIS — Z98.41 CATARACT EXTRACTION STATUS, RIGHT EYE: Chronic | ICD-10-CM

## 2022-08-31 DIAGNOSIS — Z98.890 OTHER SPECIFIED POSTPROCEDURAL STATES: Chronic | ICD-10-CM

## 2022-08-31 PROCEDURE — G0463: CPT

## 2022-08-31 PROCEDURE — 73000 X-RAY EXAM OF COLLAR BONE: CPT

## 2022-08-31 RX ORDER — APIXABAN 5 MG/1
5 TABLET, FILM COATED ORAL
Qty: 60 | Refills: 1 | Status: DISCONTINUED | COMMUNITY
Start: 2022-01-26 | End: 2022-08-31

## 2022-09-02 ENCOUNTER — APPOINTMENT (OUTPATIENT)
Dept: INTERNAL MEDICINE | Facility: CLINIC | Age: 67
End: 2022-09-02

## 2022-09-02 VITALS
HEIGHT: 59 IN | DIASTOLIC BLOOD PRESSURE: 76 MMHG | SYSTOLIC BLOOD PRESSURE: 124 MMHG | HEART RATE: 73 BPM | OXYGEN SATURATION: 97 % | TEMPERATURE: 94.7 F | WEIGHT: 118 LBS | BODY MASS INDEX: 23.79 KG/M2

## 2022-09-02 PROCEDURE — 99213 OFFICE O/P EST LOW 20 MIN: CPT

## 2022-09-02 NOTE — HISTORY OF PRESENT ILLNESS
Brief Operative Note    Patient: Emily Fernandez 63 year old female    MRN: [id number]    Surgeon(s): Anitha Guy MD  Phone Number: [phone]                       Surgeon(s) and Role:     * Anitha Guy MD - Primary    Assistant(s): Lucho Vazquez PA-C    Pre-Op Diagnosis: Hip pain [M25.559]  Arthritis of right hip [M16.11]     Post-Op Diagnosis: As above     Procedure: Procedure(s):  RIGHT TOTAL HIP ARTHROPLASTY ANTERIOR APPROACH    Anesthesia Type: General                                   Complications: None    Description: As above    Findings: As above    Specimens Removed: No specimens collected     Estimated Blood Loss: 300cc    Assistant Tasks: Opening and closing and Implanting devices     Implants:   Implant Name Type Inv. Item Serial No.  Lot No. LRB No. Used Action   SHELL ACTB HIP 48MM PINNACLE SECT GRIPTION STRL - S. Cup / Shell SHELL ACTB HIP 48MM PINNACLE SECT GRIPTION STRL . [employer] [id number] Right 1 Implanted   SCREW BN 6.5MM 25MM PINNACLE STRL ACTB CANC REV - S. Screw SCREW BN 6.5MM 25MM PINNACLE STRL ACTB CANC REV . [employer] [id number] Right 1 Implanted   LINER ACTB PINNACLE NTRL 48MM 32MM ALTRX HIP STRL - S. Liner LINER ACTB PINNACLE NTRL 48MM 32MM ALTRX HIP STRL . ASHA [employer] ASHA [id number] Right 1 Implanted   STEM FEM 101MM HIP 3 12/14 STD OFFSET TPR COLLAR - S. Stem / [place] STEM FEM 101MM HIP 3 12/14 STD OFFSET TPR COLLAR . ASHA [employer] ASHA [id number] Right 1 Implanted   HEAD FEM +1MM 12/14 TPR 32MM HIP BIOLOX DELTA - S. Head / Ball HEAD FEM +1MM 12/14 TPR 32MM HIP BIOLOX DELTA . [employer]SHA [employer] ASHA [id number] Right 1 Implanted         I was present for the key portions of the procedure and was immediately available for the non-key portions      
[de-identified] : Letitia Ho is a 68yo F with PMhx of HTN, HLD, SAH s/p coiling of L p comm aneurysm, HIT c/b DVT, and depression who presents to follow-up of hospital discharge.\par \par Hospitalized 7/28-7/31 for new onset seizure. Suspected secondary to know previous SAH and concurrent infections (URI and UTI). fell and fx clavicle. Has already followed up with Neuro and Ortho. Has some mild pain with overhead movement at site of fracture but overall no restricted movements\par \par Started pravastatin for HLD, no complaints.\par \par Still planning on traveling to [place] in 2 weeks for 1 month duration.

## 2022-09-02 NOTE — PHYSICAL EXAM
[No Acute Distress] : no acute distress [Well Nourished] : well nourished [Well Developed] : well developed [No Respiratory Distress] : no respiratory distress  [No Accessory Muscle Use] : no accessory muscle use [Clear to Auscultation] : lungs were clear to auscultation bilaterally [Normal Rate] : normal rate  [Regular Rhythm] : with a regular rhythm [Normal S1, S2] : normal S1 and S2 [No Murmur] : no murmur heard [No Edema] : there was no peripheral edema [de-identified] : Some tenderness to palpation over the lateral clavicle (right). Strength 5/5 in the distal extremities bilaterally.

## 2022-09-06 DIAGNOSIS — S42.009A FRACTURE OF UNSPECIFIED PART OF UNSPECIFIED CLAVICLE, INITIAL ENCOUNTER FOR CLOSED FRACTURE: ICD-10-CM

## 2022-09-11 NOTE — HISTORY OF PRESENT ILLNESS
[FreeTextEntry1] : *** 08/02/2022 ***\par \par OZ DAVIS is here for initial evaluation sec to a symptomatic seizure. 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 Possibly\par provoked i/s/o acute infection and recent neurosx procedure\par - s/p keppra x1 in the ED, c/w keppra 500 BID\par - Video EEG completed, no seizure activity noted\par - Seizure rescue medications for a generalized tonic clonic episode lasting >3\par min or significant derangement of vital signs: Midazolam 5mg IV (due to ativan\par shortage). --> For GTC > 3 min and refractory to Midazolam,\par - fever likely 2/2 entero/rhinovirus, supportive measures as below\par - Telemetry monitoring; Neurochecks/VS Q4H\par - Seizure, fall and aspiration precautions.\par \par

## 2022-09-11 NOTE — DISCUSSION/SUMMARY
[FreeTextEntry1] : OZ DAVIS is a 67 years old with symptomatic epilepsy. coninue keppra 500 bid as recommended by NSX.\par f/u in 6 months

## 2022-09-28 ENCOUNTER — APPOINTMENT (OUTPATIENT)
Dept: HEMATOLOGY ONCOLOGY | Facility: CLINIC | Age: 67
End: 2022-09-28

## 2022-11-15 ENCOUNTER — APPOINTMENT (OUTPATIENT)
Dept: INTERNAL MEDICINE | Facility: CLINIC | Age: 67
End: 2022-11-15

## 2022-11-15 VITALS
HEART RATE: 62 BPM | HEIGHT: 59 IN | DIASTOLIC BLOOD PRESSURE: 79 MMHG | SYSTOLIC BLOOD PRESSURE: 120 MMHG | OXYGEN SATURATION: 96 % | TEMPERATURE: 98.3 F | BODY MASS INDEX: 25.2 KG/M2 | WEIGHT: 125 LBS

## 2022-11-15 DIAGNOSIS — Z87.81 PERSONAL HISTORY OF (HEALED) TRAUMATIC FRACTURE: ICD-10-CM

## 2022-11-15 PROCEDURE — 99214 OFFICE O/P EST MOD 30 MIN: CPT | Mod: 25

## 2022-11-15 PROCEDURE — G0008: CPT

## 2022-11-15 PROCEDURE — 90662 IIV NO PRSV INCREASED AG IM: CPT

## 2022-11-15 NOTE — PHYSICAL EXAM
[No Acute Distress] : no acute distress [Well Nourished] : well nourished [Well Developed] : well developed [No Respiratory Distress] : no respiratory distress  [No Accessory Muscle Use] : no accessory muscle use [Clear to Auscultation] : lungs were clear to auscultation bilaterally [Normal Rate] : normal rate  [Regular Rhythm] : with a regular rhythm [Normal S1, S2] : normal S1 and S2 [No Murmur] : no murmur heard [No Edema] : there was no peripheral edema [de-identified] : Answering questions appropriately. Gets on exam table and ambulates without assistance

## 2022-11-15 NOTE — HISTORY OF PRESENT ILLNESS
[de-identified] : Letitia Ho is a 66yo F with PMhx of HTN, HLD, SAH s/p coiling of L p comm aneurysm, HIT c/b DVT, and osteoporosis who presents for f/u\par \par Went to [place] without issue\par Shoulder back to normal\par Noted osteoporosis, she plans to have implants done in the next month\par No issues with statin, interested in weaning lexapro because mood is good\par

## 2022-11-16 LAB
ALBUMIN SERPL ELPH-MCNC: 4.1 G/DL
ALP BLD-CCNC: 108 U/L
ALT SERPL-CCNC: 19 U/L
AST SERPL-CCNC: 16 U/L
BILIRUB DIRECT SERPL-MCNC: 0.1 MG/DL
BILIRUB INDIRECT SERPL-MCNC: 0.2 MG/DL
BILIRUB SERPL-MCNC: 0.2 MG/DL
CHOLEST SERPL-MCNC: 201 MG/DL
HDLC SERPL-MCNC: 52 MG/DL
LDLC SERPL CALC-MCNC: 106 MG/DL
NONHDLC SERPL-MCNC: 149 MG/DL
PROT SERPL-MCNC: 7.1 G/DL
TRIGL SERPL-MCNC: 214 MG/DL

## 2022-12-07 ENCOUNTER — EMERGENCY (EMERGENCY)
Facility: HOSPITAL | Age: 67
LOS: 1 days | Discharge: HOME | End: 2022-12-07
Attending: EMERGENCY MEDICINE
Payer: MEDICARE

## 2022-12-07 VITALS
WEIGHT: 264.55 LBS | SYSTOLIC BLOOD PRESSURE: 147 MMHG | DIASTOLIC BLOOD PRESSURE: 87 MMHG | HEIGHT: 58 IN | OXYGEN SATURATION: 94 % | RESPIRATION RATE: 20 BRPM | TEMPERATURE: 98 F | HEART RATE: 81 BPM

## 2022-12-07 VITALS
RESPIRATION RATE: 18 BRPM | OXYGEN SATURATION: 95 % | DIASTOLIC BLOOD PRESSURE: 85 MMHG | HEART RATE: 80 BPM | TEMPERATURE: 99 F | SYSTOLIC BLOOD PRESSURE: 131 MMHG

## 2022-12-07 DIAGNOSIS — Z98.890 OTHER SPECIFIED POSTPROCEDURAL STATES: Chronic | ICD-10-CM

## 2022-12-07 DIAGNOSIS — Z98.41 CATARACT EXTRACTION STATUS, RIGHT EYE: Chronic | ICD-10-CM

## 2022-12-07 LAB
ALBUMIN SERPL ELPH-MCNC: 4.3 G/DL — SIGNIFICANT CHANGE UP (ref 3.3–5)
ALP SERPL-CCNC: 112 U/L — SIGNIFICANT CHANGE UP (ref 40–120)
ALT FLD-CCNC: 24 U/L — SIGNIFICANT CHANGE UP (ref 10–45)
ANION GAP SERPL CALC-SCNC: 10 MMOL/L — SIGNIFICANT CHANGE UP (ref 5–17)
APPEARANCE UR: CLEAR — SIGNIFICANT CHANGE UP
AST SERPL-CCNC: 28 U/L — SIGNIFICANT CHANGE UP (ref 10–40)
BACTERIA # UR AUTO: NEGATIVE — SIGNIFICANT CHANGE UP
BASE EXCESS BLDV CALC-SCNC: 4 MMOL/L — HIGH (ref -2–3)
BASOPHILS # BLD AUTO: 0.08 K/UL — SIGNIFICANT CHANGE UP (ref 0–0.2)
BASOPHILS NFR BLD AUTO: 0.6 % — SIGNIFICANT CHANGE UP (ref 0–2)
BILIRUB SERPL-MCNC: 0.2 MG/DL — SIGNIFICANT CHANGE UP (ref 0.2–1.2)
BILIRUB UR-MCNC: NEGATIVE — SIGNIFICANT CHANGE UP
BUN SERPL-MCNC: 16 MG/DL — SIGNIFICANT CHANGE UP (ref 7–23)
CA-I SERPL-SCNC: 1.25 MMOL/L — SIGNIFICANT CHANGE UP (ref 1.15–1.33)
CALCIUM SERPL-MCNC: 9.9 MG/DL — SIGNIFICANT CHANGE UP (ref 8.4–10.5)
CHLORIDE BLDV-SCNC: 101 MMOL/L — SIGNIFICANT CHANGE UP (ref 96–108)
CHLORIDE SERPL-SCNC: 100 MMOL/L — SIGNIFICANT CHANGE UP (ref 96–108)
CO2 BLDV-SCNC: 33 MMOL/L — HIGH (ref 22–26)
CO2 SERPL-SCNC: 29 MMOL/L — SIGNIFICANT CHANGE UP (ref 22–31)
COLOR SPEC: COLORLESS — SIGNIFICANT CHANGE UP
CREAT SERPL-MCNC: 0.66 MG/DL — SIGNIFICANT CHANGE UP (ref 0.5–1.3)
D DIMER BLD IA.RAPID-MCNC: 385 NG/ML DDU — HIGH
DIFF PNL FLD: NEGATIVE — SIGNIFICANT CHANGE UP
EGFR: 97 ML/MIN/1.73M2 — SIGNIFICANT CHANGE UP
EOSINOPHIL # BLD AUTO: 0.07 K/UL — SIGNIFICANT CHANGE UP (ref 0–0.5)
EOSINOPHIL NFR BLD AUTO: 0.5 % — SIGNIFICANT CHANGE UP (ref 0–6)
EPI CELLS # UR: 0 /HPF — SIGNIFICANT CHANGE UP
GAS PNL BLDV: 137 MMOL/L — SIGNIFICANT CHANGE UP (ref 136–145)
GAS PNL BLDV: SIGNIFICANT CHANGE UP
GAS PNL BLDV: SIGNIFICANT CHANGE UP
GLUCOSE BLDV-MCNC: 132 MG/DL — HIGH (ref 70–99)
GLUCOSE SERPL-MCNC: 127 MG/DL — HIGH (ref 70–99)
GLUCOSE UR QL: NEGATIVE — SIGNIFICANT CHANGE UP
HCO3 BLDV-SCNC: 31 MMOL/L — HIGH (ref 22–29)
HCT VFR BLD CALC: 45.9 % — HIGH (ref 34.5–45)
HCT VFR BLDA CALC: 45 % — SIGNIFICANT CHANGE UP (ref 34.5–46.5)
HGB BLD CALC-MCNC: 15.1 G/DL — SIGNIFICANT CHANGE UP (ref 11.7–16.1)
HGB BLD-MCNC: 14.9 G/DL — SIGNIFICANT CHANGE UP (ref 11.5–15.5)
HYALINE CASTS # UR AUTO: 0 /LPF — SIGNIFICANT CHANGE UP (ref 0–2)
IMM GRANULOCYTES NFR BLD AUTO: 0.4 % — SIGNIFICANT CHANGE UP (ref 0–0.9)
KETONES UR-MCNC: NEGATIVE — SIGNIFICANT CHANGE UP
LACTATE BLDV-MCNC: 2.4 MMOL/L — HIGH (ref 0.5–2)
LEUKOCYTE ESTERASE UR-ACNC: NEGATIVE — SIGNIFICANT CHANGE UP
LYMPHOCYTES # BLD AUTO: 1.62 K/UL — SIGNIFICANT CHANGE UP (ref 1–3.3)
LYMPHOCYTES # BLD AUTO: 12.2 % — LOW (ref 13–44)
MCHC RBC-ENTMCNC: 29.4 PG — SIGNIFICANT CHANGE UP (ref 27–34)
MCHC RBC-ENTMCNC: 32.5 GM/DL — SIGNIFICANT CHANGE UP (ref 32–36)
MCV RBC AUTO: 90.5 FL — SIGNIFICANT CHANGE UP (ref 80–100)
MONOCYTES # BLD AUTO: 0.52 K/UL — SIGNIFICANT CHANGE UP (ref 0–0.9)
MONOCYTES NFR BLD AUTO: 3.9 % — SIGNIFICANT CHANGE UP (ref 2–14)
NEUTROPHILS # BLD AUTO: 10.91 K/UL — HIGH (ref 1.8–7.4)
NEUTROPHILS NFR BLD AUTO: 82.4 % — HIGH (ref 43–77)
NITRITE UR-MCNC: NEGATIVE — SIGNIFICANT CHANGE UP
NRBC # BLD: 0 /100 WBCS — SIGNIFICANT CHANGE UP (ref 0–0)
PCO2 BLDV: 55 MMHG — HIGH (ref 39–42)
PH BLDV: 7.36 — SIGNIFICANT CHANGE UP (ref 7.32–7.43)
PH UR: 7.5 — SIGNIFICANT CHANGE UP (ref 5–8)
PLATELET # BLD AUTO: 203 K/UL — SIGNIFICANT CHANGE UP (ref 150–400)
PO2 BLDV: 40 MMHG — SIGNIFICANT CHANGE UP (ref 25–45)
POTASSIUM BLDV-SCNC: 4.1 MMOL/L — SIGNIFICANT CHANGE UP (ref 3.5–5.1)
POTASSIUM SERPL-MCNC: 4.1 MMOL/L — SIGNIFICANT CHANGE UP (ref 3.5–5.3)
POTASSIUM SERPL-SCNC: 4.1 MMOL/L — SIGNIFICANT CHANGE UP (ref 3.5–5.3)
PROT SERPL-MCNC: 7.3 G/DL — SIGNIFICANT CHANGE UP (ref 6–8.3)
PROT UR-MCNC: NEGATIVE — SIGNIFICANT CHANGE UP
RBC # BLD: 5.07 M/UL — SIGNIFICANT CHANGE UP (ref 3.8–5.2)
RBC # FLD: 12.3 % — SIGNIFICANT CHANGE UP (ref 10.3–14.5)
RBC CASTS # UR COMP ASSIST: 0 /HPF — SIGNIFICANT CHANGE UP (ref 0–4)
SAO2 % BLDV: 66.9 % — LOW (ref 67–88)
SODIUM SERPL-SCNC: 139 MMOL/L — SIGNIFICANT CHANGE UP (ref 135–145)
SP GR SPEC: 1 — LOW (ref 1.01–1.02)
UROBILINOGEN FLD QL: NEGATIVE — SIGNIFICANT CHANGE UP
WBC # BLD: 13.25 K/UL — HIGH (ref 3.8–10.5)
WBC # FLD AUTO: 13.25 K/UL — HIGH (ref 3.8–10.5)
WBC UR QL: 0 /HPF — SIGNIFICANT CHANGE UP (ref 0–5)

## 2022-12-07 PROCEDURE — 85014 HEMATOCRIT: CPT

## 2022-12-07 PROCEDURE — 93005 ELECTROCARDIOGRAM TRACING: CPT | Mod: XU

## 2022-12-07 PROCEDURE — 84484 ASSAY OF TROPONIN QUANT: CPT

## 2022-12-07 PROCEDURE — 85379 FIBRIN DEGRADATION QUANT: CPT

## 2022-12-07 PROCEDURE — 70450 CT HEAD/BRAIN W/O DYE: CPT | Mod: MA

## 2022-12-07 PROCEDURE — 82435 ASSAY OF BLOOD CHLORIDE: CPT

## 2022-12-07 PROCEDURE — 84132 ASSAY OF SERUM POTASSIUM: CPT

## 2022-12-07 PROCEDURE — 81001 URINALYSIS AUTO W/SCOPE: CPT

## 2022-12-07 PROCEDURE — 71275 CT ANGIOGRAPHY CHEST: CPT | Mod: MA

## 2022-12-07 PROCEDURE — 84295 ASSAY OF SERUM SODIUM: CPT

## 2022-12-07 PROCEDURE — C1751: CPT

## 2022-12-07 PROCEDURE — 93308 TTE F-UP OR LMTD: CPT

## 2022-12-07 PROCEDURE — 99285 EMERGENCY DEPT VISIT HI MDM: CPT | Mod: 25

## 2022-12-07 PROCEDURE — 36000 PLACE NEEDLE IN VEIN: CPT

## 2022-12-07 PROCEDURE — 87086 URINE CULTURE/COLONY COUNT: CPT

## 2022-12-07 PROCEDURE — 93010 ELECTROCARDIOGRAM REPORT: CPT | Mod: NC,59

## 2022-12-07 PROCEDURE — 83605 ASSAY OF LACTIC ACID: CPT

## 2022-12-07 PROCEDURE — 85018 HEMOGLOBIN: CPT

## 2022-12-07 PROCEDURE — 12011 RPR F/E/E/N/L/M 2.5 CM/<: CPT | Mod: XU

## 2022-12-07 PROCEDURE — 71275 CT ANGIOGRAPHY CHEST: CPT | Mod: 26,MA

## 2022-12-07 PROCEDURE — 70486 CT MAXILLOFACIAL W/O DYE: CPT | Mod: MA

## 2022-12-07 PROCEDURE — 12011 RPR F/E/E/N/L/M 2.5 CM/<: CPT

## 2022-12-07 PROCEDURE — 36415 COLL VENOUS BLD VENIPUNCTURE: CPT

## 2022-12-07 PROCEDURE — 93308 TTE F-UP OR LMTD: CPT | Mod: 26

## 2022-12-07 PROCEDURE — 70450 CT HEAD/BRAIN W/O DYE: CPT | Mod: 26,MA

## 2022-12-07 PROCEDURE — 82803 BLOOD GASES ANY COMBINATION: CPT

## 2022-12-07 PROCEDURE — 76377 3D RENDER W/INTRP POSTPROCES: CPT | Mod: 26

## 2022-12-07 PROCEDURE — 80053 COMPREHEN METABOLIC PANEL: CPT

## 2022-12-07 PROCEDURE — 36573 INSJ PICC RS&I 5 YR+: CPT

## 2022-12-07 PROCEDURE — 82330 ASSAY OF CALCIUM: CPT

## 2022-12-07 PROCEDURE — 96374 THER/PROPH/DIAG INJ IV PUSH: CPT | Mod: XU

## 2022-12-07 PROCEDURE — 82947 ASSAY GLUCOSE BLOOD QUANT: CPT

## 2022-12-07 PROCEDURE — 76377 3D RENDER W/INTRP POSTPROCES: CPT

## 2022-12-07 PROCEDURE — 70486 CT MAXILLOFACIAL W/O DYE: CPT | Mod: 26,MA

## 2022-12-07 PROCEDURE — 85025 COMPLETE CBC W/AUTO DIFF WBC: CPT

## 2022-12-07 RX ORDER — SODIUM CHLORIDE 9 MG/ML
1000 INJECTION INTRAMUSCULAR; INTRAVENOUS; SUBCUTANEOUS ONCE
Refills: 0 | Status: COMPLETED | OUTPATIENT
Start: 2022-12-07 | End: 2022-12-07

## 2022-12-07 RX ORDER — ACETAMINOPHEN 500 MG
1000 TABLET ORAL ONCE
Refills: 0 | Status: COMPLETED | OUTPATIENT
Start: 2022-12-07 | End: 2022-12-07

## 2022-12-07 RX ADMIN — SODIUM CHLORIDE 1000 MILLILITER(S): 9 INJECTION INTRAMUSCULAR; INTRAVENOUS; SUBCUTANEOUS at 11:56

## 2022-12-07 RX ADMIN — Medication 1 TABLET(S): at 15:53

## 2022-12-07 RX ADMIN — Medication 400 MILLIGRAM(S): at 12:20

## 2022-12-07 RX ADMIN — Medication 1000 MILLIGRAM(S): at 13:42

## 2022-12-07 NOTE — ED ADULT NURSE NOTE - OBJECTIVE STATEMENT
68 y/o FF with PMH of aneurysm and seizures presents to ED complaining of a fall. Pt reported falling at home but does not remember anything that happened. Fall was unwitnessed,  was in other room.  reports pt lost consciousness for 1 minute, denies any seizure activity. Pt denies any use of anticoagulants. Pt is A&Ox3, full strength and sensation in all four extremities. Pt is a laceration on nose and right cheek. Denies headache, dizziness, vision changes, chest pain, shortness of breath, abdominal pain, nausea, vomiting, diarrhea, fevers, chills, dysuria, hematuria, recent illness travel or fall. 68 y/o FF with PMH of aneurysm and seizures presents to ED complaining of a fall. Pt reported  falling at home but does not remember anything that happened. Fall was unwitnessed,  was in other room.  reports pt lost consciousness for 1 minute, denies any seizure activity. Pt denies any use of anticoagulants. Pt is A&Ox3, full strength and sensation in all four extremities. Pt is a laceration on nose and right cheek. Denies headache, dizziness, vision changes, chest pain, shortness of breath, abdominal pain, nausea, vomiting, diarrhea, fevers, chills, dysuria, hematuria, recent illness travel.

## 2022-12-07 NOTE — ED PROVIDER NOTE - PATIENT PORTAL LINK FT
You can access the FollowMyHealth Patient Portal offered by [facility] by registering at the following website: [email or web address]. By joining [facility]’s FollowMyHealth portal, you will also be able to view your health information using other applications (apps) compatible with our system.

## 2022-12-07 NOTE — ED PROVIDER NOTE - NSFOLLOWUPINSTRUCTIONS_ED_ALL_ED_FT
1.  Stay well hydrated  2.  Continue current home medications  3.  Take Tylenol 1000mgs every 6-8 hrs as needed for pain  4.  Take Augmentin 875mgs every 12 hrs x 7 days  5.  Follow up with your PMD and cardiologist within one week of discharge.  Bring a copy of your results with you to your appointment  6.  Return to the ER for recurrent syncope, chest pain, shortness of breath or any other concerning symptoms

## 2022-12-07 NOTE — ED PROVIDER NOTE - OBJECTIVE STATEMENT
66 yo female with PMHx of cerebral aneurysm rupture s/p coil (12/2021), HTN, HIT c/b DVT now off eliquis, depression presenting with syncope.  Patient reports that she woke up this morning feeling well.  Was in the kitchen with her  when she lost consciousness.  Patient did not have any preceding symptoms.  Patient's  heard her fall and states she was unresponsive for several seconds to a minute.  No seizure-like activity at this time.  No postictal confusion.  No tongue biting or incontinence.  Patient now complaining of nose laceration, right facial pain and right chest wall pain.  Denies headache, blurry vision, neck pain, back pain, shortness of breath, abdominal pain, nausea, vomiting, diarrhea

## 2022-12-07 NOTE — ED PROVIDER NOTE - PHYSICAL EXAMINATION
Gen: AAO x 3, NAD  Skin: small U shaped superficial laceration to the bridge of the nose  HEENT: ecchymosis right periorbital, PERRLA, EOMI, MMM  Resp: unlabored CTAB  Cardiac: rrr s1s2, no murmurs, rubs or gallops  GI: ND, +BS, Soft, NT  Ext: no pedal edema, FROM in all extremities  Neuro: no focal deficits.  Strength 5/5 BUE and BLE, sensation intact, clear speech. Gen: AAO x 3, NAD  Skin: small U shaped superficial laceration to the bridge of the nose  HEENT: ecchymosis right periorbital, PERRLA, EOMI, MMM  Resp: unlabored CTAB  Cardiac: rrr s1s2, no murmurs, rubs or gallops  GI: ND, +BS, Soft, NT  Ext: no pedal edema, FROM in all extremities  Neuro: no focal deficits.  Strength 5/5 BUE and BLE, sensation intact, clear speech.  Attending Mariely Ferreira: Gen: NAD, heent: swelling to nasal bridge, laceration toupper nose approximately 1.5cm, no foreign body, no septal hematoma, eomi, perrla, mmm, neck; nttp, no nuchal rigidity, chest:right chest wall ttp, no crepitus, cv: rrr, no murmurs, lungs: ctab, abd: soft, nontender, nondistended, no peritoneal signs, , no guarding, ext: wwp, neg homans, skin: no rash, neuro: awake and alert, following commands, speech clear, sensation and strength intact, no focal deficits

## 2022-12-07 NOTE — ED PROVIDER NOTE - PROGRESS NOTE DETAILS
Patient seen at bedside in NAD.  VSS.  Patient resting comfortably without complaints. Labs unremarkable.  CTH/CTA chest negative for acute pathology.  CT maxface with acute nasal bone fracture.  Patient currently feeling well.  Ambulating without assistance.  Tolerating PO.  Offered observation for tele monitoring and TTE, but patient refusing.  States that she will follow up with her PMD and cardiologist upon discharge.  Strict follow up and return precautions provided.  -Moises Casey PA-C

## 2022-12-07 NOTE — ED PROVIDER NOTE - CLINICAL SUMMARY MEDICAL DECISION MAKING FREE TEXT BOX
Attending Mariely Ferreira: 66 yo female presenting after syncopal episode. pt denies any prodrome prior to episode. upon arrival pt awake and alert. on secondary survey pt with laceration nongaping to top of the nose and right chest wall pain. pt saturating 94%. ekg performed showing no evidence of arrythmia. pt placed on cardiac monitor. ct scans performed as well as CTA of the chest. pt will need admission for cardiac monitoring, Attending Mariely Ferreira: 66 yo female presenting after syncopal episode. pt denies any prodrome prior to episode. upon arrival pt awake and alert. on secondary survey pt with laceration nongaping to top of the nose and right chest wall pain. pt saturating 94%. ekg performed showing no evidence of arrythmia. pt placed on cardiac monitor. ct scans performed as well as CTA of the chest. pt will need admission for cardiac monitoring. lac repair

## 2022-12-07 NOTE — ED PROVIDER NOTE - ATTENDING APP SHARED VISIT CONTRIBUTION OF CARE
Attending MD Mariely Ferreira:   I personally have seen and examined this patient.  Physician assistant note reviewed and agree on plan of care and except where noted.  See HPI, PE, and MDM for details.

## 2022-12-08 LAB
CULTURE RESULTS: SIGNIFICANT CHANGE UP
SPECIMEN SOURCE: SIGNIFICANT CHANGE UP

## 2022-12-09 ENCOUNTER — APPOINTMENT (OUTPATIENT)
Dept: INTERNAL MEDICINE | Facility: CLINIC | Age: 67
End: 2022-12-09

## 2022-12-09 VITALS
WEIGHT: 123 LBS | TEMPERATURE: 98 F | SYSTOLIC BLOOD PRESSURE: 129 MMHG | OXYGEN SATURATION: 95 % | BODY MASS INDEX: 24.8 KG/M2 | HEIGHT: 59 IN | DIASTOLIC BLOOD PRESSURE: 77 MMHG | HEART RATE: 75 BPM

## 2022-12-09 DIAGNOSIS — F43.21 ADJUSTMENT DISORDER WITH DEPRESSED MOOD: ICD-10-CM

## 2022-12-09 PROCEDURE — 99214 OFFICE O/P EST MOD 30 MIN: CPT

## 2022-12-09 NOTE — PHYSICAL EXAM
[No Acute Distress] : no acute distress [Well Nourished] : well nourished [Well Developed] : well developed [No Respiratory Distress] : no respiratory distress  [No Accessory Muscle Use] : no accessory muscle use [Clear to Auscultation] : lungs were clear to auscultation bilaterally [Normal Rate] : normal rate  [Regular Rhythm] : with a regular rhythm [Normal S1, S2] : normal S1 and S2 [No Murmur] : no murmur heard [No Spinal Tenderness] : no spinal tenderness [de-identified] : Some facial edema, bruising of the face and eyes noted. No hemotypanum or posterior bruising of the ears. Tongue wound noted. [de-identified] : Mild cough noted [de-identified] : No point tenderness of the ribs [de-identified] : No facial asymmetry. Strength equal in the upper and lower extremities. Finger-to-nose normal, heel-to-shin normal

## 2022-12-09 NOTE — PHYSICAL EXAM
[No Acute Distress] : no acute distress [Well Nourished] : well nourished [Well Developed] : well developed [No Respiratory Distress] : no respiratory distress  [No Accessory Muscle Use] : no accessory muscle use [Clear to Auscultation] : lungs were clear to auscultation bilaterally [Normal Rate] : normal rate  [Regular Rhythm] : with a regular rhythm [Normal S1, S2] : normal S1 and S2 [No Murmur] : no murmur heard [No Spinal Tenderness] : no spinal tenderness [de-identified] : Some facial edema, bruising of the face and eyes noted. No hemotypanum or posterior bruising of the ears. Tongue wound noted. [de-identified] : Mild cough noted [de-identified] : No point tenderness of the ribs [de-identified] : No facial asymmetry. Strength equal in the upper and lower extremities. Finger-to-nose normal, heel-to-shin normal

## 2022-12-09 NOTE — HISTORY OF PRESENT ILLNESS
[de-identified] : Girish Bright is a 20yo M with ADHD who presents to establish care\par \par tdap 09/03/2015\par \par HCM:\par - HCV, HIV\par - COVID\par - Influenza\par - Shingrix [FreeTextEntry8] : Letitia Ho is a 66yo F with PMhx of HTN, HLD, SAH s/p coiling of L p comm aneurysm, HIT c/b DVT, and osteoporosis who presents for ER f/u. Daughter Mendy provides most history\par \par Had a LOC and broke her nose. Unclear etiology. Daughter reports patient was feeling well prior to event, perhaps did have a cough since being treated for some sort of infection in [place]. Patient has previous had respiratory issues with exposure to certain chemicals. Denies SOB, chest pain. No exertion sx.\par \par Patient was in the kitchen and then woke up on the floor with her  calling EMS. Cannot recall any tripping or prodrome. Daughter reports  did not witness the fall but only heard. Daughter reports she ran out of [employer] and did not know how to contact the Neuro doc for refills?\par \par Currently has mild HA and anterior neck pain, and some rib pain. Denies new numbness/tingling, vision changes, changes in gait. Reporting urinary incontinence but overall unchanged from prior to the fall. Denies SOB. Requesting Urogyn referral

## 2022-12-09 NOTE — HISTORY OF PRESENT ILLNESS
[de-identified] : Girish Bright is a 18yo M with ADHD who presents to establish care\par \par tdap 09/03/2015\par \par HCM:\par - HCV, HIV\par - COVID\par - Influenza\par - Shingrix [FreeTextEntry8] : Letitia Ho is a 66yo F with PMhx of HTN, HLD, SAH s/p coiling of L p comm aneurysm, HIT c/b DVT, and osteoporosis who presents for ER f/u. Daughter Mendy provides most history\par \par Had a LOC and broke her nose. Unclear etiology. Daughter reports patient was feeling well prior to event, perhaps did have a cough since being treated for some sort of infection in [place]. Patient has previous had respiratory issues with exposure to certain chemicals. Denies SOB, chest pain. No exertion sx.\par \par Patient was in the kitchen and then woke up on the floor with her  calling EMS. Cannot recall any tripping or prodrome. Daughter reports  did not witness the fall but only heard. Daughter reports she ran out of [employer] and did not know how to contact the Neuro doc for refills?\par \par Currently has mild HA and anterior neck pain, and some rib pain. Denies new numbness/tingling, vision changes, changes in gait. Reporting urinary incontinence but overall unchanged from prior to the fall. Denies SOB. Requesting Urogyn referral

## 2022-12-19 ENCOUNTER — APPOINTMENT (OUTPATIENT)
Dept: CARDIOLOGY | Facility: CLINIC | Age: 67
End: 2022-12-19

## 2022-12-19 ENCOUNTER — NON-APPOINTMENT (OUTPATIENT)
Age: 67
End: 2022-12-19

## 2022-12-19 VITALS
RESPIRATION RATE: 12 BRPM | HEIGHT: 59 IN | DIASTOLIC BLOOD PRESSURE: 84 MMHG | SYSTOLIC BLOOD PRESSURE: 137 MMHG | BODY MASS INDEX: 24.8 KG/M2 | OXYGEN SATURATION: 98 % | HEART RATE: 68 BPM | WEIGHT: 123 LBS | TEMPERATURE: 96.4 F

## 2022-12-19 PROCEDURE — 99214 OFFICE O/P EST MOD 30 MIN: CPT

## 2022-12-19 NOTE — HISTORY OF PRESENT ILLNESS
[FreeTextEntry1] : 12/19/2022\par \par Had LOC and broke nose, went to ER, had CT head, CT chest ruled out PE.  \par seen by Dr. Pascal Dec 9th\par Here for cardiac eval to assure no cardiac cause\par does not recall event, happened at home, while in kitchen, no prodrome\par no CP /sob/palptiations.  \par \par \par \par 6/7/2022\par \par No C/P or SOB.\par No UE or LE edema.\par Upper extremity venous duplex in 4/2022 showed no DVT, prior DVT resolved.\par Planned for cerebral angiogram on 6/9 with Dr. Lucas. \par D-dimer negative in 5/2022.\par \par Medications:\par Eliquis 5 mg BID\par Norvasc 2.5 mg daily\par Lexapro\par \par 3/2022\par \par 67 year-old Ms. DAVIS is seen in consult for h/o HIT and right subclavian DVT in setting of PICC linewhile was admitted with SAH and cerebral vessel thrombosis (s/p thrombectomy).\par \par Hx of right subclavian DVT, provoked in setting of PICC line 12/23/2021\par GILLIAN subsequently, agatroban -> eliquis\par Compliant with Eliquis\par No repeat doppler since\par No prior clots / bleeding\par No prior cancer screenings\par No upper or lower extremity edema\par No progression of SAH\par Seeing Hematology, no plan for further workup

## 2022-12-19 NOTE — PHYSICAL EXAM
[General Appearance - Well Developed] : well developed [Normal Appearance] : normal appearance [Well Groomed] : well groomed [General Appearance - Well Nourished] : well nourished [No Deformities] : no deformities [General Appearance - In No Acute Distress] : no acute distress [Normal Jugular Venous A Waves Present] : normal jugular venous A waves present [Normal Jugular Venous V Waves Present] : normal jugular venous V waves present [No Jugular Venous Evangelista A Waves] : no jugular venous evangelista A waves [Respiration, Rhythm And Depth] : normal respiratory rhythm and effort [Exaggerated Use Of Accessory Muscles For Inspiration] : no accessory muscle use [Auscultation Breath Sounds / Voice Sounds] : lungs were clear to auscultation bilaterally [Heart Rate And Rhythm] : heart rate and rhythm were normal [Heart Sounds] : normal S1 and S2 [Murmurs] : no murmurs present [Bowel Sounds] : normal bowel sounds [Abdomen Soft] : soft [Abdomen Tenderness] : non-tender [Abnormal Walk] : normal gait [Gait - Sufficient For Exercise Testing] : the gait was sufficient for exercise testing [Cyanosis, Localized] : no localized cyanosis [] : no rash [No Venous Stasis] : no venous stasis [No Skin Ulcers] : no skin ulcer [Oriented To Time, Place, And Person] : oriented to person, place, and time [Affect] : the affect was normal [Mood] : the mood was normal [No Anxiety] : not feeling anxious [FreeTextEntry1] : NC/AT

## 2022-12-23 ENCOUNTER — NON-APPOINTMENT (OUTPATIENT)
Age: 67
End: 2022-12-23

## 2022-12-23 NOTE — PHYSICAL THERAPY INITIAL EVALUATION ADULT - CRITERIA FOR SKILLED THERAPEUTIC INTERVENTIONS
Patient Name: Akin Rogers RENAN     : 1961    Report generated on: 2022    Report Timeframe: 2022-2022 (98%)       
impairments found/functional limitations in following categories/risk reduction/prevention/rehab potential/therapy frequency/predicted duration of therapy intervention/anticipated equipment needs at discharge/anticipated discharge recommendation

## 2022-12-27 ENCOUNTER — APPOINTMENT (OUTPATIENT)
Dept: CARDIOLOGY | Facility: CLINIC | Age: 67
End: 2022-12-27

## 2022-12-27 ENCOUNTER — NON-APPOINTMENT (OUTPATIENT)
Age: 67
End: 2022-12-27

## 2022-12-27 PROCEDURE — 93306 TTE W/DOPPLER COMPLETE: CPT

## 2023-01-03 ENCOUNTER — NON-APPOINTMENT (OUTPATIENT)
Age: 68
End: 2023-01-03

## 2023-01-12 ENCOUNTER — APPOINTMENT (OUTPATIENT)
Dept: ELECTROPHYSIOLOGY | Facility: CLINIC | Age: 68
End: 2023-01-12
Payer: MEDICARE

## 2023-01-12 ENCOUNTER — NON-APPOINTMENT (OUTPATIENT)
Age: 68
End: 2023-01-12

## 2023-01-12 VITALS
WEIGHT: 118 LBS | SYSTOLIC BLOOD PRESSURE: 130 MMHG | OXYGEN SATURATION: 97 % | HEIGHT: 61 IN | DIASTOLIC BLOOD PRESSURE: 90 MMHG | HEART RATE: 73 BPM | BODY MASS INDEX: 22.28 KG/M2

## 2023-01-12 PROCEDURE — 93000 ELECTROCARDIOGRAM COMPLETE: CPT

## 2023-01-12 PROCEDURE — 99203 OFFICE O/P NEW LOW 30 MIN: CPT | Mod: 25

## 2023-01-12 NOTE — PHYSICAL EXAM
[Well Developed] : well developed [Well Nourished] : well nourished [No Acute Distress] : no acute distress [Normal Conjunctiva] : normal conjunctiva [Normal Venous Pressure] : normal venous pressure [No Carotid Bruit] : no carotid bruit [Normal S1, S2] : normal S1, S2 [No Murmur] : no murmur [No Rub] : no rub [No Gallop] : no gallop [Clear Lung Fields] : clear lung fields [Good Air Entry] : good air entry [No Respiratory Distress] : no respiratory distress  [Soft] : abdomen soft [Non Tender] : non-tender [No Masses/organomegaly] : no masses/organomegaly [Normal Bowel Sounds] : normal bowel sounds [Normal Gait] : normal gait [No Edema] : no edema [No Cyanosis] : no cyanosis [No Clubbing] : no clubbing [No Varicosities] : no varicosities [No Rash] : no rash [No Skin Lesions] : no skin lesions [Moves all extremities] : moves all extremities [Normal Speech] : normal speech [Alert and Oriented] : alert and oriented [Normal memory] : normal memory

## 2023-01-12 NOTE — HISTORY OF PRESENT ILLNESS
[FreeTextEntry1] : Letitia Ho is a 67 year old woman with hyperlipidemia, hypertension, HIT and a prior provoked right subclavian deep vein thrombosis that occurred in the setting of an indwelling catheter. She also has a history of a subarachnoid hemorrhage and cerebral vessel thrombosis (status post thrombectomy in December of 2021). She presents today for an initial evaluation after falls / episodes of traumatic syncope. \par \par The patient was home with her , sitting at the dinning room table at 1pm on December 21st 2022. Her  was in the kitchen and reports hearing a "sound." He found his wife laying on her back on the ground. The patient does not remember any of the details of the event. She had eaten breakfast and lunch and had no prodrome, no recent medication changes. She sustained facial trauma. A similar episode happened in July, again while she was sitting. This event was also not witnessed by her . She sustained a right clavicular fracture. Her  reports that she had "seizure activity" of her both arms during the event in July when he found her on the floor. Her  believes that he thinks both times she "tripped" on the legs of the table she was sitting at.

## 2023-01-12 NOTE — CARDIOLOGY SUMMARY
[de-identified] : ECG from 1/12/2023: Sinus rhythm with complete right bundle branch block at 72bpm \par ECG from 12/18/2021: Sinus at 78 with complete RBBB (QRSd: 132ms), normal axis [de-identified] : Holter from 12/19/2022: mean HR: 73, min HR: 55, max HR: 135, PVC: 0%, APCs: 0.49% [de-identified] : TTE from 12/12/2021: LA: 3.1, DAYAMI: 20, EF: 65%, mild MR, no segmental WMAs, normal RA, normal RV size and function, mild TR, min PI, no pericardial effusion, normal pulmonary pressures

## 2023-01-12 NOTE — REVIEW OF SYSTEMS
[Negative] : Heme/Lymph [FreeTextEntry5] : see HPI [de-identified] : The patient's mental status fluctuates between asking appropriate questions to asking simple questions. She also has short term memory loss.

## 2023-01-12 NOTE — DISCUSSION/SUMMARY
[Syncope of Unknown Origin] : syncope of unknown origin [FreeTextEntry1] : Letitia Ho is a 67 year old woman with hyperlipidemia, hypertension, HIT and a prior provoked right subclavian deep vein thrombosis that occurred in the setting of an indwelling catheter. She also has a history of a subarachnoid hemorrhage and cerebral vessel thrombosis (status post thrombectomy in December of 2021). She presents today for an initial evaluation after falls / episodes of traumatic syncope. She has a complete right bundle branch block and did not have any notable rhythm abnormalities detected on a 24 hour Holter monitor. \par \par Given the traumatic nature of her two prior episodes, I suspect that she may have had a seizure or she is having a paroxysmal arrhythmia. Her , who did not witness either of the events, believes that she tripped on the legs of a table. The patient has no memory of either of the events. Given that the events occurred months apart, I recommended an ILR to monitor her heart rhythm in the event of a recurrence. The patient and her  have thrown out the table she was sitting at when both events occurred. They would prefer to avoid an ILR. I recommended a 2 week Zio monitor although I suspect the monitoring duration will be insufficient.  [EKG obtained to assist in diagnosis and management of assessed problem(s)] : EKG obtained to assist in diagnosis and management of assessed problem(s)

## 2023-01-13 ENCOUNTER — NON-APPOINTMENT (OUTPATIENT)
Age: 68
End: 2023-01-13

## 2023-01-13 ENCOUNTER — APPOINTMENT (OUTPATIENT)
Dept: NEUROLOGY | Facility: CLINIC | Age: 68
End: 2023-01-13
Payer: MEDICARE

## 2023-01-13 VITALS
HEART RATE: 67 BPM | BODY MASS INDEX: 22.66 KG/M2 | DIASTOLIC BLOOD PRESSURE: 81 MMHG | WEIGHT: 120 LBS | HEIGHT: 61 IN | SYSTOLIC BLOOD PRESSURE: 125 MMHG

## 2023-01-13 PROCEDURE — 99214 OFFICE O/P EST MOD 30 MIN: CPT

## 2023-01-13 RX ORDER — ESCITALOPRAM OXALATE 5 MG/1
5 TABLET ORAL
Qty: 90 | Refills: 1 | Status: DISCONTINUED | COMMUNITY
Start: 2022-01-26 | End: 2023-01-13

## 2023-01-27 ENCOUNTER — APPOINTMENT (OUTPATIENT)
Dept: INTERNAL MEDICINE | Facility: CLINIC | Age: 68
End: 2023-01-27
Payer: MEDICARE

## 2023-01-27 VITALS
TEMPERATURE: 98 F | WEIGHT: 123 LBS | HEIGHT: 59 IN | OXYGEN SATURATION: 97 % | DIASTOLIC BLOOD PRESSURE: 82 MMHG | HEART RATE: 81 BPM | BODY MASS INDEX: 24.8 KG/M2 | SYSTOLIC BLOOD PRESSURE: 129 MMHG

## 2023-01-27 PROCEDURE — G0439: CPT

## 2023-01-27 NOTE — HEALTH RISK ASSESSMENT
[Former] : Former [10-14] : 10-14 [< 15 Years] : < 15 Years [No] : In the past 12 months have you used drugs other than those required for medical reasons? No [Any fall with injury in past year] : Patient reported fall with injury in the past year [0] : 2) Feeling down, depressed, or hopeless: Not at all (0) [PHQ-2 Negative - No further assessment needed] : PHQ-2 Negative - No further assessment needed [With Significant Other] : lives with significant other [Retired] : retired [] :  [Feels Safe at Home] : Feels safe at home [Fully functional (bathing, dressing, toileting, transferring, walking, feeding)] : Fully functional (bathing, dressing, toileting, transferring, walking, feeding) [Independent] : using telephone [Some assistance needed] : managing medications [Full assistance needed] : managing finances [Reports changes in dental health] : Reports changes in dental health [Smoke Detector] : smoke detector [Seat Belt] :  uses seat belt [de-identified] : 2019 [de-identified] : Walks frequently [de-identified] : Breakfast: [place] toast, Lunch: [employer] cheesesteak, Dinner: Fried pork with rice. Normally eats lots of a [place] food w/ veggies, yesterday atypical [[id number]] : 0 [Sexually Active] : not sexually active [Reports changes in hearing] : Reports no changes in hearing [Reports changes in vision] : Reports no changes in vision [Guns at Home] : no guns at home [FreeTextEntry8] : Always been the case that  does it

## 2023-01-27 NOTE — HISTORY OF PRESENT ILLNESS
[de-identified] : Letitia Ho is a 66yo F with PMhx of HTN, HLD, SAH s/p coiling of L p comm aneurysm, HIT c/b DVT, and osteoporosis who presents for CPE. Accompanied by  who translates.\par \par MVI\par \par Undergoing syncope work-up with Neuro and Cardiology. Had Keppra increased\par Cardiology recommended loop but patient declined, Ziopatch showed NSR w/ RBBB but otherwise some APCs\par Received dental implant in Nov, now done.\par Requesting pap smear, last done 10 years. All normal previously

## 2023-01-27 NOTE — PHYSICAL EXAM
[No Acute Distress] : no acute distress [Well Nourished] : well nourished [Well Developed] : well developed [Normal Sclera/Conjunctiva] : normal sclera/conjunctiva [EOMI] : extraocular movements intact [Normal Oropharynx] : the oropharynx was normal [Normal TMs] : both tympanic membranes were normal [No Respiratory Distress] : no respiratory distress  [No Accessory Muscle Use] : no accessory muscle use [Clear to Auscultation] : lungs were clear to auscultation bilaterally [Normal Rate] : normal rate  [Regular Rhythm] : with a regular rhythm [Normal S1, S2] : normal S1 and S2 [No Murmur] : no murmur heard [No Edema] : there was no peripheral edema [Soft] : abdomen soft [Non Tender] : non-tender [Non-distended] : non-distended [No Masses] : no abdominal mass palpated [No Joint Swelling] : no joint swelling [No Rash] : no rash [de-identified] : Pupils minimally reactive to light and generally dilated but equal. [de-identified] : Mild cough noted [de-identified] : No facial asymmetry. Strength equal in the upper and lower extremities. Finger-to-nose normal, heel-to-shin normal

## 2023-01-27 NOTE — ASSESSMENT
[FreeTextEntry1] : HCM: \par - Lipids, a1c, CMP, vitamin D\par - Women's health referral for pap and bisphosphonates. Likely needs one more Pap before cessation of screening.\par \par RTC 4-5 months for follow-up of multiple med complaints

## 2023-01-29 ENCOUNTER — NON-APPOINTMENT (OUTPATIENT)
Age: 68
End: 2023-01-29

## 2023-02-01 ENCOUNTER — APPOINTMENT (OUTPATIENT)
Dept: INTERNAL MEDICINE | Facility: CLINIC | Age: 68
End: 2023-02-01
Payer: MEDICARE

## 2023-02-01 VITALS
HEIGHT: 59 IN | BODY MASS INDEX: 25 KG/M2 | OXYGEN SATURATION: 97 % | SYSTOLIC BLOOD PRESSURE: 136 MMHG | HEART RATE: 80 BPM | TEMPERATURE: 97.2 F | DIASTOLIC BLOOD PRESSURE: 83 MMHG | WEIGHT: 124 LBS

## 2023-02-01 PROCEDURE — 99214 OFFICE O/P EST MOD 30 MIN: CPT

## 2023-02-01 NOTE — HISTORY OF PRESENT ILLNESS
[FreeTextEntry1] :  visit for gyne exam [de-identified] : 69 yo F, h/o CVA, here for pap. Estimates her last pap was 10 years ago, never had an abnormal. Had h/o uterine prolapse with RHONDA and UUI, seen by urogyne 2014, recommended to have surgery but pt declined. Pt received pessary which needed to be resized, and now pt using same one for past 10 years. Takes it out to clean about once/weekly, does note some blood sometimes with this. Reports losing her urine with coughing and walking, also feels the prolapse, she calls it a hernia.\par Currently not sexually active, denies GSM symptoms, denies VMS symptoms. \par Denies fam h/o uterine, colon, ovarian or breast cancers. Last mammo June 2022.

## 2023-02-01 NOTE — PAST MEDICAL HISTORY
[Postmenopausal] : postmenopausal [Menarche Age ____] : age at menarche was [unfilled] [Menopause Age____] : age at menopause was [unfilled] [Total Preg ___] : G[unfilled] [Live Births ___] : P[unfilled]  [Abortions ___] : Abortions:[unfilled] [FreeTextEntry1] : no APOs or other pregnancy complications per pt

## 2023-02-01 NOTE — PHYSICAL EXAM
[No Lymphadenopathy] : no lymphadenopathy [Supple] : supple [Thyroid Normal, No Nodules] : the thyroid was normal and there were no nodules present [No Respiratory Distress] : no respiratory distress  [Normal Appearance] : normal in appearance [No Masses] : no palpable masses [No Nipple Discharge] : no nipple discharge [No Axillary Lymphadenopathy] : no axillary lymphadenopathy [Normal] : soft, non-tender, non-distended, no masses palpated, no HSM and normal bowel sounds [Normal Sphincter Tone] : normal sphincter tone [No Mass] : no mass [External Female Genitalia] : normal external genitalia [Rectal Exam - Rectum] : the rectal exam was normal [de-identified] : symmetric, no skin changes [FreeTextEntry1] : bladder prolapsed to introitus; evidence of recent bleeding in vaginal vault, cervix not visualized, blind pap taken; on bimanual, even rectovaginal exam, no uterine fundus felt, cervix felt deep and posterior, no CMT, adnexa not appreciated

## 2023-02-02 LAB — HPV HIGH+LOW RISK DNA PNL CVX: NOT DETECTED

## 2023-02-06 LAB — CYTOLOGY CVX/VAG DOC THIN PREP: NORMAL

## 2023-02-09 LAB
25(OH)D3 SERPL-MCNC: 55.9 NG/ML
ALBUMIN SERPL ELPH-MCNC: 4.8 G/DL
ALP BLD-CCNC: 104 U/L
ALT SERPL-CCNC: 18 U/L
ANION GAP SERPL CALC-SCNC: 12 MMOL/L
AST SERPL-CCNC: 17 U/L
BILIRUB SERPL-MCNC: 0.4 MG/DL
BUN SERPL-MCNC: 14 MG/DL
CALCIUM SERPL-MCNC: 10 MG/DL
CHLORIDE SERPL-SCNC: 106 MMOL/L
CHOLEST SERPL-MCNC: 191 MG/DL
CO2 SERPL-SCNC: 26 MMOL/L
CREAT SERPL-MCNC: 0.67 MG/DL
EGFR: 96 ML/MIN/1.73M2
ESTIMATED AVERAGE GLUCOSE: 117 MG/DL
GLUCOSE SERPL-MCNC: 91 MG/DL
HBA1C MFR BLD HPLC: 5.7 %
HDLC SERPL-MCNC: 65 MG/DL
LDLC SERPL CALC-MCNC: 100 MG/DL
NONHDLC SERPL-MCNC: 126 MG/DL
POTASSIUM SERPL-SCNC: 4.6 MMOL/L
PROT SERPL-MCNC: 7.4 G/DL
SODIUM SERPL-SCNC: 144 MMOL/L
TRIGL SERPL-MCNC: 130 MG/DL

## 2023-02-11 LAB — LEVETIRACETAM SERPL-MCNC: 17 UG/ML

## 2023-02-17 ENCOUNTER — APPOINTMENT (OUTPATIENT)
Dept: NEUROLOGY | Facility: CLINIC | Age: 68
End: 2023-02-17
Payer: MEDICARE

## 2023-02-17 PROCEDURE — 95816 EEG AWAKE AND DROWSY: CPT

## 2023-02-18 PROCEDURE — 95708 EEG WO VID EA 12-26HR UNMNTR: CPT

## 2023-02-19 PROCEDURE — 95721 EEG PHY/QHP>36<60 HR W/O VID: CPT

## 2023-02-19 PROCEDURE — 95700 EEG CONT REC W/VID EEG TECH: CPT

## 2023-02-19 PROCEDURE — 95708 EEG WO VID EA 12-26HR UNMNTR: CPT

## 2023-02-22 ENCOUNTER — NON-APPOINTMENT (OUTPATIENT)
Age: 68
End: 2023-02-22

## 2023-02-25 ENCOUNTER — NON-APPOINTMENT (OUTPATIENT)
Age: 68
End: 2023-02-25

## 2023-02-26 NOTE — DISCUSSION/SUMMARY
[Complex Partial] : complex partial [Focal] : focal [Symptomatic] : symptomatic [Risks Associated with Driving/[place] Law] : As per my usual protocol, the patient was advised in regards to risks and driving privileges associated with the [place] Guidelines.  [Safety Recommendations] : The patient was advised in regards to the risk of seizures and general seizure safety recommendations including not to be bathing alone, climbing to high places and operating heavy machinery. [Compliance with Medications] : The importance of compliance with medications was reinforced. [Medication Side Effects] : High frequency and serious potential medication adverse effects were reviewed with the patient, including but not exclusive to psychiatric effects.  Information sheets on medication side effects were made available to the patient in our clinic.  The patient or advocate agrees to notify us for any concerns. [Risk of Death] : Risk of death associated with seizures / SUDEP was discussed. [FreeTextEntry1] : 67 year old F h/o SAH then aneurysm rupture/repair 2021\par with symptomatic focal epilepsy, GTCS x 1 7/2022.\par LOC in 12/2022 abrupt ?syncopal\par \par RBBB on ECG, seeing cardiology\par Imaging shows bifrontal R>L encephalomalacia.  Focus/memory affected\par Prior EEGs 12/2021 mod R>L slow, and 7/2022 nl.\par \par f/u AEEG.\par Cardio eval EP\par on keppra 500 bid \par Increase LEV to 750mg bid empirically for now\par \par x time 35min\par f/u in 4 months

## 2023-02-26 NOTE — HISTORY OF PRESENT ILLNESS
[FreeTextEntry1] : \par Patient previously seen by Dr Frank.\par \par Patient is a 67y Female with PMH of SAH then cerebral aneurysm rupture s/p coil (12/2021), HTN, HIT c/b DVT now off eliquis, depression, presenting to the ED 7/2022 after a seizure at home witnessed convulsion, arms jerking bilat per hus, Fractured left clavicle\par likely i/s/o infection.  - fever likely 2/2 entero/rhinovirus \par focal motor impaired awareness seizure. Possibly provoked \par \par Dec 7/2022 sitting at table, abrupt LOC, secs,  with nasal fracture. responsive, quickly, making sense, but remained 5 min on floor, no witnessed convulsive activity per hus, amnestic, \par \par s/p keppra x1 in the ED, c/w keppra 500 BID since then\par denies ADR, sedation, mood stable.\par \par Focus/memory affected. 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

## 2023-02-26 NOTE — PHYSICAL EXAM
[FreeTextEntry1] : MS: ESL\par alert & oriented to person, place time, good fund of knowledge and recall for recent and remote events. \par CN: VFF to confrontation, EOM full without nystagmus, PERRL, V1-V3 intact to light touch, face symmetrical, hears finger rub bilaterally, palate elevates symmetrically, shoulders elevate symmetrically, tongue midline\par MOTOR: normal tone x 4 extremities, Power 5/5 proximally and distally bilaterally, no drift, normal rapid alternating movements. \par SENSORY: intact LT x 4 extremities\par REFLEXES: biceps 2+ bilaterally, triceps 1+ bilaterally, brachioradialis 1+ bilaterally, patella 1+ bilaterally, ankle 1+ bilaterally, plantar flexor bilaterally\par COORD: normal FNF, no tremor or dysmetria\par GAIT: normal base, Romberg negative, normal gait, able to walk on toes, heels and tandem.\par

## 2023-02-28 ENCOUNTER — NON-APPOINTMENT (OUTPATIENT)
Age: 68
End: 2023-02-28

## 2023-03-09 ENCOUNTER — NON-APPOINTMENT (OUTPATIENT)
Age: 68
End: 2023-03-09

## 2023-03-09 ENCOUNTER — APPOINTMENT (OUTPATIENT)
Dept: ELECTROPHYSIOLOGY | Facility: CLINIC | Age: 68
End: 2023-03-09
Payer: MEDICARE

## 2023-03-09 VITALS
HEIGHT: 59 IN | WEIGHT: 118 LBS | OXYGEN SATURATION: 97 % | BODY MASS INDEX: 23.79 KG/M2 | SYSTOLIC BLOOD PRESSURE: 130 MMHG | DIASTOLIC BLOOD PRESSURE: 82 MMHG | HEART RATE: 74 BPM

## 2023-03-09 PROCEDURE — 99213 OFFICE O/P EST LOW 20 MIN: CPT | Mod: 25

## 2023-03-09 PROCEDURE — 93000 ELECTROCARDIOGRAM COMPLETE: CPT

## 2023-03-09 NOTE — CARDIOLOGY SUMMARY
[de-identified] : ECG from 3/9/2023: Sinus rhythm with a complete right bundle branch block (QRSd: 138ms)\par ECG from 1/12/2023: Sinus rhythm with complete right bundle branch block at 72bpm \par ECG from 12/18/2021: Sinus at 78 with complete RBBB (QRSd: 132ms), normal axis\par  [de-identified] : Zio XT from 1/12/2023-1/26/2023: min HR: 49, mean HR: 74, max HR: 169, no patient triggered events, three episodes of non-sustained SVT - fastest 4 beats at 169bpm longest 8 beats at 119bpm, PVCs: 1.6%, PACs: 1.7%\par \par Holter from 12/19/2022: mean HR: 73, min HR: 55, max HR: 135, PVC: 0%, APCs: 0.49%  [de-identified] : TTE from 12/12/2021: LA: 3.1, DAYAMI: 20, EF: 65%, mild MR, no segmental WMAs, normal RA, normal RV size and function, mild TR, min PI, no pericardial effusion, normal pulmonary pressures

## 2023-03-09 NOTE — DISCUSSION/SUMMARY
[Syncope of Unknown Origin] : syncope of unknown origin [FreeTextEntry1] : Letitia Ho is a 68 year old woman with hyperlipidemia, hypertension, HIT and a prior provoked right subclavian deep vein thrombosis that occurred in the setting of an indwelling catheter. She also has a history of a subarachnoid hemorrhage and cerebral vessel thrombosis (status post thrombectomy in December of 2021). She presents today for follow-up given her history of traumatic syncope. A two week outpatient monitor did not reveals any arrhythmias that would potentially explain her past events.  She has a complete right bundle branch block. \par \par Given the traumatic nature of her two prior episodes, I suspect that she may have had a seizure or she is having a paroxysmal arrhythmia. We discussed that loop recorder would be an option for long-term monitoring. If the patient has a recurrence without a loop recorder there would be no way to identify if the etiology was arrhythmogenic. She will call the office if she wishes to pursue the loop recorder. [EKG obtained to assist in diagnosis and management of assessed problem(s)] : EKG obtained to assist in diagnosis and management of assessed problem(s)

## 2023-03-09 NOTE — PHYSICAL EXAM
[Well Developed] : well developed [Well Nourished] : well nourished [No Acute Distress] : no acute distress [Normal Venous Pressure] : normal venous pressure [No Carotid Bruit] : no carotid bruit [Normal S1, S2] : normal S1, S2 [No Murmur] : no murmur [Clear Lung Fields] : clear lung fields [Good Air Entry] : good air entry [No Respiratory Distress] : no respiratory distress  [Soft] : abdomen soft [Non Tender] : non-tender [Normal Gait] : normal gait [No Cyanosis] : no cyanosis [No Edema] : no edema [No Rash] : no rash [Moves all extremities] : moves all extremities [Normal Speech] : normal speech [Alert and Oriented] : alert and oriented

## 2023-03-09 NOTE — HISTORY OF PRESENT ILLNESS
[FreeTextEntry1] : Letitia Ho is a 68 year old woman with hyperlipidemia, hypertension, HIT and a prior provoked right subclavian deep vein thrombosis that occurred in the setting of an indwelling catheter. She also has a history of a subarachnoid hemorrhage and cerebral vessel thrombosis (status post thrombectomy in December of 2021). She presents today for a follow-up visit. \par \par To briefly summarize her history, she saw me initially on January 12th 2023 for an initial evaluation after having falls / episodes of traumatic syncope at home. The first event happened which she was sitting at the dinning room table at 1pm on December 21st 2022. Her  was in the kitchen and reports hearing a "sound." He found his wife laying on her back on the ground. She does not remember any of the details of the event. There was no prodrome. She sustained facial trauma. A similar episode happened in July, again while she was sitting. This event was also not witnessed by her . She sustained a right clavicular fracture. Her  reports that she had "seizure activity" of her both arms during the event in July when he found her on the floor. Her  believes that he thinks both times she "tripped" on the legs of the table she was sitting at. She was monitored with a Second Funnelo XT from 1/12/2023-1/26/2023. There were no findings on the monitor that would provide an explanation for her events.\par \par Since our last visit she has been feeling well. No further episodes of syncope. She denies dizziness or lightheadedness.

## 2023-03-13 ENCOUNTER — APPOINTMENT (OUTPATIENT)
Dept: UROLOGY | Facility: CLINIC | Age: 68
End: 2023-03-13
Payer: MEDICARE

## 2023-03-13 VITALS
TEMPERATURE: 98.3 F | WEIGHT: 120 LBS | SYSTOLIC BLOOD PRESSURE: 138 MMHG | RESPIRATION RATE: 16 BRPM | HEIGHT: 59 IN | HEART RATE: 77 BPM | DIASTOLIC BLOOD PRESSURE: 80 MMHG | BODY MASS INDEX: 24.19 KG/M2

## 2023-03-13 PROCEDURE — 99204 OFFICE O/P NEW MOD 45 MIN: CPT

## 2023-03-13 PROCEDURE — 51798 US URINE CAPACITY MEASURE: CPT

## 2023-03-13 NOTE — HISTORY OF PRESENT ILLNESS
[FreeTextEntry1] : 68 year old F with history of intracranial aneurysm repair and uterine prolapse. \par \par Has hx of uterine prolapse managed with pessary for the past 10 years. 1 month ago, saw Dr. Bentley who noted that her pessary size was incorrect and recommended for her to follow up with urogyn. \par \par As per patient, she had not had the pessary changed at regular intervals over the past years.  She removed her own pessary approximately 2 days ago. \par \par DTF 2 hours \par UUI 2/10 times \par Endorses RHONDA\par Endorses complete bladder emptying \par No pad/diaper use \par Denies dysuria or hematuria \par At baseline without pessary feels something in her vagina when seated which is very uncomfortable for her, when walking notes bleeding. \par 1 vaginal birth, not currently sexually active. Smoked for 30-40 years 1/2 ppd, quit about 3 years ago. \par \par Has never been offered surgery, however would like to continue managing her symptoms with a pessary and would like to get it resized.

## 2023-03-13 NOTE — END OF VISIT
[] : Resident [Time Spent: ___ minutes] : I have spent [unfilled] minutes of time on the encounter. [FreeTextEntry3] : The total time spent with the patient includes face to face time as well as time for documentation, ordering medications/labs/procedures, and care coordination, but I acknowledge it does not include time spent on any procedures performed (eg PVR, UDS, Cystoscopy, catheter changes, etc).  Time includes reviewing the chart prior to visit, documentation, and correspondence.\par  [FreeTextEntry2] : The patient voided with instructions to empty their bladder. Afterwards, we performed a bladder ultrasound scan to obtain a post-void residual.  The estimated residual volume on the bladder scan was:  0 cc \par

## 2023-03-13 NOTE — LETTER BODY
[Dear  ___] : Dear  [unfilled], [( Thank you for referring [unfilled] for consultation for _____ )] : Thank you for referring [unfilled] for consultation for [unfilled] [Please see my note below.] : Please see my note below. [Consult Closing:] : Thank you very much for allowing me to participate in the care of this patient.  If you have any questions, please do not hesitate to contact me. [Sincerely,] : Sincerely, [FreeTextEntry1] : We had a very productive visit where we reaffirmed the patient's desire for a vaginal pessary, however we counseled her that her pessary needs to be closely followed with changing/washing every 3 months in the office.\par \par We will bring her back to the office for a pessary sizing and have her trial the appropriate size. \par \par In the future, she can have her pessary changed with us every 3 months or we can eventually teach her how to change./wash it herself. [FreeTextEntry3] : Dr. Ramesh Ho\par Urology\par Voiding Dysfunction, Female Pelvic Medicine, & Reconstructive Surgery\par

## 2023-03-13 NOTE — ASSESSMENT
[FreeTextEntry1] : We discussed the etiology and management of pelvic organ prolapse.\par \par We discussed conservative non-operative interventions including weight loss, pelvic floor PT, and use of a pessary device.\par \par We discussed surgical interventions, including vaginal and abdominal approaches.  We discussed the r/b/a of obliterative vaginal surgery, or colpocleisis.  We also discussed vaginal native tissue prolapse repair. We also discussed abdominal sacrocolpopexy with mesh.\par \par \par \par PLAN\par \par she is not interested in surgical intervention\par \par we will have her return to office for pessary refitting\par \par counseled pt that we should change and wash her pessary every 3 months moving forward\par \par

## 2023-03-13 NOTE — PHYSICAL EXAM
[General Appearance - Well Developed] : well developed [General Appearance - Well Nourished] : well nourished [Normal Appearance] : normal appearance [Well Groomed] : well groomed [General Appearance - In No Acute Distress] : no acute distress [] : no respiratory distress [Abdomen Tenderness] : non-tender [Urinary Bladder Findings] : the bladder was normal on palpation [Vagina] : normal vaginal exam [Normal Station and Gait] : the gait and station were normal for the patient's age [Skin Color & Pigmentation] : normal skin color and pigmentation [Mood] : the mood was normal [Affect] : the affect was normal [Not Anxious] : not anxious [FreeTextEntry1] : stage 2 cystocele (recent pessary) with some apical descent, no lacerations or breakdown

## 2023-03-21 ENCOUNTER — APPOINTMENT (OUTPATIENT)
Dept: UROLOGY | Facility: CLINIC | Age: 68
End: 2023-03-21
Payer: MEDICARE

## 2023-03-21 ENCOUNTER — OUTPATIENT (OUTPATIENT)
Dept: OUTPATIENT SERVICES | Facility: HOSPITAL | Age: 68
LOS: 1 days | End: 2023-03-21
Payer: MEDICARE

## 2023-03-21 VITALS
WEIGHT: 120 LBS | RESPIRATION RATE: 17 BRPM | DIASTOLIC BLOOD PRESSURE: 74 MMHG | HEIGHT: 59 IN | SYSTOLIC BLOOD PRESSURE: 121 MMHG | HEART RATE: 76 BPM | BODY MASS INDEX: 24.19 KG/M2 | TEMPERATURE: 97.7 F

## 2023-03-21 DIAGNOSIS — Z98.41 CATARACT EXTRACTION STATUS, RIGHT EYE: Chronic | ICD-10-CM

## 2023-03-21 DIAGNOSIS — Z98.890 OTHER SPECIFIED POSTPROCEDURAL STATES: Chronic | ICD-10-CM

## 2023-03-21 PROCEDURE — 57160 INSERT PESSARY/OTHER DEVICE: CPT

## 2023-03-21 NOTE — HISTORY OF PRESENT ILLNESS
[FreeTextEntry1] : 68 year old F with history of intracranial aneurysm repair and uterine prolapse, has pessary as management over the past 10 years, but not been changed regularly and noted by her PCP, the pessary size was incorrect. Therefore referred to see Dr. Ho. \par \par Patient here today with her spouse for pessary fitting. \par \par Reports of RHONDA occasionally - will go with pessary with support and knob

## 2023-03-21 NOTE — ASSESSMENT
[FreeTextEntry1] : Plan:\par \par patient fitted with pessary size #3 and #2 - most comfortable with size 2. patient sent home with pessary with support and knob #2 \par \par educated patient on how to remove and re-insert the pessary - return demonstration performed \par \par RTO 1 month

## 2023-03-22 ENCOUNTER — NON-APPOINTMENT (OUTPATIENT)
Age: 68
End: 2023-03-22

## 2023-03-24 ENCOUNTER — APPOINTMENT (OUTPATIENT)
Dept: UROLOGY | Facility: CLINIC | Age: 68
End: 2023-03-24
Payer: MEDICARE

## 2023-03-24 VITALS — DIASTOLIC BLOOD PRESSURE: 71 MMHG | HEART RATE: 72 BPM | SYSTOLIC BLOOD PRESSURE: 114 MMHG

## 2023-03-24 DIAGNOSIS — Z46.89 ENCOUNTER FOR FITTING AND ADJUSTMENT OF OTHER SPECIFIED DEVICES: ICD-10-CM

## 2023-03-24 DIAGNOSIS — N81.10 CYSTOCELE, UNSPECIFIED: ICD-10-CM

## 2023-03-24 DIAGNOSIS — N39.3 STRESS INCONTINENCE (FEMALE) (MALE): ICD-10-CM

## 2023-03-24 DIAGNOSIS — Z96.0 PRESENCE OF UROGENITAL IMPLANTS: ICD-10-CM

## 2023-03-24 PROCEDURE — 99213 OFFICE O/P EST LOW 20 MIN: CPT

## 2023-03-24 NOTE — HISTORY OF PRESENT ILLNESS
[FreeTextEntry1] : 68 year old F with history of intracranial aneurysm repair and uterine prolapse had fitted for pessary with support & knob on 3/21/23 called the following day that it had fall out. pt was added on to be evaluated. \par

## 2023-04-18 ENCOUNTER — APPOINTMENT (OUTPATIENT)
Dept: UROLOGY | Facility: CLINIC | Age: 68
End: 2023-04-18
Payer: MEDICARE

## 2023-04-18 DIAGNOSIS — Z46.89 ENCOUNTER FOR FITTING AND ADJUSTMENT OF OTHER SPECIFIED DEVICES: ICD-10-CM

## 2023-04-18 DIAGNOSIS — N81.10 CYSTOCELE, UNSPECIFIED: ICD-10-CM

## 2023-04-18 PROCEDURE — 99213 OFFICE O/P EST LOW 20 MIN: CPT

## 2023-04-18 NOTE — HISTORY OF PRESENT ILLNESS
[FreeTextEntry1] : 68 year old F with history of intracranial aneurysm repair and uterine prolapse had fitted for pessary with support & knob size #3 fitted on 3/24/23, here today for follow up. \par \par pt would like to continue manage POP with pessary, deferring surgical options.\par with this new size of pessary, pt states that UUI has resolve, denies RHONDA. Nocturia 1x. \par pt changes the pessary 2x weekly - able to reinsert the pessary herself \par \par Denies constipation

## 2023-04-18 NOTE — PHYSICAL EXAM
[General Appearance - Well Developed] : well developed [Normal Appearance] : normal appearance [Abdomen Soft] : soft [External Female Genitalia] : normal external genitalia [Edema] : no peripheral edema [] : no respiratory distress [Not Anxious] : not anxious

## 2023-04-18 NOTE — ASSESSMENT
[FreeTextEntry1] : Plan:\par \par Pessary was cleansed and reinserted. pt tolerated well\par \par RTC PRN since pt knows how to clean it herself. advised to call for appt if any questions

## 2023-05-01 ENCOUNTER — APPOINTMENT (OUTPATIENT)
Dept: INTERNAL MEDICINE | Facility: CLINIC | Age: 68
End: 2023-05-01
Payer: MEDICARE

## 2023-05-01 VITALS
WEIGHT: 120 LBS | BODY MASS INDEX: 24.19 KG/M2 | TEMPERATURE: 97.9 F | SYSTOLIC BLOOD PRESSURE: 131 MMHG | HEIGHT: 59 IN | DIASTOLIC BLOOD PRESSURE: 77 MMHG | HEART RATE: 82 BPM | OXYGEN SATURATION: 93 %

## 2023-05-01 DIAGNOSIS — R05.3 CHRONIC COUGH: ICD-10-CM

## 2023-05-01 PROCEDURE — 99214 OFFICE O/P EST MOD 30 MIN: CPT

## 2023-05-01 RX ORDER — FLUTICASONE PROPIONATE 50 UG/1
50 SPRAY, METERED NASAL TWICE DAILY
Qty: 1 | Refills: 1 | Status: ACTIVE | COMMUNITY
Start: 2023-05-01 | End: 1900-01-01

## 2023-05-01 NOTE — PHYSICAL EXAM
[No Acute Distress] : no acute distress [Well Developed] : well developed [No Respiratory Distress] : no respiratory distress  [No Accessory Muscle Use] : no accessory muscle use [Clear to Auscultation] : lungs were clear to auscultation bilaterally [Normal Rate] : normal rate  [Regular Rhythm] : with a regular rhythm [Normal S1, S2] : normal S1 and S2 [de-identified] : Answering questions appropriately. Gets on exam table (although note is a little unsteady) and ambulates without assistance

## 2023-05-01 NOTE — HISTORY OF PRESENT ILLNESS
[de-identified] : Letitia Ho is a 69yo F with PMhx of HTN, HLD, SAH s/p coiling of L p comm aneurysm, HIT c/b DVT, and osteoporosis who presents for follow-up after PNA diagnosis. Accompanied by  who translates.\par \par Last week developed coughing productive of white sputum and SOB, went to Urgent Care. Received X-ray, was told she has pneumonia. Took cefdinir and doxycycline for 7 days. She thinks she is about 20% better but still has coughing rolly at night, noting increased SOB with ambulation as well. Notes a sore throat this AM. No fevers throughout this course. No CP, rhinorrhea, congestion, sinus pain, nasal discharge,

## 2023-05-11 ENCOUNTER — OUTPATIENT (OUTPATIENT)
Dept: OUTPATIENT SERVICES | Facility: HOSPITAL | Age: 68
LOS: 1 days | End: 2023-05-11
Payer: MEDICARE

## 2023-05-11 ENCOUNTER — APPOINTMENT (OUTPATIENT)
Dept: CT IMAGING | Facility: CLINIC | Age: 68
End: 2023-05-11
Payer: MEDICARE

## 2023-05-11 DIAGNOSIS — Z98.41 CATARACT EXTRACTION STATUS, RIGHT EYE: Chronic | ICD-10-CM

## 2023-05-11 DIAGNOSIS — R05.3 CHRONIC COUGH: ICD-10-CM

## 2023-05-11 DIAGNOSIS — Z98.890 OTHER SPECIFIED POSTPROCEDURAL STATES: Chronic | ICD-10-CM

## 2023-05-11 PROCEDURE — 71250 CT THORAX DX C-: CPT | Mod: 26

## 2023-05-11 PROCEDURE — 71250 CT THORAX DX C-: CPT

## 2023-05-26 ENCOUNTER — APPOINTMENT (OUTPATIENT)
Dept: NEUROLOGY | Facility: CLINIC | Age: 68
End: 2023-05-26
Payer: MEDICARE

## 2023-05-26 VITALS
DIASTOLIC BLOOD PRESSURE: 80 MMHG | BODY MASS INDEX: 24.19 KG/M2 | HEART RATE: 79 BPM | WEIGHT: 120 LBS | HEIGHT: 59 IN | SYSTOLIC BLOOD PRESSURE: 127 MMHG

## 2023-05-26 PROCEDURE — 99213 OFFICE O/P EST LOW 20 MIN: CPT

## 2023-05-26 NOTE — PHYSICAL EXAM
[FreeTextEntry1] : MS: ESL\par alert & oriented to person, place time, good fund of knowledge and recall for recent and remote events. \par CN: P surgical pupils (cataract).  VFF to confrontation, EOM full without nystagmus, PERRL, V1-V3 intact to light touch, face symmetrical, hears finger rub bilaterally, palate elevates symmetrically, shoulders elevate symmetrically, tongue midline\par MOTOR: normal tone x 4 extremities, Power 5/5 proximally and distally bilaterally, no drift, normal rapid alternating movements. \par SENSORY: intact LT x 4 extremities\par REFLEXES: biceps 2+ bilaterally, triceps 1+ bilaterally, brachioradialis 1+ bilaterally, patella 1+ bilaterally, ankle 1+ bilaterally, plantar flexor bilaterally\par COORD: normal FNF, no tremor or dysmetria\par GAIT: normal base, Romberg negative, normal gait, able to walk on toes, heels and tandem.\par

## 2023-05-26 NOTE — DISCUSSION/SUMMARY
[Complex Partial] : complex partial [Focal] : focal [Symptomatic] : symptomatic [Risks Associated with Driving/[place] Law] : As per my usual protocol, the patient was advised in regards to risks and driving privileges associated with the [place] Guidelines.  [Safety Recommendations] : The patient was advised in regards to the risk of seizures and general seizure safety recommendations including not to be bathing alone, climbing to high places and operating heavy machinery. [Compliance with Medications] : The importance of compliance with medications was reinforced. [Medication Side Effects] : High frequency and serious potential medication adverse effects were reviewed with the patient, including but not exclusive to psychiatric effects.  Information sheets on medication side effects were made available to the patient in our clinic.  The patient or advocate agrees to notify us for any concerns. [Risk of Death] : Risk of death associated with seizures / SUDEP was discussed. [FreeTextEntry1] : 67 yo F h/o SAH then L PCOMM aneurysm rupture/repair 2021\par with symptomatic focal epilepsy, GTCS x 1 7/2022.\par LOC in 12/2022 abrupt ?syncopal in nature, unclear\par \par RBBB on ECG, seeing cardiology\par Imaging shows bifrontal R>L encephalomalacia.  Focus/memory affected\par Prior EEGs 12/2021 mod R>L slow, and 7/2022 nl.\par f/u AEEG 2/2023:: -Continuous polymorphic delta and theta activity in the right frontal region\par -Breach artifact with faster frequencies in bilateral frontal regions\par \par ongoing Cardio eval EP\par LEV to 750mg bid empirically for now\par exercise\par \par x time 25min\par f/u in 4 months (4) rarely moist

## 2023-05-26 NOTE — HISTORY OF PRESENT ILLNESS
[FreeTextEntry1] : \par Rx:\par amLODIPine 2.5 mg oral tablet: 1 tab(s) orally once a day \par levETIRAcetam 750 mg oral tablet: 1 tab(s) orally 2 times a day \par Multiple Vitamins oral tablet: 1 tab(s) orally once a day \par escitalopram 5 mg oral tablet: dc'd in 2022\par \par Updates:  recent bronchitis. -> recovered.  seeing pulmonary.\par no recurrent fall or LOC.\par walking 3x/week x 4miles\par \par HPI:  Patient previously seen by Dr Frank.\par Patient is a 68y Female with PMH of SAH then cerebral aneurysm rupture s/p coil (12/2021), HTN, HIT c/b DVT now off eliquis, depression, presenting to the ED 7/2022 after a seizure at home witnessed convulsion, arms jerking bilat per hus, Fractured left clavicle. focal motor impaired awareness seizure. Possibly provoked \par likely i/s/o infection.  - fever likely 2/2 entero/rhinovirus \par \par later Dec 7/2022 sitting at table, abrupt LOC, secs,  with nasal fracture. responsive, quickly, making sense, but remained 5 min on floor, no witnessed convulsive activity per hus, amnestic, \par \par s/p keppra x1 in the ED, c/w keppra 500 BID since then\par denies ADR, sedation, mood stable.\par \par Focus/short term memory affected. 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

## 2023-06-05 ENCOUNTER — APPOINTMENT (OUTPATIENT)
Dept: PULMONOLOGY | Facility: CLINIC | Age: 68
End: 2023-06-05
Payer: MEDICARE

## 2023-06-05 VITALS
HEIGHT: 59.45 IN | DIASTOLIC BLOOD PRESSURE: 81 MMHG | BODY MASS INDEX: 22.88 KG/M2 | WEIGHT: 115 LBS | HEART RATE: 89 BPM | SYSTOLIC BLOOD PRESSURE: 115 MMHG

## 2023-06-05 PROCEDURE — 99203 OFFICE O/P NEW LOW 30 MIN: CPT | Mod: 25

## 2023-06-05 PROCEDURE — ZZZZZ: CPT

## 2023-06-05 PROCEDURE — 94729 DIFFUSING CAPACITY: CPT

## 2023-06-05 PROCEDURE — 94726 PLETHYSMOGRAPHY LUNG VOLUMES: CPT

## 2023-06-05 PROCEDURE — 94060 EVALUATION OF WHEEZING: CPT

## 2023-06-05 NOTE — ASSESSMENT
[FreeTextEntry1] : Plan:\par \par -F/U CT Scan in 3 months. Teaching provided on the practice guidelines of low dose CT scan for the next 13 years due to history of smoking, verbalized understanding\par -Advised use of Albuterol prior to daily walks\par -Notify office immediately of any S/S of acute respiratory distress \par

## 2023-06-05 NOTE — DISCUSSION/SUMMARY
[FreeTextEntry1] : \par \par \par attending:\par agree with above\par accompanied by her \par 68-year-old woman, former smoker 40-pack-year history but quit 2 years ago.  Patient has a history of subarachnoid hemorrhage cerebral thrombosis, HIT and clot in the setting of a catheter, overall doing well post all of that with no residual defects.  Patient had what sounds like some kind of respiratory illness earlier this spring.  Coughing, clear phlegm.  Given albuterol.  She had a CT of the chest which showed minimal bronchiectasis and mucoid impaction, small nonspec groundglass.  Patient actually is feeling better.  Her cough has resolved.  Patient has no known prior lung history.  She is from [place].  Moved here in the 1980s.  Denies any known history of childhood pneumonias, tuberculosis, or asthma.  Has never had a pulmonary issue until the cough at the respiratory illness this spring.  Does not may have chronic phlegm production.  Other than this episode has not required antibiotics.  He and her  walk 4 to 6 miles at a time.  When walking uphill during that walk she does note that she takes her albuterol.\par Her PFT today is normal.  And there is no response to bronchodilators.\par Recommend follow-up CT chest in 3 months for stability.  Assuming that is stable, she was advised that she will need annual low-dose CT chest, as she is only quit smoking 2 years ago.\par Follow-up albuterol 10 to 15 minutes prior to exercise.  And as needed\par If any other symptoms recur that suggest bronchiectasis exacerbations call right away

## 2023-06-05 NOTE — REVIEW OF SYSTEMS
[Seasonal Allergies] : seasonal allergies [Negative] : Endocrine [Hay Fever] : no hay fever [Watery Eyes] : no watery eyes [Itchy Eyes] : no itchy eyes [Nasal Discharge] : no nasal discharge [Hives] : no hives [Angioedema] : no angioedema

## 2023-06-05 NOTE — HISTORY OF PRESENT ILLNESS
[Former] : former [Never] : never [TextBox_4] : Pt is a 67y/o F with past medical history of HTN, HLD, SAH s/p coiling of L p comm aneurysm, HIT c/b DVT, and osteoporosis who presents for to establish pulmonary care on the recommendation of her PCP.\par \par  Endorses episodes of cough, congestion, sob, and wheezing and was diagnosed by urgent care with PNA in February 2023 and Bronchitis in April 2023,  treated with dose of steroids, antibiotics, and Albuterol with good effect.  With history of seasonal allergies, uses Zyrtec and albuterol with good response.\par \par Current denies cough, SOB, wheezing, fever, chest tightness, hoarseness, change in voice, or rhinorrhea. Continue to use Albuterol only as needed. Reports an overall active lifestyle walking 4-6 miles daily with some sob at times. Denies childhood asthma, PNA history, or excessive mucus production. With not currently deficits S/P aneurysm, not dysphasia. 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 [YearQuit] : 2021

## 2023-06-06 NOTE — PROGRESS NOTE ADULT - SUBJECTIVE AND OBJECTIVE BOX
CC:  Tiffany Avalos is here today for New Patient and Office Visit      Triggers to nasal symptoms include:  House dust, Temperature change and Cleaning Products.  Triggers to breathing Problems:  Cleaning products, Temperature change, Humidity, Spring and Fall.  Triggers to hives:  n/a    Patient would like communication of their results via:      Cell Phone:   Telephone Information:   Mobile [phone]     Okay to leave a message containing results? Yes    ENVIRONMENTAL HISTORY:  Tiffany Avalos lives in a Duplex. Pets:  has 1 dog(s), 1cat(s) and 2 ginepigs  The home has Forced Air heat. Air-conditioning Central air. The bedroom has Carpet.  Sleeps on a innerspring mattress. Basement: dry.  Mold is not apparent in the home. Cockroaches are not present in the home.  Occupation: diasblity  .  Hobbies: bingo, target shooting    Vascular Cardiology Progress Note    DIRECT SERVICE NUMBER:  [phone]        OFFICE [phone]    CC:  subarachnoid hemorrhage    Interval Events:  Remains on argatroban gtt.  R arm is stable.  Remains in NSICU.    Allergies  No Known Allergies    MEDICATIONS  (STANDING):  argatroban Infusion 1.5 MICROgram(s)/kG/Min (6.12 mL/Hr) IV Continuous <Continuous>  ciprofloxacin     Tablet 500 milliGRAM(s) Oral every 12 hours  niMODipine Oral Solution 30 milliGRAM(s) Enteral Tube every 2 hours  nystatin    Suspension 502584 Unit(s) Oral every 6 hours  nystatin Powder 1 Application(s) Topical every 12 hours  sodium chloride 1 Gram(s) Oral every 6 hours    PAST MEDICAL & SURGICAL HISTORY:  see HPI    FAMILY HISTORY: see HPI      SOCIAL HISTORY:  unchanged    REVIEW OF SYSTEMS:  12 Point ROS negative except as per subjective and HPI    Vital Signs Last 24 Hrs  T(C): 36.8 (28 Dec 2021 15:00), Max: 36.8 (28 Dec 2021 15:00)  T(F): 98.3 (28 Dec 2021 15:00), Max: 98.3 (28 Dec 2021 15:00)  HR: 86 (28 Dec 2021 15:00) (67 - 93)  RR: 17 (28 Dec 2021 15:00) (13 - 20)  SpO2: 100% (28 Dec 2021 15:00) (89% - 100%)    Appearance: NAD 	  HEENT: EVD in place  Respiratory: CTA B/L  Cardiac: RRR, S1 and S2  Psychiatry:  Alert and Awake  Gastrointestinal:  Soft, Non-tender, + BS	  Skin: No rashes, No ecchymoses, No cyanosis	  Extremities:  R arm without edema.  PICC line in place.  R A- line.    SCDs in place. No LE edema    Labs:                             11.8   9.18  )-----------( 251      ( 27 Dec 2021 21:56 )             34.6    12-28    136  |  102  |  20  ----------------------------<  126<H>  3.6   |  21<L>  |  0.34<L>    Ca    8.8      28 Dec 2021 10:40  Phos  3.2     12-28  Mg     2.1     12-28    PT/INR - ( 28 Dec 2021 10:40 )   PT: 14.7 sec;   INR: 1.24 ratio    PTT - ( 28 Dec 2021 10:40 )  PTT:47.5 sec      Assessment:  1. R subclavian vein PICC line associated thrombus  -  small clot burden  2.  ICH  3.  PCOM Aneurysm  4. L Parietal M2 thrombus s/p thrombectomy  5. Hydrocephalus s/p EVD  6. HIT  - confirmed by BALJEET    Plan:  1. On Argatroban Goal , can keep closer to 55.    2. If the PICC line is functioning, and needed, it can remain in for now.  3. Appreciate Hematology follow-up  4. Repeat venous duplex of the upper extremity to assure stability on Wednesday.  5. If the PICC line is not needed in the near future, it should be removed.    6. Right upper extremity without edema and distal pulses intact.   7. Continue excellent neurosurgical care.     Thank you  OSMANY Louis, MPAS  Vascular Cardiology Service    Please call with any questions:   DIRECT SERVICE NUMBER:  [phone]  Office [phone]    -

## 2023-06-09 ENCOUNTER — APPOINTMENT (OUTPATIENT)
Dept: CARDIOLOGY | Facility: CLINIC | Age: 68
End: 2023-06-09

## 2023-06-12 ENCOUNTER — APPOINTMENT (OUTPATIENT)
Dept: MRI IMAGING | Facility: CLINIC | Age: 68
End: 2023-06-12
Payer: MEDICARE

## 2023-06-12 ENCOUNTER — OUTPATIENT (OUTPATIENT)
Dept: OUTPATIENT SERVICES | Facility: HOSPITAL | Age: 68
LOS: 1 days | End: 2023-06-12
Payer: MEDICARE

## 2023-06-12 DIAGNOSIS — Z00.00 ENCOUNTER FOR GENERAL ADULT MEDICAL EXAMINATION WITHOUT ABNORMAL FINDINGS: ICD-10-CM

## 2023-06-12 DIAGNOSIS — Z98.890 OTHER SPECIFIED POSTPROCEDURAL STATES: Chronic | ICD-10-CM

## 2023-06-12 DIAGNOSIS — Z98.41 CATARACT EXTRACTION STATUS, RIGHT EYE: Chronic | ICD-10-CM

## 2023-06-12 PROCEDURE — 70546 MR ANGIOGRAPH HEAD W/O&W/DYE: CPT

## 2023-06-12 PROCEDURE — 70546 MR ANGIOGRAPH HEAD W/O&W/DYE: CPT | Mod: 26

## 2023-07-03 ENCOUNTER — NON-APPOINTMENT (OUTPATIENT)
Age: 68
End: 2023-07-03

## 2023-07-07 ENCOUNTER — TRANSCRIPTION ENCOUNTER (OUTPATIENT)
Age: 68
End: 2023-07-07

## 2023-07-10 ENCOUNTER — APPOINTMENT (OUTPATIENT)
Dept: INTERNAL MEDICINE | Facility: CLINIC | Age: 68
End: 2023-07-10
Payer: MEDICARE

## 2023-07-10 VITALS
HEIGHT: 59.45 IN | WEIGHT: 115 LBS | DIASTOLIC BLOOD PRESSURE: 70 MMHG | BODY MASS INDEX: 22.88 KG/M2 | OXYGEN SATURATION: 94 % | SYSTOLIC BLOOD PRESSURE: 130 MMHG | HEART RATE: 91 BPM

## 2023-07-10 PROCEDURE — 99213 OFFICE O/P EST LOW 20 MIN: CPT

## 2023-07-10 NOTE — HISTORY OF PRESENT ILLNESS
[de-identified] : Letitia Ho is a 67yo F with PMhx of HTN, HLD, SAH s/p coiling of L p comm aneurysm, HIT c/b DVT, and osteoporosis who presents for follow-up after for SOB. Accompanied by  who translates.\par \tadeo Sx initally started in May 2023 after she was diagnosed with PNA at an . She received abx and was still having cough/SOB. Saw me after, and I prescribed albuterol with some relief, however her sx were still quite severe so I prescribed prednisone burst for presumed asthma/postinfectious bronchitis. Her sx improved and she saw pulm and PFTs were normal in early Jun 2023.\par \tadeo Was still having some some SOB (although cough resolved) and using albuterol 2-3 times a day. In late June she traveled to [place] for vacation. About 4-5 days after arriving had worsening PETER again with cough and wheezing. 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 Cough is productive of clear sputum. NO fever, rhoinorrhea, CP, leg pain. [place] to [place] and sx have not improved. \par \par  thinks she may have been on prednisone during her PFTs.

## 2023-07-10 NOTE — PHYSICAL EXAM
[No Acute Distress] : no acute distress [No Respiratory Distress] : no respiratory distress  [No Accessory Muscle Use] : no accessory muscle use [Normal Rate] : normal rate  [Regular Rhythm] : with a regular rhythm [Normal S1, S2] : normal S1 and S2 [No Murmur] : no murmur heard [No Edema] : there was no peripheral edema [de-identified] : Inspiratory wheezing more prominent on left  [de-identified] : Answering questions appropriately. Gets on exam table and ambulates without assistance

## 2023-07-11 ENCOUNTER — APPOINTMENT (OUTPATIENT)
Dept: PULMONOLOGY | Facility: CLINIC | Age: 68
End: 2023-07-11
Payer: MEDICARE

## 2023-07-11 ENCOUNTER — LABORATORY RESULT (OUTPATIENT)
Age: 68
End: 2023-07-11

## 2023-07-11 ENCOUNTER — APPOINTMENT (OUTPATIENT)
Dept: RADIOLOGY | Facility: IMAGING CENTER | Age: 68
End: 2023-07-11
Payer: MEDICARE

## 2023-07-11 ENCOUNTER — OUTPATIENT (OUTPATIENT)
Dept: OUTPATIENT SERVICES | Facility: HOSPITAL | Age: 68
LOS: 1 days | End: 2023-07-11
Payer: MEDICARE

## 2023-07-11 ENCOUNTER — APPOINTMENT (OUTPATIENT)
Dept: PULMONOLOGY | Facility: CLINIC | Age: 68
End: 2023-07-11
Payer: SELF-PAY

## 2023-07-11 VITALS
SYSTOLIC BLOOD PRESSURE: 149 MMHG | RESPIRATION RATE: 17 BRPM | OXYGEN SATURATION: 95 % | HEIGHT: 59 IN | HEART RATE: 88 BPM | DIASTOLIC BLOOD PRESSURE: 98 MMHG | TEMPERATURE: 97.8 F | WEIGHT: 115 LBS | BODY MASS INDEX: 23.18 KG/M2

## 2023-07-11 DIAGNOSIS — Z87.891 PERSONAL HISTORY OF NICOTINE DEPENDENCE: ICD-10-CM

## 2023-07-11 DIAGNOSIS — J45.909 UNSPECIFIED ASTHMA, UNCOMPLICATED: ICD-10-CM

## 2023-07-11 DIAGNOSIS — R05.3 CHRONIC COUGH: ICD-10-CM

## 2023-07-11 DIAGNOSIS — R06.02 SHORTNESS OF BREATH: ICD-10-CM

## 2023-07-11 DIAGNOSIS — J47.9 BRONCHIECTASIS, UNCOMPLICATED: ICD-10-CM

## 2023-07-11 DIAGNOSIS — Z98.41 CATARACT EXTRACTION STATUS, RIGHT EYE: Chronic | ICD-10-CM

## 2023-07-11 PROCEDURE — 99214 OFFICE O/P EST MOD 30 MIN: CPT

## 2023-07-11 PROCEDURE — 71046 X-RAY EXAM CHEST 2 VIEWS: CPT | Mod: 26

## 2023-07-11 PROCEDURE — 71046 X-RAY EXAM CHEST 2 VIEWS: CPT

## 2023-07-11 PROCEDURE — 94799 UNLISTED PULMONARY SVC/PX: CPT

## 2023-07-11 NOTE — PHYSICAL EXAM
[No Acute Distress] : no acute distress [Well Nourished] : well nourished [Well Groomed] : well groomed [Well Developed] : well developed [Normal Oropharynx] : normal oropharynx [Normal Appearance] : normal appearance [Supple] : supple [No Neck Mass] : no neck mass [No JVD] : no jvd [Normal Rate/Rhythm] : normal rate/rhythm [Normal S1, S2] : normal s1, s2 [No Murmurs] : no murmurs [No Resp Distress] : no resp distress [Wheeze] : wheeze [No Abnormalities] : no abnormalities [Benign] : benign [Normal Gait] : normal gait [No Clubbing] : no clubbing [No Cyanosis] : no cyanosis [No Edema] : no edema [Normal Color/ Pigmentation] : normal color/ pigmentation [No Focal Deficits] : no focal deficits [Oriented x3] : oriented x3 [Normal Affect] : normal affect

## 2023-07-11 NOTE — ASSESSMENT
[FreeTextEntry1] : 67 yo F, former smoker 40-pack-year history but quit 2 years ago. Patient has a history of subarachnoid hemorrhage cerebral thrombosis, HIT and clot in the setting of a catheter, overall doing well post all of that with no residual defects. She has has repeated bouts of "pneumonia" over past year (3 episodes total) with persistent cough, sputum production and wheezing + PETER which respond favorably to Albuterol inh and Prednisone.  Chest CT with findings of bronchiectasis and some nodules - cannot exclude chronic infection with NTM and underlying RAD.\par \par #RAD/Asthma\par - Complete 5d course of Prednisone given active wheezing\par - start LABA/ICS (low dose) with Advair (alternative if not covered)\par - Will repeat [place] with BD when off Prednisone for at least two weeks\par - check FeNO\par - Labs today including IgE, CBC with diff, Asthma profile\par - May benefit from addition of Singulair\par \par #Bronchiectasis\par - CXR now\par - Chest CT ordered ok for 3-6mos from prior (completed 05/2023)\par - AFB sputum x 3\par - Sputum culture to assess for other bacteria \par - Aerobika device to be used once daily for airway clearance (can be used inline with Nebulizer)\par - New Nebulizer rx\par - Start Hypersal once daily for ACT \par - Immunoglobulin panel, ESR/CRP\par \par #Seasonal allergies\par - continue Zyrtec\par \par RTC in 6 weeks for follow-up.\par \par Anyi Costa MD\par

## 2023-07-11 NOTE — REVIEW OF SYSTEMS
[Fever] : no fever [Fatigue] : fatigue [Chills] : no chills [Recent Wt Loss (___ Lbs)] : ~T recent [unfilled] lb weight loss [Sore Throat] : no sore throat [Nasal Congestion] : no nasal congestion [Postnasal Drip] : no postnasal drip [Sinus Problems] : no sinus problems [Cough] : cough [Hemoptysis] : no hemoptysis [Sputum] : sputum [Dyspnea] : no dyspnea [Pleuritic Pain] : no pleuritic pain [Wheezing] : wheezing [SOB on Exertion] : sob on exertion [Chest Discomfort] : no chest discomfort [Edema] : no edema [Orthopnea] : orthopnea [Palpitations] : no palpitations [Syncope] : no syncope [Seasonal Allergies] : seasonal allergies [GERD] : no gerd [Abdominal Pain] : no abdominal pain [Nausea] : no nausea [Vomiting] : no vomiting [Diarrhea] : no diarrhea [Dysphagia] : no dysphagia [Dysuria] : no dysuria [Arthralgias] : no arthralgias [Myalgias] : no myalgias [Rash] : no rash [Headache] : no headache [Diabetes] : no diabetes [Thyroid Problem] : no thyroid problem [Obesity] : no obesity

## 2023-07-11 NOTE — REASON FOR VISIT
[Consultation] : a consultation [Cough] : cough [Bronchiectasis] : bronchiectasis [Shortness of Breath] : shortness of breath [Wheezing] : wheezing [Spouse] : spouse

## 2023-07-11 NOTE — HISTORY OF PRESENT ILLNESS
[Former] : former [>= 20 pack years] : >= 20 pack years [TextBox_4] : Ms. Ho is a  69 y/o F with PMHx HTN, HLD, SAH s/p coiling of L p comm aneurysm, HIT c/b DVT, seasonal allergies (takes Zyrtec intermittently) and osteoporosis who presents for evaluation of recurrent productive cough, shortness of breath with wheezing.Endorses episodes of cough, congestion, sob, and wheezing and was diagnosed by urgent care with PNA in February 2023 and Bronchitis in April 2023, treated with dose of steroids, antibiotics, and Albuterol with good effect. She recently traveled to [place] and while abroad developed wheezing/SOB again , used up her Albuterol inhaler within two weeks and had to get an emergency inhaler in [facility] (attained Salbutamol).  She continues to wheeze and has productive cough of white sputum - denies fever, chills but does report intermittent night sweats.  She has lost about 5lbs. Her PCP prescribed Prednisone 40mg x 5 days and she took first dose last night.  \par She denies any symptoms of PND, sinus congestion, GERD or dysphagia.\par \par Denies childhood asthma, PNA history, or excessive mucus production. With not currently deficits S/P aneurysm, not dysphasia. 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 \par  [YearQuit] : 2021

## 2023-07-12 ENCOUNTER — APPOINTMENT (OUTPATIENT)
Dept: NEUROSURGERY | Facility: CLINIC | Age: 68
End: 2023-07-12
Payer: MEDICARE

## 2023-07-12 VITALS
HEART RATE: 76 BPM | TEMPERATURE: 97.3 F | SYSTOLIC BLOOD PRESSURE: 122 MMHG | OXYGEN SATURATION: 95 % | HEIGHT: 59 IN | DIASTOLIC BLOOD PRESSURE: 86 MMHG | WEIGHT: 115 LBS | BODY MASS INDEX: 23.18 KG/M2

## 2023-07-12 PROCEDURE — 99213 OFFICE O/P EST LOW 20 MIN: CPT

## 2023-07-12 RX ORDER — LIDOCAINE 40 MG/G
4 PATCH TOPICAL
Qty: 1 | Refills: 2 | Status: DISCONTINUED | COMMUNITY
Start: 2022-12-09 | End: 2023-07-12

## 2023-07-12 NOTE — REASON FOR VISIT
[Follow-Up: _____] : a [unfilled] follow-up visit [Spouse] : spouse [FreeTextEntry1] : Reviewed results of MRA head w/wo IV contrast done 6/12/23\par \par s/p follow up cerebral angiogram 6/9/22\par \par s/p cerebral angiogram and coil embolization of ruptured left PCOMM aneurysm 12/11/21

## 2023-07-12 NOTE — PHYSICAL EXAM
[Motor Tone] : muscle tone was normal in all four extremities [Motor Strength] : muscle strength was normal in all four extremities [Abnormal Walk] : normal gait [Balance] : balance was intact [Person] : oriented to person [Place] : oriented to place [Time] : oriented to time

## 2023-07-12 NOTE — HISTORY OF PRESENT ILLNESS
[FreeTextEntry1] : OZ DAVIS is a 68 year old female with no significant PMH, transferred from [facility] for headache, with associated nausea/vomiting, posterior neck pain. Found to have SAH due to ruptured left PCOMM aneurysm. On 12/11/21, she underwent cerebral angiogram and balloon assisted coil embolization. A thrombus was noted in a distal M3 branch at the end of the procedure which was removed completely with aspiration. Hospital course complicated by EVD tract hemorrhage. Repeat angiogram on 12/17/21, and 12/22/21 with treatment of mild to moderate vasospasm with intra-arterial milrinone. 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 Discharged to [facility]. Currently home, discharged from rehab.\par \par On 6/9/22, she underwent 6 month follow up cerebral angiogram demonstrating persistent occlusion of the previously embolized ruptured left posterior communicating artery aneurysm with no evidence of recanalization. No additional significant intracranial findings.\par \par Today, she is feeling well overall, denies symptoms of motor, sensory, speech, or visual abnormalities. She recently returned from a trip.

## 2023-07-12 NOTE — ASSESSMENT
[FreeTextEntry1] : IMPRESSION: \par 68F no PMH p/w HA, N+V, posterior neck pain, CT showed SAH s/p angio and coiling of ruptured L PCOMM aneurysm on 12/11/21, s/p EVD placement which was complicated by tract hemorrhage, eventually weaned and removed, S/p repeat angios 12/17/21, 12/22/21 with vasospasm treatment. s/p 6 month f/u angio 6/9/22 demonstrating persistent occlusion of the previously embolized ruptured L PCOMM aneurysm with no evidence of recanalization. No additional significant intracranial findings.\par \par Patient doing clinically well. Denies symptoms of motor, sensory, speech, or visual abnormalities. She is able to perform activities of daily living. \par \par MRA head w/wo 6/12/23 shows 1-2mm segment of enhancement adjacent to the coil mass which could be slight residual aneurysm or the origin of the PComm. Since the angio looked so good, will follow this with repeat MRAs and can offer flow diversion down the line if there is progression. \par \par 18 month follow up visit for RAGE study trial: No rebleed has occurred. Retreatment not performed since last visit. mRS at this visit: 1\par \par \par \par PLAN:\par MRA head w/wo IV contrast in 1 year - June 2024\par Follow up visit after to review results\par

## 2023-07-14 ENCOUNTER — APPOINTMENT (OUTPATIENT)
Dept: CARDIOLOGY | Facility: CLINIC | Age: 68
End: 2023-07-14
Payer: MEDICARE

## 2023-07-14 ENCOUNTER — NON-APPOINTMENT (OUTPATIENT)
Age: 68
End: 2023-07-14

## 2023-07-14 VITALS — HEART RATE: 66 BPM | OXYGEN SATURATION: 96 % | SYSTOLIC BLOOD PRESSURE: 133 MMHG | DIASTOLIC BLOOD PRESSURE: 83 MMHG

## 2023-07-14 PROCEDURE — 99214 OFFICE O/P EST MOD 30 MIN: CPT

## 2023-07-14 NOTE — ASSESSMENT
[FreeTextEntry1] : Assessment:\par \par 1. Provoked (PICC line) R Subclavian DVT 12/23/21\par     DVT resolved \par 2. HIT\par 3. SAH 2/2 aneurysm s/p coiling and EVD 12/2021\par 4. HTN\par 5. HLD\par \par Plan:\par 1.   Doing well\par 2.  No cardiac testing at this time\par 3.  RTC in 1 year

## 2023-07-14 NOTE — HISTORY OF PRESENT ILLNESS
[FreeTextEntry1] : \par 7/14/2023\par no more LOC\par Breathing ok \par No palps. \par No CP or SOB\par BP and HR well controlled\par Seen by Dr. Lucas \par \par Meds:\par Keppra\par Pravastatin \par Amldipine 2.5mg daily \par MVI\par Inhalor \par \par \par 12/19/2022\par \par Had LOC and broke nose, went to ER, had CT head, CT chest ruled out PE.  \par seen by Dr. Pascal Dec 9th\par Here for cardiac eval to assure no cardiac cause\par does not recall event, happened at home, while in kitchen, no prodrome\par no CP /sob/palptiations.  \par \par \par \par 6/7/2022\par \par No C/P or SOB.\par No UE or LE edema.\par Upper extremity venous duplex in 4/2022 showed no DVT, prior DVT resolved.\par Planned for cerebral angiogram on 6/9 with Dr. Lucas. \par D-dimer negative in 5/2022.\par \par Medications:\par Eliquis 5 mg BID\par Norvasc 2.5 mg daily\par Lexapro\par \par 3/2022\par \par 67 year-old Ms. DAVIS is seen in consult for h/o HIT and right subclavian DVT in setting of PICC linewhile was admitted with SAH and cerebral vessel thrombosis (s/p thrombectomy).\par \par Hx of right subclavian DVT, provoked in setting of PICC line 12/23/2021\par GILLIAN subsequently, agatroban -> eliquis\par Compliant with Eliquis\par No repeat doppler since\par No prior clots / bleeding\par No prior cancer screenings\par No upper or lower extremity edema\par No progression of SAH\par Seeing Hematology, no plan for further workup

## 2023-07-28 ENCOUNTER — APPOINTMENT (OUTPATIENT)
Dept: INTERNAL MEDICINE | Facility: CLINIC | Age: 68
End: 2023-07-28

## 2023-08-02 ENCOUNTER — APPOINTMENT (OUTPATIENT)
Dept: INTERNAL MEDICINE | Facility: CLINIC | Age: 68
End: 2023-08-02
Payer: MEDICARE

## 2023-08-02 ENCOUNTER — OUTPATIENT (OUTPATIENT)
Dept: OUTPATIENT SERVICES | Facility: HOSPITAL | Age: 68
LOS: 1 days | End: 2023-08-02
Payer: MEDICARE

## 2023-08-02 VITALS
HEART RATE: 65 BPM | SYSTOLIC BLOOD PRESSURE: 118 MMHG | HEIGHT: 59 IN | OXYGEN SATURATION: 97 % | DIASTOLIC BLOOD PRESSURE: 72 MMHG | WEIGHT: 115 LBS | BODY MASS INDEX: 23.18 KG/M2

## 2023-08-02 DIAGNOSIS — Z98.890 OTHER SPECIFIED POSTPROCEDURAL STATES: Chronic | ICD-10-CM

## 2023-08-02 DIAGNOSIS — Z12.11 ENCOUNTER FOR SCREENING FOR MALIGNANT NEOPLASM OF COLON: ICD-10-CM

## 2023-08-02 DIAGNOSIS — R15.9 UNSPECIFIED URINARY INCONTINENCE: ICD-10-CM

## 2023-08-02 DIAGNOSIS — Z20.822 CONTACT WITH AND (SUSPECTED) EXPOSURE TO COVID-19: ICD-10-CM

## 2023-08-02 DIAGNOSIS — G31.84 MILD COGNITIVE IMPAIRMENT, SO STATED: ICD-10-CM

## 2023-08-02 DIAGNOSIS — I10 ESSENTIAL (PRIMARY) HYPERTENSION: ICD-10-CM

## 2023-08-02 DIAGNOSIS — Z51.81 ENCOUNTER FOR THERAPEUTIC DRUG LVL MONITORING: ICD-10-CM

## 2023-08-02 DIAGNOSIS — Z23 ENCOUNTER FOR IMMUNIZATION: ICD-10-CM

## 2023-08-02 DIAGNOSIS — G31.84 MILD COGNITIVE IMPAIRMENT OF UNCERTAIN OR UNKNOWN ETIOLOGY: ICD-10-CM

## 2023-08-02 DIAGNOSIS — R55 SYNCOPE AND COLLAPSE: ICD-10-CM

## 2023-08-02 DIAGNOSIS — Z87.81 PERSONAL HISTORY OF (HEALED) TRAUMATIC FRACTURE: ICD-10-CM

## 2023-08-02 DIAGNOSIS — Z98.41 CATARACT EXTRACTION STATUS, RIGHT EYE: Chronic | ICD-10-CM

## 2023-08-02 DIAGNOSIS — Z00.00 ENCOUNTER FOR GENERAL ADULT MEDICAL EXAMINATION WITHOUT ABNORMAL FINDINGS: ICD-10-CM

## 2023-08-02 DIAGNOSIS — R32 UNSPECIFIED URINARY INCONTINENCE: ICD-10-CM

## 2023-08-02 PROCEDURE — G0439: CPT

## 2023-08-02 PROCEDURE — 99397 PER PM REEVAL EST PAT 65+ YR: CPT

## 2023-08-02 NOTE — REVIEW OF SYSTEMS
[Chest Pain] : no chest pain [Abdominal Pain] : no abdominal pain [Constipation] : no constipation [Diarrhea] : diarrhea [Dysuria] : no dysuria

## 2023-08-02 NOTE — ASSESSMENT
[FreeTextEntry1] : HCM: - Mammogram placed - Gastroenterology referral for colonoscopy - a1c, CBC, CMP, lipids, vitamin D  RTC 2-3 months for follow-up of multiple med complaints

## 2023-08-02 NOTE — HISTORY OF PRESENT ILLNESS
[de-identified] : Letitia Ho is a 67yo F with PMhx of SAH s/p coiling of L p comm aneurysm, HIT c/b DVT, asthma/ bronchiectasis, HTN, HLD, and osteoporosis who presents for CPE.  Doing much better with new regimen from pulm, although still using albuterol nebulizer almost 4 times a day??? Weaned off of saline once her cough disappeared Plans to travel to [place] in 1-2 months.

## 2023-08-02 NOTE — PHYSICAL EXAM
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 [No Acute Distress] : no acute distress [Well Developed] : well developed [Normal Sclera/Conjunctiva] : normal sclera/conjunctiva [EOMI] : extraocular movements intact [Normal Outer Ear/Nose] : the outer ears and nose were normal in appearance [Normal Oropharynx] : the oropharynx was normal [Normal TMs] : both tympanic membranes were normal [No Lymphadenopathy] : no lymphadenopathy [Thyroid Normal, No Nodules] : the thyroid was normal and there were no nodules present [No Respiratory Distress] : no respiratory distress  [No Accessory Muscle Use] : no accessory muscle use [Clear to Auscultation] : lungs were clear to auscultation bilaterally [Normal Rate] : normal rate  [Normal S1, S2] : normal S1 and S2 [No Murmur] : no murmur heard [No Edema] : there was no peripheral edema [Soft] : abdomen soft [Non Tender] : non-tender [Non-distended] : non-distended [Normal Affect] : the affect was normal [Normal Insight/Judgement] : insight and judgment were intact [de-identified] : Pupils equal but not reactive [de-identified] : ?thyroid nodule [de-identified] : Mildly irregular heartbeat  [de-identified] : No facial asymmetry. Strength equal in the upper and lower extremities. Finger-to-nose normal, heel-to-shin normal [SlumsTotal] : 24

## 2023-08-02 NOTE — HEALTH RISK ASSESSMENT
[No] : In the past 12 months have you used drugs other than those required for medical reasons? 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 [[id number]] : 0 [Reports changes in hearing] : Reports no changes in hearing [Reports changes in dental health] : Reports no changes in dental health [Guns at Home] : no guns at home [de-identified] : Needs to see eye doctor

## 2023-08-03 LAB
25(OH)D3 SERPL-MCNC: 44.6 NG/ML
ALBUMIN SERPL ELPH-MCNC: 4.6 G/DL
ALP BLD-CCNC: 78 U/L
ALT SERPL-CCNC: 27 U/L
ANION GAP SERPL CALC-SCNC: 15 MMOL/L
AST SERPL-CCNC: 30 U/L
BILIRUB SERPL-MCNC: 0.4 MG/DL
BUN SERPL-MCNC: 11 MG/DL
CALCIUM SERPL-MCNC: 9.4 MG/DL
CHLORIDE SERPL-SCNC: 104 MMOL/L
CHOLEST SERPL-MCNC: 183 MG/DL
CO2 SERPL-SCNC: 23 MMOL/L
CREAT SERPL-MCNC: 0.64 MG/DL
EGFR: 97 ML/MIN/1.73M2
ESTIMATED AVERAGE GLUCOSE: 117 MG/DL
GLUCOSE SERPL-MCNC: 68 MG/DL
HBA1C MFR BLD HPLC: 5.7 %
HDLC SERPL-MCNC: 66 MG/DL
LDLC SERPL CALC-MCNC: 95 MG/DL
NONHDLC SERPL-MCNC: 117 MG/DL
POTASSIUM SERPL-SCNC: 5.1 MMOL/L
PROT SERPL-MCNC: 7 G/DL
SODIUM SERPL-SCNC: 142 MMOL/L
TRIGL SERPL-MCNC: 129 MG/DL

## 2023-08-22 ENCOUNTER — APPOINTMENT (OUTPATIENT)
Dept: PULMONOLOGY | Facility: CLINIC | Age: 68
End: 2023-08-22
Payer: MEDICARE

## 2023-08-22 VITALS
HEIGHT: 61 IN | RESPIRATION RATE: 17 BRPM | OXYGEN SATURATION: 97 % | BODY MASS INDEX: 21.52 KG/M2 | WEIGHT: 114 LBS | SYSTOLIC BLOOD PRESSURE: 105 MMHG | DIASTOLIC BLOOD PRESSURE: 68 MMHG | HEART RATE: 64 BPM | TEMPERATURE: 97.4 F

## 2023-08-22 DIAGNOSIS — A31.0 PULMONARY MYCOBACTERIAL INFECTION: ICD-10-CM

## 2023-08-22 LAB
A ALTERNATA IGE QN: <0.1 KUA/L
A FUMIGATUS IGE QN: <0.1 KUA/L
BACTERIA SPT CULT: NORMAL
C ALBICANS IGE QN: <0.1 KUA/L
C HERBARUM IGE QN: <0.1 KUA/L
CAT DANDER IGE QN: <0.1 KUA/L
COMMON RAGWEED IGE QN: 0.14 KUA/L
CRP SERPL-MCNC: <3 MG/L
D FARINAE IGE QN: 0.31 KUA/L
D PTERONYSS IGE QN: 0.34 KUA/L
DEPRECATED A ALTERNATA IGE RAST QL: 0
DEPRECATED A FUMIGATUS IGE RAST QL: 0
DEPRECATED C ALBICANS IGE RAST QL: 0
DEPRECATED C HERBARUM IGE RAST QL: 0
DEPRECATED CAT DANDER IGE RAST QL: 0
DEPRECATED COMMON RAGWEED IGE RAST QL: NORMAL
DEPRECATED D FARINAE IGE RAST QL: NORMAL
DEPRECATED D PTERONYSS IGE RAST QL: NORMAL
DEPRECATED DOG DANDER IGE RAST QL: 0
DEPRECATED KAPPA LC FREE/LAMBDA SER: 0.99 RATIO
DEPRECATED M RACEMOSUS IGE RAST QL: 0
DEPRECATED ROACH IGE RAST QL: NORMAL
DEPRECATED TIMOTHY IGE RAST QL: 0
DEPRECATED WHITE OAK IGE RAST QL: 0
DOG DANDER IGE QN: <0.1 KUA/L
ERYTHROCYTE [SEDIMENTATION RATE] IN BLOOD BY WESTERGREN METHOD: 39 MM/HR
IGA SER QL IEP: 313 MG/DL
IGG SER QL IEP: 1164 MG/DL
IGM SER QL IEP: 117 MG/DL
KAPPA LC CSF-MCNC: 1.64 MG/DL
KAPPA LC SERPL-MCNC: 1.62 MG/DL
M RACEMOSUS IGE QN: <0.1 KUA/L
ROACH IGE QN: 0.3 KUA/L
TIMOTHY IGE QN: <0.1 KUA/L
TOTAL IGE SMQN RAST: 616 KU/L
WHITE OAK IGE QN: <0.1 KUA/L

## 2023-08-22 PROCEDURE — 99215 OFFICE O/P EST HI 40 MIN: CPT

## 2023-08-22 NOTE — REVIEW OF SYSTEMS
[Fever] : no fever [Fatigue] : no fatigue [Recent Wt Gain (___ Lbs)] : ~T no recent weight gain [Chills] : no chills [Recent Wt Loss (___ Lbs)] : ~T no recent weight loss [Sore Throat] : no sore throat [Nasal Congestion] : no nasal congestion [Postnasal Drip] : no postnasal drip [Sinus Problems] : no sinus problems [Cough] : no cough [Sputum] : no sputum [Dyspnea] : no dyspnea [Pleuritic Pain] : no pleuritic pain [Wheezing] : no wheezing [SOB on Exertion] : no sob on exertion [Chest Discomfort] : no chest discomfort [Edema] : no edema [Palpitations] : no palpitations [Seasonal Allergies] : seasonal allergies [GERD] : no gerd [Abdominal Pain] : no abdominal pain [Nausea] : no nausea [Vomiting] : no vomiting [Dysuria] : no dysuria [Arthralgias] : no arthralgias [Myalgias] : no myalgias [Rash] : no rash [Headache] : no headache [Dizziness] : no dizziness [Depression] : no depression [Anxiety] : no anxiety [Diabetes] : no diabetes [Thyroid Problem] : no thyroid problem [Obesity] : no obesity

## 2023-08-22 NOTE — REASON FOR VISIT
[Follow-Up] : a follow-up visit [Asthma] : asthma [Cough] : cough [Bronchiectasis] : bronchiectasis [Spouse] : spouse

## 2023-08-22 NOTE — ASSESSMENT
[FreeTextEntry1] : 67 yo F, former smoker 40-pack-year history but quit 2 years ago. Patient has a history of subarachnoid hemorrhage cerebral thrombosis, HIT and thrombus in the setting of a catheter, overall doing well with no residual defects. She has had repeated bouts of "pneumonia" over past year (3 episodes total) with persistent cough, sputum production and wheezing + PETER which respond favorably to Albuterol inh and Prednisone. Chest CT with findings of bronchiectasis and some nodules. Sputum AFB x 3 sent with 2 cultures positive for M. avium.  Also noted to have elevated IgE (616) and recent Eosinophils 8.1% on CBC early August.  Patient is asymptomatic at present.  Discussed extensively risks associated with uncontrolled asthma as well as NTM infection with M. avium.  Discussed importance of daily ACT as form of treatment.  Patient is otherwise asymptomatic and therefore will not opt for treatment particularly until repeat Chest CT has been completed in 10-11/2023.   #RAD/Asthma - not currently on Advair and has felt no need to use Albuterol rescue inhaler in recent weeks (was using as thought was prescribed q6h) - Ideally would like repeat [place] with BD and FENO  (when off Prednisone for at least two weeks, prior PFTs performed on steroids) - elevated IgE and peripheral eosinophilia noted - May benefit from addition of Singulair should symptoms recur/in effort to avoid ICS in setting of NTM  #Bronchiectasis - repeat Chest CT for October/Nov 2023 to be done prior to next visit - advised to use Aerobika device once daily for airway clearance (can be used inline with Nebulizer) - should resume Hypersal once daily for ACT  #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 - M. avium x 2 on sputum cultures - provided with sputum cups to continue surveillance every 1-2months - await repeat Chest CT involvement at which time depending on findings and symptoms will determine whether treatment will be indicated - in the interim, continue ACT with once daily Hypersal/Aerobika  #Seasonal allergies - continue [facility]   RT in 3 months for follow-up with Chest CT completed.  Will be leaving for [place] next week and returning October 10th.  Anyi Costa MD

## 2023-08-22 NOTE — PHYSICAL EXAM
[No Acute Distress] : no acute distress [Well Nourished] : well nourished [Well Groomed] : well groomed [Well Developed] : well developed [Normal Oropharynx] : normal oropharynx [Normal Appearance] : normal appearance [Supple] : supple [No Neck Mass] : no neck mass [Normal Rate/Rhythm] : normal rate/rhythm [Normal S1, S2] : normal s1, s2 [No Murmurs] : no murmurs [No Resp Distress] : no resp distress [No Acc Muscle Use] : no acc muscle use [Normal Rhythm and Effort] : normal rhythm and effort [Clear to Auscultation Bilaterally] : clear to auscultation bilaterally [No Abnormalities] : no abnormalities [Benign] : benign [Normal Gait] : normal gait [No Clubbing] : no clubbing [No Cyanosis] : no cyanosis [No Edema] : no edema [Normal Color/ Pigmentation] : normal color/ pigmentation [No Focal Deficits] : no focal deficits [Oriented x3] : oriented x3 [Normal Insight/judgment] : normal insight/judgment [Normal Affect] : normal affect

## 2023-08-22 NOTE — HISTORY OF PRESENT ILLNESS
[Former] : former [>= 20 pack years] : >= 20 pack years [Never] : never [TextBox_4] : Current HPI:Ms. Ho returns for follow-up.  She reports having used the hypersal + aerobika twice a day and was using Albuterol 4x daily (misunderstood instructions to be used up to 4x daily as needed for SOB).  She stopped using Albuterol after her visit with her PCP in early August.  She stopped using the hypersal neb prior to this , as well as her Advair having noted that her cough completely resolved.  Initially she had brought up sputum the first 3 days using the hypersal but then noted miimal to sputum clearance.  She has had no fever, chills or night sweats. No GERD symptoms or PND.  She reports weight is stable at 117-119lbs but noted to be 114lbs today.  She notes no wheezing or SOB.  Prior HPI: Ms. 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 She recently traveled to [place] and while abroad developed wheezing/SOB again , used up her Albuterol inhaler within two weeks and had to get an emergency inhaler in [facility] (attained Salbutamol). She continues to wheeze and has productive cough of white sputum - denies fever, chills but does report intermittent night sweats. She has lost about 5lbs. Her PCP prescribed Prednisone 40mg x 5 days and she took first dose last night.  She denies any symptoms of PND, sinus congestion, GERD or dysphagia.    Denies childhood asthma, PNA history, or excessive mucus production. With not currently deficits S/P aneurysm, not dysphasia.    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 [YearQuit] : 2021

## 2023-08-29 ENCOUNTER — LABORATORY RESULT (OUTPATIENT)
Age: 68
End: 2023-08-29

## 2023-10-02 NOTE — ED PROVIDER NOTE - NSBENEFITOFTRANSFER_ED_A_ED
----- Message from Frank Mullins sent at 10/2/2023 12:39 PM CDT -----  Type:  Sooner Appointment Request    Caller is requesting a sooner appointment.  Caller declined first available appointment listed below.  Caller will not accept being placed on the waitlist and is requesting a message be sent to doctor.    Name of Caller:  Patient  When is the first available appointment?  Pt is scheduled on 10/17/23  Symptoms:  Hernia  Best Call Back Number:  [phone]  Additional Information:         Obtain Level of Care/Service Not Available at this Facility

## 2023-10-09 NOTE — H&P PST ADULT - WEIGHT IN LBS
113.9 Melolabial Transposition Flap Text: The defect edges were debeveled with a #15 scalpel blade.  Given the location of the defect and the proximity to free margins a melolabial flap was deemed most appropriate.  Using a sterile surgical marker, an appropriate melolabial transposition flap was drawn incorporating the defect.    The area thus outlined was incised deep to adipose tissue with a #15 scalpel blade.  The skin margins were undermined to an appropriate distance in all directions utilizing iris scissors.

## 2023-11-08 ENCOUNTER — APPOINTMENT (OUTPATIENT)
Dept: INTERNAL MEDICINE | Facility: CLINIC | Age: 68
End: 2023-11-08
Payer: MEDICARE

## 2023-11-08 ENCOUNTER — OUTPATIENT (OUTPATIENT)
Dept: OUTPATIENT SERVICES | Facility: HOSPITAL | Age: 68
LOS: 1 days | End: 2023-11-08
Payer: MEDICARE

## 2023-11-08 VITALS
HEART RATE: 71 BPM | DIASTOLIC BLOOD PRESSURE: 84 MMHG | WEIGHT: 121.6 LBS | SYSTOLIC BLOOD PRESSURE: 122 MMHG | OXYGEN SATURATION: 98 % | HEIGHT: 61 IN | BODY MASS INDEX: 22.96 KG/M2

## 2023-11-08 DIAGNOSIS — Z87.898 PERSONAL HISTORY OF OTHER SPECIFIED CONDITIONS: ICD-10-CM

## 2023-11-08 DIAGNOSIS — Z01.419 ENCOUNTER FOR GYNECOLOGICAL EXAMINATION (GENERAL) (ROUTINE) W/OUT ABNORMAL FINDINGS: ICD-10-CM

## 2023-11-08 DIAGNOSIS — Z96.0 PRESENCE OF UROGENITAL IMPLANTS: ICD-10-CM

## 2023-11-08 DIAGNOSIS — I10 ESSENTIAL (PRIMARY) HYPERTENSION: ICD-10-CM

## 2023-11-08 DIAGNOSIS — Z98.41 CATARACT EXTRACTION STATUS, RIGHT EYE: Chronic | ICD-10-CM

## 2023-11-08 DIAGNOSIS — Z98.890 OTHER SPECIFIED POSTPROCEDURAL STATES: Chronic | ICD-10-CM

## 2023-11-08 PROCEDURE — G0463: CPT

## 2023-11-08 PROCEDURE — 99212 OFFICE O/P EST SF 10 MIN: CPT

## 2023-11-09 DIAGNOSIS — J47.9 BRONCHIECTASIS, UNCOMPLICATED: ICD-10-CM

## 2023-11-17 ENCOUNTER — APPOINTMENT (OUTPATIENT)
Dept: CT IMAGING | Facility: IMAGING CENTER | Age: 68
End: 2023-11-17
Payer: MEDICARE

## 2023-11-17 ENCOUNTER — APPOINTMENT (OUTPATIENT)
Dept: MAMMOGRAPHY | Facility: IMAGING CENTER | Age: 68
End: 2023-11-17
Payer: MEDICARE

## 2023-11-17 ENCOUNTER — OUTPATIENT (OUTPATIENT)
Dept: OUTPATIENT SERVICES | Facility: HOSPITAL | Age: 68
LOS: 1 days | End: 2023-11-17
Payer: MEDICARE

## 2023-11-17 ENCOUNTER — RESULT REVIEW (OUTPATIENT)
Age: 68
End: 2023-11-17

## 2023-11-17 DIAGNOSIS — Z98.41 CATARACT EXTRACTION STATUS, RIGHT EYE: Chronic | ICD-10-CM

## 2023-11-17 DIAGNOSIS — Z98.890 OTHER SPECIFIED POSTPROCEDURAL STATES: Chronic | ICD-10-CM

## 2023-11-17 DIAGNOSIS — J47.9 BRONCHIECTASIS, UNCOMPLICATED: ICD-10-CM

## 2023-11-17 DIAGNOSIS — Z22.39 CARRIER OF OTHER SPECIFIED BACTERIAL DISEASES: ICD-10-CM

## 2023-11-17 DIAGNOSIS — Z00.00 ENCOUNTER FOR GENERAL ADULT MEDICAL EXAMINATION WITHOUT ABNORMAL FINDINGS: ICD-10-CM

## 2023-11-17 PROCEDURE — 77063 BREAST TOMOSYNTHESIS BI: CPT

## 2023-11-17 PROCEDURE — 77063 BREAST TOMOSYNTHESIS BI: CPT | Mod: 26

## 2023-11-17 PROCEDURE — 77067 SCR MAMMO BI INCL CAD: CPT | Mod: 26

## 2023-11-17 PROCEDURE — 77067 SCR MAMMO BI INCL CAD: CPT

## 2023-11-17 PROCEDURE — 71250 CT THORAX DX C-: CPT

## 2023-11-17 PROCEDURE — 71250 CT THORAX DX C-: CPT | Mod: 26

## 2023-11-28 ENCOUNTER — APPOINTMENT (OUTPATIENT)
Age: 68
End: 2023-11-28
Payer: MEDICARE

## 2023-11-28 VITALS
WEIGHT: 123 LBS | DIASTOLIC BLOOD PRESSURE: 79 MMHG | SYSTOLIC BLOOD PRESSURE: 120 MMHG | OXYGEN SATURATION: 96 % | RESPIRATION RATE: 16 BRPM | HEIGHT: 61 IN | BODY MASS INDEX: 23.22 KG/M2 | HEART RATE: 91 BPM

## 2023-11-28 PROCEDURE — 99214 OFFICE O/P EST MOD 30 MIN: CPT

## 2023-12-14 LAB
ACID FAST STN SPT: ABNORMAL
ACID FAST STN SPT: ABNORMAL
ACID FAST STN SPT: NORMAL

## 2024-03-05 ENCOUNTER — APPOINTMENT (OUTPATIENT)
Age: 69
End: 2024-03-05
Payer: MEDICARE

## 2024-03-05 VITALS
BODY MASS INDEX: 22.66 KG/M2 | OXYGEN SATURATION: 95 % | DIASTOLIC BLOOD PRESSURE: 84 MMHG | HEART RATE: 74 BPM | WEIGHT: 120 LBS | RESPIRATION RATE: 17 BRPM | SYSTOLIC BLOOD PRESSURE: 123 MMHG | HEIGHT: 61 IN

## 2024-03-05 DIAGNOSIS — I82.409 ACUTE EMBOLISM AND THROMBOSIS OF UNSPECIFIED DEEP VEINS OF UNSPECIFIED LOWER EXTREMITY: ICD-10-CM

## 2024-03-05 DIAGNOSIS — J30.2 OTHER SEASONAL ALLERGIC RHINITIS: ICD-10-CM

## 2024-03-05 DIAGNOSIS — J45.991 COUGH VARIANT ASTHMA: ICD-10-CM

## 2024-03-05 DIAGNOSIS — Z22.39 CARRIER OF OTHER SPECIFIED BACTERIAL DISEASES: ICD-10-CM

## 2024-03-05 DIAGNOSIS — J45.20 MILD INTERMITTENT ASTHMA, UNCOMPLICATED: ICD-10-CM

## 2024-03-05 PROCEDURE — 99214 OFFICE O/P EST MOD 30 MIN: CPT

## 2024-03-05 RX ORDER — FLUTICASONE PROPIONATE AND SALMETEROL 50; 100 UG/1; UG/1
100-50 POWDER RESPIRATORY (INHALATION)
Qty: 5 | Refills: 6 | Status: ACTIVE | COMMUNITY
Start: 2023-07-11 | End: 1900-01-01

## 2024-03-05 RX ORDER — ALBUTEROL SULFATE 90 UG/1
108 (90 BASE) INHALANT RESPIRATORY (INHALATION)
Qty: 1 | Refills: 1 | Status: ACTIVE | COMMUNITY
Start: 2022-12-09 | End: 1900-01-01

## 2024-03-05 RX ORDER — ALBUTEROL SULFATE 2.5 MG/3ML
(2.5 MG/3ML) SOLUTION RESPIRATORY (INHALATION)
Qty: 60 | Refills: 6 | Status: ACTIVE | COMMUNITY
Start: 2023-07-11 | End: 1900-01-01

## 2024-03-05 RX ORDER — SODIUM CHLORIDE FOR INHALATION 3.5 %
3.5 VIAL, NEBULIZER (ML) INHALATION TWICE DAILY
Qty: 60 | Refills: 6 | Status: ACTIVE | COMMUNITY
Start: 2023-07-11 | End: 1900-01-01

## 2024-03-05 NOTE — ASSESSMENT
[FreeTextEntry1] : 67 yo F, former smoker 40-pack-year history but quit 2 years ago. Patient has a history of subarachnoid hemorrhage cerebral thrombosis, HIT and thrombus in the setting of a catheter, overall doing well with no residual defects. She has had repeated bouts of "pneumonia" over past year (3 episodes total) with persistent cough, sputum production and wheezing + PETER which respond favorably to Albuterol inh and Prednisone. Chest CT with findings of bronchiectasis and some nodules. Sputum AFB x 3 sent with 2 cultures positive for M. avium. Also noted to have elevated IgE (616) and recent Eosinophils 8.1% on CBC early August. Patient is asymptomatic at present. Discussed extensively risks associated with uncontrolled asthma as well as NTM infection with M. avium. Discussed importance of daily ACT as form of treatment. Patient is otherwise asymptomatic and therefore will not opt for treatment given benign Chest CT findings.   #RAD/Asthma - continue Advair 100/50 BID for now as has helped with symptoms, instructed to take twice daily not just once - Ideally would like repeat [place] with BD and FENO  (prior PFTs performed on steroids), can perform at next visit - prior elevated IgE and peripheral eosinophilia noted  #Bronchiectasis - repeat Chest CT with insignificant findings, radiographically no indication for SABINA treatment, nor based on symptoms - advised to use Aerobika device once daily for airway clearance (can be used inline with Nebulizer) - should continue Albuterol/Hypersal once daily for ACT but advised to increase to twice or even 3x daily should develop any increase in sputum/cough, etc as well as to advise clinic  #M. avium colonization - M. avium x 2 on sputum cultures from July 2023, however 01/24 culture x 1 with m. avium, 2nd culture negative and 3rd with mycobacterium (pigmented) not yet speciated - provided with sputum cups to continue surveillance every 2months (now has 3 cups at home) - reviewed Chest CT, stable subcm pulm nodules with no significant bronchiectasis, based on symptoms and imaging no indication for pharmacologic treatment of SABINA at this time - in the interim, continue ACT with once daily Albuterol/Hypersal/Aerobika (advised no need for twice daily) - at this time still would not start treatment as patient is doing so well clinically and radiographically  #Seasonal allergies - taking Zyrtec prn - not currently requiring Flonase  #HCM - Patient received Influenza vaccination 11/2023 - Received Prevnar 20 vaccination 2022   RTC in 4 months for follow-up with PFTs to be done that day. Would like to have at least 2 additional sputum submitted for surveillance by that time.  Anyi Costa MD.

## 2024-03-05 NOTE — PHYSICAL EXAM
[Normal Oropharynx] : normal oropharynx [No Acute Distress] : no acute distress [Normal Appearance] : normal appearance [No Neck Mass] : no neck mass [Normal Rate/Rhythm] : normal rate/rhythm [Normal S1, S2] : normal s1, s2 [No Murmurs] : no murmurs [No Resp Distress] : no resp distress [No Acc Muscle Use] : no acc muscle use [Normal Rhythm and Effort] : normal rhythm and effort [No Abnormalities] : no abnormalities [Benign] : benign [Normal Gait] : normal gait [No Clubbing] : no clubbing [No Cyanosis] : no cyanosis [No Edema] : no edema [Normal Color/ Pigmentation] : normal color/ pigmentation [No Focal Deficits] : no focal deficits [Oriented x3] : oriented x3 [Normal Affect] : normal affect [TextBox_68] : pleural rub noted posterior B/L lower lobes, anterior HEIDY

## 2024-03-05 NOTE — HISTORY OF PRESENT ILLNESS
[TextBox_4] : Current HPI: Ms. Ho returns for follow-up and reports doing quite well.  Is using Advair but only once daily.  Also using Hypersal/Albuterol neb once daily.  Has no cough. No SOB.  Walks several days a week with her  for exercise and has no respiratory limitations.  No fever, no chills, no night sweats.  Weight is stable at her usual baseline of 120lbs.  Reviewed most recent culture with M. avium x 1, another culture negative and 3rd with unspeciated mycobacterium.     11/28/23:. Ms. Ho returns for follow-up. Reports using Albuterol and Hypersal twice daily. Restarted Advair 100/50 2-3 weeks ago when noticed started needing to use Albuterol rescue inhaler more often - twice a day. Since back on Advair has not needed to use rescue inhaler but carries around with her at all times. Has noticed that cold air seems to precipitate cough and need for rescue inhaler. Denies cough, shortness of breath, fever, chills, night sweats. Has gained 7lbs since last visit. Denies any PND/rhinitis or GERD symptoms.  8/22/23:Ms. Ho returns for follow-up. She reports having used the hypersal + aerobika twice a day and was using Albuterol 4x daily (misunderstood instructions to be used up to 4x daily as needed for SOB). She stopped using Albuterol after her visit with her PCP in early August. She stopped using the hypersal neb prior to this , as well as her Advair having noted that her cough completely resolved. Initially she had brought up sputum the first 3 days using the hypersal but then noted miimal to sputum clearance. She has had no fever, chills or night sweats. No GERD symptoms or PND. She reports weight is stable at 117-119lbs but noted to be 114lbs today. She notes no wheezing or SOB.  7/11/23: Ms. 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 She recently traveled to [place] and while abroad developed wheezing/SOB again , used up her Albuterol inhaler within two weeks and had to get an emergency inhaler in [facility] (attained Salbutamol). She continues to wheeze and has productive cough of white sputum - denies fever, chills but does report intermittent night sweats. She has lost about 5lbs. Her PCP prescribed Prednisone 40mg x 5 days and she took first dose last night.

## 2024-03-05 NOTE — REVIEW OF SYSTEMS
[Fever] : no fever [Fatigue] : no fatigue [Recent Wt Gain (___ Lbs)] : ~T no recent weight gain [Chills] : no chills [Recent Wt Loss (___ Lbs)] : ~T no recent weight loss [Sore Throat] : no sore throat [Nasal Congestion] : no nasal congestion [Dry Mouth] : no dry mouth [Sinus Problems] : no sinus problems [Cough] : no cough [Hemoptysis] : no hemoptysis [Chest Tightness] : no chest tightness [Frequent URIs] : no frequent URIs [Sputum] : no sputum [Dyspnea] : no dyspnea [Pleuritic Pain] : no pleuritic pain [Wheezing] : no wheezing [SOB on Exertion] : no sob on exertion [Chest Discomfort] : no chest discomfort [Orthopnea] : no orthopnea [Palpitations] : no palpitations [Seasonal Allergies] : no seasonal allergies [GERD] : no gerd [Nausea] : no nausea [Abdominal Pain] : no abdominal pain [Vomiting] : no vomiting [Dysuria] : no dysuria [Arthralgias] : no arthralgias [Myalgias] : no myalgias [Rash] : no rash [Easy Bruising] : no easy bruising [Clotting Disorder/ Frequent bleeding] : no clotting disorder/ frequent bleeding [Headache] : no headache [Dizziness] : no dizziness [Obesity] : no obesity

## 2024-03-06 LAB — ACID FAST STN SPT: NORMAL

## 2024-03-07 LAB — ACID FAST STN SPT: ABNORMAL

## 2024-03-08 NOTE — ED PROCEDURE NOTE - CPROC ED TOLERANCE1
[FreeTextEntry1] : 69 HTN HLD Thyroid cancer. JASE on c-pap. 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 Getting better. He had ct scan chest coronary calcification. Goes gym 3 /wk bicycle 30 mins and then wts.  He has no chest pain or sob. No palpitations. He needs ldl to be about 70 or less . Patient and wife aware. Statin increased. Repeat lipids ldl 107 .  Because abnormal ekg and calcium heart. SE 10/23 neg pood exercise tolerance mild mr.  He needs weight loss. He has M jacob. Followed by oncology. Will add zetia. Blood 3 mos. SE reviewed blood outside reviewed.
Patient tolerated procedure well.
Patient tolerated procedure well.

## 2024-03-11 LAB — ACID FAST STN SPT: ABNORMAL

## 2024-03-20 NOTE — PHYSICAL THERAPY INITIAL EVALUATION ADULT - BED MOBILITY TRAINING, PT EVAL
How Severe Are Your Spot(S)?: moderate Have Your Spot(S) Been Treated In The Past?: has not been treated Hpi Title: Evaluation of Skin Lesions GOAL: Pt will perform all bed mobility independently within 2-3 wks.

## 2024-03-21 NOTE — PATIENT PROFILE ADULT - NSPROPTRIGHTCAREGIVER_GEN_A_NUR
See me again in 3 months.  We can do a fingerstick A1c at next visit. Do not need to fast.    information could not be obtained

## 2024-04-19 NOTE — PROGRESS NOTE ADULT - SUBJECTIVE AND OBJECTIVE BOX
Planned for brain biopsy on 5/2/24. Here for pre-op  Is following with neurosurgery      CHIEF COMPLAINT: She slept well last night and has no new complaints.       HISTORY OF PRESENT ILLNESS    This is a 67 YO female with no PMH who was admitted to [facility] on 12/11/21  with c/o HA/vomiting since 12/10 found to have SAH c/b hydrocephaluns. On 12/11 s/p coiling of L Posterior communicating  aneurysm, found to have a partial left m2 thrombus now s/p thrombectomy with TICI 3 reperfusion. On 12/14, s/p R EVD for hydro c/b sizable tract hemorrhage with IVH now with L EVD, removed on 12/29. 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 Patient was medically optimized for discharge to [facility] IRU on 1/5/22.    Pfizer x 2 + booster ( last dose 8/11/21) (05 Jan 2022 14:15)        PAST MEDICAL & SURGICAL HISTORY:  No pertinent past medical history    No significant past surgical history      Vital Signs Last 24 Hrs  T(C): 36.6 (13 Jan 2022 07:55), Max: 36.6 (12 Jan 2022 19:42)  T(F): 97.9 (13 Jan 2022 07:55), Max: 97.9 (12 Jan 2022 19:42)  HR: 73 (13 Jan 2022 07:55) (73 - 75)  BP: 101/68 (13 Jan 2022 07:55) (101/68 - 119/67)  BP(mean): --  RR: 16 (13 Jan 2022 07:55) (15 - 16)  SpO2: 94% (13 Jan 2022 07:55) (94% - 95%)      PHYSICAL EXAM  Constitutional - NAD, Comfortable  HEENT - NCAT, EOMI  Neck - Supple, No limited ROM  Chest - CTA bilaterally  Cardiovascular - RRR, S1S2  Abdomen -Soft, NTND  Extremities - No C/C/E, No calf tenderness   Neurologic Exam -  aox3, mathias                 LABS:                          11.2   3.99  )-----------( 174      ( 10 Deniz 2022 05:30 )             34.7     01-10    142  |  106  |  14  ----------------------------<  86  3.5   |  29  |  0.59    Ca    8.8      10 Deniz 2022 05:30    TPro  6.1  /  Alb  2.9<L>  /  TBili  0.3  /  DBili  x   /  AST  14  /  ALT  15  /  AlkPhos  85  01-10    LIVER FUNCTIONS - ( 10 Deniz 2022 05:30 )  Alb: 2.9 g/dL / Pro: 6.1 g/dL / ALK PHOS: 85 U/L / ALT: 15 U/L / AST: 14 U/L / GGT: x               MEDICATIONS  (STANDING):  amLODIPine   Tablet 5 milliGRAM(s) Oral daily  apixaban 5 milliGRAM(s) Oral two times a day  multivitamin 1 Tablet(s) Oral daily  pantoprazole   Suspension 40 milliGRAM(s) Oral before breakfast  senna 2 Tablet(s) Oral at bedtime    MEDICATIONS  (PRN):  acetaminophen     Tablet .. 650 milliGRAM(s) Oral every 6 hours PRN Mild Pain (1 - 3)  polyethylene glycol 3350 17 Gram(s) Oral daily PRN Constipation  zinc oxide 40% Ointment 1 Application(s) Topical two times a day PRN rash

## 2024-05-03 RX ORDER — AMLODIPINE BESYLATE 2.5 MG/1
2.5 TABLET ORAL
Qty: 90 | Refills: 3 | Status: ACTIVE | COMMUNITY
Start: 2022-01-26 | End: 1900-01-01

## 2024-05-03 RX ORDER — ALENDRONATE SODIUM 70 MG/1
70 TABLET ORAL
Qty: 12 | Refills: 3 | Status: ACTIVE | COMMUNITY
Start: 2023-03-14 | End: 1900-01-01

## 2024-05-03 RX ORDER — PRAVASTATIN SODIUM 40 MG/1
40 TABLET ORAL
Qty: 90 | Refills: 3 | Status: ACTIVE | COMMUNITY
Start: 2022-07-20 | End: 1900-01-01

## 2024-05-28 ENCOUNTER — APPOINTMENT (OUTPATIENT)
Dept: NEUROLOGY | Facility: CLINIC | Age: 69
End: 2024-05-28
Payer: MEDICARE

## 2024-05-28 VITALS
HEIGHT: 61 IN | DIASTOLIC BLOOD PRESSURE: 76 MMHG | HEART RATE: 71 BPM | BODY MASS INDEX: 22.66 KG/M2 | SYSTOLIC BLOOD PRESSURE: 128 MMHG | WEIGHT: 120 LBS

## 2024-05-28 DIAGNOSIS — G40.209 LOCALIZATION-RELATED (FOCAL) (PARTIAL) SYMPTOMATIC EPILEPSY AND EPILEPTIC SYNDROMES WITH COMPLEX PARTIAL SEIZURES, NOT INTRACTABLE, W/OUT STATUS EPILEPTICUS: ICD-10-CM

## 2024-05-28 PROCEDURE — 99213 OFFICE O/P EST LOW 20 MIN: CPT

## 2024-05-28 PROCEDURE — G2211 COMPLEX E/M VISIT ADD ON: CPT

## 2024-05-28 RX ORDER — LEVETIRACETAM 500 MG/1
500 TABLET, FILM COATED ORAL TWICE DAILY
Qty: 180 | Refills: 1 | Status: ACTIVE | COMMUNITY
Start: 2022-08-02 | End: 1900-01-01

## 2024-05-28 NOTE — HISTORY OF PRESENT ILLNESS
[FreeTextEntry1] : Rx: amLODIPine 2.5 mg oral tablet: 1 tab(s) orally once a day  levETIRAcetam 750 mg oral tablet: 1 tab(s) orally 2 times a day  (17 in 2023) Multiple Vitamins oral tablet: 1 tab(s) orally once a day  escitalopram 5 mg oral tablet: dc'd in 2022  Updates:   no recurrent fall or LOC. walking 2-3x/week x 2-3 miles recent trip to [place], [place]  HPI:  Patient previously seen by Dr Frank.   69y Female with PMH of SAH then cerebral aneurysm rupture s/p coil (12/2021), HTN, HIT c/b DVT now off eliquis, depression, presenting to the ED 7/2022 after a seizure at home witnessed convulsion, arms jerking bilat per hus, Fractured left clavicle. focal motor impaired awareness seizure. Possibly provoked likely i/s/o infection.  - fever likely 2/2 entero/rhinovirus   later Dec 7/2022 sitting at table, abrupt LOC, secs,  with nasal fracture. responsive, quickly, making sense, but remained 5 min on floor, no witnessed convulsive activity per hus, amnestic,  s/p keppra x1 in the ED, c/w keppra since then denies ADR, sedation, mood stable.  Focus/short term memory affected. Less active vs pre-bleed.  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  PMHX: 2023 bronchitis. -> recovered.  seeing pulmonary. Known osteoporosis

## 2024-05-28 NOTE — PHYSICAL EXAM
[FreeTextEntry1] : MS: ESL alert & oriented to person, place time, good fund of knowledge and recall for recent and remote events.  repeat 3/3, recall 2/3. oriented 5/5 CN: P surgical pupils (cataract) L>R poorly reactive.  VFF to confrontation, EOM full without nystagmus, PERRL, V1-V3 intact to light touch, face symmetrical, hears finger rub bilaterally, palate elevates symmetrically, shoulders elevate symmetrically, tongue midline MOTOR: normal tone x 4 extremities, Power 5/5 proximally and distally bilaterally, no drift, normal rapid alternating movements.  SENSORY: intact LT x 4 extremities REFLEXES: biceps 2+ bilaterally, triceps 1+ bilaterally, brachioradialis 1+ bilaterally, patella 1+ bilaterally, ankle 1+ bilaterally, plantar flexor bilaterally COORD: normal FNF, no tremor or dysmetria GAIT: normal base, Romberg negative, normal gait, able to walk on toes, heels and tandem.

## 2024-05-28 NOTE — DISCUSSION/SUMMARY
[Complex Partial] : complex partial [Focal] : focal [Symptomatic] : symptomatic [Risks Associated with Driving/[place] Law] : As per my usual protocol, the patient was advised in regards to risks and driving privileges associated with the [place] Guidelines.  [Safety Recommendations] : The patient was advised in regards to the risk of seizures and general seizure safety recommendations including not to be bathing alone, climbing to high places and operating heavy machinery. [Compliance with Medications] : The importance of compliance with medications was reinforced. [Medication Side Effects] : High frequency and serious potential medication adverse effects were reviewed with the patient, including but not exclusive to psychiatric effects.  Information sheets on medication side effects were made available to the patient in our clinic.  The patient or advocate agrees to notify us for any concerns. [Risk of Death] : Risk of death associated with seizures / SUDEP was discussed. [FreeTextEntry1] : 70 yo F h/o SAH then L PCOMM aneurysm rupture/repair 2021 with symptomatic focal epilepsy, GTCS x 1 7/2022. LOC in 12/2022 abrupt ?syncopal in nature, unclear  RBBB on ECG, seeing cardiology Imaging shows bifrontal R>L encephalomalacia.  Focus/memory affected Prior EEGs 12/2021 mod R>L slow, and 7/2022 nl. f/u AEEG 2/2023:: -Continuous polymorphic delta and theta activity in the right frontal region -Breach artifact with faster frequencies in bilateral frontal regions  Cardio and pulmonary consult f/u. exercising  Discussed dosing of Rx. Pt not currently with side effects, but wished to attempt lowering dose of ASM.  Not driving at this time. Agree to  LEV to 500mg bid for now.  x time 31min f/u in 6 months

## 2024-06-08 ENCOUNTER — APPOINTMENT (OUTPATIENT)
Dept: MRI IMAGING | Facility: IMAGING CENTER | Age: 69
End: 2024-06-08
Payer: MEDICARE

## 2024-06-08 ENCOUNTER — OUTPATIENT (OUTPATIENT)
Dept: OUTPATIENT SERVICES | Facility: HOSPITAL | Age: 69
LOS: 1 days | End: 2024-06-08
Payer: MEDICARE

## 2024-06-08 DIAGNOSIS — Z98.890 OTHER SPECIFIED POSTPROCEDURAL STATES: Chronic | ICD-10-CM

## 2024-06-08 DIAGNOSIS — I60.9 NONTRAUMATIC SUBARACHNOID HEMORRHAGE, UNSPECIFIED: ICD-10-CM

## 2024-06-08 DIAGNOSIS — Z98.41 CATARACT EXTRACTION STATUS, RIGHT EYE: Chronic | ICD-10-CM

## 2024-06-08 PROCEDURE — 70546 MR ANGIOGRAPH HEAD W/O&W/DYE: CPT

## 2024-06-08 PROCEDURE — A9585: CPT

## 2024-06-08 PROCEDURE — 70546 MR ANGIOGRAPH HEAD W/O&W/DYE: CPT | Mod: 26

## 2024-06-11 ENCOUNTER — NON-APPOINTMENT (OUTPATIENT)
Age: 69
End: 2024-06-11

## 2024-06-12 ENCOUNTER — NON-APPOINTMENT (OUTPATIENT)
Age: 69
End: 2024-06-12

## 2024-06-12 ENCOUNTER — APPOINTMENT (OUTPATIENT)
Dept: NEUROSURGERY | Facility: CLINIC | Age: 69
End: 2024-06-12
Payer: MEDICARE

## 2024-06-12 VITALS
OXYGEN SATURATION: 95 % | SYSTOLIC BLOOD PRESSURE: 104 MMHG | WEIGHT: 120 LBS | BODY MASS INDEX: 22.66 KG/M2 | DIASTOLIC BLOOD PRESSURE: 68 MMHG | HEIGHT: 61 IN | TEMPERATURE: 98 F | HEART RATE: 73 BPM

## 2024-06-12 DIAGNOSIS — I60.9 NONTRAUMATIC SUBARACHNOID HEMORRHAGE, UNSPECIFIED: ICD-10-CM

## 2024-06-12 PROCEDURE — 99214 OFFICE O/P EST MOD 30 MIN: CPT

## 2024-06-12 NOTE — REASON FOR VISIT
[Follow-Up: _____] : a [unfilled] follow-up visit [Spouse] : spouse [FreeTextEntry1] : Reviewed MRA head w/wo IV contrast done 6/8/24 s/p follow up cerebral angiogram 6/9/22 s/p cerebral angiogram and coil embolization of ruptured left PCOMM aneurysm 12/11/21

## 2024-06-12 NOTE — ASSESSMENT
[FreeTextEntry1] : IMPRESSION: 69F no PMH p/w HA, N+V, posterior neck pain, CT showed SAH s/p angio and coiling of ruptured L PCOMM aneurysm on 12/11/21, s/p EVD placement which was complicated by tract hemorrhage, eventually weaned and removed, S/p repeat angios 12/17/21, 12/22/21 with vasospasm treatment. s/p 6 month f/u angio 6/9/22 demonstrating persistent occlusion of the previously embolized ruptured L PCOMM aneurysm with no evidence of recanalization. No additional significant intracranial findings.  MRA head w/wo 6/8/24 is stable, no evidence of aneurysm recurrence or additional aneurysms. Small enhancement at the base of the aneurysm stable from prior, likely the origin of the pComm. Official radiology report not available at time of visit. Explained we will continue to monitor for progression and evaluate if additional treatment is warranted down the line,  Patient doing clinically well. Denies symptoms of motor, sensory, speech, or visual abnormalities. She is able to perform activities of daily living.   PLAN: MRA head non contrast in another year - June 2025 Follow up visit after to review results Will call patient if anything alarming on official radiology report of most recent MRA

## 2024-06-12 NOTE — HISTORY OF PRESENT ILLNESS
[FreeTextEntry1] : OZ DAVIS is a 69 year old female with no significant PMH, transferred from [facility] for headache, with associated nausea/vomiting, posterior neck pain. Found to have SAH due to ruptured left PCOMM aneurysm. On 12/11/21, she underwent cerebral angiogram and balloon assisted coil embolization. A thrombus was noted in a distal M3 branch at the end of the procedure which was removed completely with aspiration. Hospital course complicated by EVD tract hemorrhage. Repeat angiogram on 12/17/21, and 12/22/21 with treatment of mild to moderate vasospasm with intra-arterial milrinone. 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 Discharged to [facility]. Currently home, discharged from rehab. On 6/9/22, she underwent 6 month follow up cerebral angiogram demonstrating persistent occlusion of the previously embolized ruptured left posterior communicating artery aneurysm with no evidence of recanalization. No additional significant intracranial findings.  Today, patient presents with her  for annual follow up to review results of MRA head w/wo obtained on 6/8/24. No new neurologic complaints since our last visit. Denies symptoms of motor, sensory, speech, or visual abnormalities.

## 2024-06-26 ENCOUNTER — APPOINTMENT (OUTPATIENT)
Dept: INTERNAL MEDICINE | Facility: CLINIC | Age: 69
End: 2024-06-26
Payer: MEDICARE

## 2024-06-26 ENCOUNTER — NON-APPOINTMENT (OUTPATIENT)
Age: 69
End: 2024-06-26

## 2024-06-26 VITALS
SYSTOLIC BLOOD PRESSURE: 107 MMHG | HEART RATE: 70 BPM | BODY MASS INDEX: 21.9 KG/M2 | WEIGHT: 116 LBS | DIASTOLIC BLOOD PRESSURE: 75 MMHG | HEIGHT: 61 IN | RESPIRATION RATE: 16 BRPM | OXYGEN SATURATION: 95 % | TEMPERATURE: 97.9 F

## 2024-06-26 DIAGNOSIS — I10 ESSENTIAL (PRIMARY) HYPERTENSION: ICD-10-CM

## 2024-06-26 DIAGNOSIS — Z00.00 ENCOUNTER FOR GENERAL ADULT MEDICAL EXAMINATION W/OUT ABNORMAL FINDINGS: ICD-10-CM

## 2024-06-26 DIAGNOSIS — I60.7 NONTRAUMATIC SUBARACHNOID HEMORRHAGE FROM UNSPECIFIED INTRACRANIAL ARTERY: ICD-10-CM

## 2024-06-26 DIAGNOSIS — I45.10 UNSPECIFIED RIGHT BUNDLE-BRANCH BLOCK: ICD-10-CM

## 2024-06-26 DIAGNOSIS — E78.5 HYPERLIPIDEMIA, UNSPECIFIED: ICD-10-CM

## 2024-06-26 DIAGNOSIS — M81.0 AGE-RELATED OSTEOPOROSIS W/OUT CURRENT PATHOLOGICAL FRACTURE: ICD-10-CM

## 2024-06-26 DIAGNOSIS — J47.9 BRONCHIECTASIS, UNCOMPLICATED: ICD-10-CM

## 2024-06-26 DIAGNOSIS — J45.909 UNSPECIFIED ASTHMA, UNCOMPLICATED: ICD-10-CM

## 2024-06-26 LAB
ACID FAST STN SPT: ABNORMAL
ACID FAST STN SPT: NORMAL
ACID FAST STN SPT: NORMAL

## 2024-06-26 PROCEDURE — 99214 OFFICE O/P EST MOD 30 MIN: CPT

## 2024-06-26 PROCEDURE — 99204 OFFICE O/P NEW MOD 45 MIN: CPT

## 2024-06-26 PROCEDURE — 93000 ELECTROCARDIOGRAM COMPLETE: CPT

## 2024-06-26 RX ORDER — PREDNISONE 20 MG/1
20 TABLET ORAL DAILY
Qty: 10 | Refills: 0 | Status: DISCONTINUED | COMMUNITY
Start: 2023-05-11 | End: 2024-06-26

## 2024-06-30 PROBLEM — I45.10 RIGHT BUNDLE-BRANCH BLOCK: Status: ACTIVE | Noted: 2023-01-09

## 2024-06-30 PROBLEM — E78.5 HLD (HYPERLIPIDEMIA): Status: ACTIVE | Noted: 2022-01-26

## 2024-06-30 PROBLEM — J47.9 BRONCHIECTASIS: Status: ACTIVE | Noted: 2023-05-22

## 2024-06-30 PROBLEM — I10 BENIGN ESSENTIAL HTN: Status: ACTIVE | Noted: 2022-01-26

## 2024-06-30 PROBLEM — M81.0 OSTEOPOROSIS: Status: ACTIVE | Noted: 2022-08-10

## 2024-06-30 PROBLEM — J45.909 REACTIVE AIRWAY DISEASE WITHOUT COMPLICATION, UNSPECIFIED ASTHMA SEVERITY, UNSPECIFIED WHETHER PERSISTENT: Status: ACTIVE | Noted: 2023-07-11

## 2024-06-30 PROBLEM — I60.7 RUPTURED ANEURYSM OF INTRACRANIAL ARTERY: Status: ACTIVE | Noted: 2023-04-13

## 2024-07-02 ENCOUNTER — APPOINTMENT (OUTPATIENT)
Age: 69
End: 2024-07-02

## 2024-07-09 ENCOUNTER — APPOINTMENT (OUTPATIENT)
Dept: RADIOLOGY | Facility: IMAGING CENTER | Age: 69
End: 2024-07-09
Payer: MEDICARE

## 2024-07-09 ENCOUNTER — APPOINTMENT (OUTPATIENT)
Age: 69
End: 2024-07-09

## 2024-07-09 ENCOUNTER — APPOINTMENT (OUTPATIENT)
Dept: PULMONOLOGY | Facility: CLINIC | Age: 69
End: 2024-07-09
Payer: MEDICARE

## 2024-07-09 ENCOUNTER — OUTPATIENT (OUTPATIENT)
Dept: OUTPATIENT SERVICES | Facility: HOSPITAL | Age: 69
LOS: 1 days | End: 2024-07-09
Payer: MEDICARE

## 2024-07-09 VITALS
SYSTOLIC BLOOD PRESSURE: 118 MMHG | HEIGHT: 60 IN | WEIGHT: 120 LBS | OXYGEN SATURATION: 96 % | RESPIRATION RATE: 16 BRPM | HEART RATE: 75 BPM | BODY MASS INDEX: 23.56 KG/M2 | DIASTOLIC BLOOD PRESSURE: 75 MMHG | TEMPERATURE: 97.8 F

## 2024-07-09 DIAGNOSIS — Z22.39 CARRIER OF OTHER SPECIFIED BACTERIAL DISEASES: ICD-10-CM

## 2024-07-09 DIAGNOSIS — I60.9 NONTRAUMATIC SUBARACHNOID HEMORRHAGE, UNSPECIFIED: ICD-10-CM

## 2024-07-09 DIAGNOSIS — J45.20 MILD INTERMITTENT ASTHMA, UNCOMPLICATED: ICD-10-CM

## 2024-07-09 DIAGNOSIS — J47.9 BRONCHIECTASIS, UNCOMPLICATED: ICD-10-CM

## 2024-07-09 DIAGNOSIS — J30.2 OTHER SEASONAL ALLERGIC RHINITIS: ICD-10-CM

## 2024-07-09 DIAGNOSIS — Z98.890 OTHER SPECIFIED POSTPROCEDURAL STATES: Chronic | ICD-10-CM

## 2024-07-09 DIAGNOSIS — I82.409 ACUTE EMBOLISM AND THROMBOSIS OF UNSPECIFIED DEEP VEINS OF UNSPECIFIED LOWER EXTREMITY: ICD-10-CM

## 2024-07-09 DIAGNOSIS — J45.991 COUGH VARIANT ASTHMA: ICD-10-CM

## 2024-07-09 PROCEDURE — 94729 DIFFUSING CAPACITY: CPT

## 2024-07-09 PROCEDURE — ZZZZZ: CPT

## 2024-07-09 PROCEDURE — 71046 X-RAY EXAM CHEST 2 VIEWS: CPT

## 2024-07-09 PROCEDURE — 99214 OFFICE O/P EST MOD 30 MIN: CPT | Mod: 25

## 2024-07-09 PROCEDURE — 94060 EVALUATION OF WHEEZING: CPT

## 2024-07-09 PROCEDURE — 94726 PLETHYSMOGRAPHY LUNG VOLUMES: CPT

## 2024-07-09 PROCEDURE — 95012 NITRIC OXIDE EXP GAS DETER: CPT

## 2024-07-09 PROCEDURE — 71046 X-RAY EXAM CHEST 2 VIEWS: CPT | Mod: 26

## 2024-07-19 ENCOUNTER — APPOINTMENT (OUTPATIENT)
Dept: CARDIOLOGY | Facility: CLINIC | Age: 69
End: 2024-07-19

## 2024-07-29 ENCOUNTER — APPOINTMENT (OUTPATIENT)
Dept: RADIOLOGY | Facility: CLINIC | Age: 69
End: 2024-07-29
Payer: MEDICARE

## 2024-07-29 ENCOUNTER — OUTPATIENT (OUTPATIENT)
Dept: OUTPATIENT SERVICES | Facility: HOSPITAL | Age: 69
LOS: 1 days | End: 2024-07-29
Payer: MEDICARE

## 2024-07-29 DIAGNOSIS — M81.0 AGE-RELATED OSTEOPOROSIS WITHOUT CURRENT PATHOLOGICAL FRACTURE: ICD-10-CM

## 2024-07-29 DIAGNOSIS — Z98.890 OTHER SPECIFIED POSTPROCEDURAL STATES: Chronic | ICD-10-CM

## 2024-07-29 DIAGNOSIS — Z98.41 CATARACT EXTRACTION STATUS, RIGHT EYE: Chronic | ICD-10-CM

## 2024-07-29 PROCEDURE — 77080 DXA BONE DENSITY AXIAL: CPT | Mod: 26

## 2024-07-29 PROCEDURE — 77080 DXA BONE DENSITY AXIAL: CPT

## 2024-10-15 ENCOUNTER — APPOINTMENT (OUTPATIENT)
Dept: ENDOCRINOLOGY | Facility: CLINIC | Age: 69
End: 2024-10-15

## 2024-11-05 ENCOUNTER — APPOINTMENT (OUTPATIENT)
Dept: INTERNAL MEDICINE | Facility: CLINIC | Age: 69
End: 2024-11-05
Payer: MEDICARE

## 2024-11-05 VITALS
BODY MASS INDEX: 22.78 KG/M2 | HEART RATE: 71 BPM | DIASTOLIC BLOOD PRESSURE: 80 MMHG | SYSTOLIC BLOOD PRESSURE: 122 MMHG | HEIGHT: 60 IN | WEIGHT: 116 LBS | OXYGEN SATURATION: 98 % | TEMPERATURE: 97.4 F

## 2024-11-05 DIAGNOSIS — Z00.00 ENCOUNTER FOR GENERAL ADULT MEDICAL EXAMINATION W/OUT ABNORMAL FINDINGS: ICD-10-CM

## 2024-11-05 DIAGNOSIS — M81.0 AGE-RELATED OSTEOPOROSIS W/OUT CURRENT PATHOLOGICAL FRACTURE: ICD-10-CM

## 2024-11-05 DIAGNOSIS — I10 ESSENTIAL (PRIMARY) HYPERTENSION: ICD-10-CM

## 2024-11-05 DIAGNOSIS — R73.03 PREDIABETES.: ICD-10-CM

## 2024-11-05 DIAGNOSIS — Z23 ENCOUNTER FOR IMMUNIZATION: ICD-10-CM

## 2024-11-05 DIAGNOSIS — E78.5 HYPERLIPIDEMIA, UNSPECIFIED: ICD-10-CM

## 2024-11-05 PROCEDURE — G0008: CPT

## 2024-11-05 PROCEDURE — 90662 IIV NO PRSV INCREASED AG IM: CPT

## 2024-11-05 PROCEDURE — G0439: CPT

## 2024-11-05 PROCEDURE — 36415 COLL VENOUS BLD VENIPUNCTURE: CPT

## 2024-11-05 PROCEDURE — 99213 OFFICE O/P EST LOW 20 MIN: CPT | Mod: 25

## 2024-11-06 LAB
ALBUMIN SERPL ELPH-MCNC: 4.5 G/DL
ALP BLD-CCNC: 73 U/L
ALT SERPL-CCNC: 24 U/L
ANION GAP SERPL CALC-SCNC: 14 MMOL/L
AST SERPL-CCNC: 18 U/L
BILIRUB SERPL-MCNC: 0.2 MG/DL
BUN SERPL-MCNC: 17 MG/DL
CALCIUM SERPL-MCNC: 9.5 MG/DL
CHLORIDE SERPL-SCNC: 104 MMOL/L
CHOLEST SERPL-MCNC: 210 MG/DL
CO2 SERPL-SCNC: 25 MMOL/L
CREAT SERPL-MCNC: 0.64 MG/DL
EGFR: 96 ML/MIN/1.73M2
ESTIMATED AVERAGE GLUCOSE: 123 MG/DL
GLUCOSE SERPL-MCNC: 99 MG/DL
HBA1C MFR BLD HPLC: 5.9 %
HDLC SERPL-MCNC: 72 MG/DL
LDLC SERPL CALC-MCNC: 102 MG/DL
NONHDLC SERPL-MCNC: 138 MG/DL
POTASSIUM SERPL-SCNC: 4.8 MMOL/L
PROT SERPL-MCNC: 7.5 G/DL
SODIUM SERPL-SCNC: 143 MMOL/L
TRIGL SERPL-MCNC: 216 MG/DL

## 2024-11-21 ENCOUNTER — APPOINTMENT (OUTPATIENT)
Dept: MAMMOGRAPHY | Facility: CLINIC | Age: 69
End: 2024-11-21

## 2024-11-21 ENCOUNTER — RESULT REVIEW (OUTPATIENT)
Age: 69
End: 2024-11-21

## 2024-11-21 PROCEDURE — 77063 BREAST TOMOSYNTHESIS BI: CPT

## 2024-11-21 PROCEDURE — 77067 SCR MAMMO BI INCL CAD: CPT

## 2024-12-14 LAB
ACID FAST STN SPT: NORMAL

## 2024-12-31 NOTE — PRE-ANESTHESIA EVALUATION ADULT - WEIGHT IN KG
52.6 Alert-The patient is alert, awake and responds to voice. The patient is oriented to time, place, and person. The triage nurse is able to obtain subjective information.

## 2025-01-10 NOTE — PRE-ANESTHESIA EVALUATION ADULT - NS MD HP INPLANTS MED DEV
Orders:    metoprolol succinate XL (Toprol-XL) 25 mg 24 hr tablet; Take 1 tablet (25 mg) by mouth once daily.     None

## 2025-01-16 ENCOUNTER — APPOINTMENT (OUTPATIENT)
Dept: PULMONOLOGY | Facility: CLINIC | Age: 70
End: 2025-01-16
Payer: MEDICARE

## 2025-01-16 VITALS
OXYGEN SATURATION: 94 % | WEIGHT: 115 LBS | HEART RATE: 86 BPM | HEIGHT: 60 IN | TEMPERATURE: 97.4 F | SYSTOLIC BLOOD PRESSURE: 128 MMHG | DIASTOLIC BLOOD PRESSURE: 77 MMHG | BODY MASS INDEX: 22.58 KG/M2 | RESPIRATION RATE: 16 BRPM

## 2025-01-16 DIAGNOSIS — J47.9 BRONCHIECTASIS, UNCOMPLICATED: ICD-10-CM

## 2025-01-16 DIAGNOSIS — Z22.39 CARRIER OF OTHER SPECIFIED BACTERIAL DISEASES: ICD-10-CM

## 2025-01-16 DIAGNOSIS — J45.20 MILD INTERMITTENT ASTHMA, UNCOMPLICATED: ICD-10-CM

## 2025-01-16 PROCEDURE — 99214 OFFICE O/P EST MOD 30 MIN: CPT

## 2025-01-23 ENCOUNTER — APPOINTMENT (OUTPATIENT)
Dept: CT IMAGING | Facility: CLINIC | Age: 70
End: 2025-01-23
Payer: MEDICARE

## 2025-01-23 ENCOUNTER — OUTPATIENT (OUTPATIENT)
Dept: OUTPATIENT SERVICES | Facility: HOSPITAL | Age: 70
LOS: 1 days | End: 2025-01-23
Payer: MEDICARE

## 2025-01-23 DIAGNOSIS — Z98.41 CATARACT EXTRACTION STATUS, RIGHT EYE: Chronic | ICD-10-CM

## 2025-01-23 DIAGNOSIS — Z22.39 CARRIER OF OTHER SPECIFIED BACTERIAL DISEASES: ICD-10-CM

## 2025-01-23 DIAGNOSIS — Z98.890 OTHER SPECIFIED POSTPROCEDURAL STATES: Chronic | ICD-10-CM

## 2025-01-23 DIAGNOSIS — J47.9 BRONCHIECTASIS, UNCOMPLICATED: ICD-10-CM

## 2025-01-23 PROCEDURE — 71250 CT THORAX DX C-: CPT | Mod: 26

## 2025-01-23 PROCEDURE — 71250 CT THORAX DX C-: CPT

## 2025-02-07 ENCOUNTER — NON-APPOINTMENT (OUTPATIENT)
Age: 70
End: 2025-02-07

## 2025-04-16 ENCOUNTER — LABORATORY RESULT (OUTPATIENT)
Age: 70
End: 2025-04-16

## 2025-05-02 NOTE — OCCUPATIONAL THERAPY INITIAL EVALUATION ADULT - IMPAIRED TRANSFERS: BED/CHAIR, REHAB EVAL
05/02/25 0705   Patient Assessment/Suction   Level of Consciousness (AVPU) alert   Respiratory Effort Normal;Unlabored   Expansion/Accessory Muscles/Retractions no use of accessory muscles;no retractions;expansion symmetric   All Lung Fields Breath Sounds diminished;equal bilaterally   Rhythm/Pattern, Respiratory unlabored;pattern regular;depth regular;no shortness of breath reported   Cough Frequency no cough   PRE-TX-O2   Device (Oxygen Therapy) room air   SpO2 96 %   Pulse Oximetry Type Continuous   $ Pulse Oximetry - Multiple Charge Pulse Oximetry - Multiple   Pulse 83   Resp 20   Respiratory Evaluation   $ Care Plan Tech Time 15 min        impaired balance

## 2025-05-22 NOTE — PROCEDURE NOTE - COAGULOPATHY REVERSAL
Patient presents to the ED via private vehicle for c/o inability to bear weight on LEFT leg x 4 days ago and then calf pain began today and is now having pain in left thigh as well. Denies any injury. Denies blood thinners.   Protamine/Other:

## 2025-05-27 ENCOUNTER — APPOINTMENT (OUTPATIENT)
Dept: NEUROLOGY | Facility: CLINIC | Age: 70
End: 2025-05-27

## 2025-05-30 ENCOUNTER — APPOINTMENT (OUTPATIENT)
Dept: INTERNAL MEDICINE | Facility: CLINIC | Age: 70
End: 2025-05-30
Payer: MEDICARE

## 2025-05-30 VITALS
OXYGEN SATURATION: 96 % | HEIGHT: 60 IN | BODY MASS INDEX: 24.15 KG/M2 | WEIGHT: 123 LBS | HEART RATE: 68 BPM | DIASTOLIC BLOOD PRESSURE: 79 MMHG | SYSTOLIC BLOOD PRESSURE: 127 MMHG | TEMPERATURE: 97.5 F

## 2025-05-30 DIAGNOSIS — M81.0 AGE-RELATED OSTEOPOROSIS W/OUT CURRENT PATHOLOGICAL FRACTURE: ICD-10-CM

## 2025-05-30 DIAGNOSIS — I60.7 NONTRAUMATIC SUBARACHNOID HEMORRHAGE FROM UNSPECIFIED INTRACRANIAL ARTERY: ICD-10-CM

## 2025-05-30 DIAGNOSIS — R73.03 PREDIABETES.: ICD-10-CM

## 2025-05-30 DIAGNOSIS — J47.9 BRONCHIECTASIS, UNCOMPLICATED: ICD-10-CM

## 2025-05-30 DIAGNOSIS — J45.20 MILD INTERMITTENT ASTHMA, UNCOMPLICATED: ICD-10-CM

## 2025-05-30 DIAGNOSIS — Z22.39 CARRIER OF OTHER SPECIFIED BACTERIAL DISEASES: ICD-10-CM

## 2025-05-30 DIAGNOSIS — I10 ESSENTIAL (PRIMARY) HYPERTENSION: ICD-10-CM

## 2025-05-30 PROCEDURE — 36415 COLL VENOUS BLD VENIPUNCTURE: CPT

## 2025-05-30 PROCEDURE — G2211 COMPLEX E/M VISIT ADD ON: CPT

## 2025-05-30 PROCEDURE — 99214 OFFICE O/P EST MOD 30 MIN: CPT

## 2025-06-01 LAB
ALBUMIN SERPL ELPH-MCNC: 4.5 G/DL
ALP BLD-CCNC: 80 U/L
ALT SERPL-CCNC: 29 U/L
ANION GAP SERPL CALC-SCNC: 12 MMOL/L
AST SERPL-CCNC: 27 U/L
BILIRUB SERPL-MCNC: 0.2 MG/DL
BUN SERPL-MCNC: 11 MG/DL
CALCIUM SERPL-MCNC: 9.5 MG/DL
CHLORIDE SERPL-SCNC: 104 MMOL/L
CHOLEST SERPL-MCNC: 184 MG/DL
CO2 SERPL-SCNC: 24 MMOL/L
CREAT SERPL-MCNC: 0.64 MG/DL
EGFRCR SERPLBLD CKD-EPI 2021: 96 ML/MIN/1.73M2
ESTIMATED AVERAGE GLUCOSE: 111 MG/DL
GLUCOSE SERPL-MCNC: 100 MG/DL
HBA1C MFR BLD HPLC: 5.5 %
HDLC SERPL-MCNC: 54 MG/DL
LDLC SERPL-MCNC: 56 MG/DL
NONHDLC SERPL-MCNC: 129 MG/DL
POTASSIUM SERPL-SCNC: 4 MMOL/L
PROT SERPL-MCNC: 7.1 G/DL
SODIUM SERPL-SCNC: 141 MMOL/L
TRIGL SERPL-MCNC: 490 MG/DL

## 2025-06-03 ENCOUNTER — APPOINTMENT (OUTPATIENT)
Age: 70
End: 2025-06-03

## 2025-07-15 ENCOUNTER — APPOINTMENT (OUTPATIENT)
Dept: INTERNAL MEDICINE | Facility: CLINIC | Age: 70
End: 2025-07-15
Payer: MEDICARE

## 2025-07-15 VITALS
HEART RATE: 55 BPM | HEIGHT: 60 IN | TEMPERATURE: 98.4 F | DIASTOLIC BLOOD PRESSURE: 74 MMHG | BODY MASS INDEX: 23.75 KG/M2 | RESPIRATION RATE: 15 BRPM | WEIGHT: 121 LBS | SYSTOLIC BLOOD PRESSURE: 123 MMHG | OXYGEN SATURATION: 96 %

## 2025-07-15 PROBLEM — I60.9 SUBARACHNOID HEMORRHAGE, NONTRAUMATIC: Status: RESOLVED | Noted: 2022-01-26 | Resolved: 2025-07-15

## 2025-07-15 PROCEDURE — G2211 COMPLEX E/M VISIT ADD ON: CPT

## 2025-07-15 PROCEDURE — 99214 OFFICE O/P EST MOD 30 MIN: CPT

## 2025-08-11 ENCOUNTER — OUTPATIENT (OUTPATIENT)
Dept: OUTPATIENT SERVICES | Facility: HOSPITAL | Age: 70
LOS: 1 days | End: 2025-08-11
Payer: MEDICARE

## 2025-08-11 ENCOUNTER — APPOINTMENT (OUTPATIENT)
Dept: MRI IMAGING | Facility: CLINIC | Age: 70
End: 2025-08-11
Payer: MEDICARE

## 2025-08-11 ENCOUNTER — APPOINTMENT (OUTPATIENT)
Dept: NEUROLOGY | Facility: CLINIC | Age: 70
End: 2025-08-11
Payer: MEDICARE

## 2025-08-11 VITALS
BODY MASS INDEX: 23.75 KG/M2 | HEIGHT: 60 IN | DIASTOLIC BLOOD PRESSURE: 78 MMHG | SYSTOLIC BLOOD PRESSURE: 120 MMHG | HEART RATE: 90 BPM | WEIGHT: 121 LBS

## 2025-08-11 DIAGNOSIS — I45.10 UNSPECIFIED RIGHT BUNDLE-BRANCH BLOCK: ICD-10-CM

## 2025-08-11 DIAGNOSIS — G40.209 LOCALIZATION-RELATED (FOCAL) (PARTIAL) SYMPTOMATIC EPILEPSY AND EPILEPTIC SYNDROMES WITH COMPLEX PARTIAL SEIZURES, NOT INTRACTABLE, W/OUT STATUS EPILEPTICUS: ICD-10-CM

## 2025-08-11 DIAGNOSIS — Z98.41 CATARACT EXTRACTION STATUS, RIGHT EYE: Chronic | ICD-10-CM

## 2025-08-11 DIAGNOSIS — Z98.890 OTHER SPECIFIED POSTPROCEDURAL STATES: Chronic | ICD-10-CM

## 2025-08-11 DIAGNOSIS — T70.20XA UNSPECIFIED EFFECTS OF HIGH ALTITUDE, INITIAL ENCOUNTER: ICD-10-CM

## 2025-08-11 PROCEDURE — 70544 MR ANGIOGRAPHY HEAD W/O DYE: CPT

## 2025-08-11 PROCEDURE — 99214 OFFICE O/P EST MOD 30 MIN: CPT

## 2025-08-11 PROCEDURE — G2211 COMPLEX E/M VISIT ADD ON: CPT

## 2025-08-11 PROCEDURE — 70544 MR ANGIOGRAPHY HEAD W/O DYE: CPT | Mod: 26

## 2025-08-12 ENCOUNTER — APPOINTMENT (OUTPATIENT)
Dept: PULMONOLOGY | Facility: CLINIC | Age: 70
End: 2025-08-12
Payer: MEDICARE

## 2025-08-12 VITALS
WEIGHT: 122 LBS | SYSTOLIC BLOOD PRESSURE: 150 MMHG | HEART RATE: 63 BPM | TEMPERATURE: 97.8 F | DIASTOLIC BLOOD PRESSURE: 98 MMHG | HEIGHT: 60 IN | BODY MASS INDEX: 23.95 KG/M2 | OXYGEN SATURATION: 96 % | RESPIRATION RATE: 16 BRPM

## 2025-08-12 PROCEDURE — 99214 OFFICE O/P EST MOD 30 MIN: CPT

## 2025-08-20 ENCOUNTER — NON-APPOINTMENT (OUTPATIENT)
Age: 70
End: 2025-08-20

## 2025-08-20 ENCOUNTER — APPOINTMENT (OUTPATIENT)
Dept: NEUROSURGERY | Facility: CLINIC | Age: 70
End: 2025-08-20
Payer: MEDICARE

## 2025-08-20 VITALS
BODY MASS INDEX: 23.95 KG/M2 | HEART RATE: 74 BPM | RESPIRATION RATE: 16 BRPM | WEIGHT: 122 LBS | DIASTOLIC BLOOD PRESSURE: 79 MMHG | HEIGHT: 60 IN | SYSTOLIC BLOOD PRESSURE: 126 MMHG | OXYGEN SATURATION: 95 %

## 2025-08-20 DIAGNOSIS — I60.7 NONTRAUMATIC SUBARACHNOID HEMORRHAGE FROM UNSPECIFIED INTRACRANIAL ARTERY: ICD-10-CM

## 2025-08-20 PROCEDURE — 99214 OFFICE O/P EST MOD 30 MIN: CPT
